# Patient Record
Sex: FEMALE | Race: WHITE | Employment: OTHER | URBAN - METROPOLITAN AREA
[De-identification: names, ages, dates, MRNs, and addresses within clinical notes are randomized per-mention and may not be internally consistent; named-entity substitution may affect disease eponyms.]

---

## 2017-07-12 LAB
AVERAGE GLUCOSE: NORMAL
HBA1C MFR BLD: 6.6 %

## 2017-10-19 ENCOUNTER — OFFICE VISIT (OUTPATIENT)
Dept: FAMILY MEDICINE CLINIC | Age: 67
End: 2017-10-19

## 2017-10-19 VITALS
WEIGHT: 293 LBS | BODY MASS INDEX: 45.99 KG/M2 | DIASTOLIC BLOOD PRESSURE: 76 MMHG | HEIGHT: 67 IN | SYSTOLIC BLOOD PRESSURE: 126 MMHG | OXYGEN SATURATION: 96 % | HEART RATE: 81 BPM | RESPIRATION RATE: 20 BRPM

## 2017-10-19 DIAGNOSIS — E83.42 HYPOMAGNESEMIA: ICD-10-CM

## 2017-10-19 DIAGNOSIS — Z78.0 POST-MENOPAUSAL: ICD-10-CM

## 2017-10-19 DIAGNOSIS — Z12.31 ENCOUNTER FOR SCREENING MAMMOGRAM FOR BREAST CANCER: ICD-10-CM

## 2017-10-19 DIAGNOSIS — R79.89 ELEVATED LFTS: Chronic | ICD-10-CM

## 2017-10-19 DIAGNOSIS — D68.51 HOMOZYGOUS FACTOR V LEIDEN MUTATION (HCC): ICD-10-CM

## 2017-10-19 DIAGNOSIS — I82.5Z1 LOWER LEG DVT (DEEP VENOUS THROMBOEMBOLISM), CHRONIC, RIGHT (HCC): ICD-10-CM

## 2017-10-19 DIAGNOSIS — R60.0 EDEMA OF BOTH LEGS: ICD-10-CM

## 2017-10-19 DIAGNOSIS — I10 ESSENTIAL HYPERTENSION: ICD-10-CM

## 2017-10-19 DIAGNOSIS — E78.00 HYPERCHOLESTEROLEMIA: ICD-10-CM

## 2017-10-19 DIAGNOSIS — Z23 NEED FOR INFLUENZA VACCINATION: ICD-10-CM

## 2017-10-19 DIAGNOSIS — E55.9 VITAMIN D DEFICIENCY: ICD-10-CM

## 2017-10-19 DIAGNOSIS — E11.69 DIABETES MELLITUS TYPE 2 IN OBESE (HCC): Primary | ICD-10-CM

## 2017-10-19 DIAGNOSIS — E66.9 DIABETES MELLITUS TYPE 2 IN OBESE (HCC): Primary | ICD-10-CM

## 2017-10-19 DIAGNOSIS — R74.8 LOW SERUM HDL: Chronic | ICD-10-CM

## 2017-10-19 PROCEDURE — G8417 CALC BMI ABV UP PARAM F/U: HCPCS | Performed by: FAMILY MEDICINE

## 2017-10-19 PROCEDURE — G8400 PT W/DXA NO RESULTS DOC: HCPCS | Performed by: FAMILY MEDICINE

## 2017-10-19 PROCEDURE — 99205 OFFICE O/P NEW HI 60 MIN: CPT | Performed by: FAMILY MEDICINE

## 2017-10-19 PROCEDURE — 1090F PRES/ABSN URINE INCON ASSESS: CPT | Performed by: FAMILY MEDICINE

## 2017-10-19 PROCEDURE — G8484 FLU IMMUNIZE NO ADMIN: HCPCS | Performed by: FAMILY MEDICINE

## 2017-10-19 PROCEDURE — 4040F PNEUMOC VAC/ADMIN/RCVD: CPT | Performed by: FAMILY MEDICINE

## 2017-10-19 PROCEDURE — 3046F HEMOGLOBIN A1C LEVEL >9.0%: CPT | Performed by: FAMILY MEDICINE

## 2017-10-19 PROCEDURE — 3014F SCREEN MAMMO DOC REV: CPT | Performed by: FAMILY MEDICINE

## 2017-10-19 PROCEDURE — G8427 DOCREV CUR MEDS BY ELIG CLIN: HCPCS | Performed by: FAMILY MEDICINE

## 2017-10-19 PROCEDURE — 1036F TOBACCO NON-USER: CPT | Performed by: FAMILY MEDICINE

## 2017-10-19 PROCEDURE — 3017F COLORECTAL CA SCREEN DOC REV: CPT | Performed by: FAMILY MEDICINE

## 2017-10-19 PROCEDURE — 85610 PROTHROMBIN TIME: CPT | Performed by: FAMILY MEDICINE

## 2017-10-19 PROCEDURE — 1123F ACP DISCUSS/DSCN MKR DOCD: CPT | Performed by: FAMILY MEDICINE

## 2017-10-19 RX ORDER — ATORVASTATIN CALCIUM 40 MG/1
1 TABLET, FILM COATED ORAL DAILY
Refills: 5 | COMMUNITY
Start: 2017-10-12 | End: 2018-02-13 | Stop reason: SDUPTHER

## 2017-10-19 RX ORDER — BLOOD SUGAR DIAGNOSTIC
STRIP MISCELLANEOUS
Refills: 0 | COMMUNITY
Start: 2017-08-22 | End: 2017-11-14 | Stop reason: SDUPTHER

## 2017-10-19 RX ORDER — METOPROLOL SUCCINATE 50 MG/1
TABLET, EXTENDED RELEASE ORAL
Qty: 30 TABLET | Refills: 2 | Status: SHIPPED | OUTPATIENT
Start: 2017-10-19 | End: 2017-10-19 | Stop reason: SDUPTHER

## 2017-10-19 RX ORDER — METOPROLOL SUCCINATE 50 MG/1
TABLET, EXTENDED RELEASE ORAL
Refills: 0 | COMMUNITY
Start: 2017-09-07 | End: 2017-10-19 | Stop reason: SDUPTHER

## 2017-10-19 RX ORDER — WARFARIN SODIUM 6 MG/1
6 TABLET ORAL DAILY
Qty: 90 TABLET | Refills: 5 | Status: SHIPPED | OUTPATIENT
Start: 2017-10-19 | End: 2018-11-04 | Stop reason: SDUPTHER

## 2017-10-19 RX ORDER — AMLODIPINE BESYLATE 10 MG/1
TABLET ORAL
Refills: 6 | COMMUNITY
Start: 2017-10-11 | End: 2018-02-13 | Stop reason: SDUPTHER

## 2017-10-19 RX ORDER — INSULIN GLARGINE 100 [IU]/ML
INJECTION, SOLUTION SUBCUTANEOUS
Refills: 6 | COMMUNITY
Start: 2017-10-07 | End: 2018-01-22 | Stop reason: ALTCHOICE

## 2017-10-19 NOTE — PROGRESS NOTES
Subjective:      Patient ID: Jennifer Elmore is a 74 y.o. female. Chief Complaint   Patient presents with    3 Month Follow-Up     HPI    Diabetes mellitus type 2 in obese ([address]  Last A1c was 6.1-or 6.2. On Lantus 72 at bed and Humalog SSI. Checks BS TID. Essential hypertension  -     metoprolol succinate (TOPROL XL) 50 MG extended release tablet; TAKE 1 TAB BY MOUTH DAILY  Has been in good control. Has been in good control. Is eating chips and fast food. Hypercholesterolemia  Has been in good control on Atorvastatin 40 mg.  Diet- watches. Foods that are high = eggs, bread, processed meats, desserts. Post-menopausal  -     DEXA Bone Density Axial Skeleton; Future  Factor V Leiden, prothrombin gene mutation (HCC) homozygous  -     warfarin (COUMADIN) 6 MG tablet; Take 1 tablet by mouth daily Sat 1 1/2 tablets, Sun 1 Tablets, Monday, 1 1/2 tabs, tues  1 tabs,Wed 1 1/2 tabs, , Friday 1 tab  -     POCT INR  Lifetime warfarin per prior providers. Lower leg DVT (deep venous thromboembolism), chronic, right (HCC)  -     warfarin (COUMADIN) 6 MG tablet; Take 1 tablet by mouth daily Sat 1 1/2 tablets, Sun 1 Tablets, Monday, 1 1/2 tabs, tues  1 tabs,Wed 1 1/2 tabs, Thurs 1, Friday 1 tab  Edema of both legs  On Lasix 40 mg and Potassium 10 mEq daily. Swells at times. Wears hose. Encounter for screening mammogram for breast cancer  -     [facility] Digital Screen Bilateral [[id number]];  Future  Need for influenza vaccination  -     INFLUENZA, HIGH DOSE, 65 YRS +, IM, PF, PREFILL SYR, 0.5ML (FLUZONE HD)    Past Medical History:   Diagnosis Date    Diabetes mellitus type 2 in obese ([address]     Edema of both legs     Heterozygous factor V Leiden mutation ([facility] [facility] 1950    Hypercholesterolemia     Hypertension     Lower leg DVT (deep venous thromboembolism), chronic, right [facility])        Past Surgical History:   Procedure Laterality Date    APPENDECTOMY  1976     SECTION      3     CHOLECYSTECTOMY  2002    gangrenous    TUBAL LIGATION Bilateral 02/12/1980       Social History     Social History    Marital status:      Spouse name: N/A    Number of children: 3    Years of education: 12     Occupational History    Retired  7/28/17      Social History Main Topics    Smoking status: Never Smoker    Smokeless tobacco: Never Used    Alcohol use Yes      Comment: OCCASIONALLY 1 or less/1-2 wk    Drug use: No      Comment: No Mj experimentation    Sexual activity: No     Other Topics Concern    Not on file     Social History Narrative    No Exercise. Problems    DEEP VENOUS THROMBOPHLEBITIS (ICD-453.40)    TRANSAMINASES, SERUM, ELEVATED (ICD-790.4)    HYPOMAGNESEMIA (ICD-275.2)    Hx of SEPSIS (ICD-995.91)    DIABETES MELLITUS, TYPE II (ICD-250.00)    HYPERTENSION (ICD-401. 9)    ALOPECIA (ICD-704.00)    ENCOUNTER FOR LONG-TERM USE OF ANTICOAGULANTS (ICD-V58.61)    DERMATITIS (ICD-692. 9)    CELLULITIS (ICD-682. 9)    FACTOR V DEFICIENCY (ICD-286.3)       Family History   Problem Relation Age of Onset    Coronary Art Dis Father     Heart Surgery Father 48    Hypertension Father     Heart Attack Father        Allergies   Allergen Reactions    Nickel Nausea Only and Rash    Ibuprofen      Pt takes Warfarin       Current Outpatient Prescriptions   Medication Sig Dispense Refill    atorvastatin (LIPITOR) 40 MG tablet Take 1 tablet by mouth daily  5    amLODIPine (NORVASC) 10 MG tablet TK 1 T PO D  6    LANTUS 100 UNIT/ML injection vial ADM 72 UNITS SC Q NIGHT  6    metoprolol succinate (TOPROL XL) 50 MG extended release tablet TK 1 T PO D  0    ACCU-CHEK APURVA PLUS strip CHECK 3 TO 4 TIMES DAILY  0    insulin lispro (HUMALOG) 100 UNIT/ML injection vial Inject into the skin 3 times daily (before meals) INJECT UP TO 30 MINUTES BEFORE MEAL.  SLIDING SCALE: IF GLUCOSE IS 0-79 EAT FIRST 0-79, EAT FIRST THEN ;USE 9 UNITS,  USE 10 UNITS, -200 USE 12 UNITS, -250, USE 15 UNITS, GREATER THAN 250 USE 18      lisinopril (PRINIVIL;ZESTRIL) 40 MG tablet Take 40 mg by mouth daily.  furosemide (LASIX) 20 MG tablet Take 20 mg by mouth daily.  potassium chloride SA (K-DUR;KLOR-CON M) 10 MEQ tablet Take 10 mEq by mouth daily.  warfarin (COUMADIN) 6 MG tablet Take 6 mg by mouth daily. Sat 1 1/2 tablets, Sun 1 Tablets, Monday, 1 1/2 tabs, tues ! 1/2 tabs,Wed 1 1/2 tabs. No current facility-administered medications for this visit. Review of Systems Review of systems in history of present illness or scanned in under media tab. Objective:   Physical Exam   Constitutional: She appears well-developed and well-nourished. No distress. HENT:   Right Ear: Tympanic membrane, external ear and ear canal normal. Tympanic membrane is not retracted and not bulging. No middle ear effusion. Left Ear: Tympanic membrane, external ear and ear canal normal. Tympanic membrane is not retracted and not bulging. No middle ear effusion. Nose: Nose normal. No mucosal edema or rhinorrhea. Mouth/Throat: Uvula is midline, oropharynx is clear and moist and mucous membranes are normal. Mucous membranes are not pale, not dry and not cyanotic. She has dentures. No oral lesions. No uvula swelling or dental caries. No oropharyngeal exudate, posterior oropharyngeal edema, posterior oropharyngeal erythema or tonsillar abscesses. Eyes: Conjunctivae and EOM are normal. Pupils are equal, round, and reactive to light. Right eye exhibits no discharge and no exudate. Left eye exhibits no discharge and no exudate. Right conjunctiva is not injected. Left conjunctiva is not injected. No scleral icterus. Neck: Trachea normal and phonation normal. Neck supple. No tracheal tenderness present. No tracheal deviation present. No thyroid mass and no thyromegaly present. Cardiovascular: Normal rate, regular rhythm, S1 normal and S2 normal.   No extrasystoles are present.  Exam reveals no gallop, no S3, no S4, no distant heart sounds and no friction rub. No murmur heard. No systolic murmur is present    No diastolic murmur is present   Pulmonary/Chest: Effort normal and breath sounds normal. No stridor. No respiratory distress. She has no decreased breath sounds. She has no wheezes. She has no rhonchi. She has no rales. Abdominal: Normal appearance. She exhibits no distension, no pulsatile midline mass and no mass. There is no hepatosplenomegaly. There is no tenderness. There is no rigidity, no rebound, no guarding, no CVA tenderness, no tenderness at McBurney's point and negative Yo's sign. Musculoskeletal: She exhibits edema (2 + right and trace left). Lymphadenopathy:        Head (right side): No submandibular and no tonsillar adenopathy present. Head (left side): No submandibular and no tonsillar adenopathy present. She has no cervical adenopathy. Right cervical: No superficial cervical, no deep cervical and no posterior cervical adenopathy present. Left cervical: No superficial cervical, no deep cervical and no posterior cervical adenopathy present. She has no axillary adenopathy. Right axillary: No pectoral and no lateral adenopathy present. Left axillary: No pectoral and no lateral adenopathy present. Right: No supraclavicular adenopathy present. Left: No supraclavicular adenopathy present. Neurological: She is alert. She displays no tremor. She exhibits normal muscle tone. Coordination and gait normal.   Reflex Scores:       Patellar reflexes are 2+ on the right side and 2+ on the left side. Skin: Skin is warm and dry. No lesion noted. She is not diaphoretic. No cyanosis. No pallor. Nails show no clubbing. Psychiatric: She has a normal mood and affect. Her behavior is normal. Judgment normal. Cognition and memory are normal.   Nursing note and vitals reviewed.     Vitals:    10/19/17 1403   BP: 126/76   Site: less than 90 or if less than 100 and the you are light headed when you stand up. Call if the lower number (diastolic blood pressure) is more than 100. A much lower bottom number even in the 40's is not usually urgent. Hypercholesterolemia/mixed hyperlipidemia/low HDL  Fasting labs. Post-menopausal  -     DEXA Bone Density Axial Skeleton; Future  Need a vitamin D level. Factor V Leiden, prothrombin gene mutation (HCC)  -     warfarin (COUMADIN) 6 MG tablet; Take 1 tablet by mouth daily Sat 1 1/2 tablets, Sun 1 Tablets, Monday, 1 1/2 tabs, tues  1 tabs,Wed 1 1/2 tabs, Thurs 1, Friday 1 tab  -     POCT INR    Lower leg DVT (deep venous thromboembolism), chronic, right (HCC)  -     warfarin (COUMADIN) 6 MG tablet; Take 1 tablet by mouth daily Sat 1 1/2 tablets, Sun 1 Tablets, Monday, 1 1/2 tabs, tues  1 tabs,Wed 1 1/2 tabs, Thurs 1, Friday 1 tab    Edema of both legs  Keep leg elevated when sitting. Wear compression stockings as much as possible. If feet are down while sitting press toes and front of foot down then lift them firmly with heel on the ground. When standing gently rock from heels to standing on toe tips. These are calf pumps that move fluid out of the calf and back to the central circulation and prevent worsened swelling in the foot and calf. Encounter for screening mammogram for breast cancer  -     [facility] Digital Screen Bilateral [[id number]]; Future    Need for influenza vaccination  -     INFLUENZA, HIGH DOSE, 65 YRS +, IM, PF, PREFILL SYR, 0.5ML (FLUZONE HD)      The more active you stay the more independent you stay. The latest recommendation from the [facility] Service for exercise is 30 minutes brisk walking or the equivalent 5 times per week or aerobic levels of exercise 25 minutes 3 times a week (breathing slightly hard and sweating at room temperature are indicators of aerobic exercise). OR walking 10,000 steps/day counted on a pedometer or cell phone. Low serum HDL  -     Lipid Panel; Standing    Elevated LFTs  -     Comprehensive Metabolic Panel; Standing    Hypomagnesemia  -     Magnesium; Standing    Vitamin D deficiency  -     Vitamin D 25 Hydroxy;  Future

## 2017-10-20 LAB
INTERNATIONAL NORMALIZATION RATIO, POC: 1.1
PROTHROMBIN TIME, POC: 12.8

## 2017-10-30 ENCOUNTER — ANTI-COAG VISIT (OUTPATIENT)
Dept: FAMILY MEDICINE CLINIC | Age: 67
End: 2017-10-30

## 2017-10-30 DIAGNOSIS — I82.5Z1 LOWER LEG DVT (DEEP VENOUS THROMBOEMBOLISM), CHRONIC, RIGHT (HCC): Primary | ICD-10-CM

## 2017-10-30 DIAGNOSIS — Z79.01 ENCOUNTER FOR CURRENT LONG-TERM USE OF ANTICOAGULANTS: ICD-10-CM

## 2017-10-30 DIAGNOSIS — Z23 NEED FOR INFLUENZA VACCINATION: ICD-10-CM

## 2017-10-30 LAB
INTERNATIONAL NORMALIZATION RATIO, POC: 2
PROTHROMBIN TIME, POC: 23.8

## 2017-10-30 PROCEDURE — 90471 IMMUNIZATION ADMIN: CPT | Performed by: FAMILY MEDICINE

## 2017-10-30 PROCEDURE — 85610 PROTHROMBIN TIME: CPT | Performed by: FAMILY MEDICINE

## 2017-10-30 PROCEDURE — 90662 IIV NO PRSV INCREASED AG IM: CPT | Performed by: FAMILY MEDICINE

## 2017-11-05 PROBLEM — E83.42 HYPOMAGNESEMIA: Status: ACTIVE | Noted: 2017-11-05

## 2017-11-14 ENCOUNTER — TELEPHONE (OUTPATIENT)
Dept: FAMILY MEDICINE CLINIC | Age: 67
End: 2017-11-14

## 2017-11-14 DIAGNOSIS — E66.9 DIABETES MELLITUS TYPE 2 IN OBESE (HCC): ICD-10-CM

## 2017-11-14 DIAGNOSIS — E11.69 DIABETES MELLITUS TYPE 2 IN OBESE (HCC): ICD-10-CM

## 2017-11-14 RX ORDER — BLOOD SUGAR DIAGNOSTIC
STRIP MISCELLANEOUS
Qty: 100 EACH | Refills: 2 | Status: SHIPPED | OUTPATIENT
Start: 2017-11-14 | End: 2018-03-15 | Stop reason: SDUPTHER

## 2017-11-14 NOTE — TELEPHONE ENCOUNTER
Pt calling for refills - never been filled through  before    accuchek Gabby plus  test strips     humalog insulin - [id number] 10 mg      [facility]-[address], [address] [place] [address] [address] - P [phone] - F [phone]    Pt @  [phone]

## 2017-11-15 RX ORDER — BLOOD SUGAR DIAGNOSTIC
STRIP MISCELLANEOUS
Qty: 300 STRIP | Refills: 2 | OUTPATIENT
Start: 2017-11-15

## 2017-11-30 ENCOUNTER — NURSE ONLY (OUTPATIENT)
Dept: FAMILY MEDICINE CLINIC | Age: 67
End: 2017-11-30

## 2017-11-30 DIAGNOSIS — D68.51 HOMOZYGOUS FACTOR V LEIDEN MUTATION (HCC): ICD-10-CM

## 2017-11-30 DIAGNOSIS — I82.5Z1 LOWER LEG DVT (DEEP VENOUS THROMBOEMBOLISM), CHRONIC, RIGHT (HCC): ICD-10-CM

## 2017-11-30 LAB
INTERNATIONAL NORMALIZATION RATIO, POC: 3.6
PROTHROMBIN TIME, POC: 42.8

## 2017-11-30 PROCEDURE — 85610 PROTHROMBIN TIME: CPT | Performed by: FAMILY MEDICINE

## 2017-12-07 ENCOUNTER — ANTI-COAG VISIT (OUTPATIENT)
Dept: FAMILY MEDICINE CLINIC | Age: 67
End: 2017-12-07

## 2017-12-07 DIAGNOSIS — I82.5Z1 LOWER LEG DVT (DEEP VENOUS THROMBOEMBOLISM), CHRONIC, RIGHT (HCC): ICD-10-CM

## 2017-12-07 DIAGNOSIS — D68.51 HOMOZYGOUS FACTOR V LEIDEN MUTATION (HCC): ICD-10-CM

## 2017-12-07 LAB
INTERNATIONAL NORMALIZATION RATIO, POC: 2.5
PROTHROMBIN TIME, POC: 30.2

## 2017-12-07 PROCEDURE — 85610 PROTHROMBIN TIME: CPT | Performed by: FAMILY MEDICINE

## 2017-12-22 ENCOUNTER — ANTI-COAG VISIT (OUTPATIENT)
Dept: FAMILY MEDICINE CLINIC | Age: 67
End: 2017-12-22

## 2017-12-22 DIAGNOSIS — D68.51 HOMOZYGOUS FACTOR V LEIDEN MUTATION (HCC): ICD-10-CM

## 2017-12-22 DIAGNOSIS — I82.5Z1 LOWER LEG DVT (DEEP VENOUS THROMBOEMBOLISM), CHRONIC, RIGHT (HCC): ICD-10-CM

## 2017-12-22 LAB
INTERNATIONAL NORMALIZATION RATIO, POC: 3.7
PROTHROMBIN TIME, POC: 44.8

## 2017-12-22 PROCEDURE — 85610 PROTHROMBIN TIME: CPT | Performed by: FAMILY MEDICINE

## 2017-12-29 ENCOUNTER — ANTI-COAG VISIT (OUTPATIENT)
Dept: FAMILY MEDICINE CLINIC | Age: 67
End: 2017-12-29

## 2017-12-29 DIAGNOSIS — I82.5Z1 LOWER LEG DVT (DEEP VENOUS THROMBOEMBOLISM), CHRONIC, RIGHT (HCC): ICD-10-CM

## 2017-12-29 DIAGNOSIS — D68.51 HOMOZYGOUS FACTOR V LEIDEN MUTATION (HCC): ICD-10-CM

## 2017-12-29 LAB
INTERNATIONAL NORMALIZATION RATIO, POC: 2.9
PROTHROMBIN TIME, POC: 34.5

## 2017-12-29 PROCEDURE — 85610 PROTHROMBIN TIME: CPT | Performed by: FAMILY MEDICINE

## 2018-01-11 DIAGNOSIS — E87.6 DRUG-INDUCED HYPOKALEMIA: ICD-10-CM

## 2018-01-11 DIAGNOSIS — T50.905A DRUG-INDUCED HYPOKALEMIA: ICD-10-CM

## 2018-01-11 DIAGNOSIS — I10 ESSENTIAL HYPERTENSION: Primary | ICD-10-CM

## 2018-01-11 DIAGNOSIS — R60.0 EDEMA OF BOTH LEGS: ICD-10-CM

## 2018-01-11 RX ORDER — FUROSEMIDE 40 MG/1
TABLET ORAL
Qty: 30 TABLET | Refills: 0 | Status: SHIPPED | OUTPATIENT
Start: 2018-01-11 | End: 2018-02-10 | Stop reason: SDUPTHER

## 2018-01-11 RX ORDER — LISINOPRIL 40 MG/1
TABLET ORAL
Qty: 30 TABLET | Refills: 0 | Status: SHIPPED | OUTPATIENT
Start: 2018-01-11 | End: 2018-02-10 | Stop reason: SDUPTHER

## 2018-01-11 RX ORDER — POTASSIUM CHLORIDE 750 MG/1
TABLET, EXTENDED RELEASE ORAL
Qty: 30 TABLET | Refills: 0 | Status: SHIPPED | OUTPATIENT
Start: 2018-01-11 | End: 2018-02-10 | Stop reason: SDUPTHER

## 2018-01-12 ENCOUNTER — ANTI-COAG VISIT (OUTPATIENT)
Dept: FAMILY MEDICINE CLINIC | Age: 68
End: 2018-01-12

## 2018-01-12 DIAGNOSIS — D68.51 HOMOZYGOUS FACTOR V LEIDEN MUTATION (HCC): ICD-10-CM

## 2018-01-12 DIAGNOSIS — I82.5Z1 LOWER LEG DVT (DEEP VENOUS THROMBOEMBOLISM), CHRONIC, RIGHT (HCC): ICD-10-CM

## 2018-01-12 LAB
INTERNATIONAL NORMALIZATION RATIO, POC: 1.9
PROTHROMBIN TIME, POC: 22.4

## 2018-01-12 PROCEDURE — 85610 PROTHROMBIN TIME: CPT | Performed by: FAMILY MEDICINE

## 2018-01-22 ENCOUNTER — OFFICE VISIT (OUTPATIENT)
Dept: FAMILY MEDICINE CLINIC | Age: 68
End: 2018-01-22

## 2018-01-22 VITALS
SYSTOLIC BLOOD PRESSURE: 112 MMHG | RESPIRATION RATE: 16 BRPM | OXYGEN SATURATION: 97 % | HEIGHT: 66 IN | DIASTOLIC BLOOD PRESSURE: 64 MMHG | WEIGHT: 293 LBS | HEART RATE: 56 BPM | BODY MASS INDEX: 47.09 KG/M2

## 2018-01-22 DIAGNOSIS — Z78.0 POST-MENOPAUSAL: ICD-10-CM

## 2018-01-22 DIAGNOSIS — R79.89 ELEVATED LFTS: Chronic | ICD-10-CM

## 2018-01-22 DIAGNOSIS — I10 ESSENTIAL HYPERTENSION: Chronic | ICD-10-CM

## 2018-01-22 DIAGNOSIS — Z23 NEED FOR PROPHYLACTIC VACCINATION AGAINST DIPHTHERIA-TETANUS-PERTUSSIS (DTP): ICD-10-CM

## 2018-01-22 DIAGNOSIS — I82.5Z1 LOWER LEG DVT (DEEP VENOUS THROMBOEMBOLISM), CHRONIC, RIGHT (HCC): Chronic | ICD-10-CM

## 2018-01-22 DIAGNOSIS — R74.8 LOW SERUM HDL: Chronic | ICD-10-CM

## 2018-01-22 DIAGNOSIS — E11.69 DIABETES MELLITUS TYPE 2 IN OBESE (HCC): Primary | Chronic | ICD-10-CM

## 2018-01-22 DIAGNOSIS — E66.9 DIABETES MELLITUS TYPE 2 IN OBESE (HCC): Primary | Chronic | ICD-10-CM

## 2018-01-22 DIAGNOSIS — E78.00 HYPERCHOLESTEROLEMIA: Chronic | ICD-10-CM

## 2018-01-22 DIAGNOSIS — Z12.31 ENCOUNTER FOR SCREENING MAMMOGRAM FOR BREAST CANCER: ICD-10-CM

## 2018-01-22 DIAGNOSIS — D68.51 HOMOZYGOUS FACTOR V LEIDEN MUTATION (HCC): Chronic | ICD-10-CM

## 2018-01-22 DIAGNOSIS — E55.9 VITAMIN D DEFICIENCY: ICD-10-CM

## 2018-01-22 DIAGNOSIS — E83.42 HYPOMAGNESEMIA: Chronic | ICD-10-CM

## 2018-01-22 DIAGNOSIS — R60.0 EDEMA OF BOTH LEGS: Chronic | ICD-10-CM

## 2018-01-22 LAB
A/G RATIO: 1.3 (ref 1.1–2.2)
ALBUMIN SERPL-MCNC: 3.7 G/DL (ref 3.4–5)
ALP BLD-CCNC: 93 U/L (ref 40–129)
ALT SERPL-CCNC: 10 U/L (ref 10–40)
ANION GAP SERPL CALCULATED.3IONS-SCNC: 12 MMOL/L (ref 3–16)
AST SERPL-CCNC: 15 U/L (ref 15–37)
BILIRUB SERPL-MCNC: 1.4 MG/DL (ref 0–1)
BUN BLDV-MCNC: 20 MG/DL (ref 7–20)
CALCIUM SERPL-MCNC: 9.1 MG/DL (ref 8.3–10.6)
CHLORIDE BLD-SCNC: 103 MMOL/L (ref 99–110)
CHOLESTEROL, TOTAL: 164 MG/DL (ref 0–199)
CO2: 28 MMOL/L (ref 21–32)
CREAT SERPL-MCNC: 0.8 MG/DL (ref 0.6–1.2)
CREATININE URINE: 134.1 MG/DL (ref 28–259)
GFR AFRICAN AMERICAN: >60
GFR NON-AFRICAN AMERICAN: >60
GLOBULIN: 2.9 G/DL
GLUCOSE BLD-MCNC: 61 MG/DL (ref 70–99)
HBA1C MFR BLD: 7.2 %
HDLC SERPL-MCNC: 53 MG/DL (ref 40–60)
HOMOCYSTEINE: 19 UMOL/L (ref 0–10)
INTERNATIONAL NORMALIZATION RATIO, POC: 1.9
LDL CHOLESTEROL CALCULATED: 89 MG/DL
MAGNESIUM: 1.9 MG/DL (ref 1.8–2.4)
MICROALBUMIN UR-MCNC: <1.2 MG/DL
MICROALBUMIN/CREAT UR-RTO: NORMAL MG/G (ref 0–30)
POTASSIUM SERPL-SCNC: 4.1 MMOL/L (ref 3.5–5.1)
PROTHROMBIN TIME, POC: 22.9
SODIUM BLD-SCNC: 143 MMOL/L (ref 136–145)
TOTAL PROTEIN: 6.6 G/DL (ref 6.4–8.2)
TRIGL SERPL-MCNC: 110 MG/DL (ref 0–150)
VITAMIN D 25-HYDROXY: 13.2 NG/ML
VLDLC SERPL CALC-MCNC: 22 MG/DL

## 2018-01-22 PROCEDURE — 83036 HEMOGLOBIN GLYCOSYLATED A1C: CPT | Performed by: FAMILY MEDICINE

## 2018-01-22 PROCEDURE — 1036F TOBACCO NON-USER: CPT | Performed by: FAMILY MEDICINE

## 2018-01-22 PROCEDURE — 3045F PR MOST RECENT HEMOGLOBIN A1C LEVEL 7.0-9.0%: CPT | Performed by: FAMILY MEDICINE

## 2018-01-22 PROCEDURE — 85610 PROTHROMBIN TIME: CPT | Performed by: FAMILY MEDICINE

## 2018-01-22 PROCEDURE — 1123F ACP DISCUSS/DSCN MKR DOCD: CPT | Performed by: FAMILY MEDICINE

## 2018-01-22 PROCEDURE — G8427 DOCREV CUR MEDS BY ELIG CLIN: HCPCS | Performed by: FAMILY MEDICINE

## 2018-01-22 PROCEDURE — 3017F COLORECTAL CA SCREEN DOC REV: CPT | Performed by: FAMILY MEDICINE

## 2018-01-22 PROCEDURE — 4040F PNEUMOC VAC/ADMIN/RCVD: CPT | Performed by: FAMILY MEDICINE

## 2018-01-22 PROCEDURE — 1090F PRES/ABSN URINE INCON ASSESS: CPT | Performed by: FAMILY MEDICINE

## 2018-01-22 PROCEDURE — 36415 COLL VENOUS BLD VENIPUNCTURE: CPT | Performed by: FAMILY MEDICINE

## 2018-01-22 PROCEDURE — G8417 CALC BMI ABV UP PARAM F/U: HCPCS | Performed by: FAMILY MEDICINE

## 2018-01-22 PROCEDURE — G8484 FLU IMMUNIZE NO ADMIN: HCPCS | Performed by: FAMILY MEDICINE

## 2018-01-22 PROCEDURE — G8400 PT W/DXA NO RESULTS DOC: HCPCS | Performed by: FAMILY MEDICINE

## 2018-01-22 PROCEDURE — 99215 OFFICE O/P EST HI 40 MIN: CPT | Performed by: FAMILY MEDICINE

## 2018-01-22 PROCEDURE — 3014F SCREEN MAMMO DOC REV: CPT | Performed by: FAMILY MEDICINE

## 2018-01-22 RX ORDER — CALCIUM CARB/VITAMIN D3/VIT K1 500-100-40
TABLET,CHEWABLE ORAL
Refills: 2 | COMMUNITY
Start: 2018-01-07 | End: 2018-08-06 | Stop reason: SDUPTHER

## 2018-01-22 RX ORDER — LANCETS
EACH MISCELLANEOUS
Refills: 10 | COMMUNITY
Start: 2018-01-10 | End: 2019-08-02 | Stop reason: SDUPTHER

## 2018-01-22 ASSESSMENT — PATIENT HEALTH QUESTIONNAIRE - PHQ9
2. FEELING DOWN, DEPRESSED OR HOPELESS: 0
1. LITTLE INTEREST OR PLEASURE IN DOING THINGS: 0
SUM OF ALL RESPONSES TO PHQ QUESTIONS 1-9: 0
SUM OF ALL RESPONSES TO PHQ9 QUESTIONS 1 & 2: 0

## 2018-01-22 NOTE — PROGRESS NOTES
 ACCU-CHEK APURVA PLUS strip CHECK 3 TO 4 TIMES DAILY 100 each 2    insulin lispro (HUMALOG) 100 UNIT/ML injection vial INJECT UP TO 30 MINUTES BEFORE MEAL. SLIDING SCALE: IF GLUCOSE IS 0-79 EAT FIRST THEN: USE 9 UNITS,  USE 10 UNITS, -200 USE 12 UNITS, -250, USE 15 UNITS, GREATER THAN 250 USE 18 1 vial 3    atorvastatin (LIPITOR) 40 MG tablet Take 1 tablet by mouth daily  5    amLODIPine (NORVASC) 10 MG tablet TK 1 T PO D  6    LANTUS 100 UNIT/ML injection vial ADM 72 UNITS SC Q NIGHT  6    warfarin (COUMADIN) 6 MG tablet Take 1 tablet by mouth daily Sat 1 1/2 tablets, Sun 1 Tablets, Monday, 1 1/2 tabs, tues  1 tabs,Wed 1 1/2 tabs, Thurs 1, Friday 1 tab 90 tablet 5    metoprolol succinate (TOPROL XL) 50 MG extended release tablet TAKE 1 TABLET BY MOUTH DAILY 90 tablet 0     No current facility-administered medications on file prior to visit. Review of Systems    Objective:   Physical Exam   Constitutional: She appears well-developed. No distress. Eyes: Conjunctivae are normal. Right eye exhibits no discharge. Left eye exhibits no discharge. No scleral icterus. Neck: Trachea normal and phonation normal. Neck supple. No JVD present. Carotid bruit is not present. No tracheal deviation present. No thyroid mass and no thyromegaly present. Cardiovascular: Normal rate, regular rhythm, S1 normal, S2 normal and normal heart sounds. No extrasystoles are present. Exam reveals no gallop, no S3, no S4, no distant heart sounds and no friction rub. No murmur heard. Pulmonary/Chest: Effort normal and breath sounds normal. No respiratory distress. She has no decreased breath sounds. She has no wheezes. She has no rhonchi. She has no rales. Musculoskeletal: She exhibits edema (2+ right). Lymphadenopathy:        Head (right side): No submandibular and no tonsillar adenopathy present. Head (left side): No submandibular and no tonsillar adenopathy present.      She has no cervical adenopathy. Right cervical: No superficial cervical, no deep cervical and no posterior cervical adenopathy present. Left cervical: No superficial cervical, no deep cervical and no posterior cervical adenopathy present. Neurological: She is alert. Reflex Scores:       Patellar reflexes are 2+ on the right side and 2+ on the left side. Skin: Skin is warm and dry. She is not diaphoretic. No cyanosis. No pallor. Nails show no clubbing. Psychiatric: She has a normal mood and affect. Her speech is normal and behavior is normal. Judgment normal. Cognition and memory are normal.   Nursing note and vitals reviewed. BP Readings from Last 3 Encounters:   01/22/18 112/64   10/19/17 126/76   05/10/14 139/75     Pulse Readings from Last 3 Encounters:   01/22/18 56   10/19/17 81   05/10/14 75     Wt Readings from Last 3 Encounters:   01/22/18 (!) 308 lb 6.4 oz (139.9 kg)   10/19/17 (!) 304 lb 9.6 oz (138.2 kg)   05/10/14 245 lb (111.1 kg)     Body mass index is 50.54 kg/m². Results for POC orders placed in visit on 01/22/18   POCT glycosylated hemoglobin (Hb A1C)   Result Value Ref Range    Hemoglobin A1C 7.2 %   POCT INR   Result Value Ref Range    INR 1.9     Protime 22.9     Impression    POCT/INR PERFORMED SLIGHTLY OUT OF NML RANGE PT ADVISED PER DR. Diogo Landry TO TAKE 9MG TOMORROW NIGHT THEN RESUME NML DOSE RECHECK IN X4 WEEKS. SG     Assessment:      1. Diabetes mellitus type 2 in obese ([facility] [address]    2. Hypercholesterolemia    3. Essential hypertension    4. Hypomagnesemia    5. Low serum HDL    6. Homozygous Factor V Leiden mutation ([facility] [address]    7. Edema of both legs    8. Lower leg DVT (deep venous thromboembolism), chronic, right (HCC)    9. Elevated LFTs    10. Need for prophylactic vaccination against diphtheria-tetanus-pertussis (DTP)    11. Encounter for screening mammogram for breast cancer    12. Post-menopausal    13.  Vitamin D deficiency          Plan:      - Counseling More Than 50% of the 40 min

## 2018-01-22 NOTE — PATIENT INSTRUCTIONS
Jarett Sharif was seen today for diabetes. Diagnoses and all orders for this visit:    Diabetes mellitus type 2 in obese ([facility] [address]  -     Microalbumin / creatinine urine ratio; Future  -     POCT glycosylated hemoglobin (Hb A1C)  -     Insulin Degludec (TRESIBA FLEXTOUCH) 100 UNIT/ML SOPN; Inject 72 Units into the skin Daily with supper - continue same sliding scale. Based on your recent lab values, your Diabetes is unstable. Estimated Average Glucose (eAG) is a new way to understand how well you're managing your diabetes. How does it relate to A1C? If you have diabetes, you may know about the A1c test that tells caban your average blood glucose over the past 2 to 3 months. A1C is reported as a percent (for example, 7%). Now we have a new way to report A1C called estimated average glucose, or eAG. eAG uses the same units (mg/dl) as your glucose meters. Why use eAG? Using eAG may help you get a better idea of how well you are taking care of your diabetes. And that can help you and your health care provider know what changes you may need to make to be as healthy as possible. What's your number? Lab Results   Component Value Date    LABA1C 7.2 01/22/2018   · We will see you at your next scheduled appointment  · Make sure you are taking your medications regularly    -     Blood sugar goal is  before a meal.  Less than 180 1-2 hours after a meal.  100-140 at bedtime. The Hemoglobin A1c goal for good control to avoid diabetic complications like stroke, heart attacks, amputations, kidney dialysis is an A1c of 6.4 or less. Up to 6.9 is considered fair control. Hypercholesterolemia  -     Lipid Panel     Essential hypertension  -     Good control.  -     Continue meds and lifestyle control. Recommend a home cuff. Hypomagnesemia  Check level.     Low serum HDL  -     Lipid Panel    Homozygous Factor V Leiden mutation ([facility] [address] / Lower leg DVT (deep venous thromboembolism), chronic, right ([facility]  Call if sick and medicines are not staying down. Edema of both legs  Keep leg elevated when sitting. Wear compression stockings as much as possible. If feet are down while sitting press toes and front of foot down then lift them firmly with heel on the ground. When standing gently rock from heels to standing on toe tips. These are calf pumps that move fluid out of the calf and back to the central circulation and prevent worsened swelling in the foot and calf. Elevated LFTs  Recheck. Need for prophylactic vaccination against diphtheria-tetanus-pertussis (DTP)  -     Tdap (ADACEL) 5-2-15.5 LF-MCG/0.5 injection; Inject 0.5 mLs into the muscle once for 1 dose    Encounter for screening mammogram for breast cancer  -     [facility] Digital Screen Bilateral [[id number]]; Future    Post-menopausal  -     DEXA Bone Density Axial Skeleton; Future    Vitamin D deficiency  IF you have low level of vitamin D. This is associated with: Increased risk of death from cardiovascular disease, cognitive impairment in older adults, severe asthma in children, cancer especially breast and colon. Vitamin D may also have a role in treatment of diabetes and prediabetes, high blood pressure, psoriasis and multiple sclerosis. Vitamin D deficiency weakens bones leading to rickets in children and osteoporosis in adults, increases risk of infections, and leads to more skin problems. The primary symptoms of deficiency are often muscle aches and weakness as well as fatigue often mistaken for natural aging. Will notify you if you need this. Other orders  -     Cancel:  DIABETES FOOT EXAM - will do at next visit.

## 2018-01-24 DIAGNOSIS — E72.11 HOMOCYSTINEMIA (HCC): Primary | ICD-10-CM

## 2018-01-24 DIAGNOSIS — E55.9 VITAMIN D DEFICIENCY: ICD-10-CM

## 2018-01-24 PROBLEM — R79.83 HOMOCYSTINEMIA: Status: ACTIVE | Noted: 2018-01-24

## 2018-01-24 PROBLEM — R79.83 HOMOCYSTINEMIA: Chronic | Status: ACTIVE | Noted: 2018-01-22

## 2018-01-24 RX ORDER — FOLIC ACID 1 MG/1
1 TABLET ORAL DAILY
Qty: 90 TABLET | Refills: 3 | Status: SHIPPED | OUTPATIENT
Start: 2018-01-24 | End: 2019-02-15 | Stop reason: SDUPTHER

## 2018-01-31 ENCOUNTER — TELEPHONE (OUTPATIENT)
Dept: FAMILY MEDICINE CLINIC | Age: 68
End: 2018-01-31

## 2018-01-31 DIAGNOSIS — E66.9 DIABETES MELLITUS TYPE 2 IN OBESE (HCC): Primary | Chronic | ICD-10-CM

## 2018-01-31 DIAGNOSIS — E11.69 DIABETES MELLITUS TYPE 2 IN OBESE (HCC): Primary | Chronic | ICD-10-CM

## 2018-02-01 NOTE — TELEPHONE ENCOUNTER
She is wondering if she supposed to take her sliding scale Humalog along with a [place]. Explained that she is. The sliding scale Humalog as twice a day. The [place] is just before dinner. By taking it before dinner it will lower the postprandial sugar after dinner and help prevent low sugars overnight or the following morning. She is also asking about the folic acid was at for? 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 She will start 1 mg once daily as instructed. Her previous needles or not for an insulin pen they were for a syringe. She needs pen needles. Called [facility]. They suggested 31-gauge ×5 mm. E scribed number 100 with 3 additional refills. Patient was told that she can come in and be instructed on how to use her a Tresiba pen tomorrow.

## 2018-02-10 DIAGNOSIS — T50.905A DRUG-INDUCED HYPOKALEMIA: ICD-10-CM

## 2018-02-10 DIAGNOSIS — R60.0 EDEMA OF BOTH LEGS: ICD-10-CM

## 2018-02-10 DIAGNOSIS — I10 ESSENTIAL HYPERTENSION: ICD-10-CM

## 2018-02-10 DIAGNOSIS — E87.6 DRUG-INDUCED HYPOKALEMIA: ICD-10-CM

## 2018-02-12 RX ORDER — POTASSIUM CHLORIDE 750 MG/1
TABLET, EXTENDED RELEASE ORAL
Qty: 30 TABLET | Refills: 2 | Status: SHIPPED | OUTPATIENT
Start: 2018-02-12 | End: 2018-06-16 | Stop reason: SDUPTHER

## 2018-02-12 RX ORDER — FUROSEMIDE 40 MG/1
TABLET ORAL
Qty: 30 TABLET | Refills: 2 | Status: SHIPPED | OUTPATIENT
Start: 2018-02-12 | End: 2018-06-16 | Stop reason: SDUPTHER

## 2018-02-12 RX ORDER — LISINOPRIL 40 MG/1
TABLET ORAL
Qty: 30 TABLET | Refills: 2 | Status: SHIPPED | OUTPATIENT
Start: 2018-02-12 | End: 2018-06-16 | Stop reason: SDUPTHER

## 2018-02-13 DIAGNOSIS — R74.8 LOW SERUM HDL: Chronic | ICD-10-CM

## 2018-02-13 DIAGNOSIS — I10 ESSENTIAL HYPERTENSION: ICD-10-CM

## 2018-02-13 DIAGNOSIS — E78.00 HYPERCHOLESTEROLEMIA: ICD-10-CM

## 2018-02-13 RX ORDER — ATORVASTATIN CALCIUM 40 MG/1
40 TABLET, FILM COATED ORAL NIGHTLY
Qty: 90 TABLET | Refills: 3 | Status: SHIPPED | OUTPATIENT
Start: 2018-02-13 | End: 2018-07-31 | Stop reason: SDUPTHER

## 2018-02-13 RX ORDER — AMLODIPINE BESYLATE 10 MG/1
10 TABLET ORAL DAILY
Qty: 90 TABLET | Refills: 1 | Status: SHIPPED | OUTPATIENT
Start: 2018-02-13 | End: 2018-07-31 | Stop reason: SDUPTHER

## 2018-02-22 ENCOUNTER — ANTI-COAG VISIT (OUTPATIENT)
Dept: FAMILY MEDICINE CLINIC | Age: 68
End: 2018-02-22

## 2018-02-22 DIAGNOSIS — Z23 NEED FOR PNEUMOCOCCAL VACCINE: Primary | ICD-10-CM

## 2018-02-22 DIAGNOSIS — D68.51 HOMOZYGOUS FACTOR V LEIDEN MUTATION (HCC): ICD-10-CM

## 2018-02-22 DIAGNOSIS — I82.5Z1 LOWER LEG DVT (DEEP VENOUS THROMBOEMBOLISM), CHRONIC, RIGHT (HCC): ICD-10-CM

## 2018-02-22 LAB
INTERNATIONAL NORMALIZATION RATIO, POC: 1.9
PROTHROMBIN TIME, POC: 22.4

## 2018-02-22 PROCEDURE — G0009 ADMIN PNEUMOCOCCAL VACCINE: HCPCS | Performed by: FAMILY MEDICINE

## 2018-02-22 PROCEDURE — 85610 PROTHROMBIN TIME: CPT | Performed by: FAMILY MEDICINE

## 2018-02-22 PROCEDURE — 90670 PCV13 VACCINE IM: CPT | Performed by: FAMILY MEDICINE

## 2018-03-08 ENCOUNTER — ANTI-COAG VISIT (OUTPATIENT)
Dept: FAMILY MEDICINE CLINIC | Age: 68
End: 2018-03-08

## 2018-03-08 DIAGNOSIS — I82.5Z1 LOWER LEG DVT (DEEP VENOUS THROMBOEMBOLISM), CHRONIC, RIGHT (HCC): ICD-10-CM

## 2018-03-08 DIAGNOSIS — D68.51 HOMOZYGOUS FACTOR V LEIDEN MUTATION (HCC): ICD-10-CM

## 2018-03-08 LAB
INTERNATIONAL NORMALIZATION RATIO, POC: 3.3
PROTHROMBIN TIME, POC: 39.3

## 2018-03-08 PROCEDURE — 85610 PROTHROMBIN TIME: CPT | Performed by: FAMILY MEDICINE

## 2018-03-15 ENCOUNTER — ANTI-COAG VISIT (OUTPATIENT)
Dept: FAMILY MEDICINE CLINIC | Age: 68
End: 2018-03-15

## 2018-03-15 DIAGNOSIS — D68.51 HOMOZYGOUS FACTOR V LEIDEN MUTATION (HCC): ICD-10-CM

## 2018-03-15 DIAGNOSIS — I82.5Z1 LOWER LEG DVT (DEEP VENOUS THROMBOEMBOLISM), CHRONIC, RIGHT (HCC): ICD-10-CM

## 2018-03-15 DIAGNOSIS — E66.9 DIABETES MELLITUS TYPE 2 IN OBESE (HCC): ICD-10-CM

## 2018-03-15 DIAGNOSIS — E11.69 DIABETES MELLITUS TYPE 2 IN OBESE (HCC): ICD-10-CM

## 2018-03-15 LAB
INTERNATIONAL NORMALIZATION RATIO, POC: 2.8
PROTHROMBIN TIME, POC: 33.7

## 2018-03-15 PROCEDURE — 85610 PROTHROMBIN TIME: CPT | Performed by: FAMILY MEDICINE

## 2018-03-15 RX ORDER — BLOOD SUGAR DIAGNOSTIC
STRIP MISCELLANEOUS
Qty: 100 STRIP | Refills: 0 | Status: SHIPPED | OUTPATIENT
Start: 2018-03-15 | End: 2018-03-28 | Stop reason: SDUPTHER

## 2018-03-28 DIAGNOSIS — E11.69 DIABETES MELLITUS TYPE 2 IN OBESE (HCC): ICD-10-CM

## 2018-03-28 DIAGNOSIS — E66.9 DIABETES MELLITUS TYPE 2 IN OBESE (HCC): ICD-10-CM

## 2018-03-28 RX ORDER — BLOOD SUGAR DIAGNOSTIC
STRIP MISCELLANEOUS
Qty: 100 STRIP | Refills: 0 | Status: SHIPPED | OUTPATIENT
Start: 2018-03-28 | End: 2018-04-10 | Stop reason: SDUPTHER

## 2018-03-29 ENCOUNTER — ANTI-COAG VISIT (OUTPATIENT)
Dept: FAMILY MEDICINE CLINIC | Age: 68
End: 2018-03-29

## 2018-03-29 DIAGNOSIS — I82.5Z1 LOWER LEG DVT (DEEP VENOUS THROMBOEMBOLISM), CHRONIC, RIGHT (HCC): ICD-10-CM

## 2018-03-29 DIAGNOSIS — Z79.01 ENCOUNTER FOR CURRENT LONG-TERM USE OF ANTICOAGULANTS: ICD-10-CM

## 2018-03-29 DIAGNOSIS — D68.51 HOMOZYGOUS FACTOR V LEIDEN MUTATION (HCC): ICD-10-CM

## 2018-03-29 LAB
INTERNATIONAL NORMALIZATION RATIO, POC: 2.9
PROTHROMBIN TIME, POC: 34.3

## 2018-03-29 PROCEDURE — 85610 PROTHROMBIN TIME: CPT | Performed by: FAMILY MEDICINE

## 2018-03-29 NOTE — PROGRESS NOTES
POCT PT/INR PERFORMED, WITHIN NORMAL RANGE. PER DR, CONTINUE SAME DOSE OF 9 MG MON, WED, SAT AND 6 MG ALL OTHER DAYS. REPEAT IN 4 WKS. PT INFORMED.  [facility]

## 2018-04-10 DIAGNOSIS — E66.9 DIABETES MELLITUS TYPE 2 IN OBESE (HCC): ICD-10-CM

## 2018-04-10 DIAGNOSIS — E11.69 DIABETES MELLITUS TYPE 2 IN OBESE (HCC): ICD-10-CM

## 2018-04-10 RX ORDER — BLOOD SUGAR DIAGNOSTIC
STRIP MISCELLANEOUS
Qty: 300 STRIP | Refills: 1 | Status: SHIPPED | OUTPATIENT
Start: 2018-04-10 | End: 2019-02-26 | Stop reason: SDUPTHER

## 2018-04-11 ENCOUNTER — OFFICE VISIT (OUTPATIENT)
Dept: FAMILY MEDICINE CLINIC | Age: 68
End: 2018-04-11

## 2018-04-11 VITALS
OXYGEN SATURATION: 98 % | HEART RATE: 72 BPM | TEMPERATURE: 97.7 F | SYSTOLIC BLOOD PRESSURE: 122 MMHG | HEIGHT: 66 IN | RESPIRATION RATE: 18 BRPM | DIASTOLIC BLOOD PRESSURE: 82 MMHG | WEIGHT: 293 LBS | BODY MASS INDEX: 47.09 KG/M2

## 2018-04-11 DIAGNOSIS — Z01.818 PRE-OP EXAMINATION: ICD-10-CM

## 2018-04-11 DIAGNOSIS — H26.9 CATARACT OF BOTH EYES, UNSPECIFIED CATARACT TYPE: Primary | ICD-10-CM

## 2018-04-11 PROCEDURE — 1090F PRES/ABSN URINE INCON ASSESS: CPT | Performed by: NURSE PRACTITIONER

## 2018-04-11 PROCEDURE — G8427 DOCREV CUR MEDS BY ELIG CLIN: HCPCS | Performed by: NURSE PRACTITIONER

## 2018-04-11 PROCEDURE — G8417 CALC BMI ABV UP PARAM F/U: HCPCS | Performed by: NURSE PRACTITIONER

## 2018-04-11 PROCEDURE — 3014F SCREEN MAMMO DOC REV: CPT | Performed by: NURSE PRACTITIONER

## 2018-04-11 PROCEDURE — 3017F COLORECTAL CA SCREEN DOC REV: CPT | Performed by: NURSE PRACTITIONER

## 2018-04-11 PROCEDURE — 99213 OFFICE O/P EST LOW 20 MIN: CPT | Performed by: NURSE PRACTITIONER

## 2018-04-26 ENCOUNTER — ANTI-COAG VISIT (OUTPATIENT)
Dept: FAMILY MEDICINE CLINIC | Age: 68
End: 2018-04-26

## 2018-04-26 DIAGNOSIS — I82.5Z1 LOWER LEG DVT (DEEP VENOUS THROMBOEMBOLISM), CHRONIC, RIGHT (HCC): ICD-10-CM

## 2018-04-26 DIAGNOSIS — D68.51 HOMOZYGOUS FACTOR V LEIDEN MUTATION (HCC): ICD-10-CM

## 2018-04-26 LAB
INTERNATIONAL NORMALIZATION RATIO, POC: 2.3
PROTHROMBIN TIME, POC: 28.1

## 2018-04-26 PROCEDURE — 85610 PROTHROMBIN TIME: CPT | Performed by: FAMILY MEDICINE

## 2018-05-29 ENCOUNTER — ANTI-COAG VISIT (OUTPATIENT)
Dept: FAMILY MEDICINE CLINIC | Age: 68
End: 2018-05-29

## 2018-05-29 DIAGNOSIS — I82.5Z1 LOWER LEG DVT (DEEP VENOUS THROMBOEMBOLISM), CHRONIC, RIGHT (HCC): ICD-10-CM

## 2018-05-29 DIAGNOSIS — D68.51 HOMOZYGOUS FACTOR V LEIDEN MUTATION (HCC): ICD-10-CM

## 2018-05-29 LAB
INTERNATIONAL NORMALIZATION RATIO, POC: 3.3
PROTHROMBIN TIME, POC: 39.5

## 2018-05-29 PROCEDURE — 85610 PROTHROMBIN TIME: CPT | Performed by: FAMILY MEDICINE

## 2018-06-01 DIAGNOSIS — E66.9 DIABETES MELLITUS TYPE 2 IN OBESE (HCC): ICD-10-CM

## 2018-06-01 DIAGNOSIS — E11.69 DIABETES MELLITUS TYPE 2 IN OBESE (HCC): ICD-10-CM

## 2018-06-05 ENCOUNTER — TELEPHONE (OUTPATIENT)
Dept: FAMILY MEDICINE CLINIC | Age: 68
End: 2018-06-05

## 2018-06-05 DIAGNOSIS — E66.9 DIABETES MELLITUS TYPE 2 IN OBESE (HCC): Primary | Chronic | ICD-10-CM

## 2018-06-05 DIAGNOSIS — E11.69 DIABETES MELLITUS TYPE 2 IN OBESE (HCC): Primary | Chronic | ICD-10-CM

## 2018-06-09 DIAGNOSIS — E66.9 DIABETES MELLITUS TYPE 2 IN OBESE (HCC): Chronic | ICD-10-CM

## 2018-06-09 DIAGNOSIS — E11.69 DIABETES MELLITUS TYPE 2 IN OBESE (HCC): Chronic | ICD-10-CM

## 2018-06-11 RX ORDER — INSULIN DEGLUDEC INJECTION 100 U/ML
INJECTION, SOLUTION SUBCUTANEOUS
Qty: 20 PEN | Refills: 0 | Status: SHIPPED | OUTPATIENT
Start: 2018-06-11 | End: 2018-09-06 | Stop reason: SDUPTHER

## 2018-06-16 DIAGNOSIS — E87.6 DRUG-INDUCED HYPOKALEMIA: ICD-10-CM

## 2018-06-16 DIAGNOSIS — R60.0 EDEMA OF BOTH LEGS: ICD-10-CM

## 2018-06-16 DIAGNOSIS — I10 ESSENTIAL HYPERTENSION: ICD-10-CM

## 2018-06-16 DIAGNOSIS — T50.905A DRUG-INDUCED HYPOKALEMIA: ICD-10-CM

## 2018-06-18 RX ORDER — LISINOPRIL 40 MG/1
TABLET ORAL
Qty: 30 TABLET | Refills: 0 | Status: SHIPPED | OUTPATIENT
Start: 2018-06-18 | End: 2018-07-13 | Stop reason: SDUPTHER

## 2018-06-18 RX ORDER — FUROSEMIDE 40 MG/1
TABLET ORAL
Qty: 30 TABLET | Refills: 0 | Status: SHIPPED | OUTPATIENT
Start: 2018-06-18 | End: 2018-07-13 | Stop reason: SDUPTHER

## 2018-06-18 RX ORDER — POTASSIUM CHLORIDE 750 MG/1
TABLET, EXTENDED RELEASE ORAL
Qty: 30 TABLET | Refills: 0 | Status: SHIPPED | OUTPATIENT
Start: 2018-06-18 | End: 2018-07-13 | Stop reason: SDUPTHER

## 2018-06-26 ENCOUNTER — ANTI-COAG VISIT (OUTPATIENT)
Dept: FAMILY MEDICINE CLINIC | Age: 68
End: 2018-06-26

## 2018-06-26 DIAGNOSIS — D68.51 HOMOZYGOUS FACTOR V LEIDEN MUTATION (HCC): ICD-10-CM

## 2018-06-26 DIAGNOSIS — I82.5Z1 LOWER LEG DVT (DEEP VENOUS THROMBOEMBOLISM), CHRONIC, RIGHT (HCC): ICD-10-CM

## 2018-06-26 LAB
INTERNATIONAL NORMALIZATION RATIO, POC: 3.1
PROTHROMBIN TIME, POC: 37.4

## 2018-06-26 PROCEDURE — 85610 PROTHROMBIN TIME: CPT | Performed by: FAMILY MEDICINE

## 2018-07-13 DIAGNOSIS — T50.905A DRUG-INDUCED HYPOKALEMIA: ICD-10-CM

## 2018-07-13 DIAGNOSIS — I10 ESSENTIAL HYPERTENSION: ICD-10-CM

## 2018-07-13 DIAGNOSIS — E87.6 DRUG-INDUCED HYPOKALEMIA: ICD-10-CM

## 2018-07-13 DIAGNOSIS — R60.0 EDEMA OF BOTH LEGS: ICD-10-CM

## 2018-07-13 RX ORDER — POTASSIUM CHLORIDE 750 MG/1
TABLET, EXTENDED RELEASE ORAL
Qty: 30 TABLET | Refills: 0 | Status: SHIPPED | OUTPATIENT
Start: 2018-07-13 | End: 2018-07-31 | Stop reason: SDUPTHER

## 2018-07-13 RX ORDER — LISINOPRIL 40 MG/1
TABLET ORAL
Qty: 30 TABLET | Refills: 0 | Status: SHIPPED | OUTPATIENT
Start: 2018-07-13 | End: 2018-07-31 | Stop reason: SDUPTHER

## 2018-07-13 RX ORDER — FUROSEMIDE 40 MG/1
TABLET ORAL
Qty: 30 TABLET | Refills: 0 | Status: SHIPPED | OUTPATIENT
Start: 2018-07-13 | End: 2018-07-31 | Stop reason: SDUPTHER

## 2018-07-24 ENCOUNTER — ANTI-COAG VISIT (OUTPATIENT)
Dept: FAMILY MEDICINE CLINIC | Age: 68
End: 2018-07-24

## 2018-07-24 DIAGNOSIS — I82.5Z1 LOWER LEG DVT (DEEP VENOUS THROMBOEMBOLISM), CHRONIC, RIGHT (HCC): ICD-10-CM

## 2018-07-24 DIAGNOSIS — D68.51 HOMOZYGOUS FACTOR V LEIDEN MUTATION (HCC): ICD-10-CM

## 2018-07-24 LAB
INTERNATIONAL NORMALIZATION RATIO, POC: 4
PROTHROMBIN TIME, POC: 48.3

## 2018-07-24 PROCEDURE — 85610 PROTHROMBIN TIME: CPT | Performed by: FAMILY MEDICINE

## 2018-07-31 ENCOUNTER — OFFICE VISIT (OUTPATIENT)
Dept: FAMILY MEDICINE CLINIC | Age: 68
End: 2018-07-31

## 2018-07-31 VITALS
WEIGHT: 293 LBS | RESPIRATION RATE: 16 BRPM | DIASTOLIC BLOOD PRESSURE: 84 MMHG | BODY MASS INDEX: 47.09 KG/M2 | HEART RATE: 66 BPM | OXYGEN SATURATION: 98 % | TEMPERATURE: 98.2 F | HEIGHT: 66 IN | SYSTOLIC BLOOD PRESSURE: 128 MMHG

## 2018-07-31 DIAGNOSIS — Z79.01 ENCOUNTER FOR CURRENT LONG-TERM USE OF ANTICOAGULANTS: ICD-10-CM

## 2018-07-31 DIAGNOSIS — E78.00 HYPERCHOLESTEROLEMIA: ICD-10-CM

## 2018-07-31 DIAGNOSIS — E87.6 DRUG-INDUCED HYPOKALEMIA: ICD-10-CM

## 2018-07-31 DIAGNOSIS — R74.8 LOW SERUM HDL: Chronic | ICD-10-CM

## 2018-07-31 DIAGNOSIS — E66.9 DIABETES MELLITUS TYPE 2 IN OBESE (HCC): Primary | Chronic | ICD-10-CM

## 2018-07-31 DIAGNOSIS — Z11.59 ENCOUNTER FOR HEPATITIS C SCREENING TEST FOR LOW RISK PATIENT: ICD-10-CM

## 2018-07-31 DIAGNOSIS — T50.905A DRUG-INDUCED HYPOKALEMIA: ICD-10-CM

## 2018-07-31 DIAGNOSIS — Z12.31 ENCOUNTER FOR SCREENING MAMMOGRAM FOR BREAST CANCER: ICD-10-CM

## 2018-07-31 DIAGNOSIS — R60.0 EDEMA OF BOTH LEGS: ICD-10-CM

## 2018-07-31 DIAGNOSIS — I10 ESSENTIAL HYPERTENSION: ICD-10-CM

## 2018-07-31 DIAGNOSIS — D68.51 HOMOZYGOUS FACTOR V LEIDEN MUTATION (HCC): ICD-10-CM

## 2018-07-31 DIAGNOSIS — E11.69 DIABETES MELLITUS TYPE 2 IN OBESE (HCC): Primary | Chronic | ICD-10-CM

## 2018-07-31 DIAGNOSIS — Z12.11 SCREENING FOR COLON CANCER: ICD-10-CM

## 2018-07-31 DIAGNOSIS — I82.5Z1 LOWER LEG DVT (DEEP VENOUS THROMBOEMBOLISM), CHRONIC, RIGHT (HCC): ICD-10-CM

## 2018-07-31 DIAGNOSIS — Z78.0 POST-MENOPAUSAL: ICD-10-CM

## 2018-07-31 DIAGNOSIS — Z23 NEED FOR PROPHYLACTIC VACCINATION AND INOCULATION AGAINST VARICELLA: ICD-10-CM

## 2018-07-31 LAB
HBA1C MFR BLD: 6.5 %
HEPATITIS C ANTIBODY INTERPRETATION: NORMAL
INTERNATIONAL NORMALIZATION RATIO, POC: 2.1
PROTHROMBIN TIME, POC: 25.7

## 2018-07-31 PROCEDURE — 1036F TOBACCO NON-USER: CPT | Performed by: NURSE PRACTITIONER

## 2018-07-31 PROCEDURE — G8427 DOCREV CUR MEDS BY ELIG CLIN: HCPCS | Performed by: NURSE PRACTITIONER

## 2018-07-31 PROCEDURE — G8417 CALC BMI ABV UP PARAM F/U: HCPCS | Performed by: NURSE PRACTITIONER

## 2018-07-31 PROCEDURE — 1123F ACP DISCUSS/DSCN MKR DOCD: CPT | Performed by: NURSE PRACTITIONER

## 2018-07-31 PROCEDURE — 83036 HEMOGLOBIN GLYCOSYLATED A1C: CPT | Performed by: NURSE PRACTITIONER

## 2018-07-31 PROCEDURE — 3017F COLORECTAL CA SCREEN DOC REV: CPT | Performed by: NURSE PRACTITIONER

## 2018-07-31 PROCEDURE — G8400 PT W/DXA NO RESULTS DOC: HCPCS | Performed by: NURSE PRACTITIONER

## 2018-07-31 PROCEDURE — 99213 OFFICE O/P EST LOW 20 MIN: CPT | Performed by: NURSE PRACTITIONER

## 2018-07-31 PROCEDURE — 2022F DILAT RTA XM EVC RTNOPTHY: CPT | Performed by: NURSE PRACTITIONER

## 2018-07-31 PROCEDURE — 3044F HG A1C LEVEL LT 7.0%: CPT | Performed by: NURSE PRACTITIONER

## 2018-07-31 PROCEDURE — 4040F PNEUMOC VAC/ADMIN/RCVD: CPT | Performed by: NURSE PRACTITIONER

## 2018-07-31 PROCEDURE — 85610 PROTHROMBIN TIME: CPT | Performed by: NURSE PRACTITIONER

## 2018-07-31 PROCEDURE — 1090F PRES/ABSN URINE INCON ASSESS: CPT | Performed by: NURSE PRACTITIONER

## 2018-07-31 PROCEDURE — 1101F PT FALLS ASSESS-DOCD LE1/YR: CPT | Performed by: NURSE PRACTITIONER

## 2018-07-31 RX ORDER — FUROSEMIDE 40 MG/1
TABLET ORAL
Qty: 90 TABLET | Refills: 0 | Status: SHIPPED | OUTPATIENT
Start: 2018-07-31 | End: 2018-11-04 | Stop reason: SDUPTHER

## 2018-07-31 RX ORDER — METOPROLOL SUCCINATE 50 MG/1
TABLET, EXTENDED RELEASE ORAL
Qty: 90 TABLET | Refills: 1 | Status: SHIPPED | OUTPATIENT
Start: 2018-07-31 | End: 2019-02-11 | Stop reason: SDUPTHER

## 2018-07-31 RX ORDER — ATORVASTATIN CALCIUM 40 MG/1
40 TABLET, FILM COATED ORAL NIGHTLY
Qty: 90 TABLET | Refills: 3 | Status: SHIPPED | OUTPATIENT
Start: 2018-07-31 | End: 2019-09-16 | Stop reason: SDUPTHER

## 2018-07-31 RX ORDER — POTASSIUM CHLORIDE 750 MG/1
TABLET, EXTENDED RELEASE ORAL
Qty: 90 TABLET | Refills: 0 | Status: SHIPPED | OUTPATIENT
Start: 2018-07-31 | End: 2018-11-04 | Stop reason: SDUPTHER

## 2018-07-31 RX ORDER — LISINOPRIL 40 MG/1
TABLET ORAL
Qty: 90 TABLET | Refills: 1 | Status: SHIPPED | OUTPATIENT
Start: 2018-07-31 | End: 2019-02-11 | Stop reason: SDUPTHER

## 2018-07-31 RX ORDER — AMLODIPINE BESYLATE 10 MG/1
10 TABLET ORAL DAILY
Qty: 90 TABLET | Refills: 1 | Status: SHIPPED | OUTPATIENT
Start: 2018-07-31 | End: 2019-03-13 | Stop reason: SDUPTHER

## 2018-07-31 ASSESSMENT — ENCOUNTER SYMPTOMS
EYES NEGATIVE: 1
GASTROINTESTINAL NEGATIVE: 1
ALLERGIC/IMMUNOLOGIC NEGATIVE: 1
RESPIRATORY NEGATIVE: 1

## 2018-08-07 RX ORDER — CALCIUM CARB/VITAMIN D3/VIT K1 500-100-40
TABLET,CHEWABLE ORAL
Qty: 300 EACH | Refills: 0 | Status: SHIPPED | OUTPATIENT
Start: 2018-08-07 | End: 2018-12-11 | Stop reason: SDUPTHER

## 2018-08-14 ENCOUNTER — ANTI-COAG VISIT (OUTPATIENT)
Dept: FAMILY MEDICINE CLINIC | Age: 68
End: 2018-08-14

## 2018-08-14 DIAGNOSIS — D68.51 HOMOZYGOUS FACTOR V LEIDEN MUTATION (HCC): ICD-10-CM

## 2018-08-14 DIAGNOSIS — I82.5Z1 LOWER LEG DVT (DEEP VENOUS THROMBOEMBOLISM), CHRONIC, RIGHT (HCC): ICD-10-CM

## 2018-08-14 LAB
INTERNATIONAL NORMALIZATION RATIO, POC: 3.1
PROTHROMBIN TIME, POC: 36.7

## 2018-08-14 PROCEDURE — 85610 PROTHROMBIN TIME: CPT | Performed by: FAMILY MEDICINE

## 2018-08-28 ENCOUNTER — ANTI-COAG VISIT (OUTPATIENT)
Dept: FAMILY MEDICINE CLINIC | Age: 68
End: 2018-08-28

## 2018-08-28 DIAGNOSIS — D68.51 HOMOZYGOUS FACTOR V LEIDEN MUTATION (HCC): ICD-10-CM

## 2018-08-28 DIAGNOSIS — I82.5Z1 LOWER LEG DVT (DEEP VENOUS THROMBOEMBOLISM), CHRONIC, RIGHT (HCC): ICD-10-CM

## 2018-08-28 LAB
INTERNATIONAL NORMALIZATION RATIO, POC: 3.2
PROTHROMBIN TIME, POC: 38.7

## 2018-08-28 PROCEDURE — 85610 PROTHROMBIN TIME: CPT | Performed by: FAMILY MEDICINE

## 2018-09-06 DIAGNOSIS — E11.69 DIABETES MELLITUS TYPE 2 IN OBESE (HCC): Chronic | ICD-10-CM

## 2018-09-06 DIAGNOSIS — E66.9 DIABETES MELLITUS TYPE 2 IN OBESE (HCC): Chronic | ICD-10-CM

## 2018-09-06 RX ORDER — INSULIN DEGLUDEC INJECTION 100 U/ML
INJECTION, SOLUTION SUBCUTANEOUS
Qty: 25 PEN | Refills: 0 | Status: SHIPPED | OUTPATIENT
Start: 2018-09-06 | End: 2018-12-20 | Stop reason: SDUPTHER

## 2018-09-11 ENCOUNTER — ANTI-COAG VISIT (OUTPATIENT)
Dept: FAMILY MEDICINE CLINIC | Age: 68
End: 2018-09-11

## 2018-09-11 DIAGNOSIS — I82.5Z1 LOWER LEG DVT (DEEP VENOUS THROMBOEMBOLISM), CHRONIC, RIGHT (HCC): ICD-10-CM

## 2018-09-11 DIAGNOSIS — D68.51 HOMOZYGOUS FACTOR V LEIDEN MUTATION (HCC): ICD-10-CM

## 2018-09-11 LAB
INTERNATIONAL NORMALIZATION RATIO, POC: 3.5
PROTHROMBIN TIME, POC: 42.3

## 2018-09-11 PROCEDURE — 85610 PROTHROMBIN TIME: CPT | Performed by: FAMILY MEDICINE

## 2018-09-18 ENCOUNTER — ANTI-COAG VISIT (OUTPATIENT)
Dept: FAMILY MEDICINE CLINIC | Age: 68
End: 2018-09-18

## 2018-09-18 DIAGNOSIS — I82.5Z1 LOWER LEG DVT (DEEP VENOUS THROMBOEMBOLISM), CHRONIC, RIGHT (HCC): ICD-10-CM

## 2018-09-18 DIAGNOSIS — D68.51 HOMOZYGOUS FACTOR V LEIDEN MUTATION (HCC): ICD-10-CM

## 2018-09-18 LAB
INTERNATIONAL NORMALIZATION RATIO, POC: 2.6
PROTHROMBIN TIME, POC: 30.9

## 2018-09-18 PROCEDURE — 85610 PROTHROMBIN TIME: CPT | Performed by: FAMILY MEDICINE

## 2018-10-02 ENCOUNTER — ANTI-COAG VISIT (OUTPATIENT)
Dept: FAMILY MEDICINE CLINIC | Age: 68
End: 2018-10-02
Payer: MEDICARE

## 2018-10-02 DIAGNOSIS — D68.51 HOMOZYGOUS FACTOR V LEIDEN MUTATION (HCC): ICD-10-CM

## 2018-10-02 DIAGNOSIS — I82.5Z1 LOWER LEG DVT (DEEP VENOUS THROMBOEMBOLISM), CHRONIC, RIGHT (HCC): ICD-10-CM

## 2018-10-02 LAB
INTERNATIONAL NORMALIZATION RATIO, POC: 3.3
PROTHROMBIN TIME, POC: 39.3

## 2018-10-02 PROCEDURE — 85610 PROTHROMBIN TIME: CPT | Performed by: FAMILY MEDICINE

## 2018-10-09 ENCOUNTER — ANTI-COAG VISIT (OUTPATIENT)
Dept: FAMILY MEDICINE CLINIC | Age: 68
End: 2018-10-09
Payer: MEDICARE

## 2018-10-09 DIAGNOSIS — D68.51 HOMOZYGOUS FACTOR V LEIDEN MUTATION (HCC): ICD-10-CM

## 2018-10-09 DIAGNOSIS — I82.5Z1 LOWER LEG DVT (DEEP VENOUS THROMBOEMBOLISM), CHRONIC, RIGHT (HCC): ICD-10-CM

## 2018-10-09 LAB
INTERNATIONAL NORMALIZATION RATIO, POC: 3.3
PROTHROMBIN TIME, POC: 39.3

## 2018-10-09 PROCEDURE — 85610 PROTHROMBIN TIME: CPT | Performed by: FAMILY MEDICINE

## 2018-10-16 ENCOUNTER — ANTI-COAG VISIT (OUTPATIENT)
Dept: FAMILY MEDICINE CLINIC | Age: 68
End: 2018-10-16
Payer: MEDICARE

## 2018-10-16 DIAGNOSIS — I82.5Z1 LOWER LEG DVT (DEEP VENOUS THROMBOEMBOLISM), CHRONIC, RIGHT (HCC): ICD-10-CM

## 2018-10-16 DIAGNOSIS — D68.51 HOMOZYGOUS FACTOR V LEIDEN MUTATION (HCC): ICD-10-CM

## 2018-10-16 LAB
INR BLD: 2.78 (ref 0.86–1.14)
INTERNATIONAL NORMALIZATION RATIO, POC: 3.3
PROTHROMBIN TIME, POC: 40
PROTHROMBIN TIME: 31.7 SEC (ref 9.8–13)

## 2018-10-16 PROCEDURE — 85610 PROTHROMBIN TIME: CPT | Performed by: FAMILY MEDICINE

## 2018-10-16 PROCEDURE — 36415 COLL VENOUS BLD VENIPUNCTURE: CPT | Performed by: NURSE PRACTITIONER

## 2018-10-18 ENCOUNTER — TELEPHONE (OUTPATIENT)
Dept: FAMILY MEDICINE CLINIC | Age: 68
End: 2018-10-18

## 2018-10-26 ENCOUNTER — ANTI-COAG VISIT (OUTPATIENT)
Dept: FAMILY MEDICINE CLINIC | Age: 68
End: 2018-10-26
Payer: MEDICARE

## 2018-10-26 DIAGNOSIS — I82.5Z1 LOWER LEG DVT (DEEP VENOUS THROMBOEMBOLISM), CHRONIC, RIGHT (HCC): ICD-10-CM

## 2018-10-26 DIAGNOSIS — D68.51 HOMOZYGOUS FACTOR V LEIDEN MUTATION (HCC): ICD-10-CM

## 2018-10-26 LAB
INTERNATIONAL NORMALIZATION RATIO, POC: 3.8
PROTHROMBIN TIME, POC: 45.2

## 2018-10-26 PROCEDURE — 85610 PROTHROMBIN TIME: CPT | Performed by: FAMILY MEDICINE

## 2018-11-02 ENCOUNTER — ANTI-COAG VISIT (OUTPATIENT)
Dept: FAMILY MEDICINE CLINIC | Age: 68
End: 2018-11-02
Payer: MEDICARE

## 2018-11-02 DIAGNOSIS — D68.51 HOMOZYGOUS FACTOR V LEIDEN MUTATION (HCC): ICD-10-CM

## 2018-11-02 DIAGNOSIS — I82.5Z1 LOWER LEG DVT (DEEP VENOUS THROMBOEMBOLISM), CHRONIC, RIGHT (HCC): ICD-10-CM

## 2018-11-02 DIAGNOSIS — Z79.01 ENCOUNTER FOR CURRENT LONG-TERM USE OF ANTICOAGULANTS: ICD-10-CM

## 2018-11-02 LAB
INTERNATIONAL NORMALIZATION RATIO, POC: 2.3
PROTHROMBIN TIME, POC: 27.7

## 2018-11-02 PROCEDURE — 85610 PROTHROMBIN TIME: CPT | Performed by: FAMILY MEDICINE

## 2018-11-04 DIAGNOSIS — T50.905A DRUG-INDUCED HYPOKALEMIA: ICD-10-CM

## 2018-11-04 DIAGNOSIS — I82.5Z1 LOWER LEG DVT (DEEP VENOUS THROMBOEMBOLISM), CHRONIC, RIGHT (HCC): ICD-10-CM

## 2018-11-04 DIAGNOSIS — R60.0 EDEMA OF BOTH LEGS: ICD-10-CM

## 2018-11-04 DIAGNOSIS — D68.51 HOMOZYGOUS FACTOR V LEIDEN MUTATION (HCC): ICD-10-CM

## 2018-11-04 DIAGNOSIS — E87.6 DRUG-INDUCED HYPOKALEMIA: ICD-10-CM

## 2018-11-05 RX ORDER — FUROSEMIDE 40 MG/1
TABLET ORAL
Qty: 90 TABLET | Refills: 0 | Status: SHIPPED | OUTPATIENT
Start: 2018-11-05 | End: 2019-02-11 | Stop reason: SDUPTHER

## 2018-11-05 RX ORDER — WARFARIN SODIUM 6 MG/1
TABLET ORAL
Qty: 90 TABLET | Refills: 2 | Status: SHIPPED | OUTPATIENT
Start: 2018-11-05 | End: 2019-06-17 | Stop reason: SDUPTHER

## 2018-11-05 RX ORDER — POTASSIUM CHLORIDE 750 MG/1
TABLET, EXTENDED RELEASE ORAL
Qty: 90 TABLET | Refills: 0 | Status: SHIPPED | OUTPATIENT
Start: 2018-11-05 | End: 2019-02-11 | Stop reason: SDUPTHER

## 2018-11-14 ENCOUNTER — ANTI-COAG VISIT (OUTPATIENT)
Dept: FAMILY MEDICINE CLINIC | Age: 68
End: 2018-11-14
Payer: MEDICARE

## 2018-11-14 DIAGNOSIS — I82.5Z1 LOWER LEG DVT (DEEP VENOUS THROMBOEMBOLISM), CHRONIC, RIGHT (HCC): Primary | Chronic | ICD-10-CM

## 2018-11-14 LAB
INR BLD: 2.39 (ref 0.86–1.14)
PROTHROMBIN TIME: 27.2 SEC (ref 9.8–13)

## 2018-11-14 PROCEDURE — 36415 COLL VENOUS BLD VENIPUNCTURE: CPT | Performed by: NURSE PRACTITIONER

## 2018-11-20 ENCOUNTER — TELEPHONE (OUTPATIENT)
Dept: FAMILY MEDICINE CLINIC | Age: 68
End: 2018-11-20

## 2018-12-05 ENCOUNTER — TELEPHONE (OUTPATIENT)
Dept: FAMILY MEDICINE CLINIC | Age: 68
End: 2018-12-05

## 2018-12-05 NOTE — TELEPHONE ENCOUNTER
PT C/O NASAL DRAINAGE: yes  WHAT COLOR: no, COUGH: yes, BRINGING UP PHLEGM:  yes IF YES, WHAT COLOR:  Yellow , SORE THROAT:  yes, PND:  no, HEADACHE:  yes, EAR PAIN:  no, LEFT, RIGHT OR BOTH:  , S.O.B.:  no  WITH OR WITHOUT EXERTION:  Only when she coughs a lot, WHEEZING:  no, HEAD CONGESTION:  yes, CHEST CONGESTION:  yes, BODY ACHES:  no, VOMITTING no, DIARRHEA:  no, TEMP:  no  IF SO, HIGHEST TEMP:  , SYMPTOMS FOR HOW MANY DAYS:  Couple days, WHAT MEDS HAS PT TRIED:  no, MED ALLERGIES:  , VERIFY PHARMACY INFORMATION. APPT OFFERED:  No    Pt is diabetic and wants to know what OTC she can take so this will not get worse.       Please give to Thao Ernestine

## 2018-12-11 RX ORDER — CALCIUM CARB/VITAMIN D3/VIT K1 500-100-40
TABLET,CHEWABLE ORAL
Qty: 300 EACH | Refills: 0 | Status: SHIPPED | OUTPATIENT
Start: 2018-12-11 | End: 2019-05-07 | Stop reason: SDUPTHER

## 2018-12-12 ENCOUNTER — ANTI-COAG VISIT (OUTPATIENT)
Dept: FAMILY MEDICINE CLINIC | Age: 68
End: 2018-12-12
Payer: MEDICARE

## 2018-12-12 DIAGNOSIS — I82.5Z1 LOWER LEG DVT (DEEP VENOUS THROMBOEMBOLISM), CHRONIC, RIGHT (HCC): ICD-10-CM

## 2018-12-12 DIAGNOSIS — D68.51 HOMOZYGOUS FACTOR V LEIDEN MUTATION (HCC): ICD-10-CM

## 2018-12-12 LAB
INTERNATIONAL NORMALIZATION RATIO, POC: 2.2
PROTHROMBIN TIME, POC: 26.4

## 2018-12-12 PROCEDURE — 85610 PROTHROMBIN TIME: CPT | Performed by: FAMILY MEDICINE

## 2019-01-09 ENCOUNTER — ANTI-COAG VISIT (OUTPATIENT)
Dept: FAMILY MEDICINE CLINIC | Age: 69
End: 2019-01-09
Payer: MEDICARE

## 2019-01-09 DIAGNOSIS — D68.51 HOMOZYGOUS FACTOR V LEIDEN MUTATION (HCC): ICD-10-CM

## 2019-01-09 DIAGNOSIS — I82.5Z1 LOWER LEG DVT (DEEP VENOUS THROMBOEMBOLISM), CHRONIC, RIGHT (HCC): ICD-10-CM

## 2019-01-09 DIAGNOSIS — Z79.01 ENCOUNTER FOR CURRENT LONG-TERM USE OF ANTICOAGULANTS: ICD-10-CM

## 2019-01-09 LAB
INTERNATIONAL NORMALIZATION RATIO, POC: 2.5
PROTHROMBIN TIME, POC: 30.3

## 2019-01-09 PROCEDURE — 85610 PROTHROMBIN TIME: CPT | Performed by: FAMILY MEDICINE

## 2019-01-17 DIAGNOSIS — E11.69 DIABETES MELLITUS TYPE 2 IN OBESE (HCC): Chronic | ICD-10-CM

## 2019-01-17 DIAGNOSIS — E66.9 DIABETES MELLITUS TYPE 2 IN OBESE (HCC): Chronic | ICD-10-CM

## 2019-02-11 DIAGNOSIS — I10 ESSENTIAL HYPERTENSION: ICD-10-CM

## 2019-02-12 ENCOUNTER — OFFICE VISIT (OUTPATIENT)
Dept: FAMILY MEDICINE CLINIC | Age: 69
End: 2019-02-12
Payer: MEDICARE

## 2019-02-12 VITALS
WEIGHT: 293 LBS | OXYGEN SATURATION: 95 % | SYSTOLIC BLOOD PRESSURE: 112 MMHG | HEART RATE: 55 BPM | DIASTOLIC BLOOD PRESSURE: 60 MMHG | HEIGHT: 66 IN | BODY MASS INDEX: 47.09 KG/M2

## 2019-02-12 DIAGNOSIS — E78.00 HYPERCHOLESTEROLEMIA: Chronic | ICD-10-CM

## 2019-02-12 DIAGNOSIS — E66.9 DIABETES MELLITUS TYPE 2 IN OBESE (HCC): ICD-10-CM

## 2019-02-12 DIAGNOSIS — I10 ESSENTIAL HYPERTENSION: ICD-10-CM

## 2019-02-12 DIAGNOSIS — D68.51 HOMOZYGOUS FACTOR V LEIDEN MUTATION (HCC): ICD-10-CM

## 2019-02-12 DIAGNOSIS — E78.2 MIXED HYPERLIPIDEMIA: ICD-10-CM

## 2019-02-12 DIAGNOSIS — I82.5Z1 LOWER LEG DVT (DEEP VENOUS THROMBOEMBOLISM), CHRONIC, RIGHT (HCC): ICD-10-CM

## 2019-02-12 DIAGNOSIS — E11.69 DIABETES MELLITUS TYPE 2 IN OBESE (HCC): ICD-10-CM

## 2019-02-12 DIAGNOSIS — E66.01 MORBID OBESITY (HCC): ICD-10-CM

## 2019-02-12 DIAGNOSIS — Z12.11 COLON CANCER SCREENING: ICD-10-CM

## 2019-02-12 DIAGNOSIS — R60.0 EDEMA OF BOTH LEGS: ICD-10-CM

## 2019-02-12 DIAGNOSIS — E11.9 TYPE 2 DIABETES MELLITUS WITHOUT COMPLICATION, UNSPECIFIED WHETHER LONG TERM INSULIN USE (HCC): ICD-10-CM

## 2019-02-12 DIAGNOSIS — M81.0 POST-MENOPAUSAL OSTEOPOROSIS: ICD-10-CM

## 2019-02-12 DIAGNOSIS — R79.83 HOMOCYSTEINEMIA: ICD-10-CM

## 2019-02-12 DIAGNOSIS — Z12.39 BREAST CANCER SCREENING: ICD-10-CM

## 2019-02-12 LAB
HBA1C MFR BLD: 7.2 %
INTERNATIONAL NORMALIZATION RATIO, POC: 2.3
PROTHROMBIN TIME, POC: 27.7

## 2019-02-12 PROCEDURE — 4040F PNEUMOC VAC/ADMIN/RCVD: CPT | Performed by: NURSE PRACTITIONER

## 2019-02-12 PROCEDURE — 1123F ACP DISCUSS/DSCN MKR DOCD: CPT | Performed by: NURSE PRACTITIONER

## 2019-02-12 PROCEDURE — G8400 PT W/DXA NO RESULTS DOC: HCPCS | Performed by: NURSE PRACTITIONER

## 2019-02-12 PROCEDURE — G8427 DOCREV CUR MEDS BY ELIG CLIN: HCPCS | Performed by: NURSE PRACTITIONER

## 2019-02-12 PROCEDURE — G8484 FLU IMMUNIZE NO ADMIN: HCPCS | Performed by: NURSE PRACTITIONER

## 2019-02-12 PROCEDURE — 3045F PR MOST RECENT HEMOGLOBIN A1C LEVEL 7.0-9.0%: CPT | Performed by: NURSE PRACTITIONER

## 2019-02-12 PROCEDURE — 2022F DILAT RTA XM EVC RTNOPTHY: CPT | Performed by: NURSE PRACTITIONER

## 2019-02-12 PROCEDURE — 85610 PROTHROMBIN TIME: CPT | Performed by: NURSE PRACTITIONER

## 2019-02-12 PROCEDURE — 1036F TOBACCO NON-USER: CPT | Performed by: NURSE PRACTITIONER

## 2019-02-12 PROCEDURE — 3017F COLORECTAL CA SCREEN DOC REV: CPT | Performed by: NURSE PRACTITIONER

## 2019-02-12 PROCEDURE — 1101F PT FALLS ASSESS-DOCD LE1/YR: CPT | Performed by: NURSE PRACTITIONER

## 2019-02-12 PROCEDURE — 83036 HEMOGLOBIN GLYCOSYLATED A1C: CPT | Performed by: NURSE PRACTITIONER

## 2019-02-12 PROCEDURE — 99214 OFFICE O/P EST MOD 30 MIN: CPT | Performed by: NURSE PRACTITIONER

## 2019-02-12 PROCEDURE — G8417 CALC BMI ABV UP PARAM F/U: HCPCS | Performed by: NURSE PRACTITIONER

## 2019-02-12 PROCEDURE — 82044 UR ALBUMIN SEMIQUANTITATIVE: CPT | Performed by: NURSE PRACTITIONER

## 2019-02-12 PROCEDURE — 1090F PRES/ABSN URINE INCON ASSESS: CPT | Performed by: NURSE PRACTITIONER

## 2019-02-12 ASSESSMENT — PATIENT HEALTH QUESTIONNAIRE - PHQ9
SUM OF ALL RESPONSES TO PHQ9 QUESTIONS 1 & 2: 0
SUM OF ALL RESPONSES TO PHQ QUESTIONS 1-9: 0
2. FEELING DOWN, DEPRESSED OR HOPELESS: 0
1. LITTLE INTEREST OR PLEASURE IN DOING THINGS: 0
SUM OF ALL RESPONSES TO PHQ QUESTIONS 1-9: 0

## 2019-02-13 ENCOUNTER — NURSE ONLY (OUTPATIENT)
Dept: FAMILY MEDICINE CLINIC | Age: 69
End: 2019-02-13
Payer: MEDICARE

## 2019-02-13 DIAGNOSIS — I10 ESSENTIAL HYPERTENSION: ICD-10-CM

## 2019-02-13 DIAGNOSIS — E78.00 HYPERCHOLESTEROLEMIA: Chronic | ICD-10-CM

## 2019-02-13 DIAGNOSIS — E78.2 MIXED HYPERLIPIDEMIA: ICD-10-CM

## 2019-02-13 DIAGNOSIS — E11.9 TYPE 2 DIABETES MELLITUS WITHOUT COMPLICATION, UNSPECIFIED WHETHER LONG TERM INSULIN USE (HCC): ICD-10-CM

## 2019-02-13 DIAGNOSIS — R79.83 HOMOCYSTEINEMIA: ICD-10-CM

## 2019-02-13 DIAGNOSIS — R60.0 EDEMA OF BOTH LEGS: ICD-10-CM

## 2019-02-13 LAB
A/G RATIO: 1.2 (ref 1.1–2.2)
ALBUMIN SERPL-MCNC: 3.9 G/DL (ref 3.4–5)
ALP BLD-CCNC: 117 U/L (ref 40–129)
ALT SERPL-CCNC: 11 U/L (ref 10–40)
ANION GAP SERPL CALCULATED.3IONS-SCNC: 18 MMOL/L (ref 3–16)
AST SERPL-CCNC: 19 U/L (ref 15–37)
BILIRUB SERPL-MCNC: 1.4 MG/DL (ref 0–1)
BUN BLDV-MCNC: 19 MG/DL (ref 7–20)
CALCIUM SERPL-MCNC: 9.6 MG/DL (ref 8.3–10.6)
CHLORIDE BLD-SCNC: 104 MMOL/L (ref 99–110)
CHOLESTEROL, TOTAL: 163 MG/DL (ref 0–199)
CO2: 24 MMOL/L (ref 21–32)
CREAT SERPL-MCNC: 0.9 MG/DL (ref 0.6–1.2)
CREATININE URINE POCT: 200
GFR AFRICAN AMERICAN: >60
GFR NON-AFRICAN AMERICAN: >60
GLOBULIN: 3.3 G/DL
GLUCOSE BLD-MCNC: 59 MG/DL (ref 70–99)
HDLC SERPL-MCNC: 54 MG/DL (ref 40–60)
HOMOCYSTEINE: 16 UMOL/L (ref 0–10)
LDL CHOLESTEROL CALCULATED: 89 MG/DL
MICROALBUMIN/CREAT 24H UR: 10 MG/G{CREAT}
MICROALBUMIN/CREAT UR-RTO: <30
POTASSIUM SERPL-SCNC: 3.7 MMOL/L (ref 3.5–5.1)
SODIUM BLD-SCNC: 146 MMOL/L (ref 136–145)
TOTAL PROTEIN: 7.2 G/DL (ref 6.4–8.2)
TRIGL SERPL-MCNC: 100 MG/DL (ref 0–150)
VLDLC SERPL CALC-MCNC: 20 MG/DL

## 2019-02-13 PROCEDURE — 36415 COLL VENOUS BLD VENIPUNCTURE: CPT | Performed by: NURSE PRACTITIONER

## 2019-02-13 RX ORDER — METOPROLOL SUCCINATE 50 MG/1
TABLET, EXTENDED RELEASE ORAL
Qty: 90 TABLET | Refills: 0 | Status: SHIPPED | OUTPATIENT
Start: 2019-02-13 | End: 2019-05-14 | Stop reason: SDUPTHER

## 2019-02-13 RX ORDER — LISINOPRIL 40 MG/1
TABLET ORAL
Qty: 90 TABLET | Refills: 0 | Status: SHIPPED | OUTPATIENT
Start: 2019-02-13 | End: 2019-05-14 | Stop reason: SDUPTHER

## 2019-02-15 DIAGNOSIS — E72.11 HOMOCYSTINEMIA (HCC): Primary | Chronic | ICD-10-CM

## 2019-02-15 RX ORDER — FOLIC ACID 1 MG/1
1 TABLET ORAL DAILY
Qty: 90 TABLET | Refills: 0 | Status: SHIPPED | OUTPATIENT
Start: 2019-02-15 | End: 2020-04-03

## 2019-02-26 DIAGNOSIS — E66.9 DIABETES MELLITUS TYPE 2 IN OBESE (HCC): Chronic | ICD-10-CM

## 2019-02-26 DIAGNOSIS — E11.69 DIABETES MELLITUS TYPE 2 IN OBESE (HCC): Chronic | ICD-10-CM

## 2019-02-26 RX ORDER — BLOOD SUGAR DIAGNOSTIC
STRIP MISCELLANEOUS
Qty: 300 STRIP | Refills: 5 | Status: SHIPPED | OUTPATIENT
Start: 2019-02-26 | End: 2020-04-03

## 2019-02-26 RX ORDER — PEN NEEDLE, DIABETIC 31 GX5/16"
NEEDLE, DISPOSABLE MISCELLANEOUS
Qty: 100 EACH | Refills: 5 | Status: SHIPPED | OUTPATIENT
Start: 2019-02-26 | End: 2020-04-13

## 2019-03-05 ENCOUNTER — TELEPHONE (OUTPATIENT)
Dept: FAMILY MEDICINE CLINIC | Age: 69
End: 2019-03-05

## 2019-03-12 ENCOUNTER — ANTI-COAG VISIT (OUTPATIENT)
Dept: FAMILY MEDICINE CLINIC | Age: 69
End: 2019-03-12
Payer: MEDICARE

## 2019-03-12 DIAGNOSIS — I82.5Z1 LOWER LEG DVT (DEEP VENOUS THROMBOEMBOLISM), CHRONIC, RIGHT (HCC): ICD-10-CM

## 2019-03-12 DIAGNOSIS — D68.51 HOMOZYGOUS FACTOR V LEIDEN MUTATION (HCC): ICD-10-CM

## 2019-03-12 LAB
INTERNATIONAL NORMALIZATION RATIO, POC: 2.8
PROTHROMBIN TIME, POC: 33.7

## 2019-03-12 PROCEDURE — 85610 PROTHROMBIN TIME: CPT | Performed by: FAMILY MEDICINE

## 2019-03-13 DIAGNOSIS — I10 ESSENTIAL HYPERTENSION: ICD-10-CM

## 2019-03-13 RX ORDER — AMLODIPINE BESYLATE 10 MG/1
10 TABLET ORAL DAILY
Qty: 90 TABLET | Refills: 0 | Status: SHIPPED | OUTPATIENT
Start: 2019-03-13 | End: 2019-06-19 | Stop reason: SDUPTHER

## 2019-04-01 DIAGNOSIS — E11.69 DIABETES MELLITUS TYPE 2 IN OBESE (HCC): Chronic | ICD-10-CM

## 2019-04-01 DIAGNOSIS — E66.9 DIABETES MELLITUS TYPE 2 IN OBESE (HCC): Chronic | ICD-10-CM

## 2019-04-02 RX ORDER — INSULIN DEGLUDEC INJECTION 100 U/ML
INJECTION, SOLUTION SUBCUTANEOUS
Qty: 75 ML | Refills: 2 | Status: SHIPPED | OUTPATIENT
Start: 2019-04-02 | End: 2020-02-17

## 2019-04-11 ENCOUNTER — ANTI-COAG VISIT (OUTPATIENT)
Dept: FAMILY MEDICINE CLINIC | Age: 69
End: 2019-04-11
Payer: MEDICARE

## 2019-04-11 DIAGNOSIS — D68.51 HOMOZYGOUS FACTOR V LEIDEN MUTATION (HCC): ICD-10-CM

## 2019-04-11 DIAGNOSIS — I82.5Z1 LOWER LEG DVT (DEEP VENOUS THROMBOEMBOLISM), CHRONIC, RIGHT (HCC): ICD-10-CM

## 2019-04-11 DIAGNOSIS — Z79.01 ENCOUNTER FOR CURRENT LONG-TERM USE OF ANTICOAGULANTS: ICD-10-CM

## 2019-04-11 LAB
INTERNATIONAL NORMALIZATION RATIO, POC: 2.3
PROTHROMBIN TIME, POC: 27.9

## 2019-04-11 PROCEDURE — 85610 PROTHROMBIN TIME: CPT | Performed by: FAMILY MEDICINE

## 2019-04-25 DIAGNOSIS — E11.69 DIABETES MELLITUS TYPE 2 IN OBESE (HCC): Chronic | ICD-10-CM

## 2019-04-25 DIAGNOSIS — E66.9 DIABETES MELLITUS TYPE 2 IN OBESE (HCC): Chronic | ICD-10-CM

## 2019-05-07 RX ORDER — CALCIUM CARB/VITAMIN D3/VIT K1 500-100-40
TABLET,CHEWABLE ORAL
Qty: 100 EACH | Refills: 5 | Status: SHIPPED | OUTPATIENT
Start: 2019-05-07 | End: 2020-04-13

## 2019-05-09 ENCOUNTER — ANTI-COAG VISIT (OUTPATIENT)
Dept: FAMILY MEDICINE CLINIC | Age: 69
End: 2019-05-09
Payer: MEDICARE

## 2019-05-09 DIAGNOSIS — I82.5Z1 LOWER LEG DVT (DEEP VENOUS THROMBOEMBOLISM), CHRONIC, RIGHT (HCC): ICD-10-CM

## 2019-05-09 DIAGNOSIS — D68.51 HOMOZYGOUS FACTOR V LEIDEN MUTATION (HCC): ICD-10-CM

## 2019-05-09 DIAGNOSIS — Z79.01 ENCOUNTER FOR CURRENT LONG-TERM USE OF ANTICOAGULANTS: ICD-10-CM

## 2019-05-09 LAB
INTERNATIONAL NORMALIZATION RATIO, POC: 2.2
PROTHROMBIN TIME, POC: 26.9

## 2019-05-09 PROCEDURE — 85610 PROTHROMBIN TIME: CPT | Performed by: FAMILY MEDICINE

## 2019-05-14 DIAGNOSIS — R60.0 EDEMA OF BOTH LEGS: ICD-10-CM

## 2019-05-14 DIAGNOSIS — T50.905A DRUG-INDUCED HYPOKALEMIA: ICD-10-CM

## 2019-05-14 DIAGNOSIS — I10 ESSENTIAL HYPERTENSION: ICD-10-CM

## 2019-05-14 DIAGNOSIS — E87.6 DRUG-INDUCED HYPOKALEMIA: ICD-10-CM

## 2019-05-15 RX ORDER — FUROSEMIDE 40 MG/1
TABLET ORAL
Qty: 90 TABLET | Refills: 0 | Status: SHIPPED | OUTPATIENT
Start: 2019-05-15 | End: 2019-07-15 | Stop reason: SDUPTHER

## 2019-05-15 RX ORDER — POTASSIUM CHLORIDE 750 MG/1
TABLET, EXTENDED RELEASE ORAL
Qty: 90 TABLET | Refills: 0 | Status: SHIPPED | OUTPATIENT
Start: 2019-05-15 | End: 2019-08-14 | Stop reason: SDUPTHER

## 2019-05-15 RX ORDER — LISINOPRIL 40 MG/1
TABLET ORAL
Qty: 90 TABLET | Refills: 0 | Status: SHIPPED | OUTPATIENT
Start: 2019-05-15 | End: 2019-06-11 | Stop reason: SDUPTHER

## 2019-05-15 RX ORDER — METOPROLOL SUCCINATE 50 MG/1
TABLET, EXTENDED RELEASE ORAL
Qty: 90 TABLET | Refills: 0 | Status: SHIPPED | OUTPATIENT
Start: 2019-05-15 | End: 2019-08-14 | Stop reason: SDUPTHER

## 2019-05-29 ENCOUNTER — TELEPHONE (OUTPATIENT)
Dept: FAMILY MEDICINE CLINIC | Age: 69
End: 2019-05-29

## 2019-05-29 NOTE — TELEPHONE ENCOUNTER
Sylvia Peralta and her brothers are noticing changes in [facility]. Gaining weight, not getting out as much. Says she cant walk because of the pain in her knees and back. Yesterday she called her son in law at 6:30 in the evening asking why he hadnt dropped off the kidsd. He normally brings them at 6:30 in the morning. Neville Sell that it took him a good 5-10 minutes to convince her that it wasn't morning time it was evening. She always goes to the kids soccer games and missed one recently. She will not let any of them look at her legs, does where her hose.    Ahsan Garrett is on her [de-identified])

## 2019-06-07 ENCOUNTER — ANTI-COAG VISIT (OUTPATIENT)
Dept: FAMILY MEDICINE CLINIC | Age: 69
End: 2019-06-07
Payer: MEDICARE

## 2019-06-07 DIAGNOSIS — Z79.01 ENCOUNTER FOR CURRENT LONG-TERM USE OF ANTICOAGULANTS: ICD-10-CM

## 2019-06-07 DIAGNOSIS — I82.5Z1 LOWER LEG DVT (DEEP VENOUS THROMBOEMBOLISM), CHRONIC, RIGHT (HCC): ICD-10-CM

## 2019-06-07 DIAGNOSIS — D68.51 HOMOZYGOUS FACTOR V LEIDEN MUTATION (HCC): ICD-10-CM

## 2019-06-07 LAB
INTERNATIONAL NORMALIZATION RATIO, POC: 2.4
PROTHROMBIN TIME, POC: 28.6

## 2019-06-07 PROCEDURE — 85610 PROTHROMBIN TIME: CPT | Performed by: FAMILY MEDICINE

## 2019-06-11 DIAGNOSIS — I10 ESSENTIAL HYPERTENSION: ICD-10-CM

## 2019-06-11 DIAGNOSIS — R74.8 LOW SERUM HDL: Chronic | ICD-10-CM

## 2019-06-11 DIAGNOSIS — E78.00 HYPERCHOLESTEROLEMIA: ICD-10-CM

## 2019-06-11 RX ORDER — LISINOPRIL 40 MG/1
40 TABLET ORAL DAILY
Qty: 90 TABLET | Refills: 0 | Status: SHIPPED | OUTPATIENT
Start: 2019-06-11 | End: 2019-09-23 | Stop reason: ALTCHOICE

## 2019-06-11 RX ORDER — ATORVASTATIN CALCIUM 40 MG/1
40 TABLET, FILM COATED ORAL DAILY
Qty: 90 TABLET | Refills: 1 | Status: SHIPPED | OUTPATIENT
Start: 2019-06-11 | End: 2019-09-23 | Stop reason: ALTCHOICE

## 2019-06-17 DIAGNOSIS — I10 ESSENTIAL HYPERTENSION: ICD-10-CM

## 2019-06-17 DIAGNOSIS — R60.0 EDEMA OF BOTH LEGS: ICD-10-CM

## 2019-06-17 DIAGNOSIS — I82.5Z1 LOWER LEG DVT (DEEP VENOUS THROMBOEMBOLISM), CHRONIC, RIGHT (HCC): ICD-10-CM

## 2019-06-17 DIAGNOSIS — D68.51 HOMOZYGOUS FACTOR V LEIDEN MUTATION (HCC): ICD-10-CM

## 2019-06-17 DIAGNOSIS — T50.905A DRUG-INDUCED HYPOKALEMIA: ICD-10-CM

## 2019-06-17 DIAGNOSIS — E87.6 DRUG-INDUCED HYPOKALEMIA: ICD-10-CM

## 2019-06-17 RX ORDER — WARFARIN SODIUM 6 MG/1
TABLET ORAL
Qty: 90 TABLET | Refills: 0 | Status: SHIPPED | OUTPATIENT
Start: 2019-06-17 | End: 2019-09-23 | Stop reason: ALTCHOICE

## 2019-06-17 RX ORDER — METOPROLOL SUCCINATE 50 MG/1
TABLET, EXTENDED RELEASE ORAL
Qty: 90 TABLET | Refills: 0 | OUTPATIENT
Start: 2019-06-17

## 2019-06-17 RX ORDER — POTASSIUM CHLORIDE 750 MG/1
TABLET, EXTENDED RELEASE ORAL
Qty: 90 TABLET | Refills: 0 | OUTPATIENT
Start: 2019-06-17

## 2019-06-17 RX ORDER — FUROSEMIDE 40 MG/1
TABLET ORAL
Qty: 90 TABLET | Refills: 0 | OUTPATIENT
Start: 2019-06-17

## 2019-06-18 DIAGNOSIS — I10 ESSENTIAL HYPERTENSION: ICD-10-CM

## 2019-06-19 DIAGNOSIS — I10 ESSENTIAL HYPERTENSION: ICD-10-CM

## 2019-06-19 RX ORDER — AMLODIPINE BESYLATE 10 MG/1
10 TABLET ORAL DAILY
Qty: 90 TABLET | Refills: 0 | Status: SHIPPED | OUTPATIENT
Start: 2019-06-19 | End: 2019-06-21 | Stop reason: SDUPTHER

## 2019-06-21 RX ORDER — AMLODIPINE BESYLATE 10 MG/1
10 TABLET ORAL DAILY
Qty: 90 TABLET | Refills: 0 | Status: SHIPPED | OUTPATIENT
Start: 2019-06-21 | End: 2019-12-10 | Stop reason: SDUPTHER

## 2019-07-05 ENCOUNTER — ANTI-COAG VISIT (OUTPATIENT)
Dept: FAMILY MEDICINE CLINIC | Age: 69
End: 2019-07-05
Payer: MEDICARE

## 2019-07-05 DIAGNOSIS — Z79.01 ENCOUNTER FOR CURRENT LONG-TERM USE OF ANTICOAGULANTS: ICD-10-CM

## 2019-07-05 DIAGNOSIS — I82.5Z1 LOWER LEG DVT (DEEP VENOUS THROMBOEMBOLISM), CHRONIC, RIGHT (HCC): ICD-10-CM

## 2019-07-05 DIAGNOSIS — D68.51 HOMOZYGOUS FACTOR V LEIDEN MUTATION (HCC): ICD-10-CM

## 2019-07-05 LAB
INTERNATIONAL NORMALIZATION RATIO, POC: 2
PROTHROMBIN TIME, POC: 24

## 2019-07-05 PROCEDURE — 85610 PROTHROMBIN TIME: CPT | Performed by: FAMILY MEDICINE

## 2019-07-15 DIAGNOSIS — I82.5Z1 LOWER LEG DVT (DEEP VENOUS THROMBOEMBOLISM), CHRONIC, RIGHT (HCC): ICD-10-CM

## 2019-07-15 DIAGNOSIS — R60.0 EDEMA OF BOTH LEGS: ICD-10-CM

## 2019-07-15 DIAGNOSIS — D68.51 HOMOZYGOUS FACTOR V LEIDEN MUTATION (HCC): ICD-10-CM

## 2019-07-15 RX ORDER — FUROSEMIDE 40 MG/1
TABLET ORAL
Qty: 90 TABLET | Refills: 0 | Status: SHIPPED | OUTPATIENT
Start: 2019-07-15 | End: 2019-11-11 | Stop reason: SDUPTHER

## 2019-07-16 RX ORDER — WARFARIN SODIUM 6 MG/1
TABLET ORAL
Qty: 90 TABLET | Refills: 0 | Status: SHIPPED | OUTPATIENT
Start: 2019-07-16 | End: 2020-07-09

## 2019-08-02 ENCOUNTER — ANTI-COAG VISIT (OUTPATIENT)
Dept: FAMILY MEDICINE CLINIC | Age: 69
End: 2019-08-02
Payer: MEDICARE

## 2019-08-02 DIAGNOSIS — Z79.01 ENCOUNTER FOR CURRENT LONG-TERM USE OF ANTICOAGULANTS: ICD-10-CM

## 2019-08-02 DIAGNOSIS — I82.5Z1 LOWER LEG DVT (DEEP VENOUS THROMBOEMBOLISM), CHRONIC, RIGHT (HCC): ICD-10-CM

## 2019-08-02 DIAGNOSIS — D68.51 HOMOZYGOUS FACTOR V LEIDEN MUTATION (HCC): ICD-10-CM

## 2019-08-02 LAB
INTERNATIONAL NORMALIZATION RATIO, POC: 2.8
PROTHROMBIN TIME, POC: 33.5

## 2019-08-02 PROCEDURE — 85610 PROTHROMBIN TIME: CPT | Performed by: FAMILY MEDICINE

## 2019-08-02 RX ORDER — LANCETS
EACH MISCELLANEOUS
Qty: 100 EACH | Refills: 1 | Status: SHIPPED | OUTPATIENT
Start: 2019-08-02 | End: 2021-02-24 | Stop reason: SDUPTHER

## 2019-08-02 RX ORDER — LANCETS
1 EACH MISCELLANEOUS 4 TIMES DAILY PRN
Qty: 120 EACH | Refills: 11 | Status: SHIPPED | OUTPATIENT
Start: 2019-08-02 | End: 2019-08-02 | Stop reason: SDUPTHER

## 2019-08-14 DIAGNOSIS — I10 ESSENTIAL HYPERTENSION: ICD-10-CM

## 2019-08-14 DIAGNOSIS — E66.9 DIABETES MELLITUS TYPE 2 IN OBESE (HCC): Chronic | ICD-10-CM

## 2019-08-14 DIAGNOSIS — E11.69 DIABETES MELLITUS TYPE 2 IN OBESE (HCC): Chronic | ICD-10-CM

## 2019-08-14 DIAGNOSIS — T50.905A DRUG-INDUCED HYPOKALEMIA: ICD-10-CM

## 2019-08-14 DIAGNOSIS — E87.6 DRUG-INDUCED HYPOKALEMIA: ICD-10-CM

## 2019-08-14 RX ORDER — METOPROLOL SUCCINATE 50 MG/1
TABLET, EXTENDED RELEASE ORAL
Qty: 90 TABLET | Refills: 0 | Status: SHIPPED | OUTPATIENT
Start: 2019-08-14 | End: 2019-11-12 | Stop reason: SDUPTHER

## 2019-08-14 RX ORDER — POTASSIUM CHLORIDE 750 MG/1
TABLET, EXTENDED RELEASE ORAL
Qty: 90 TABLET | Refills: 0 | Status: SHIPPED | OUTPATIENT
Start: 2019-08-14 | End: 2019-11-11 | Stop reason: SDUPTHER

## 2019-08-30 ENCOUNTER — ANTI-COAG VISIT (OUTPATIENT)
Dept: FAMILY MEDICINE CLINIC | Age: 69
End: 2019-08-30
Payer: MEDICARE

## 2019-08-30 DIAGNOSIS — Z79.01 ENCOUNTER FOR CURRENT LONG-TERM USE OF ANTICOAGULANTS: ICD-10-CM

## 2019-08-30 DIAGNOSIS — D68.51 HOMOZYGOUS FACTOR V LEIDEN MUTATION (HCC): ICD-10-CM

## 2019-08-30 DIAGNOSIS — I82.5Z1 LOWER LEG DVT (DEEP VENOUS THROMBOEMBOLISM), CHRONIC, RIGHT (HCC): ICD-10-CM

## 2019-08-30 LAB
INTERNATIONAL NORMALIZATION RATIO, POC: 2.7
PROTHROMBIN TIME, POC: 31.8

## 2019-08-30 PROCEDURE — 85610 PROTHROMBIN TIME: CPT | Performed by: FAMILY MEDICINE

## 2019-09-13 DIAGNOSIS — I10 ESSENTIAL HYPERTENSION: ICD-10-CM

## 2019-09-13 RX ORDER — LISINOPRIL 40 MG/1
40 TABLET ORAL DAILY
Qty: 90 TABLET | Refills: 0 | OUTPATIENT
Start: 2019-09-13 | End: 2019-12-12

## 2019-09-16 DIAGNOSIS — I10 ESSENTIAL HYPERTENSION: ICD-10-CM

## 2019-09-16 DIAGNOSIS — E78.00 HYPERCHOLESTEROLEMIA: ICD-10-CM

## 2019-09-16 DIAGNOSIS — R74.8 LOW SERUM HDL: Chronic | ICD-10-CM

## 2019-09-17 RX ORDER — ATORVASTATIN CALCIUM 40 MG/1
40 TABLET, FILM COATED ORAL NIGHTLY
Qty: 90 TABLET | Refills: 0 | Status: SHIPPED | OUTPATIENT
Start: 2019-09-17 | End: 2019-12-30 | Stop reason: SDUPTHER

## 2019-09-17 RX ORDER — LISINOPRIL 40 MG/1
40 TABLET ORAL DAILY
Qty: 90 TABLET | Refills: 0 | OUTPATIENT
Start: 2019-09-17 | End: 2019-12-16

## 2019-09-17 RX ORDER — LISINOPRIL 40 MG/1
TABLET ORAL
Qty: 90 TABLET | Refills: 0 | Status: SHIPPED | OUTPATIENT
Start: 2019-09-17 | End: 2019-11-12 | Stop reason: SDUPTHER

## 2019-09-23 ENCOUNTER — OFFICE VISIT (OUTPATIENT)
Dept: FAMILY MEDICINE CLINIC | Age: 69
End: 2019-09-23
Payer: MEDICARE

## 2019-09-23 VITALS
HEART RATE: 64 BPM | BODY MASS INDEX: 47.09 KG/M2 | SYSTOLIC BLOOD PRESSURE: 116 MMHG | DIASTOLIC BLOOD PRESSURE: 60 MMHG | OXYGEN SATURATION: 96 % | RESPIRATION RATE: 24 BRPM | HEIGHT: 66 IN | WEIGHT: 293 LBS

## 2019-09-23 DIAGNOSIS — Z79.01 ENCOUNTER FOR CURRENT LONG-TERM USE OF ANTICOAGULANTS: ICD-10-CM

## 2019-09-23 DIAGNOSIS — Z23 NEED FOR 23-POLYVALENT PNEUMOCOCCAL POLYSACCHARIDE VACCINE: ICD-10-CM

## 2019-09-23 DIAGNOSIS — Z23 NEED FOR INFLUENZA VACCINATION: ICD-10-CM

## 2019-09-23 DIAGNOSIS — I10 ESSENTIAL HYPERTENSION: ICD-10-CM

## 2019-09-23 DIAGNOSIS — D68.51 HOMOZYGOUS FACTOR V LEIDEN MUTATION (HCC): ICD-10-CM

## 2019-09-23 DIAGNOSIS — I82.5Z1 LOWER LEG DVT (DEEP VENOUS THROMBOEMBOLISM), CHRONIC, RIGHT (HCC): ICD-10-CM

## 2019-09-23 DIAGNOSIS — E66.9 DIABETES MELLITUS TYPE 2 IN OBESE (HCC): ICD-10-CM

## 2019-09-23 DIAGNOSIS — E11.69 DIABETES MELLITUS TYPE 2 IN OBESE (HCC): ICD-10-CM

## 2019-09-23 DIAGNOSIS — M81.0 POSTMENOPAUSAL OSTEOPOROSIS: ICD-10-CM

## 2019-09-23 DIAGNOSIS — E78.00 HYPERCHOLESTEROLEMIA: ICD-10-CM

## 2019-09-23 DIAGNOSIS — Z12.11 COLON CANCER SCREENING: ICD-10-CM

## 2019-09-23 DIAGNOSIS — E72.11 HOMOCYSTINEMIA (HCC): ICD-10-CM

## 2019-09-23 LAB
HBA1C MFR BLD: 6.5 %
INTERNATIONAL NORMALIZATION RATIO, POC: 2.5
PROTHROMBIN TIME, POC: 30

## 2019-09-23 PROCEDURE — 99214 OFFICE O/P EST MOD 30 MIN: CPT | Performed by: NURSE PRACTITIONER

## 2019-09-23 PROCEDURE — G8400 PT W/DXA NO RESULTS DOC: HCPCS | Performed by: NURSE PRACTITIONER

## 2019-09-23 PROCEDURE — 90732 PPSV23 VACC 2 YRS+ SUBQ/IM: CPT | Performed by: NURSE PRACTITIONER

## 2019-09-23 PROCEDURE — G8417 CALC BMI ABV UP PARAM F/U: HCPCS | Performed by: NURSE PRACTITIONER

## 2019-09-23 PROCEDURE — 3044F HG A1C LEVEL LT 7.0%: CPT | Performed by: NURSE PRACTITIONER

## 2019-09-23 PROCEDURE — 4040F PNEUMOC VAC/ADMIN/RCVD: CPT | Performed by: NURSE PRACTITIONER

## 2019-09-23 PROCEDURE — 3017F COLORECTAL CA SCREEN DOC REV: CPT | Performed by: NURSE PRACTITIONER

## 2019-09-23 PROCEDURE — G8427 DOCREV CUR MEDS BY ELIG CLIN: HCPCS | Performed by: NURSE PRACTITIONER

## 2019-09-23 PROCEDURE — 1090F PRES/ABSN URINE INCON ASSESS: CPT | Performed by: NURSE PRACTITIONER

## 2019-09-23 PROCEDURE — G0008 ADMIN INFLUENZA VIRUS VAC: HCPCS | Performed by: NURSE PRACTITIONER

## 2019-09-23 PROCEDURE — G0009 ADMIN PNEUMOCOCCAL VACCINE: HCPCS | Performed by: NURSE PRACTITIONER

## 2019-09-23 PROCEDURE — 83036 HEMOGLOBIN GLYCOSYLATED A1C: CPT | Performed by: NURSE PRACTITIONER

## 2019-09-23 PROCEDURE — 1123F ACP DISCUSS/DSCN MKR DOCD: CPT | Performed by: NURSE PRACTITIONER

## 2019-09-23 PROCEDURE — 1036F TOBACCO NON-USER: CPT | Performed by: NURSE PRACTITIONER

## 2019-09-23 PROCEDURE — 90653 IIV ADJUVANT VACCINE IM: CPT | Performed by: NURSE PRACTITIONER

## 2019-09-23 PROCEDURE — 2022F DILAT RTA XM EVC RTNOPTHY: CPT | Performed by: NURSE PRACTITIONER

## 2019-09-23 PROCEDURE — 85610 PROTHROMBIN TIME: CPT | Performed by: NURSE PRACTITIONER

## 2019-09-23 RX ORDER — LANCETS
EACH MISCELLANEOUS
Qty: 100 EACH | Refills: 5 | Status: SHIPPED | OUTPATIENT
Start: 2019-09-23 | End: 2020-06-29 | Stop reason: SDUPTHER

## 2019-09-23 NOTE — PROGRESS NOTES
ptSUBJECTIVE:    Patient ID: Rodrick Johnson is a 81 y.o. y.o. female. HPI  Chief Complaint   Patient presents with    Diabetes     ROUTINE FOLLOW UP FOR DM- A1C DONE TODAY, PT HAD EYE DONE DR. Nayan Ross [phone]    Hypertension     ROUTINE FOLLOW UP FOR HTN      SHE DOES CHECK HER BLOOD SUGARS AT HOME TYPICALLY RUNNING IN 'S- AS HIGH  WHEN OUT TO EAT- NO C/O INCREASED THIRST - FREQUENT URINATION OR VISION CHANGES NOTED- SHE HAS HAD HER EYE EXAM- SHE DOES NOT EXERCISE    Hypertension: Patient here for follow-up of elevated blood pressure. She is not exercising and is adherent to low salt diet- She does not check her blood pressure  well controlled at home. Cardiac symptoms none. Patient denies none. Cardiovascular risk factors: advanced age (older than 54 for men, 72 for women), diabetes mellitus, dyslipidemia, obesity (BMI >= 30 kg/m2) and sedentary lifestyle. Use of agents associated with hypertension: none. History of target organ damage: none. Review of Systems     OBJECTIVE:  Vitals:    09/23/19 1105   BP: 116/60   Pulse: 64   Resp: 24   SpO2: 96%       ASSESSMENT UNREMARKABLE- UNABLE TO DOCUMENT USING PHYSICAL EXAM TAB    A+O X 3  OBESE  VSS  EYES-PERRLA   NO CAROTID BRUIT  LUNGS CTA  CARDIO SYSTOLIC MURMUR  MSK- NO DIFFICULTY AMBULATION  Delfino Perez was seen today for diabetes and hypertension. Diagnoses and all orders for this visit:    Essential hypertension  Continue lisinopril and Toprol as prescribed  She is encouraged to monitor B/P at least 1 x daily- goal of 150/90 or less  She is encouraged to increase physical activity to at least 30 min 4-5 x weekly- cardio - chair exercises reviewed with pt as well      Diabetes mellitus type 2 in obese (HCC)  POCT glycosylated hemoglobin (Hb A1C) 6.5 GOOD CONTROL  COMPREHENSIVE METABOLIC PANEL;  Future  ACCU-CHEK SOFTCLIX LANCETS MISC; CHECK 3 X DAILY  CONTINUE TRESIBA 72 UNITS NIGHTLY  NOVOLOG SLIDING SCALE  0-79 EAT THEN 9 UNITS---

## 2019-09-23 NOTE — PATIENT INSTRUCTIONS
serving of the food. First, look at the serving size on the food label. Is that the amount you eat in a serving? All of the nutrition information on a food label is based on that serving size. So if you eat more or less than that, you'll need to adjust the other numbers. Total carbohydrate is the next thing you need to look for on the label. If you count carbohydrate servings, one serving of carbohydrate is 15 grams. For foods that don't come with labels, such as fresh fruits and vegetables, you'll need a guide that lists carbohydrate in these foods. Ask your doctor, dietitian, or diabetes educator about books or other nutrition guides you can use. If you take insulin, you need to know how many grams of carbohydrate are in a meal. This lets you know how much rapid-acting insulin to take before you eat. If you use an insulin pump, you get a constant rate of insulin during the day. So the pump must be programmed at meals to give you extra insulin to cover the rise in blood sugar after meals. When you know how much carbohydrate you will eat, you can take the right amount of insulin. Or, if you always use the same amount of insulin, you need to make sure that you eat the same amount of carbohydrate at meals. If you need more help to understand carbohydrate counting and food labels, ask your doctor, dietitian, or diabetes educator. How do you get started with meal planning? Here are some tips to get started:  · Plan your meals a week at a time. Don't forget to include snacks too. · Use cookbooks or online recipes to plan several main meals. Plan some quick meals for busy nights. You also can double some recipes that freeze well. Then you can save half for other busy nights when you don't have time to cook. · Make sure you have the ingredients you need for your recipes. If you're running low on basic items, put these items on your shopping list too.   · List foods that you use to make breakfasts, lunches, and serving sizes (equal to 1 ounce of meat) are 1/4 cup of cottage cheese, 1 egg, 1 tablespoon of peanut butter, and ½ cup of tofu. How can you eat out and still eat healthy? · Learn to estimate the serving sizes of foods that have carbohydrate. If you measure food at home, it will be easier to estimate the amount in a serving of restaurant food. · If the meal you order has too much carbohydrate (such as potatoes, corn, or baked beans), ask to have a low-carbohydrate food instead. Ask for a salad or green vegetables. · If you use insulin, check your blood sugar before and after eating out to help you plan how much to eat in the future. · If you eat more carbohydrate at a meal than you had planned, take a walk or do other exercise. This will help lower your blood sugar. What else should you know? · Limit saturated fat, such as the fat from meat and dairy products. This is a healthy choice because people who have diabetes are at higher risk of heart disease. So choose lean cuts of meat and nonfat or low-fat dairy products. Use olive or canola oil instead of butter or shortening when cooking. · Don't skip meals. Your blood sugar may drop too low if you skip meals and take insulin or certain medicines for diabetes. · Check with your doctor before you drink alcohol. Alcohol can cause your blood sugar to drop too low. Alcohol can also cause a bad reaction if you take certain diabetes medicines. Follow-up care is a key part of your treatment and safety. Be sure to make and go to all appointments, and call your doctor if you are having problems. It's also a good idea to know your test results and keep a list of the medicines you take. Where can you learn more? Go to [email or web address] and sign in to your [employer] account. Enter N061 in the [employer] box to learn more about \"Learning About Diabetes Food Guidelines. \"     If you do not have an account, please click on the \"Sign Up each day. A serving is 1 cup of lettuce or raw leafy vegetables, ½ cup of chopped or cooked vegetables, or 4 ounces (½ cup) of vegetable juice. Choose vegetables more often than vegetable juice. · Get 2 to 3 servings of low-fat and fat-free dairy each day. A serving is 8 ounces of milk, 1 cup of yogurt, or 1 ½ ounces of cheese. · Eat 6 to 8 servings of grains each day. A serving is 1 slice of bread, 1 ounce of dry cereal, or ½ cup of cooked rice, pasta, or cooked cereal. Try to choose whole-grain products as much as possible. · Limit lean meat, poultry, and fish to 2 servings each day. A serving is 3 ounces, about the size of a deck of cards. · Eat 4 to 5 servings of nuts, seeds, and legumes (cooked dried beans, lentils, and split peas) each week. A serving is 1/3 cup of nuts, 2 tablespoons of seeds, or ½ cup of cooked beans or peas. · Limit fats and oils to 2 to 3 servings each day. A serving is 1 teaspoon of vegetable oil or 2 tablespoons of salad dressing. · Limit sweets and added sugars to 5 servings or less a week. A serving is 1 tablespoon jelly or jam, ½ cup sorbet, or 1 cup of lemonade. · Eat less than 2,300 milligrams (mg) of sodium a day. If you limit your sodium to 1,500 mg a day, you can lower your blood pressure even more. Tips for success  · Start small. Do not try to make dramatic changes to your diet all at once. You might feel that you are missing out on your favorite foods and then be more likely to not follow the plan. Make small changes, and stick with them. Once those changes become habit, add a few more changes. · Try some of the following:  ? Make it a goal to eat a fruit or vegetable at every meal and at snacks. This will make it easy to get the recommended amount of fruits and vegetables each day. ? Try yogurt topped with fruit and nuts for a snack or healthy dessert. ? Add lettuce, tomato, cucumber, and onion to sandwiches.   ? Combine a ready-made pizza crust with low-fat goal for your blood pressure. Your goal will be based on your health and your age. Lifestyle changes, such as eating healthy and being active, are always important to help lower blood pressure. You might also take medicine to reach your blood pressure goal.  Follow-up care is a key part of your treatment and safety. Be sure to make and go to all appointments, and call your doctor if you are having problems. It's also a good idea to know your test results and keep a list of the medicines you take. How can you care for yourself at home? Medical treatment  · If you stop taking your medicine, your blood pressure will go back up. You may take one or more types of medicine to lower your blood pressure. Be safe with medicines. Take your medicine exactly as prescribed. Call your doctor if you think you are having a problem with your medicine. · Talk to your doctor before you start taking aspirin every day. Aspirin can help certain people lower their risk of a heart attack or stroke. But taking aspirin isn't right for everyone, because it can cause serious bleeding. · See your doctor regularly. You may need to see the doctor more often at first or until your blood pressure comes down. · If you are taking blood pressure medicine, talk to your doctor before you take decongestants or anti-inflammatory medicine, such as ibuprofen. Some of these medicines can raise blood pressure. · Learn how to check your blood pressure at home. Lifestyle changes  · Stay at a healthy weight. This is especially important if you put on weight around the waist. Losing even 10 pounds can help you lower your blood pressure. · If your doctor recommends it, get more exercise. Walking is a good choice. Bit by bit, increase the amount you walk every day. Try for at least 30 minutes on most days of the week. You also may want to swim, bike, or do other activities. · Avoid or limit alcohol.  Talk to your doctor about whether you can drink any alcohol. · Try to limit how much sodium you eat to less than 2,300 milligrams (mg) a day. Your doctor may ask you to try to eat less than 1,500 mg a day. · Eat plenty of fruits (such as bananas and oranges), vegetables, legumes, whole grains, and low-fat dairy products. · Lower the amount of saturated fat in your diet. Saturated fat is found in animal products such as milk, cheese, and meat. Limiting these foods may help you lose weight and also lower your risk for heart disease. · Do not smoke. Smoking increases your risk for heart attack and stroke. If you need help quitting, talk to your doctor about stop-smoking programs and medicines. These can increase your chances of quitting for good. When should you call for help? Call 911 anytime you think you may need emergency care. This may mean having symptoms that suggest that your blood pressure is causing a serious heart or blood vessel problem. Your blood pressure may be over 180/120.   For example, call 911 if:    · You have symptoms of a heart attack. These may include:  ? Chest pain or pressure, or a strange feeling in the chest.  ? Sweating. ? Shortness of breath. ? Nausea or vomiting. ? Pain, pressure, or a strange feeling in the back, neck, jaw, or upper belly or in one or both shoulders or arms. ? Lightheadedness or sudden weakness. ? A fast or irregular heartbeat.     · You have symptoms of a stroke. These may include:  ? Sudden numbness, tingling, weakness, or loss of movement in your face, arm, or leg, especially on only one side of your body. ? Sudden vision changes. ? Sudden trouble speaking. ? Sudden confusion or trouble understanding simple statements. ? Sudden problems with walking or balance. ? A sudden, severe headache that is different from past headaches.     · You have severe back or belly pain.    Do not wait until your blood pressure comes down on its own.  Get help right away.   Call your doctor now or seek immediate care

## 2019-10-31 ENCOUNTER — ANTI-COAG VISIT (OUTPATIENT)
Dept: FAMILY MEDICINE CLINIC | Age: 69
End: 2019-10-31
Payer: MEDICARE

## 2019-10-31 DIAGNOSIS — I82.5Z1 LOWER LEG DVT (DEEP VENOUS THROMBOEMBOLISM), CHRONIC, RIGHT (HCC): ICD-10-CM

## 2019-10-31 DIAGNOSIS — Z79.01 ENCOUNTER FOR CURRENT LONG-TERM USE OF ANTICOAGULANTS: ICD-10-CM

## 2019-10-31 DIAGNOSIS — D68.51 HOMOZYGOUS FACTOR V LEIDEN MUTATION (HCC): ICD-10-CM

## 2019-10-31 LAB
INTERNATIONAL NORMALIZATION RATIO, POC: 2.5
PROTHROMBIN TIME, POC: 29.6

## 2019-10-31 PROCEDURE — 85610 PROTHROMBIN TIME: CPT | Performed by: FAMILY MEDICINE

## 2019-11-11 DIAGNOSIS — R60.0 EDEMA OF BOTH LEGS: ICD-10-CM

## 2019-11-11 DIAGNOSIS — T50.905A DRUG-INDUCED HYPOKALEMIA: ICD-10-CM

## 2019-11-11 DIAGNOSIS — E87.6 DRUG-INDUCED HYPOKALEMIA: ICD-10-CM

## 2019-11-11 RX ORDER — POTASSIUM CHLORIDE 750 MG/1
TABLET, EXTENDED RELEASE ORAL
Qty: 90 TABLET | Refills: 0 | Status: SHIPPED | OUTPATIENT
Start: 2019-11-11 | End: 2020-02-10

## 2019-11-11 RX ORDER — FUROSEMIDE 40 MG/1
TABLET ORAL
Qty: 90 TABLET | Refills: 0 | Status: SHIPPED | OUTPATIENT
Start: 2019-11-11 | End: 2019-12-10 | Stop reason: SDUPTHER

## 2019-11-12 DIAGNOSIS — I10 ESSENTIAL HYPERTENSION: ICD-10-CM

## 2019-11-12 RX ORDER — LISINOPRIL 40 MG/1
TABLET ORAL
Qty: 90 TABLET | Refills: 0 | Status: SHIPPED | OUTPATIENT
Start: 2019-11-12 | End: 2020-04-30

## 2019-11-12 RX ORDER — METOPROLOL SUCCINATE 50 MG/1
TABLET, EXTENDED RELEASE ORAL
Qty: 90 TABLET | Refills: 0 | Status: SHIPPED | OUTPATIENT
Start: 2019-11-12 | End: 2020-02-10

## 2019-11-14 ENCOUNTER — HOSPITAL ENCOUNTER (OUTPATIENT)
Dept: MAMMOGRAPHY | Age: 69
Discharge: HOME | End: 2019-11-19
Payer: MEDICARE

## 2019-11-14 DIAGNOSIS — Z12.31 VISIT FOR SCREENING MAMMOGRAM: ICD-10-CM

## 2019-11-14 PROCEDURE — 77063 BREAST TOMOSYNTHESIS BI: CPT

## 2019-11-19 DIAGNOSIS — R92.1 BREAST CALCIFICATIONS ON MAMMOGRAM: Primary | ICD-10-CM

## 2019-11-25 ENCOUNTER — HOSPITAL ENCOUNTER (OUTPATIENT)
Dept: WOMENS IMAGING | Age: 69
Discharge: HOME | End: 2019-11-25
Payer: MEDICARE

## 2019-11-25 DIAGNOSIS — R92.1 BREAST CALCIFICATIONS ON MAMMOGRAM: ICD-10-CM

## 2019-11-25 PROCEDURE — 77065 DX MAMMO INCL CAD UNI: CPT

## 2019-11-26 ENCOUNTER — ANTI-COAG VISIT (OUTPATIENT)
Dept: FAMILY MEDICINE CLINIC | Age: 69
End: 2019-11-26
Payer: MEDICARE

## 2019-11-26 DIAGNOSIS — D68.51 HOMOZYGOUS FACTOR V LEIDEN MUTATION (HCC): ICD-10-CM

## 2019-11-26 DIAGNOSIS — I82.5Z1 LOWER LEG DVT (DEEP VENOUS THROMBOEMBOLISM), CHRONIC, RIGHT (HCC): ICD-10-CM

## 2019-11-26 DIAGNOSIS — Z79.01 ENCOUNTER FOR CURRENT LONG-TERM USE OF ANTICOAGULANTS: ICD-10-CM

## 2019-11-26 LAB
INTERNATIONAL NORMALIZATION RATIO, POC: 2.5
PROTHROMBIN TIME, POC: 29.7

## 2019-11-26 PROCEDURE — 85610 PROTHROMBIN TIME: CPT | Performed by: FAMILY MEDICINE

## 2019-11-27 DIAGNOSIS — E66.9 DIABETES MELLITUS TYPE 2 IN OBESE (HCC): Chronic | ICD-10-CM

## 2019-11-27 DIAGNOSIS — E11.69 DIABETES MELLITUS TYPE 2 IN OBESE (HCC): Chronic | ICD-10-CM

## 2019-12-10 DIAGNOSIS — D68.51 HOMOZYGOUS FACTOR V LEIDEN MUTATION (HCC): ICD-10-CM

## 2019-12-10 DIAGNOSIS — I10 ESSENTIAL HYPERTENSION: ICD-10-CM

## 2019-12-10 DIAGNOSIS — R60.0 EDEMA OF BOTH LEGS: ICD-10-CM

## 2019-12-10 DIAGNOSIS — I82.5Z1 LOWER LEG DVT (DEEP VENOUS THROMBOEMBOLISM), CHRONIC, RIGHT (HCC): ICD-10-CM

## 2019-12-10 RX ORDER — WARFARIN SODIUM 6 MG/1
TABLET ORAL
Qty: 90 TABLET | Refills: 0 | Status: SHIPPED | OUTPATIENT
Start: 2019-12-10 | End: 2020-04-20

## 2019-12-10 RX ORDER — FUROSEMIDE 40 MG/1
TABLET ORAL
Qty: 90 TABLET | Refills: 0 | Status: SHIPPED | OUTPATIENT
Start: 2019-12-10 | End: 2020-03-09

## 2019-12-10 RX ORDER — AMLODIPINE BESYLATE 10 MG/1
TABLET ORAL
Qty: 90 TABLET | Refills: 0 | Status: SHIPPED | OUTPATIENT
Start: 2019-12-10 | End: 2020-03-09

## 2019-12-26 ENCOUNTER — ANTI-COAG VISIT (OUTPATIENT)
Dept: FAMILY MEDICINE CLINIC | Age: 69
End: 2019-12-26
Payer: MEDICARE

## 2019-12-26 DIAGNOSIS — D68.51 HOMOZYGOUS FACTOR V LEIDEN MUTATION (HCC): ICD-10-CM

## 2019-12-26 DIAGNOSIS — I82.5Z1 LOWER LEG DVT (DEEP VENOUS THROMBOEMBOLISM), CHRONIC, RIGHT (HCC): ICD-10-CM

## 2019-12-26 DIAGNOSIS — E72.11 HOMOCYSTINEMIA (HCC): ICD-10-CM

## 2019-12-26 DIAGNOSIS — E11.69 DIABETES MELLITUS TYPE 2 IN OBESE (HCC): ICD-10-CM

## 2019-12-26 DIAGNOSIS — E66.9 DIABETES MELLITUS TYPE 2 IN OBESE (HCC): ICD-10-CM

## 2019-12-26 DIAGNOSIS — Z79.01 ENCOUNTER FOR CURRENT LONG-TERM USE OF ANTICOAGULANTS: ICD-10-CM

## 2019-12-26 DIAGNOSIS — E78.00 HYPERCHOLESTEROLEMIA: ICD-10-CM

## 2019-12-26 LAB
A/G RATIO: 1 (ref 1.1–2.2)
ALBUMIN SERPL-MCNC: 3.3 G/DL (ref 3.4–5)
ALP BLD-CCNC: 112 U/L (ref 40–129)
ALT SERPL-CCNC: 10 U/L (ref 10–40)
ANION GAP SERPL CALCULATED.3IONS-SCNC: 16 MMOL/L (ref 3–16)
AST SERPL-CCNC: 19 U/L (ref 15–37)
BILIRUB SERPL-MCNC: 1.2 MG/DL (ref 0–1)
BUN BLDV-MCNC: 16 MG/DL (ref 7–20)
CALCIUM SERPL-MCNC: 9.2 MG/DL (ref 8.3–10.6)
CHLORIDE BLD-SCNC: 102 MMOL/L (ref 99–110)
CHOLESTEROL, TOTAL: 132 MG/DL (ref 0–199)
CO2: 26 MMOL/L (ref 21–32)
CREAT SERPL-MCNC: 0.9 MG/DL (ref 0.6–1.2)
GFR AFRICAN AMERICAN: >60
GFR NON-AFRICAN AMERICAN: >60
GLOBULIN: 3.2 G/DL
GLUCOSE BLD-MCNC: 48 MG/DL (ref 70–99)
HDLC SERPL-MCNC: 51 MG/DL (ref 40–60)
HOMOCYSTEINE: 14 UMOL/L (ref 0–10)
INTERNATIONAL NORMALIZATION RATIO, POC: 2.3
LDL CHOLESTEROL CALCULATED: 67 MG/DL
POTASSIUM SERPL-SCNC: 4.1 MMOL/L (ref 3.5–5.1)
PROTHROMBIN TIME, POC: 28.1
SODIUM BLD-SCNC: 144 MMOL/L (ref 136–145)
TOTAL PROTEIN: 6.5 G/DL (ref 6.4–8.2)
TRIGL SERPL-MCNC: 71 MG/DL (ref 0–150)
VLDLC SERPL CALC-MCNC: 14 MG/DL

## 2019-12-26 PROCEDURE — 36415 COLL VENOUS BLD VENIPUNCTURE: CPT | Performed by: NURSE PRACTITIONER

## 2019-12-26 PROCEDURE — 85610 PROTHROMBIN TIME: CPT | Performed by: NURSE PRACTITIONER

## 2019-12-30 DIAGNOSIS — E78.00 HYPERCHOLESTEROLEMIA: ICD-10-CM

## 2019-12-30 DIAGNOSIS — R74.8 LOW SERUM HDL: Chronic | ICD-10-CM

## 2019-12-30 RX ORDER — ATORVASTATIN CALCIUM 40 MG/1
40 TABLET, FILM COATED ORAL NIGHTLY
Qty: 90 TABLET | Refills: 3 | Status: SHIPPED | OUTPATIENT
Start: 2019-12-30 | End: 2021-01-11

## 2020-01-28 ENCOUNTER — ANTI-COAG VISIT (OUTPATIENT)
Dept: FAMILY MEDICINE CLINIC | Age: 70
End: 2020-01-28
Payer: COMMERCIAL

## 2020-01-28 LAB
INTERNATIONAL NORMALIZATION RATIO, POC: 1.8
PROTHROMBIN TIME, POC: 22

## 2020-01-28 PROCEDURE — 85610 PROTHROMBIN TIME: CPT | Performed by: FAMILY MEDICINE

## 2020-01-28 NOTE — PROGRESS NOTES
POCT PT/INR Performed today, slightly low, Per CC, increase today's dose to 1 1/2 tabs then resume 1 tab qd, repeat in 2 weeks.   Pt informed./mv

## 2020-01-31 ENCOUNTER — TELEPHONE (OUTPATIENT)
Dept: FAMILY MEDICINE CLINIC | Age: 70
End: 2020-01-31

## 2020-01-31 NOTE — TELEPHONE ENCOUNTER
PT CALLING SHE TWISTED HER ANKLE SHE HAS BEEN PUTTING ICE ON IT AND TOOK ONE IBPROFEN  - PT WANTING ADVICE ON WHAT ELSE SHE CAN DO?    PT @  [phone]

## 2020-01-31 NOTE — TELEPHONE ENCOUNTER
With coumadin, the only safe med that does not interfere OTC is tylenol - 2 Tylenol Extra strength up to 3 times daily. Use ice, as tylenol does not help with inflammation, and ice does. 15 minutes 3-4 times daily. Elevated, use an ace wrap. Rest the ankle. Xray ordered if not improving. If pain not controlled with OTC, will need to be seen for pain medication.

## 2020-01-31 NOTE — TELEPHONE ENCOUNTER
LEFT ANKLE, SHE TWISTED IT TURNING AROUND IS NOT SWOLLEN YET, IS ABLE TO WALK ON IT ADVISED TO KEEP USING ICE, SHE IS ALSO ELEVATING AND WALKING AS MUCH AS SHE CAN TOLERATE, SHE DID TAKE IBUPROFEN BUT SHE IS ON WARFARIN WHAT CAN SHE TAKE FOR PAIN ALSO CAN WE ORDER AN XRAY JUST IN CASE IT GETS WORSE OVER THE WEEKEND. Clara Kwan

## 2020-02-10 RX ORDER — METOPROLOL SUCCINATE 50 MG/1
TABLET, EXTENDED RELEASE ORAL
Qty: 90 TABLET | Refills: 0 | Status: SHIPPED | OUTPATIENT
Start: 2020-02-10 | End: 2020-05-11

## 2020-02-10 RX ORDER — POTASSIUM CHLORIDE 750 MG/1
TABLET, EXTENDED RELEASE ORAL
Qty: 90 TABLET | Refills: 0 | Status: SHIPPED | OUTPATIENT
Start: 2020-02-10 | End: 2020-05-11

## 2020-02-11 ENCOUNTER — ANTI-COAG VISIT (OUTPATIENT)
Dept: FAMILY MEDICINE CLINIC | Age: 70
End: 2020-02-11
Payer: COMMERCIAL

## 2020-02-11 LAB
INTERNATIONAL NORMALIZATION RATIO, POC: 2.1
PROTHROMBIN TIME, POC: 25.4

## 2020-02-11 PROCEDURE — 85610 PROTHROMBIN TIME: CPT | Performed by: NURSE PRACTITIONER

## 2020-02-11 NOTE — PROGRESS NOTES
POCT PT/INR Performed today, within nml range, Per CC, Continue same dose of 1 tab daily, repeat in 4 weeks.   Pt informed./mv

## 2020-02-17 RX ORDER — INSULIN DEGLUDEC INJECTION 100 U/ML
INJECTION, SOLUTION SUBCUTANEOUS
Qty: 75 ML | Refills: 2 | Status: ON HOLD | OUTPATIENT
Start: 2020-02-17 | End: 2020-12-03 | Stop reason: SDUPTHER

## 2020-03-09 RX ORDER — FUROSEMIDE 40 MG/1
TABLET ORAL
Qty: 90 TABLET | Refills: 0 | Status: SHIPPED | OUTPATIENT
Start: 2020-03-09 | End: 2020-07-22

## 2020-03-09 RX ORDER — AMLODIPINE BESYLATE 10 MG/1
TABLET ORAL
Qty: 90 TABLET | Refills: 0 | Status: SHIPPED | OUTPATIENT
Start: 2020-03-09 | End: 2020-06-02

## 2020-03-10 ENCOUNTER — ANTI-COAG VISIT (OUTPATIENT)
Dept: FAMILY MEDICINE CLINIC | Age: 70
End: 2020-03-10
Payer: COMMERCIAL

## 2020-03-10 LAB
INTERNATIONAL NORMALIZATION RATIO, POC: 2.1
PROTHROMBIN TIME, POC: 25.7

## 2020-03-10 PROCEDURE — 85610 PROTHROMBIN TIME: CPT | Performed by: FAMILY MEDICINE

## 2020-04-03 ENCOUNTER — ANTI-COAG VISIT (OUTPATIENT)
Dept: FAMILY MEDICINE CLINIC | Age: 70
End: 2020-04-03
Payer: COMMERCIAL

## 2020-04-03 LAB
INTERNATIONAL NORMALIZATION RATIO, POC: 2.2
PROTHROMBIN TIME, POC: 26.9

## 2020-04-03 PROCEDURE — 85610 PROTHROMBIN TIME: CPT | Performed by: FAMILY MEDICINE

## 2020-04-03 RX ORDER — FOLIC ACID 1 MG/1
1 TABLET ORAL DAILY
Qty: 90 TABLET | Refills: 0 | Status: SHIPPED | OUTPATIENT
Start: 2020-04-03 | End: 2020-06-24

## 2020-04-03 RX ORDER — BLOOD SUGAR DIAGNOSTIC
STRIP MISCELLANEOUS
Qty: 300 STRIP | Refills: 5 | Status: SHIPPED | OUTPATIENT
Start: 2020-04-03 | End: 2021-04-16

## 2020-04-13 RX ORDER — PEN NEEDLE, DIABETIC 31 GX5/16"
NEEDLE, DISPOSABLE MISCELLANEOUS
Qty: 100 EACH | Refills: 5 | Status: SHIPPED | OUTPATIENT
Start: 2020-04-13

## 2020-04-13 RX ORDER — CALCIUM CARB/VITAMIN D3/VIT K1 500-100-40
TABLET,CHEWABLE ORAL
Qty: 100 EACH | Refills: 5 | Status: SHIPPED | OUTPATIENT
Start: 2020-04-13 | End: 2020-06-29

## 2020-04-20 RX ORDER — WARFARIN SODIUM 6 MG/1
TABLET ORAL
Qty: 90 TABLET | Refills: 0 | Status: SHIPPED | OUTPATIENT
Start: 2020-04-20 | End: 2020-04-24 | Stop reason: SDUPTHER

## 2020-04-20 RX ORDER — WARFARIN SODIUM 6 MG/1
TABLET ORAL
Qty: 109 TABLET | OUTPATIENT
Start: 2020-04-20

## 2020-04-24 ENCOUNTER — VIRTUAL VISIT (OUTPATIENT)
Dept: FAMILY MEDICINE CLINIC | Age: 70
End: 2020-04-24
Payer: COMMERCIAL

## 2020-04-24 VITALS — WEIGHT: 293 LBS | BODY MASS INDEX: 47.09 KG/M2 | HEIGHT: 66 IN

## 2020-04-24 PROCEDURE — 99213 OFFICE O/P EST LOW 20 MIN: CPT | Performed by: NURSE PRACTITIONER

## 2020-04-24 ASSESSMENT — PATIENT HEALTH QUESTIONNAIRE - PHQ9
2. FEELING DOWN, DEPRESSED OR HOPELESS: 1
SUM OF ALL RESPONSES TO PHQ QUESTIONS 1-9: 1
SUM OF ALL RESPONSES TO PHQ QUESTIONS 1-9: 1
SUM OF ALL RESPONSES TO PHQ9 QUESTIONS 1 & 2: 1
1. LITTLE INTEREST OR PLEASURE IN DOING THINGS: 0

## 2020-04-24 NOTE — PROGRESS NOTES
2020     Pebbles Moreau (:  1950) is a 78 y.o. female, here for evaluation of the following medical concerns:  Pebbles Moreau is a 78 y.o. female being evaluated by a Virtual Visit (video visit) encounter to address concerns as mentioned above. A caregiver was present when appropriate. Due to this being a TeleHealth encounter ([facility]), evaluation of the following organ systems was limited: Vitals/Constitutional/EENT/Resp/CV/GI//MS/Neuro/Skin/Heme-Lymph-Imm. Pursuant to the emergency declaration under the [address] [place] [address], [address] [place] [address] authority and the [employer] Act, this Virtual Visit was conducted with patient's (and/or legal guardian's) consent, to reduce the patient's risk of exposure to COVID-19 and provide necessary medical care. The patient (and/or legal guardian) has also been advised to contact this office for worsening conditions or problems, and seek emergency medical treatment and/or call 911 if deemed necessary. Patient identification was verified at the start of the visit: Yes    Total time spent for this encounter: 15    Services were provided through a video synchronous discussion virtually to substitute for in-person clinic visit. Patient and provider were located at their individual homes. --JUAN MANUEL Sanderson CNP on 2020 at 1:16 PM    An electronic signature was used to authenticate this note.   HPI   Chief Complaint   Patient presents with    Hypertension     HTN ROUTINE FOLLOW UP    Diabetes Mellitus     DM ROUTINE FOLLOW UP     Treatment Adherence:   Medication compliance:  compliant all of the time Tresiba 72 units nightly- sliding scale for Novolog  Diet compliance:  compliant most of the time  Weight trend: n/a  Current exercise: no regular exercise  Barriers: none    Diabetes Mellitus Type 2: Current symptoms/problems JUAN MANUEL Moore CNP   NOVOLOG 100 UNIT/ML injection vial ADMINISTER 18 UNITS UNDER THE SKIN THREE TIMES DAILY BEFORE MEALS Yes JUAN MANUEL Moore CNP   amLODIPine (NORVASC) 10 MG tablet TAKE 1 TABLET BY MOUTH DAILY FOR HIGH BLOOD PRESSURE Yes JUAN MANUEL Moore CNP   furosemide (LASIX) 40 MG tablet TAKE 1 TABLET BY MOUTH DAILY Yes JUAN MANUEL Moore CNP   TRESIBA FLEXTOUCH 100 UNIT/ML SOPN INJECT 72 UNITS UNDER THE SKIN DAILY WITH SUPPER Yes JUAN MANUEL Moore CNP   KLOR-CON M10 10 MEQ extended release tablet TAKE 1 TABLET BY MOUTH DAILY Yes JUAN MANUEL Moore CNP   metoprolol succinate (TOPROL XL) 50 MG extended release tablet TAKE 1 TABLET BY MOUTH DAILY Yes JUAN MANUEL Moore CNP   atorvastatin (LIPITOR) 40 MG tablet Take 1 tablet by mouth nightly Yes JUAN MANUEL Cheney CNP   lisinopril (PRINIVIL;ZESTRIL) 40 MG tablet TAKE 1 TABLET BY MOUTH DAILY Yes Everton Sandoval, DO   ACCU-CHEK SOFTCLIX LANCETS MISC CHECK 3 X DAILY Yes JUAN MANUEL Cheney CNP   ACCU-CHEK MULTICLIX LANCETS MISC TEST FOUR TIMES DAILY AS NEEDED FOR LOW OR HIGH BLOOD SUGARS Yes JUAN MANUEL Moore CNP   warfarin (COUMADIN) 6 MG tablet TAKE 1 TABLET BY MOUTH DAILY OR AS DIRECTED PER INR Yes JUAN MANUEL Moore CNP        Social History     Tobacco Use    Smoking status: Never Smoker    Smokeless tobacco: Never Used   Substance Use Topics    Alcohol use: Yes     Comment: OCCASIONALLY 1 or less/1-2 wk        Vitals:    04/24/20 1125   Weight: (!) 310 lb (140.6 kg)  Comment: PER PT AT HOME.  [facility]   Height: 5' 5.5\" (1.664 m)     Estimated body mass index is 50.8 kg/m² as calculated from the following:    Height as of this encounter: 5' 5.5\" (1.664 m). Weight as of this encounter: 310 lb (140.6 kg). Physical Exam  Constitutional:       General: She is awake. She is not in acute distress. Appearance: Normal appearance.  She is well-developed. She is morbidly obese. She is not ill-appearing, toxic-appearing or diaphoretic. Neurological:      Mental Status: She is alert. GCS: GCS eye subscore is 4. GCS verbal subscore is 5. GCS motor subscore is 6. Psychiatric:         Attention and Perception: Attention and perception normal.         Mood and Affect: Mood and affect normal.         Speech: Speech normal.         Behavior: Behavior normal. Behavior is cooperative. Thought Content: Thought content normal.         Cognition and Memory: Cognition and memory normal.         Judgment: Judgment normal.         Tripp Hamilton was seen today for hypertension and diabetes mellitus.     Diagnoses and all orders for this visit:    Diabetes mellitus type 2 in obese ([address]  CONTINUE TRESIBA- NOVOLOG AS PRESCRIBED  CONTINUE MONITORING BLOOD GLUCOSE LEVELS- FASTING GOAL 110 -130  ENCOURAGED PT TO EXERCISE AT LEAST 30 MI 4-5 X WEEKLY    Essential hypertension  She is encouraged to monitor b/p at least once weekly'- goal of 130/90 or less- as well as increasing physical activity to at least 30 min 4-5 x weekly- preferably cardio  Follow up in six months    Left breast mass  -     [facility] CAD LEFT- FOLLOW UP 8/25

## 2020-05-11 RX ORDER — POTASSIUM CHLORIDE 750 MG/1
TABLET, EXTENDED RELEASE ORAL
Qty: 90 TABLET | Refills: 1 | Status: SHIPPED | OUTPATIENT
Start: 2020-05-11 | End: 2020-10-20

## 2020-05-11 RX ORDER — METOPROLOL SUCCINATE 50 MG/1
TABLET, EXTENDED RELEASE ORAL
Qty: 90 TABLET | Refills: 1 | Status: SHIPPED | OUTPATIENT
Start: 2020-05-11 | End: 2020-10-20

## 2020-06-02 RX ORDER — AMLODIPINE BESYLATE 10 MG/1
TABLET ORAL
Qty: 90 TABLET | Refills: 0 | Status: SHIPPED | OUTPATIENT
Start: 2020-06-02 | End: 2020-08-24

## 2020-06-05 ENCOUNTER — TELEPHONE (OUTPATIENT)
Dept: FAMILY MEDICINE CLINIC | Age: 70
End: 2020-06-05

## 2020-06-05 NOTE — TELEPHONE ENCOUNTER
Called pt about overdue INR, pt going to [facility] for INR and repeat labs from Dec 2019.   Labs ordered as lab collect./mv

## 2020-06-16 ENCOUNTER — NURSE ONLY (OUTPATIENT)
Dept: FAMILY MEDICINE CLINIC | Age: 70
End: 2020-06-16
Payer: COMMERCIAL

## 2020-06-16 VITALS — TEMPERATURE: 97.9 F

## 2020-06-16 LAB
A/G RATIO: 1.3 (ref 1.1–2.2)
ALBUMIN SERPL-MCNC: 3.8 G/DL (ref 3.4–5)
ALP BLD-CCNC: 120 U/L (ref 40–129)
ALT SERPL-CCNC: 9 U/L (ref 10–40)
ANION GAP SERPL CALCULATED.3IONS-SCNC: 9 MMOL/L (ref 3–16)
AST SERPL-CCNC: 18 U/L (ref 15–37)
BILIRUB SERPL-MCNC: 1.2 MG/DL (ref 0–1)
BUN BLDV-MCNC: 15 MG/DL (ref 7–20)
CALCIUM SERPL-MCNC: 8.6 MG/DL (ref 8.3–10.6)
CHLORIDE BLD-SCNC: 104 MMOL/L (ref 99–110)
CO2: 30 MMOL/L (ref 21–32)
CREAT SERPL-MCNC: 0.8 MG/DL (ref 0.6–1.2)
GFR AFRICAN AMERICAN: >60
GFR NON-AFRICAN AMERICAN: >60
GLOBULIN: 2.9 G/DL
GLUCOSE BLD-MCNC: 72 MG/DL (ref 70–99)
HOMOCYSTEINE: 18 UMOL/L (ref 0–10)
INTERNATIONAL NORMALIZATION RATIO, POC: 2.7
POTASSIUM SERPL-SCNC: 4 MMOL/L (ref 3.5–5.1)
PROTHROMBIN TIME, POC: 32.8
SODIUM BLD-SCNC: 143 MMOL/L (ref 136–145)
TOTAL PROTEIN: 6.7 G/DL (ref 6.4–8.2)

## 2020-06-16 PROCEDURE — 36415 COLL VENOUS BLD VENIPUNCTURE: CPT | Performed by: NURSE PRACTITIONER

## 2020-06-16 PROCEDURE — 85610 PROTHROMBIN TIME: CPT | Performed by: FAMILY MEDICINE

## 2020-06-24 RX ORDER — FOLIC ACID 1 MG/1
1 TABLET ORAL DAILY
Qty: 90 TABLET | Refills: 1 | Status: SHIPPED | OUTPATIENT
Start: 2020-06-24 | End: 2020-06-26 | Stop reason: SDUPTHER

## 2020-06-26 ENCOUNTER — TELEPHONE (OUTPATIENT)
Dept: FAMILY MEDICINE CLINIC | Age: 70
End: 2020-06-26

## 2020-06-26 RX ORDER — FOLIC ACID 1 MG/1
2 TABLET ORAL DAILY
Qty: 180 TABLET | Refills: 1 | Status: SHIPPED | OUTPATIENT
Start: 2020-06-26 | End: 2021-01-08

## 2020-06-26 NOTE — TELEPHONE ENCOUNTER
PT HAS AN APPT ON Monday - HER DAUGHTER IS CALLING TO SEE IF THERE IS ANYTHING SHE CAN TAKE OTC --  TO HELP - SO THEY DON'T HAVE TO TAKE HER TO THE HOSPITAL OVER THE WEEKEND  SHE IS TAKING COUGH DROPS, VICKS VAPOR RUB = CHLORICIDIN      OXYGEN LEVEL 93 & 97    THEY THINK IT IS BRONCHITIS    GINO @  [phone]

## 2020-06-29 ENCOUNTER — TELEMEDICINE (OUTPATIENT)
Dept: FAMILY MEDICINE CLINIC | Age: 70
End: 2020-06-29
Payer: COMMERCIAL

## 2020-06-29 PROCEDURE — 99442 PR PHYS/QHP TELEPHONE EVALUATION 11-20 MIN: CPT | Performed by: NURSE PRACTITIONER

## 2020-06-29 RX ORDER — AMOXICILLIN 500 MG/1
500 CAPSULE ORAL 2 TIMES DAILY
Qty: 14 CAPSULE | Refills: 0 | Status: SHIPPED | OUTPATIENT
Start: 2020-06-29 | End: 2020-07-06

## 2020-06-29 ASSESSMENT — ENCOUNTER SYMPTOMS
CHEST TIGHTNESS: 0
WHEEZING: 0
SHORTNESS OF BREATH: 0

## 2020-06-29 NOTE — PROGRESS NOTES
SUBJECTIVE:    Patient ID: Richa Varela is a 78 y.o. y.o. female. Richa Varela is a 78 y.o. female evaluated via telephone on 6/29/2020. Consent:  She and/or health care decision maker is aware that that she may receive a bill for this telephone service, depending on her insurance coverage, and has provided verbal consent to proceed: Yes      Documentation:  I communicated with the patient and/or health care decision maker about viral illnesses. Symptomatic treatment  Details of this discussion including any medical advice provided:       I affirm this is a Patient Initiated Episode with a Patient who has not had a related appointment within my department in the past 7 days or scheduled within the next 24 hours. Patient identification was verified at the start of the visit: Yes    Total Time: minutes: 11-20 minutes    Note: not billable if this call serves to triage the patient into an appointment for the relevant concern      Dimitri Conroy   HPI  Chief Complaint   Patient presents with    Cough     PT CO COUGH WITH LIGHT MUCOUS X 3-4 DAYS. WAS A LITTLE BETTER YESTERDAY. COUGH IS NOT AS OFTEN BUT HAS BEEN USING MUCINEX. NO FWEVER. NO BODY ACHES. Pt c/o productive cough clear to yellow stuff - slight wheezing x 1 day but better now- no SOB or fevers - slight sore throat in the beginning gargling with salt water seemed to help- her daughter listened to her lungs and they were  Has tried Mucinex- vaporizer - vicks vapor rub     Review of Systems   Constitutional: Negative for fever. Respiratory: Negative for chest tightness, shortness of breath and wheezing. All other systems reviewed and are negative. OBJECTIVE:        Physical Exam  Psychiatric:         Attention and Perception: Attention and perception normal.         Mood and Affect: Mood and affect normal.         Speech: Speech normal.         Behavior: Behavior normal.         Thought Content:  Thought content normal.         Cognition and Memory: Cognition and memory normal.         Judgment: Judgment normal.         Earnest Mathias was seen today for cough. Diagnoses and all orders for this visit:    Viral URI  -     amoxicillin (AMOXIL) 500 MG capsule; Take 1 capsule by mouth 2 times daily for 7 days   HYDROcodone-homatropine (HYCODAN) 5-1.5 MG/5ML syrup; Take 5 mLs by mouth 4 times daily as needed (cough) for up to 3 days. Sedation precautions dw pt  Encouraged to continue symptomatic treatment - hold off on abx until 7/2  As most likely viral  Instructions for Respiratory Infections (SAVE THIS SHEET)   For the first 7-14 days of symptoms follow instructions below, even before being seen in the office or even during treatment with antibiotics, until symptom free. 1. Water: Drink 1 ounce of water for every 2 pounds of body weight for adults need 64 Ounces of water per day. This will loosen mucus in the head and chest & improve the weak feeling of dehydration, allow the body to get germ fighting resources to the infection. Half can be juice or sugar free Crystal Light. Don't count drinks with caffeine or carbonation. Infants can have Pedialyte liquid or freezer pops. Avoid salt if you have high Blood Pressure, swelling in the feet or ankles or have heart problems. 2. Humidity: Humidify the air to 35-50% ( or until the windows fog over slightly). Can use a humidifier, vaporizer, boil water on the stove or put a coffee can full of water on the heater vents. This will loosen mucus from infections and allergies. 3. Sleep: Get 8-10 hours a night and rest during the evening after work or school. If you have trouble sleeping, adults can take Melatonin 3mg up to 3 tabs at bedtime ( not for children or pregnant women). If Mono is suspected then sleep during 9PM to 9AM time span (if possible.)   4. Cough: Take cough medicines with Guaifenesin ( to loosen chest or head congestion) and Dextromethorphan ( to decrease excess cough). appear call to be seen in the office as soon as possible and don't gargle on that day. Newborns, infants, or anyone with earaches or influenza may need to be seen quickly. Adults with fevers over 103 degrees or shortness of breath should call the office immediately. Ilya Teresa

## 2020-07-09 RX ORDER — WARFARIN SODIUM 6 MG/1
TABLET ORAL
Qty: 90 TABLET | Refills: 1 | Status: SHIPPED | OUTPATIENT
Start: 2020-07-09 | End: 2020-12-09 | Stop reason: ALTCHOICE

## 2020-07-14 ENCOUNTER — ANTI-COAG VISIT (OUTPATIENT)
Dept: FAMILY MEDICINE CLINIC | Age: 70
End: 2020-07-14
Payer: COMMERCIAL

## 2020-07-14 LAB
INTERNATIONAL NORMALIZATION RATIO, POC: 2.9
PROTHROMBIN TIME, POC: 34.7

## 2020-07-14 PROCEDURE — 85610 PROTHROMBIN TIME: CPT | Performed by: FAMILY MEDICINE

## 2020-07-14 NOTE — PROGRESS NOTES
POCT PT/INR Performed today, within nml range, Per TR, Continue same dose of 1 tab daily, repeat in 4 weeks.   Pt informed./mv

## 2020-07-22 RX ORDER — FUROSEMIDE 40 MG/1
TABLET ORAL
Qty: 90 TABLET | Refills: 0 | Status: SHIPPED | OUTPATIENT
Start: 2020-07-22 | End: 2020-10-20

## 2020-08-11 ENCOUNTER — ANTI-COAG VISIT (OUTPATIENT)
Dept: FAMILY MEDICINE CLINIC | Age: 70
End: 2020-08-11
Payer: COMMERCIAL

## 2020-08-11 LAB
INTERNATIONAL NORMALIZATION RATIO, POC: 2.7
PROTHROMBIN TIME, POC: 32.4

## 2020-08-11 PROCEDURE — 85610 PROTHROMBIN TIME: CPT | Performed by: FAMILY MEDICINE

## 2020-08-24 RX ORDER — AMLODIPINE BESYLATE 10 MG/1
TABLET ORAL
Qty: 90 TABLET | Refills: 0 | Status: SHIPPED | OUTPATIENT
Start: 2020-08-24 | End: 2020-11-17

## 2020-09-08 ENCOUNTER — NURSE ONLY (OUTPATIENT)
Dept: FAMILY MEDICINE CLINIC | Age: 70
End: 2020-09-08
Payer: COMMERCIAL

## 2020-09-08 LAB
INTERNATIONAL NORMALIZATION RATIO, POC: 3.4
PROTHROMBIN TIME, POC: 41.3

## 2020-09-08 PROCEDURE — 85610 PROTHROMBIN TIME: CPT | Performed by: FAMILY MEDICINE

## 2020-09-08 NOTE — PROGRESS NOTES
POCT PT/INR Performed today, SLIGHTLY HIGH, Per JDK, HOLD TODAYS dose, THEN RESUME 1 TAB DAILY, INCREASE GREENS, repeat in 10 DAYS.   Pt informed./mv

## 2020-09-18 ENCOUNTER — NURSE ONLY (OUTPATIENT)
Dept: FAMILY MEDICINE CLINIC | Age: 70
End: 2020-09-18
Payer: COMMERCIAL

## 2020-09-18 LAB
INTERNATIONAL NORMALIZATION RATIO, POC: 3.6
PROTHROMBIN TIME, POC: 43.2

## 2020-09-18 PROCEDURE — 90653 IIV ADJUVANT VACCINE IM: CPT | Performed by: NURSE PRACTITIONER

## 2020-09-18 PROCEDURE — G0008 ADMIN INFLUENZA VIRUS VAC: HCPCS | Performed by: NURSE PRACTITIONER

## 2020-09-18 PROCEDURE — 85610 PROTHROMBIN TIME: CPT | Performed by: FAMILY MEDICINE

## 2020-09-18 NOTE — PROGRESS NOTES
Vaccine Information Sheet, \"Influenza - Inactivated\"  given to Sahra Bueno, or parent/legal guardian of  Sahra Bueno and verbalized understanding. Patient responses:    Have you ever had a reaction to a flu vaccine? No  Do you have any current illness? No  Have you ever had Guillian Wayne Syndrome? No  Do you have a serious allergy to any of the follow: Neomycin, Polymyxin, Thimerosal, eggs or egg products? No    Flu vaccine given per order. Please see immunization tab. Risks and benefits explained. Current VIS given. Immunizations Administered     Name Date Dose Route    Influenza, Triv, inactivated, subunit, adjuvanted, IM (Fluad 65 yrs and older) 9/18/2020 0.5 mL Intramuscular    Site: Deltoid- Left    Lot:  [id number]    NDC: 48031-565-53        POCT PT/INR PERFORMED, SLIGHTLY ELEVATED AGAIN. PER TR, HOLD TODAY THEN TAKE 1/2 TAB TOMORROW/SATURDAY THEN TAKE 1 TAB DAILY AND REPEAT IN ONE WEEK. PT INFORMED.   [facility]

## 2020-09-25 ENCOUNTER — NURSE ONLY (OUTPATIENT)
Dept: FAMILY MEDICINE CLINIC | Age: 70
End: 2020-09-25
Payer: COMMERCIAL

## 2020-09-25 LAB
HBA1C MFR BLD: 6.2 %
INTERNATIONAL NORMALIZATION RATIO, POC: 2.3
PROTHROMBIN TIME, POC: 28.1

## 2020-09-25 PROCEDURE — 83036 HEMOGLOBIN GLYCOSYLATED A1C: CPT | Performed by: NURSE PRACTITIONER

## 2020-09-25 PROCEDURE — 85610 PROTHROMBIN TIME: CPT | Performed by: FAMILY MEDICINE

## 2020-10-08 ENCOUNTER — NURSE ONLY (OUTPATIENT)
Dept: FAMILY MEDICINE CLINIC | Age: 70
End: 2020-10-08
Payer: COMMERCIAL

## 2020-10-08 LAB
INTERNATIONAL NORMALIZATION RATIO, POC: 3.2
PROTHROMBIN TIME, POC: 38.5

## 2020-10-08 PROCEDURE — 85610 PROTHROMBIN TIME: CPT | Performed by: FAMILY MEDICINE

## 2020-10-12 ENCOUNTER — TELEPHONE (OUTPATIENT)
Dept: FAMILY MEDICINE CLINIC | Age: 70
End: 2020-10-12

## 2020-10-20 RX ORDER — POTASSIUM CHLORIDE 750 MG/1
TABLET, EXTENDED RELEASE ORAL
Qty: 90 TABLET | Refills: 0 | Status: SHIPPED | OUTPATIENT
Start: 2020-10-20 | End: 2021-01-19

## 2020-10-20 RX ORDER — METOPROLOL SUCCINATE 50 MG/1
TABLET, EXTENDED RELEASE ORAL
Qty: 90 TABLET | Refills: 0 | Status: SHIPPED | OUTPATIENT
Start: 2020-10-20 | End: 2021-01-19

## 2020-10-20 RX ORDER — LISINOPRIL 40 MG/1
TABLET ORAL
Qty: 90 TABLET | Refills: 0 | Status: ON HOLD | OUTPATIENT
Start: 2020-10-20 | End: 2020-12-03 | Stop reason: HOSPADM

## 2020-10-20 RX ORDER — FUROSEMIDE 40 MG/1
TABLET ORAL
Qty: 90 TABLET | Refills: 0 | Status: ON HOLD | OUTPATIENT
Start: 2020-10-20 | End: 2020-12-03 | Stop reason: SDUPTHER

## 2020-10-20 NOTE — TELEPHONE ENCOUNTER
LAST VISIT 04/24/2020. NEXT VISIT NONE.     Pt due for inperson visit for dm, htn     Sent pt a [employer].  hs

## 2020-10-20 NOTE — TELEPHONE ENCOUNTER
LAST VISIT 04/24/2020. NEXT VISIT NONE. Pt due for inperson visit for dm, htn    Sent pt a [employer].  hs

## 2020-10-26 ENCOUNTER — NURSE ONLY (OUTPATIENT)
Dept: FAMILY MEDICINE CLINIC | Age: 70
End: 2020-10-26
Payer: COMMERCIAL

## 2020-10-26 LAB
INTERNATIONAL NORMALIZATION RATIO, POC: 3.3
PROTHROMBIN TIME, POC: 39.7

## 2020-10-26 PROCEDURE — 85610 PROTHROMBIN TIME: CPT | Performed by: FAMILY MEDICINE

## 2020-11-09 ENCOUNTER — NURSE ONLY (OUTPATIENT)
Dept: FAMILY MEDICINE CLINIC | Age: 70
End: 2020-11-09
Payer: COMMERCIAL

## 2020-11-09 LAB
INTERNATIONAL NORMALIZATION RATIO, POC: 3.3
PROTHROMBIN TIME, POC: 39.7

## 2020-11-09 PROCEDURE — 85610 PROTHROMBIN TIME: CPT | Performed by: FAMILY MEDICINE

## 2020-11-09 NOTE — PROGRESS NOTES
POCT PT/INR Performed today, high, Per CC, Hold todays dose, then resume 1/2 tab on Tues, and Sat and 1 tab on all other days, repeat in 10 days.   Pt informed./mv

## 2020-11-17 RX ORDER — AMLODIPINE BESYLATE 10 MG/1
TABLET ORAL
Qty: 90 TABLET | Refills: 0 | Status: ON HOLD | OUTPATIENT
Start: 2020-11-17 | End: 2020-12-03 | Stop reason: HOSPADM

## 2020-11-17 NOTE — TELEPHONE ENCOUNTER
TRIED TO CALL PT BUT VM NOT SET UP. SENT PT A MY CHART MESSAGE INSTRUCTING THEM TO CALL AND SCHEDULE AN APPT. IT CAN BE VIRTUAL OR IN OFFICE.    [address]

## 2020-11-19 ENCOUNTER — NURSE ONLY (OUTPATIENT)
Dept: FAMILY MEDICINE CLINIC | Age: 70
End: 2020-11-19
Payer: COMMERCIAL

## 2020-11-19 LAB
INTERNATIONAL NORMALIZATION RATIO, POC: 3.3
PROTHROMBIN TIME, POC: 39.2

## 2020-11-19 PROCEDURE — 85610 PROTHROMBIN TIME: CPT | Performed by: NURSE PRACTITIONER

## 2020-11-19 NOTE — PROGRESS NOTES
POCT PT/INR Performed today, within nml range, Per CC, hold today and tomorrow and then resume dose of 1/2 tab on Tues and Saturday and 1 tab on all other days, repeat in 2 weeks.   Pt informed./mv

## 2020-11-22 ENCOUNTER — APPOINTMENT (OUTPATIENT)
Dept: GENERAL RADIOLOGY | Age: 70
DRG: 292 | End: 2020-11-22
Payer: MEDICARE

## 2020-11-22 ENCOUNTER — TELEPHONE (OUTPATIENT)
Dept: FAMILY MEDICINE CLINIC | Age: 70
End: 2020-11-22

## 2020-11-22 ENCOUNTER — HOSPITAL ENCOUNTER (INPATIENT)
Age: 70
LOS: 11 days | Discharge: HOME HEALTH | DRG: 292 | End: 2020-12-03
Attending: INTERNAL MEDICINE | Admitting: INTERNAL MEDICINE
Payer: MEDICARE

## 2020-11-22 PROBLEM — R53.1 GENERALIZED WEAKNESS: Status: ACTIVE | Noted: 2020-11-22

## 2020-11-22 PROBLEM — I87.2 CHRONIC VENOUS STASIS DERMATITIS OF BOTH LOWER EXTREMITIES: Status: ACTIVE | Noted: 2020-11-22

## 2020-11-22 PROBLEM — I50.9 CHF WITH UNKNOWN LVEF (HCC): Status: ACTIVE | Noted: 2020-11-22

## 2020-11-22 PROBLEM — E66.01 MORBID OBESITY DUE TO EXCESS CALORIES (HCC): Status: ACTIVE | Noted: 2020-11-22

## 2020-11-22 PROBLEM — R06.02 SHORTNESS OF BREATH: Status: ACTIVE | Noted: 2020-11-22

## 2020-11-22 PROBLEM — I48.19 PERSISTENT ATRIAL FIBRILLATION (HCC): Status: ACTIVE | Noted: 2020-11-22

## 2020-11-22 PROBLEM — R09.89 PULMONARY VASCULAR CONGESTION: Status: ACTIVE | Noted: 2020-11-22

## 2020-11-22 LAB
ANION GAP SERPL CALCULATED.3IONS-SCNC: 11 MMOL/L (ref 3–16)
APTT: 37.2 SEC (ref 24.2–36.2)
BASOPHILS ABSOLUTE: 0 K/UL (ref 0–0.2)
BASOPHILS RELATIVE PERCENT: 0.6 %
BUN BLDV-MCNC: 25 MG/DL (ref 7–20)
CALCIUM SERPL-MCNC: 9.1 MG/DL (ref 8.3–10.6)
CHLORIDE BLD-SCNC: 100 MMOL/L (ref 99–110)
CO2: 24 MMOL/L (ref 21–32)
CREAT SERPL-MCNC: 1.1 MG/DL (ref 0.6–1.2)
EOSINOPHILS ABSOLUTE: 0.1 K/UL (ref 0–0.6)
EOSINOPHILS RELATIVE PERCENT: 1.1 %
GFR AFRICAN AMERICAN: 60
GFR NON-AFRICAN AMERICAN: 49
GLUCOSE BLD-MCNC: 124 MG/DL (ref 70–99)
GLUCOSE BLD-MCNC: 147 MG/DL (ref 70–99)
GLUCOSE BLD-MCNC: 149 MG/DL (ref 70–99)
HCG QUALITATIVE: NEGATIVE
HCT VFR BLD CALC: 33.5 % (ref 36–48)
HEMOGLOBIN: 10.4 G/DL (ref 12–16)
INR BLD: 1.82 (ref 0.86–1.14)
LACTIC ACID, SEPSIS: 1.8 MMOL/L (ref 0.4–1.9)
LYMPHOCYTES ABSOLUTE: 1.1 K/UL (ref 1–5.1)
LYMPHOCYTES RELATIVE PERCENT: 23.2 %
MCH RBC QN AUTO: 25.3 PG (ref 26–34)
MCHC RBC AUTO-ENTMCNC: 31 G/DL (ref 31–36)
MCV RBC AUTO: 81.6 FL (ref 80–100)
MONOCYTES ABSOLUTE: 0.6 K/UL (ref 0–1.3)
MONOCYTES RELATIVE PERCENT: 13.3 %
NEUTROPHILS ABSOLUTE: 2.9 K/UL (ref 1.7–7.7)
NEUTROPHILS RELATIVE PERCENT: 61.8 %
PDW BLD-RTO: 20.1 % (ref 12.4–15.4)
PERFORMED ON: ABNORMAL
PERFORMED ON: ABNORMAL
PLATELET # BLD: 203 K/UL (ref 135–450)
PMV BLD AUTO: 8 FL (ref 5–10.5)
POTASSIUM REFLEX MAGNESIUM: 4.4 MMOL/L (ref 3.5–5.1)
PRO-BNP: 1346 PG/ML (ref 0–124)
PROTHROMBIN TIME: 21.2 SEC (ref 10–13.2)
RBC # BLD: 4.11 M/UL (ref 4–5.2)
SODIUM BLD-SCNC: 135 MMOL/L (ref 136–145)
TROPONIN: <0.01 NG/ML
WBC # BLD: 4.7 K/UL (ref 4–11)

## 2020-11-22 PROCEDURE — 6370000000 HC RX 637 (ALT 250 FOR IP): Performed by: INTERNAL MEDICINE

## 2020-11-22 PROCEDURE — 2700000000 HC OXYGEN THERAPY PER DAY

## 2020-11-22 PROCEDURE — 36415 COLL VENOUS BLD VENIPUNCTURE: CPT

## 2020-11-22 PROCEDURE — 87040 BLOOD CULTURE FOR BACTERIA: CPT

## 2020-11-22 PROCEDURE — 1200000000 HC SEMI PRIVATE

## 2020-11-22 PROCEDURE — 83605 ASSAY OF LACTIC ACID: CPT

## 2020-11-22 PROCEDURE — 94761 N-INVAS EAR/PLS OXIMETRY MLT: CPT

## 2020-11-22 PROCEDURE — 85730 THROMBOPLASTIN TIME PARTIAL: CPT

## 2020-11-22 PROCEDURE — 99283 EMERGENCY DEPT VISIT LOW MDM: CPT

## 2020-11-22 PROCEDURE — 93005 ELECTROCARDIOGRAM TRACING: CPT | Performed by: STUDENT IN AN ORGANIZED HEALTH CARE EDUCATION/TRAINING PROGRAM

## 2020-11-22 PROCEDURE — 85025 COMPLETE CBC W/AUTO DIFF WBC: CPT

## 2020-11-22 PROCEDURE — 85610 PROTHROMBIN TIME: CPT

## 2020-11-22 PROCEDURE — 6360000002 HC RX W HCPCS: Performed by: INTERNAL MEDICINE

## 2020-11-22 PROCEDURE — 84703 CHORIONIC GONADOTROPIN ASSAY: CPT

## 2020-11-22 PROCEDURE — 2500000003 HC RX 250 WO HCPCS: Performed by: INTERNAL MEDICINE

## 2020-11-22 PROCEDURE — 71046 X-RAY EXAM CHEST 2 VIEWS: CPT

## 2020-11-22 PROCEDURE — 83880 ASSAY OF NATRIURETIC PEPTIDE: CPT

## 2020-11-22 PROCEDURE — 84484 ASSAY OF TROPONIN QUANT: CPT

## 2020-11-22 PROCEDURE — 6360000002 HC RX W HCPCS: Performed by: PHYSICIAN ASSISTANT

## 2020-11-22 PROCEDURE — 80048 BASIC METABOLIC PNL TOTAL CA: CPT

## 2020-11-22 RX ORDER — NICOTINE POLACRILEX 4 MG
15 LOZENGE BUCCAL PRN
Status: DISCONTINUED | OUTPATIENT
Start: 2020-11-22 | End: 2020-12-03 | Stop reason: HOSPADM

## 2020-11-22 RX ORDER — SODIUM CHLORIDE 0.9 % (FLUSH) 0.9 %
10 SYRINGE (ML) INJECTION EVERY 12 HOURS SCHEDULED
Status: DISCONTINUED | OUTPATIENT
Start: 2020-11-22 | End: 2020-12-03 | Stop reason: HOSPADM

## 2020-11-22 RX ORDER — SODIUM CHLORIDE 0.9 % (FLUSH) 0.9 %
10 SYRINGE (ML) INJECTION PRN
Status: DISCONTINUED | OUTPATIENT
Start: 2020-11-22 | End: 2020-12-03 | Stop reason: HOSPADM

## 2020-11-22 RX ORDER — ACETAMINOPHEN 650 MG/1
650 SUPPOSITORY RECTAL EVERY 4 HOURS PRN
Status: DISCONTINUED | OUTPATIENT
Start: 2020-11-22 | End: 2020-12-03 | Stop reason: HOSPADM

## 2020-11-22 RX ORDER — ATORVASTATIN CALCIUM 40 MG/1
40 TABLET, FILM COATED ORAL NIGHTLY
Status: DISCONTINUED | OUTPATIENT
Start: 2020-11-22 | End: 2020-12-03 | Stop reason: HOSPADM

## 2020-11-22 RX ORDER — SODIUM CHLORIDE 0.9 % (FLUSH) 0.9 %
10 SYRINGE (ML) INJECTION PRN
Status: DISCONTINUED | OUTPATIENT
Start: 2020-11-22 | End: 2020-11-22 | Stop reason: HOSPADM

## 2020-11-22 RX ORDER — METOPROLOL SUCCINATE 50 MG/1
50 TABLET, EXTENDED RELEASE ORAL NIGHTLY
Status: DISCONTINUED | OUTPATIENT
Start: 2020-11-22 | End: 2020-11-26

## 2020-11-22 RX ORDER — INSULIN GLARGINE 100 [IU]/ML
72 INJECTION, SOLUTION SUBCUTANEOUS NIGHTLY
Status: DISCONTINUED | OUTPATIENT
Start: 2020-11-22 | End: 2020-11-23

## 2020-11-22 RX ORDER — HEPARIN SODIUM 1000 [USP'U]/ML
8600 INJECTION, SOLUTION INTRAVENOUS; SUBCUTANEOUS ONCE
Status: COMPLETED | OUTPATIENT
Start: 2020-11-22 | End: 2020-11-22

## 2020-11-22 RX ORDER — FUROSEMIDE 10 MG/ML
40 INJECTION INTRAMUSCULAR; INTRAVENOUS 2 TIMES DAILY
Status: DISCONTINUED | OUTPATIENT
Start: 2020-11-22 | End: 2020-11-24

## 2020-11-22 RX ORDER — DEXTROSE MONOHYDRATE 25 G/50ML
12.5 INJECTION, SOLUTION INTRAVENOUS PRN
Status: DISCONTINUED | OUTPATIENT
Start: 2020-11-22 | End: 2020-12-03 | Stop reason: HOSPADM

## 2020-11-22 RX ORDER — ONDANSETRON 2 MG/ML
4 INJECTION INTRAMUSCULAR; INTRAVENOUS EVERY 4 HOURS PRN
Status: DISCONTINUED | OUTPATIENT
Start: 2020-11-22 | End: 2020-12-03 | Stop reason: HOSPADM

## 2020-11-22 RX ORDER — WARFARIN SODIUM 3 MG/1
6 TABLET ORAL
Status: COMPLETED | OUTPATIENT
Start: 2020-11-22 | End: 2020-11-22

## 2020-11-22 RX ORDER — FUROSEMIDE 10 MG/ML
40 INJECTION INTRAMUSCULAR; INTRAVENOUS ONCE
Status: COMPLETED | OUTPATIENT
Start: 2020-11-22 | End: 2020-11-22

## 2020-11-22 RX ORDER — ACETAMINOPHEN 325 MG/1
650 TABLET ORAL EVERY 4 HOURS PRN
Status: DISCONTINUED | OUTPATIENT
Start: 2020-11-22 | End: 2020-12-03 | Stop reason: HOSPADM

## 2020-11-22 RX ORDER — DEXTROSE MONOHYDRATE 50 MG/ML
100 INJECTION, SOLUTION INTRAVENOUS PRN
Status: DISCONTINUED | OUTPATIENT
Start: 2020-11-22 | End: 2020-12-03 | Stop reason: HOSPADM

## 2020-11-22 RX ORDER — AMLODIPINE BESYLATE 10 MG/1
10 TABLET ORAL NIGHTLY
Status: DISCONTINUED | OUTPATIENT
Start: 2020-11-22 | End: 2020-11-24

## 2020-11-22 RX ORDER — LISINOPRIL 40 MG/1
40 TABLET ORAL NIGHTLY
Status: DISCONTINUED | OUTPATIENT
Start: 2020-11-22 | End: 2020-11-24

## 2020-11-22 RX ORDER — POLYETHYLENE GLYCOL 3350 17 G/17G
17 POWDER, FOR SOLUTION ORAL DAILY PRN
Status: DISCONTINUED | OUTPATIENT
Start: 2020-11-22 | End: 2020-12-03 | Stop reason: HOSPADM

## 2020-11-22 RX ORDER — HEPARIN SODIUM 10000 [USP'U]/100ML
12.9 INJECTION, SOLUTION INTRAVENOUS CONTINUOUS
Status: DISCONTINUED | OUTPATIENT
Start: 2020-11-22 | End: 2020-11-23

## 2020-11-22 RX ORDER — SODIUM CHLORIDE 0.9 % (FLUSH) 0.9 %
10 SYRINGE (ML) INJECTION EVERY 12 HOURS SCHEDULED
Status: DISCONTINUED | OUTPATIENT
Start: 2020-11-22 | End: 2020-11-22 | Stop reason: HOSPADM

## 2020-11-22 RX ADMIN — INSULIN LISPRO 6 UNITS: 100 INJECTION, SOLUTION INTRAVENOUS; SUBCUTANEOUS at 17:33

## 2020-11-22 RX ADMIN — HEPARIN SODIUM 19.3 ML/HR: 10000 INJECTION, SOLUTION INTRAVENOUS at 19:53

## 2020-11-22 RX ADMIN — INSULIN GLARGINE 72 UNITS: 100 INJECTION, SOLUTION SUBCUTANEOUS at 21:43

## 2020-11-22 RX ADMIN — AMLODIPINE BESYLATE 10 MG: 10 TABLET ORAL at 21:38

## 2020-11-22 RX ADMIN — METOPROLOL SUCCINATE 50 MG: 50 TABLET, EXTENDED RELEASE ORAL at 21:38

## 2020-11-22 RX ADMIN — FUROSEMIDE 40 MG: 10 INJECTION, SOLUTION INTRAMUSCULAR; INTRAVENOUS at 14:45

## 2020-11-22 RX ADMIN — LISINOPRIL 40 MG: 40 TABLET ORAL at 21:38

## 2020-11-22 RX ADMIN — ATORVASTATIN CALCIUM 40 MG: 40 TABLET, FILM COATED ORAL at 21:38

## 2020-11-22 RX ADMIN — WARFARIN SODIUM 6 MG: 3 TABLET ORAL at 17:33

## 2020-11-22 RX ADMIN — FUROSEMIDE 40 MG: 10 INJECTION, SOLUTION INTRAMUSCULAR; INTRAVENOUS at 21:19

## 2020-11-22 RX ADMIN — HEPARIN SODIUM 8600 UNITS: 1000 INJECTION INTRAVENOUS; SUBCUTANEOUS at 19:47

## 2020-11-22 ASSESSMENT — PAIN SCALES - GENERAL
PAINLEVEL_OUTOF10: 0
PAINLEVEL_OUTOF10: 4
PAINLEVEL_OUTOF10: 0

## 2020-11-22 ASSESSMENT — ENCOUNTER SYMPTOMS
COUGH: 0
EYE PAIN: 0
VOMITING: 0
NAUSEA: 0
CONSTIPATION: 0
SORE THROAT: 0
BACK PAIN: 0
DIARRHEA: 0
ABDOMINAL PAIN: 0
RHINORRHEA: 0
SHORTNESS OF BREATH: 1

## 2020-11-22 ASSESSMENT — PAIN DESCRIPTION - ORIENTATION: ORIENTATION: RIGHT;POSTERIOR;LOWER

## 2020-11-22 ASSESSMENT — PAIN DESCRIPTION - LOCATION: LOCATION: LEG

## 2020-11-22 NOTE — PROGRESS NOTES
Patient admitted to room 3259 from ER. Transferred with gait belt and stand pivot. Denies pain. Oriented to room and call light. Verbalizes and demonstrates understanding. All safety precautions in place.

## 2020-11-22 NOTE — H&P
Hospital Medicine  History and Physical    PCP: JUAN MANUEL Jennings CNP  Patient Name: Xiomara Garcia    Date of Service: Pt seen/examined on 2020 and admitted to Inpatient with expected LOS greater than two midnights due to medical therapy    CHIEF COMPLAINT:  Pt c/o this of breath, increased bilateral lower extremity edema  HISTORY OF PRESENT ILLNESS: Pt is an 78y.o. year-old female with a history of hypertension, hyperlipidemia, diabetes mellitus, chronic venous stasis dermatitis of bilateral lower extremities, atrial fibrillation, factor V Leiden deficiency and a chronic left DVT for which she takes Coumadin. Presents to the emergency room for evaluation following a 1 month history of shortness of breath and a 2-day history of increased lower extremity weakness and generalized fatigue. She states that her shortness of breath is moderately severe, worse with exertion and improved with rest.  In the emergency room she was found to have an area vascular congestion and bilateral lower extremity edema. She is being admitted for further evaluation and treatment. Associated signs and symptoms do not include chest pain, diaphoresis, orthopnea, paroxysmal nocturnal dyspnea, fever or chills.       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    19    Homozygous Factor V Leiden mutation ([facility] 1952    Hypercholesterolemia     Hypertension     Hypomagnesemia     Low serum HDL     Lower leg DVT (deep venous thromboembolism), chronic, right (HCC)     Osteoarthritis of cervical spine 2010    Facet arthropathy, spondylosis    Renal failure, acute (HCC)     Due to sepsis    Vitamin D deficiency 2018    13.2       Past Surgical History:        Procedure Laterality Date    APPENDECTOMY  1976     SECTION      3     CHOLECYSTECTOMY  2002    gangrenous    COLONOSCOPY  01/01/2014    no polyp    TUBAL LIGATION Bilateral 02/12/1980       Allergies:  Nickel and Ibuprofen    Medications Prior to Admission:    Prior to Admission medications    Medication Sig Start Date End Date Taking?  Authorizing Provider   NOVOLOG 100 UNIT/ML injection vial ADMINISTER 18 UNITS UNDER THE SKIN THREE TIMES DAILY BEFORE MEALS 11/20/20  Yes JUAN MANUEL Warner CNP   amLODIPine ([address]) 10 MG tablet TAKE 1 TABLET BY MOUTH DAILY FOR HIGH BLOOD PRESSURE  Patient taking differently: Take 10 mg by mouth nightly  11/17/20  Yes JUAN MANUEL Dawson CNP   lisinopril (PRINIVIL;ZESTRIL) 40 MG tablet TAKE 1 TABLET BY MOUTH DAILY  Patient taking differently: Take 40 mg by mouth nightly  10/20/20  Yes Sabine Villalpando DO   metoprolol succinate (TOPROL XL) 50 MG extended release tablet TAKE 1 TABLET BY MOUTH DAILY  Patient taking differently: Take 50 mg by mouth nightly TAKE 1 TABLET BY MOUTH DAILY 10/20/20  Yes JUAN MANUEL Heller CNP   furosemide (LASIX) 40 MG tablet TAKE 1 TABLET BY MOUTH DAILY  Patient taking differently: Take 40 mg by mouth nightly  10/20/20  Yes JUAN MANUEL Dawson CNP   potassium chloride (KLOR-CON M) 10 MEQ extended release tablet TAKE 1 TABLET BY MOUTH DAILY  Patient taking differently: Take 10 mEq by mouth nightly  10/20/20  Yes JUAN MANUEL Dawson CNP   folic acid (FOLVITE) 1 MG tablet Take 2 tablets by mouth daily  Patient taking differently: Take 2 mg by mouth nightly  6/26/20 11/22/20 Yes JUAN MANUEL Warner CNP   TRESIBA FLEXTOUCH 100 UNIT/ML SOPN INJECT 72 UNITS UNDER THE SKIN DAILY WITH SUPPER  Patient taking differently: Inject 72 Units into the skin nightly  2/17/20  Yes JUAN MANUEL Heller CNP   atorvastatin (LIPITOR) 40 MG tablet Take 1 tablet by mouth nightly 12/30/19  Yes JUAN MANUEL Dawson CNP   warfarin (COUMADIN) 6 MG tablet TAKE 1 TABLET BY MOUTH The cardiac silhouette is globally prominent. Pulmonary vessels are congested. Interstitial septal lines are increased. There is blunting of at least 1 costophrenic angle. No consolidation is seen. No acute osseous abnormality. Vascular congestion. Suspected small pleural effusion. Increased interstitial opacities, suggestive of pulmonary edema. Enlargement of the cardiac silhouette, cardiomegaly versus pericardial effusion. Assessment:   Principal Problem:    CHF with unknown LVEF ([facility]  Active Problems:    DMII (diabetes mellitus, type 2) ([facility]    Essential hypertension    Hypercholesterolemia    Lower leg DVT (deep venous thromboembolism), chronic, right (HCC)    Homozygous Factor V Leiden mutation (HCC)    Generalized weakness    Pulmonary vascular congestion    Chronic venous stasis dermatitis of both lower extremities    Morbid obesity due to excess calories (HCC)    Shortness of breath    Persistent atrial fibrillation ([facility]  Resolved Problems:    * No resolved hospital problems. *      Plan:       CHF with unknown LVEF with pulmonary vascular congestion (New onset CHF? Pt takes Lasix at home, but has not been told she has CHF) - A 2D Echocardiogram has been ordered to assess cardiac function. Diurese with IV Lasix. Monitor strict I&Os and daily weights. A low sodium fluid restricted diet has been ordered    Shortness of breath - likely secondary to pulmonary vascular congestion - no O2 need at this time. Expect to improve with diuresis    Persistent atrial fibrillation (HCC) - continue Metoprolol and Coumadin. Bridge with Heparin as Pt's INR is subtherapeutic    Chronic, right lower leg DVT (deep venous thromboembolism) and Pt is homozygous Factor V Leiden mutation - Continue Coumadin.  Bridge with Heparin as Pt's INR is subtherapeutic      Generalized weakness/fatigue - Will ask PT/OT to evaluate and treat patient, and if necessary to provide recommendations for post hospital therapy    Diabetes mellitus II - Lantus, SSI and carb control diet    Essential (primary) hypertension - continue home meds and monitor blood pressure    Hyperlipidemia - No current evidence of Rhabdomyolysis or other adverse effects. Continue statin therapy while in the hospital    Chronic venous stasis dermatitis of both lower extremities - Will provide supportive care. Morbid obesity due to excess calories (Body mass index is 62.63 kg/m². ) - Complicating assessment and treatment. Placing patient at risk for multiple co-morbidities as well as early death and contributing to the patient's presentation.  on weight loss when appropriate. DVT Prophylaxis: Lovenox  Heparin SCDs Coumadin continued Xarelto Not indicated, as patient is ambulatory  Diet: DIET LOW SODIUM 2 GM; Carb Control: 4 carb choices (60 gms)/meal; 1500 ml  Code Status: Full Code  (Advanced care planning has been discussed with patient and/or responsible family member and is reflected in the code status.  Further orders associated with this have been entered if appropriate)    Disposition: Anticipate that patient will remain in the hospital for 2 to 3 days depending on further evaluation and clinical course    Please note that over 50 minutes was spent in evaluating the patient, review of records and results, discussion with staff/family, etc.    Velasquez Paz MD

## 2020-11-22 NOTE — ED NOTES
ED SBAR report provider to Namita Irwin RN. Patient to be transported to Room 3272 via stretcher by transport tech. Patient transported with bedside cardiac monitor and with IV medications infusing. IV site clean, dry, and intact. MEWS score and pain assessed as 0/10 and documented. Updated patient and family on plan of care.      Toni Morris RN  11/22/20 [id number]

## 2020-11-22 NOTE — TELEPHONE ENCOUNTER
Received call from the patient's daughter. She states that the patient is currently staying with them at their home. Upon coming to their house they noted a few days ago that her right lower extremity was swollen. They state that over time it has gotten significantly worse. It is now red and and extremely painful. They state that the patient can hardly walk and is very concerned about this. They deny any open sores or drainage at  this time. She does have a known history of right lower extremity diabetic ulcers, DVT, and has been hospitalized for sepsis due to wounds and cellulitis previously. I have recommended that they go to the ER for further evaluation to help differentiate.   They are in agreement with this plan

## 2020-11-22 NOTE — ED PROVIDER NOTES
[address] [facility]        Pt Name: Lien Villalobos  MRN: [id number]  [place] 1950  Date of evaluation: 11/22/2020  Provider: Ashia Dalal PA-C  PCP: JUAN MANUEL Mandel CNP    EUGENE. I have evaluated this patient. My supervising physician was available for consultation. CHIEF COMPLAINT       Chief Complaint   Patient presents with    Shortness of Breath    Skin Ulcer     right calf       HISTORY OF PRESENT ILLNESS   (Location/Symptom, Timing/Onset, Context/Setting, Quality, Duration, Modifying Factors, Severity)  Note limiting factors. Lien Villalobos is a 78 y.o. female who presents here to the emergency department, the patient states that she recently stayed at her daughter-in-law's house, and 2 days ago woke up and felt profoundly weak in her legs, and her legs seem to be more swollen. She states that she has had shortness of breath for about 1 month, she does have a history of peripheral edema, and DVT, A. fib, it is currently on Coumadin, but states that something feels very different. She has increased shortness of breath and weakness. She rates her pain level to her legs as 4/10. Nursing Notes were all reviewed and agreed with or any disagreements were addressed  in the HPI. REVIEW OF SYSTEMS    (2-9 systems for level 4, 10 or more for level 5)     Review of Systems   Constitutional: Negative for chills, diaphoresis and fever. HENT: Negative for congestion, ear pain, rhinorrhea and sore throat. Eyes: Negative for pain and visual disturbance. Respiratory: Positive for shortness of breath. Negative for cough. Cardiovascular: Positive for leg swelling. Negative for chest pain and palpitations. Gastrointestinal: Negative for abdominal pain, constipation, diarrhea, nausea and vomiting. Genitourinary: Negative for decreased urine volume, dysuria, frequency and urgency. Musculoskeletal: Negative for back pain and neck pain. Skin: Negative for rash and wound. Neurological: Negative for dizziness and light-headedness.        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    19    Homozygous Factor V Leiden mutation ([facility] 1952    Hypercholesterolemia     Hypertension     Hypomagnesemia     Low serum HDL     Lower leg DVT (deep venous thromboembolism), chronic, right (HCC)     Osteoarthritis of cervical spine 2010    Facet arthropathy, spondylosis    Renal failure, acute (HCC)     Due to sepsis    Vitamin D deficiency 2018    13.2       SURGICAL HISTORY     Past Surgical History:   Procedure Laterality Date    APPENDECTOMY  1976     SECTION      3     CHOLECYSTECTOMY  2002    gangrenous    COLONOSCOPY  2014    no polyp    TUBAL LIGATION Bilateral 1980       CURRENTMEDICATIONS       Previous Medications    ACCU-CHEK APURVA PLUS STRIP    CHECK SUGAR 3 TO 4 TIMES A DAY    ACCU-CHEK MULTICLIX LANCETS MISC    TEST FOUR TIMES DAILY AS NEEDED FOR LOW OR HIGH BLOOD SUGARS    AMLODIPINE (NORVASC) 10 MG TABLET    TAKE 1 TABLET BY MOUTH DAILY FOR HIGH BLOOD PRESSURE    ATORVASTATIN (LIPITOR) 40 MG TABLET    Take 1 tablet by mouth nightly    B-D UF III MINI PEN NEEDLES 31G X 5 MM MISC    USE DAILY AS DIRECTED    FOLIC ACID (FOLVITE) 1 MG TABLET    Take 2 tablets by mouth daily    FUROSEMIDE (LASIX) 40 MG TABLET    TAKE 1 TABLET BY MOUTH DAILY    LISINOPRIL (PRINIVIL;ZESTRIL) 40 MG TABLET    TAKE 1 TABLET BY MOUTH DAILY    METOPROLOL SUCCINATE (TOPROL XL) 50 MG EXTENDED RELEASE TABLET    TAKE 1 TABLET BY MOUTH DAILY    NOVOLOG 100 UNIT/ML INJECTION VIAL    ADMINISTER 18 UNITS UNDER THE SKIN THREE TIMES DAILY BEFORE MEALS    POTASSIUM CHLORIDE (KLOR-CON M) 10 MEQ EXTENDED RELEASE TABLET    TAKE 1 TABLET BY MOUTH DAILY    TRESIBA FLEXTOUCH 100 UNIT/ML SOPN    INJECT 72 UNITS UNDER THE SKIN DAILY WITH SUPPER    WARFARIN (COUMADIN) 6 MG TABLET    TAKE 1 TABLET BY MOUTH DAILY OR AS DIRECTED       ALLERGIES     Nickel and Ibuprofen    FAMILYHISTORY       Family History   Problem Relation Age of Onset    Kidney Disease Mother         kidney stones - larger    Coronary Art Dis Father     Heart Surgery Father 48    Hypertension Father     Heart Attack Father     COPD Sister         smoker    Diabetes Sister     Diabetes Brother     Clotting Disorder Son         Factor V Leiden heterozygote    Other Son         GERD, gout, MRSA, homocystinemia    Hypertension Son     High Cholesterol Son     Other Sister         carotid stenosis        SOCIAL HISTORY       Social History     Socioeconomic History    Marital status:       Spouse name: Not on file    Number of children: 3    Years of education: 15    Highest education level: Not on file   Occupational History    Occupation: Retired  7/28/17   Social Needs    Financial resource strain: Not on file    Food insecurity     Worry: Not on file     Inability: Not on file   Agrivi needs     Medical: Not on file     Non-medical: Not on file   Tobacco Use    Smoking status: Never Smoker    Smokeless tobacco: Never Used   Substance and Sexual Activity    Alcohol use: Yes     Comment: OCCASIONALLY 1 or less/1-2 wk    Drug use: No     Comment: No Mj experimentation    Sexual activity: Never   Lifestyle    Physical activity     Days per week: Not on file     Minutes per session: Not on file    Stress: Not on file   Relationships    Social connections     Talks on phone: Not on file     Gets together: Not on file     Attends Sabianism service: Not on file     Active member of club or organization: Not on file     Attends meetings of clubs or organizations: Not on file     Relationship status: Not on file  Intimate partner violence     Fear of current or ex partner: Not on file     Emotionally abused: Not on file     Physically abused: Not on file     Forced sexual activity: Not on file   Other Topics Concern    Not on file   Social History Narrative    No Exercise. 19    Information from [facility] EMR ending 2010    Problems    DEEP VENOUS THROMBOPHLEBITIS (ICD-453.40)    TRANSAMINASES, SERUM, ELEVATED (ICD-790.4)    HYPOMAGNESEMIA (ICD-275.2)    Hx of SEPSIS (ICD-995.91)    DIABETES MELLITUS, TYPE II (ICD-250.00)    HYPERTENSION (ICD-401. 9)    ALOPECIA (ICD-704.00)    ENCOUNTER FOR LONG-TERM USE OF ANTICOAGULANTS (ICD-V58.61)    DERMATITIS (ICD-692. 9)    CELLULITIS (ICD-682. 9)    FACTOR V DEFICIENCY (ICD-286.3)        Past Medical History:  Coronary Artery Disease, Hypertension, mixed hyperlipidemia/ low HDL/ metabolic synd. , Diabetes Type II, 2009 episode renal failure 2nd to sepsis, 2009 CELLULITIS/OPEN WOUND RIGHT ANKLE, BLOOD CLOTS (bilat. PASCALE hose)/ factor V leiden homozygote. Surgical History:  Appendectomy:, Cholecystectomy: , GYN Surgery: 3 , Tubal Ligation: , 2004 \"[facility]\"      Family History: . Father - HTN, CAD/ MI    Son - 1 heterozygous factor V leiden. 2  HTN    Sister - Breast Ca, HTN, DVT        Exercise: doing hand weights 15-20 3x/wk. 18       SCREENINGS             PHYSICAL EXAM    (up to 7 for level 4, 8 or more for level 5)     ED Triage Vitals [20 1125]   BP Temp Temp Source Pulse Resp SpO2 Height Weight   (!) 146/72 97.4 °F (36.3 °C) Oral 62 22 91 % 5' 6.5\" (1.689 m) (!) 393 lb 15.4 oz (178.7 kg)       Physical Exam  Vitals signs and nursing note reviewed. Constitutional:       Appearance: Normal appearance. She is well-developed. She is not diaphoretic. Comments: Body mass index is 62.63 kg/m². HENT:      Head: Normocephalic and atraumatic.       Right Ear: External ear normal.      Left Ear: External ear normal.      Nose: Nose normal.   Eyes:      General:         Right eye: No discharge. Left eye: No discharge. Neck:      Musculoskeletal: Normal range of motion and neck supple. Cardiovascular:      Rate and Rhythm: Normal rate and regular rhythm. Heart sounds: Normal heart sounds. No murmur. No friction rub. No gallop. Pulmonary:      Effort: Pulmonary effort is normal. No respiratory distress. Breath sounds: No stridor. Examination of the right-lower field reveals rhonchi and rales. Examination of the left-lower field reveals rhonchi and rales. Rhonchi and rales present. No wheezing. Chest:      Chest wall: No tenderness. Abdominal:      General: Bowel sounds are normal. There is no distension or abdominal bruit. Palpations: Abdomen is soft. Abdomen is not rigid. There is no mass or pulsatile mass. Tenderness: There is no abdominal tenderness. There is no guarding or rebound. Negative signs include Yo's sign and McBurney's sign. Musculoskeletal: Normal range of motion. Right lower leg: She exhibits tenderness. Edema present. Left lower leg: She exhibits tenderness. Edema present. Comments: Chronic skin changes from venous stasis   Skin:     General: Skin is warm and dry. Coloration: Skin is not pale. Neurological:      Mental Status: She is alert and oriented to person, place, and time.    Psychiatric:         Behavior: Behavior normal.         DIAGNOSTIC RESULTS   LABS:    Labs Reviewed   CBC WITH AUTO DIFFERENTIAL - Abnormal; Notable for the following components:       Result Value    Hemoglobin 10.4 (*)     Hematocrit 33.5 (*)     MCH 25.3 (*)     RDW 20.1 (*)     All other components within normal limits    Narrative:     Performed at:  [address]   Phone [phone]   BASIC METABOLIC PANEL W/ REFLEX TO MG FOR LOW K - Abnormal; Notable for the following components:    Sodium 135 (*) at the time of this note:    XR CHEST (2 VW)   Final Result   Vascular congestion. Suspected small pleural effusion. Increased interstitial opacities, suggestive of pulmonary edema. Enlargement of the cardiac silhouette, cardiomegaly versus pericardial   effusion. Xr Chest (2 Vw)    Result Date: 11/22/2020  EXAMINATION: TWO XRAY VIEWS OF THE CHEST 11/22/2020 12:08 pm COMPARISON: None. HISTORY: ORDERING SYSTEM PROVIDED HISTORY: Shortness of breath TECHNOLOGIST PROVIDED HISTORY: Reason for exam:->Shortness of breath Reason for Exam: sob, water retention in legs, diabetic with diabetic ulcer Acuity: Acute Type of Exam: Initial FINDINGS: The cardiac silhouette is globally prominent. Pulmonary vessels are congested. Interstitial septal lines are increased. There is blunting of at least 1 costophrenic angle. No consolidation is seen. No acute osseous abnormality. Vascular congestion. Suspected small pleural effusion. Increased interstitial opacities, suggestive of pulmonary edema. Enlargement of the cardiac silhouette, cardiomegaly versus pericardial effusion. PROCEDURES   Unless otherwise noted below, none     Procedures    CRITICAL CARE TIME   N/A    CONSULTS:  None      EMERGENCY DEPARTMENT COURSE and DIFFERENTIAL DIAGNOSIS/MDM:   Vitals:    Vitals:    11/22/20 1125   BP: (!) 146/72   Pulse: 62   Resp: 22   Temp: 97.4 °F (36.3 °C)   TempSrc: Oral   SpO2: 91%   Weight: (!) 393 lb 15.4 oz (178.7 kg)   Height: 5' 6.5\" (1.689 m)       Patient was given thefollowing medications:  Medications   sodium chloride flush 0.9 % injection 10 mL (has no administration in time range)   sodium chloride flush 0.9 % injection 10 mL (has no administration in time range)   furosemide (LASIX) injection 40 mg (has no administration in time range)           This patient has new onset of pulmonary edema, most likely secondary to congestive heart failure from her longstanding A. fib.   She also has profound weakness, she will need to be evaluated with PT and OT, and potentially go to rehab for management of her chronic deconditioning. FINAL IMPRESSION      1. Acute on chronic congestive heart failure, unspecified heart failure type ([facility]    2. Longstanding persistent atrial fibrillation ([facility]    3. General weakness          DISPOSITION/PLAN   DISPOSITION Decision To Admit 11/22/2020 02:28:22 PM      PATIENT REFERREDTO:  No follow-up provider specified.     DISCHARGE MEDICATIONS:  New Prescriptions    No medications on file       DISCONTINUED MEDICATIONS:  Discontinued Medications    No medications on file              (Please note that portions ofthis note were completed with a voice recognition program.  Efforts were made to edit the dictations but occasionally words are mis-transcribed.)    Jennie Stone PA-C (electronically signed)             Jennie Stone PA-C  11/22/20 1600

## 2020-11-22 NOTE — PROGRESS NOTES
Clinical Pharmacy Note  Heparin Dosing Consult    Ria Alexander is a 78 y.o. female ordered heparin per high dose nomogram by Dr. Porfirio Dominique. No results found for: APTT  Lab Results   Component Value Date    HGB 10.4 11/22/2020    HCT 33.5 11/22/2020     11/22/2020    INR 1.82 11/22/2020       Ht Readings from Last 1 Encounters:   11/22/20 5' 6.5\" (1.689 m)        Wt Readings from Last 1 Encounters:   11/22/20 (!) 390 lb 7 oz (177.1 kg)     Dosing weight: 107 kg    Assessment/Plan:  Initial bolus: 8600 units  Initial infusion rate: 19.3 mL/hr  Next aPTT: 0100 11/23/20     Pharmacy will continue to monitor adjust heparin based on aPTT results using nomogram below:     HIGH DOSE HEPARIN PROTOCOL (DVT/PE)     Initial Bolus: 80 units/kg Max Bolus: 10,000 units       Initial Rate: 18 units/kg/hr Max Initial Rate: 2,100 units/hr     aPTT < 36   Heparin 80 units/kg bolus Increase infusion by 4 units/kg/hr       (maximum 10,000 units)   aPTT 37-48   Heparin 40 units/kg bolus Increase infusion by 2 units/kg/hr       (maximum 5,000 units)   aPTT 49-76   No bolus   No change   aPTT 77-85   No bolus   Decrease infusion by 2 units/kg/hr   aPTT 86-94   Hold heparin for 1 hour Decrease infusion by 3 units/kg/hr   aPTT      Hold heparin for 1 hour Decrease infusion by 4 units/kg/hr   aPTT > 103   Hold heparin for 1 hour Decrease infusion by 6 units/kg/hr    Obtain aPTT 6 hours after initial bolus and 6 hours after any dose change until two consecutive therapeutic aPTTs are achieved - then daily.

## 2020-11-23 LAB
ANION GAP SERPL CALCULATED.3IONS-SCNC: 7 MMOL/L (ref 3–16)
APTT: 157.5 SEC (ref 24.2–36.2)
APTT: 46.9 SEC (ref 24.2–36.2)
APTT: 51.2 SEC (ref 24.2–36.2)
APTT: >248 SEC (ref 24.2–36.2)
BASOPHILS ABSOLUTE: 0 K/UL (ref 0–0.2)
BASOPHILS RELATIVE PERCENT: 1.1 %
BUN BLDV-MCNC: 26 MG/DL (ref 7–20)
CALCIUM SERPL-MCNC: 8.9 MG/DL (ref 8.3–10.6)
CHLORIDE BLD-SCNC: 106 MMOL/L (ref 99–110)
CO2: 29 MMOL/L (ref 21–32)
CREAT SERPL-MCNC: 1 MG/DL (ref 0.6–1.2)
EKG DIAGNOSIS: NORMAL
EKG Q-T INTERVAL: 426 MS
EKG QRS DURATION: 88 MS
EKG QTC CALCULATION (BAZETT): 439 MS
EKG R AXIS: 213 DEGREES
EKG T AXIS: 86 DEGREES
EKG VENTRICULAR RATE: 64 BPM
EOSINOPHILS ABSOLUTE: 0.1 K/UL (ref 0–0.6)
EOSINOPHILS RELATIVE PERCENT: 2.9 %
GFR AFRICAN AMERICAN: >60
GFR NON-AFRICAN AMERICAN: 55
GLUCOSE BLD-MCNC: 119 MG/DL (ref 70–99)
GLUCOSE BLD-MCNC: 60 MG/DL (ref 70–99)
GLUCOSE BLD-MCNC: 62 MG/DL (ref 70–99)
GLUCOSE BLD-MCNC: 67 MG/DL (ref 70–99)
GLUCOSE BLD-MCNC: 68 MG/DL (ref 70–99)
GLUCOSE BLD-MCNC: 73 MG/DL (ref 70–99)
GLUCOSE BLD-MCNC: 87 MG/DL (ref 70–99)
GLUCOSE BLD-MCNC: 96 MG/DL (ref 70–99)
HCT VFR BLD CALC: 29.6 % (ref 36–48)
HEMOGLOBIN: 9.5 G/DL (ref 12–16)
INR BLD: 2.04 (ref 0.86–1.14)
LV EF: 58 %
LVEF MODALITY: NORMAL
LYMPHOCYTES ABSOLUTE: 1 K/UL (ref 1–5.1)
LYMPHOCYTES RELATIVE PERCENT: 31.5 %
MCH RBC QN AUTO: 25.7 PG (ref 26–34)
MCHC RBC AUTO-ENTMCNC: 32 G/DL (ref 31–36)
MCV RBC AUTO: 80.5 FL (ref 80–100)
MONOCYTES ABSOLUTE: 0.4 K/UL (ref 0–1.3)
MONOCYTES RELATIVE PERCENT: 14.6 %
NEUTROPHILS ABSOLUTE: 1.5 K/UL (ref 1.7–7.7)
NEUTROPHILS RELATIVE PERCENT: 49.9 %
PDW BLD-RTO: 19.6 % (ref 12.4–15.4)
PERFORMED ON: ABNORMAL
PERFORMED ON: NORMAL
PLATELET # BLD: 180 K/UL (ref 135–450)
PMV BLD AUTO: 8.1 FL (ref 5–10.5)
POTASSIUM REFLEX MAGNESIUM: 4.1 MMOL/L (ref 3.5–5.1)
PROTHROMBIN TIME: 23.8 SEC (ref 10–13.2)
RBC # BLD: 3.68 M/UL (ref 4–5.2)
SODIUM BLD-SCNC: 142 MMOL/L (ref 136–145)
WBC # BLD: 3.1 K/UL (ref 4–11)

## 2020-11-23 PROCEDURE — 2500000003 HC RX 250 WO HCPCS: Performed by: HOSPITALIST

## 2020-11-23 PROCEDURE — 93010 ELECTROCARDIOGRAM REPORT: CPT | Performed by: INTERNAL MEDICINE

## 2020-11-23 PROCEDURE — 6370000000 HC RX 637 (ALT 250 FOR IP): Performed by: INTERNAL MEDICINE

## 2020-11-23 PROCEDURE — 97166 OT EVAL MOD COMPLEX 45 MIN: CPT

## 2020-11-23 PROCEDURE — 6370000000 HC RX 637 (ALT 250 FOR IP): Performed by: HOSPITALIST

## 2020-11-23 PROCEDURE — 93306 TTE W/DOPPLER COMPLETE: CPT

## 2020-11-23 PROCEDURE — 2580000003 HC RX 258: Performed by: INTERNAL MEDICINE

## 2020-11-23 PROCEDURE — 36415 COLL VENOUS BLD VENIPUNCTURE: CPT

## 2020-11-23 PROCEDURE — 94760 N-INVAS EAR/PLS OXIMETRY 1: CPT

## 2020-11-23 PROCEDURE — 97535 SELF CARE MNGMENT TRAINING: CPT

## 2020-11-23 PROCEDURE — 97116 GAIT TRAINING THERAPY: CPT | Performed by: PHYSICAL THERAPIST

## 2020-11-23 PROCEDURE — 85610 PROTHROMBIN TIME: CPT

## 2020-11-23 PROCEDURE — 80048 BASIC METABOLIC PNL TOTAL CA: CPT

## 2020-11-23 PROCEDURE — 6360000002 HC RX W HCPCS: Performed by: INTERNAL MEDICINE

## 2020-11-23 PROCEDURE — 97162 PT EVAL MOD COMPLEX 30 MIN: CPT | Performed by: PHYSICAL THERAPIST

## 2020-11-23 PROCEDURE — 97530 THERAPEUTIC ACTIVITIES: CPT | Performed by: PHYSICAL THERAPIST

## 2020-11-23 PROCEDURE — 85730 THROMBOPLASTIN TIME PARTIAL: CPT

## 2020-11-23 PROCEDURE — 85025 COMPLETE CBC W/AUTO DIFF WBC: CPT

## 2020-11-23 PROCEDURE — 97530 THERAPEUTIC ACTIVITIES: CPT

## 2020-11-23 PROCEDURE — 1200000000 HC SEMI PRIVATE

## 2020-11-23 PROCEDURE — 2700000000 HC OXYGEN THERAPY PER DAY

## 2020-11-23 RX ORDER — HEPARIN SODIUM 10000 [USP'U]/100ML
6.4 INJECTION, SOLUTION INTRAVENOUS CONTINUOUS
Status: DISCONTINUED | OUTPATIENT
Start: 2020-11-23 | End: 2020-11-23

## 2020-11-23 RX ORDER — HEPARIN SODIUM 10000 [USP'U]/100ML
8.6 INJECTION, SOLUTION INTRAVENOUS CONTINUOUS
Status: DISCONTINUED | OUTPATIENT
Start: 2020-11-23 | End: 2020-11-24

## 2020-11-23 RX ORDER — HEPARIN SODIUM 1000 [USP'U]/ML
4200 INJECTION, SOLUTION INTRAVENOUS; SUBCUTANEOUS ONCE
Status: COMPLETED | OUTPATIENT
Start: 2020-11-24 | End: 2020-11-24

## 2020-11-23 RX ORDER — WARFARIN SODIUM 3 MG/1
6 TABLET ORAL
Status: COMPLETED | OUTPATIENT
Start: 2020-11-23 | End: 2020-11-23

## 2020-11-23 RX ORDER — INSULIN GLARGINE 100 [IU]/ML
44 INJECTION, SOLUTION SUBCUTANEOUS NIGHTLY
Status: DISCONTINUED | OUTPATIENT
Start: 2020-11-23 | End: 2020-11-24

## 2020-11-23 RX ADMIN — INSULIN GLARGINE 44 UNITS: 100 INJECTION, SOLUTION SUBCUTANEOUS at 22:14

## 2020-11-23 RX ADMIN — SODIUM CHLORIDE, PRESERVATIVE FREE 10 ML: 5 INJECTION INTRAVENOUS at 07:37

## 2020-11-23 RX ADMIN — ATORVASTATIN CALCIUM 40 MG: 40 TABLET, FILM COATED ORAL at 22:14

## 2020-11-23 RX ADMIN — FUROSEMIDE 40 MG: 10 INJECTION, SOLUTION INTRAMUSCULAR; INTRAVENOUS at 07:32

## 2020-11-23 RX ADMIN — FUROSEMIDE 40 MG: 10 INJECTION, SOLUTION INTRAMUSCULAR; INTRAVENOUS at 17:07

## 2020-11-23 RX ADMIN — HEPARIN SODIUM 6.5 ML/HR: 10000 INJECTION, SOLUTION INTRAVENOUS at 13:01

## 2020-11-23 RX ADMIN — ACETAMINOPHEN 650 MG: 325 TABLET ORAL at 23:23

## 2020-11-23 RX ADMIN — WARFARIN SODIUM 6 MG: 3 TABLET ORAL at 17:07

## 2020-11-23 ASSESSMENT — PAIN DESCRIPTION - LOCATION: LOCATION: LEG

## 2020-11-23 ASSESSMENT — PAIN SCALES - GENERAL
PAINLEVEL_OUTOF10: 0
PAINLEVEL_OUTOF10: 0
PAINLEVEL_OUTOF10: 3

## 2020-11-23 ASSESSMENT — PAIN DESCRIPTION - ONSET: ONSET: ON-GOING

## 2020-11-23 ASSESSMENT — PAIN DESCRIPTION - ORIENTATION: ORIENTATION: RIGHT

## 2020-11-23 ASSESSMENT — PAIN DESCRIPTION - PAIN TYPE: TYPE: ACUTE PAIN

## 2020-11-23 ASSESSMENT — PAIN DESCRIPTION - FREQUENCY: FREQUENCY: CONTINUOUS

## 2020-11-23 ASSESSMENT — PAIN DESCRIPTION - DESCRIPTORS: DESCRIPTORS: ACHING

## 2020-11-23 ASSESSMENT — PAIN DESCRIPTION - PROGRESSION: CLINICAL_PROGRESSION: NOT CHANGED

## 2020-11-23 NOTE — PROGRESS NOTES
Patient currently sitting up in chair. Patient is currently receiving Heparin gtt at 6.5 in RFA, infusing without issues. Agility here to perform maintenance on bed. MD also here to see patient and ordered wound consult on patient d/t area on back of right leg that is red and painful. Skin on BLE very dry, excoriation to abd pannus, behind bilat knees, and groin, Interdry applied. Diabetic educator seen patient r/t hypoglycemic episodes this am and afternoon. Medication changes were made, see new orders.

## 2020-11-23 NOTE — PROGRESS NOTES
Occupational Therapy   Occupational Therapy Initial Assessment  Date: 2020   Patient Name: Albina Rutherford  MRN: [id number]     : 1950    Date of Service: 2020    Discharge Recommendations:  Continue to assess pending progress, Home with Home health OT, Home with assist PRN  OT Equipment Recommendations  Other: TBD - possibly bariatric RW (pt reports having RW walker at home that belonged to daughter in [address] father)    Albina Rutherford scored a 19/24 on the AM-PAC ADL Inpatient form. Current research shows that an AM-PAC score of 18 or greater is typically associated with a discharge to the patient's home setting. Based on the patient's AM-PAC score, and their current ADL deficits, it is recommended that the patient have 2-3 sessions per week of Occupational Therapy at d/c to increase the patient's independence. At this time, this patient demonstrates the endurance and safety to discharge home with home OT and a follow up treatment frequency of 2-3x/wk. Please see assessment section for further patient specific details. If patient discharges prior to next session this note will serve as a discharge summary. Please see below for the latest assessment towards goals. Assessment   Performance deficits / Impairments: Decreased functional mobility ; Decreased endurance;Decreased ADL status; Decreased balance  Assessment: Pt is a 77 y/o female admitted to the hospital for CHF. Pt lives alone in an entry-level apartment and was indep with ADL/IADLs and functional mobility with no device. Pt is currently functioning below baseline d/t the above deficits and required CGA/SBA for fx mobility and transfers using RW. Pt required up to CGA for grooming/toileting this morning and anticipate pt will require up to Min A with ADLs d/t decreased endurance, balance, and occassional pain in R LE.  Will continue to work with pt on acute care to address the above deficits but recommend pt recieve home OT and assist prn to continue improving functional mobility and ADL participation. Pt is motiviated to participate in therapies to get stronger for safe return home and was receptive to home OT recommendation. Prognosis: Good  Decision Making: Medium Complexity  History: see above  Exam: fx mobility, transfers, ADLs, bed mobility, coordination, UE ROM/strength  OT Education: OT Role;Plan of Care;Transfer Training  REQUIRES OT FOLLOW UP: Yes  Activity Tolerance  Activity Tolerance: Patient Tolerated treatment well  Safety Devices  Safety Devices in place: Yes  Type of devices: Left in chair;Call light within reach; Chair alarm in place;Gait belt           Patient Diagnosis(es): The primary encounter diagnosis was Acute on chronic congestive heart failure, unspecified heart failure type ([facility] Diagnoses of Longstanding persistent atrial fibrillation (HCC) and General weakness were also pertinent to this visit. has a past medical history of Diabetes mellitus type 2 in obese ([address] Edema of both legs, Elevated LFTs, 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[address] Homozygous Factor V Leiden mutation ([address] Hypercholesterolemia, Hypertension, Hypomagnesemia, Low serum HDL, Lower leg DVT (deep venous thromboembolism), chronic, right ([address] Osteoarthritis of cervical spine, Renal failure, acute ([address] and Vitamin D deficiency. has a past surgical history that includes Cholecystectomy ();  section; Appendectomy (1976); Tubal ligation (Bilateral, 1980); and Colonoscopy (2014).            Restrictions  Restrictions/Precautions  Restrictions/Precautions: Fall Risk  Position Activity Restriction  Other position/activity restrictions: 2 L 02, telemetry, IV in R arm    Subjective   General  Chart Reviewed: Yes, Orders, History and Physical  Additional Pertinent Hx: MIRTA Austin  \"Sheila Felton is a 78 y.o. female who presents here to the emergency department, the patient states that she recently stayed at her daughter-in-law's house, and 2 days ago woke up and felt profoundly weak in her legs, and her legs seem to be more swollen. She states that she has had shortness of breath for about 1 month, she does have a history of peripheral edema, and DVT, A. fib, it is currently on Coumadin, but states that something feels very different. She has increased shortness of breath and weakness. She rates her pain level to her legs as 4/10. \"  Family / Caregiver Present: No  Referring Practitioner: Roni  Diagnosis: CHF with unknown LVEF  Subjective  Subjective: Pt met bedside, agreeable to OT, no c/o of pain. General Comment  Comments: Per RNavni for therapy    Social/Functional History  Social/Functional History  Lives With: Alone  Type of Home: Apartment  Home Layout: One level  Home Access: Level entry  Bathroom Shower/Tub: Tub/Shower unit  Bathroom Toilet: Handicap height  Bathroom Accessibility: Accessible  Home Equipment: Reacher, Rolling walker  ADL Assistance: Independent  Homemaking Assistance: Independent  Ambulation Assistance: Independent  Transfer Assistance: Independent  Active : Yes  Mode of Transportation: [place]  Occupation: Retired  Type of occupation: [employer]  Leisure & Hobbies: plays cards with her granddaughter who visits frequently (granddaughter has CP)  Additional Comments: Pt reports difficulty with getting in/out of bed, so sleeps in a recliner.  Pt also reports that she would like to get grab bars/shower chair for bathroom       Objective   Vision: Impaired  Vision Exceptions: Wears glasses for reading;Wears glasses for distance  Hearing: Within functional limits    Orientation  Overall Orientation Status: Within Functional Limits     Balance  Sitting Balance: Stand by assistance  Standing Balance: Contact guard assistance(Initially CGA, but progressed to SBA)  Standing Balance  Time: 4-5 min  Activity: grooming and washing eye glasses at sink  Comment: Pt stood with SBA at sink with no LOB or c/o dizziness. Pt leans forward to stabilize arms on sink. Functional Mobility  Functional - Mobility Device: Rolling Walker(bariatric RW)  Activity: To/from bathroom  Assist Level: Contact guard assistance(CGA/SBA)  Functional Mobility Comments: Pt ambulated from EOB > to bathroom with RW and CGA. Pt then ambulated from bathroom > recliner with RW and SBA. Assist to manage IV and 02 during fx mobility. Toilet Transfers  Toilet - Technique: Ambulating  Equipment Used: Grab bars  Toilet Transfer: Contact guard assistance  Toilet Transfers Comments: pt ambulated with RW to toilet with CGA, but able to complete sit > stand from toilet with SBA. Assist to manage IV and 02. Wheelchair Bed Transfers  Wheelchair/Bed - Technique: Ambulating  Equipment Used: Bed;Other(recliner)  Level of Asssistance: Stand by assistance;Contact guard assistance  Wheelchair Transfers Comments: Pt initially unable to complete sit > stand from EOB (height slightly elevated), but after 3 attempts was able to complete with CGA and RW. Pt able to complete stand > sit to recliner (with pillow and cushion for increased height) with SBA and RW. Assist to manage 02 and IV. ADL  Grooming: Stand by assistance(Pt stood with SBA at sink for oral care and to wash face)  Toileting: Contact guard assistance;Stand by assistance(Pure wick removed. Pt completed hygiene after urinating while seated. Pt able to manage hospital gown for toileting while standing with CGA/SBA, but was not wearing briefs)  Additional Comments: Anticipate pt will be indep for feeding, CGA/Min A for bathing, Set up for UB dressing, and CGA/Min A for LB dressing  Tone RUE  RUE Tone: Normotonic  Tone LUE  LUE Tone: Normotonic  Coordination  Movements Are Fluid And Coordinated: Yes     Bed mobility  Supine to Sit: Moderate assistance(Min A to manage L LE and Mod A for trunk support.  Pt reports not sleeping in a bed at home b/c too difficult)  Comment: hosital bed with rail and HOB elevated  Transfers  Sit to stand: Contact guard assistance  Stand to sit: Contact guard assistance  Transfer Comments: Pt attempted sit > stand ~3x before successful, but able to achieve with CGA x1. No trouble with additional sit > stand from toilet.      Cognition  Overall Cognitive Status: WFL        Sensation  Overall Sensation Status: WFL        LUE AROM (degrees)  LUE AROM : WFL  Left Hand AROM (degrees)  Left Hand AROM: WFL  RUE AROM (degrees)  RUE AROM : WFL  Right Hand AROM (degrees)  Right Hand AROM: WFL  LUE Strength  Gross LUE Strength: WFL  RUE Strength  Gross RUE Strength: WFL                   Plan   Plan  Times per week: 3-5  Plan weeks: by discharge  Current Treatment Recommendations: Strengthening, Patient/Caregiver Education & Training, Equipment Evaluation, Education, & procurement, Balance Training, Functional Mobility Training, Endurance Training, Safety Education & Training, Self-Care / ADL                                                      AM-PAC Score        AM-[facility] Inpatient Daily Activity Raw Score: 19 (11/23/20 0921)  AM-PAC Inpatient ADL T-Scale Score : 40.22 (11/23/20 0921)  ADL Inpatient CMS 0-100% Score: 42.8 (11/23/20 0921)  ADL Inpatient CMS G-Code Modifier : CK (11/23/20 0921)    Goals  Short term goals  Time Frame for Short term goals: by discharge  Short term goal 1: Pt will complete self care indep with AD  Short term goal 2: Pt will complete fx mobility indep with AD  Short term goal 3: Pt will complete transfers indep with AD  Short term goal 4: Pt will increase activity tolerance to stand for 6 min for ADL task  Short term goal 5: Pt will be indep in HEP for UE strengthening for improved activity tolerance for mobility/ADL participation  Long term goals  Time Frame for Long term goals : LTGs=STGs  Patient Goals   Patient goals : \"to get stronger\"       Therapy Time   Individual Concurrent Group Co-treatment   Time In 0745         Time Out 0900         Minutes 75 Timed Code Treatment Minutes: 60 Minutes(+15 min eval)     Therapist was present, directed the patient's care, made skilled judgement, and was responsible for assessment and treatment of the patient.         WILFRID Bush, OTR/ESTHER #[id number] 11/23/2020 12:07 PM

## 2020-11-23 NOTE — CARE COORDINATION
[facility]  Diabetes Education   Progress Note       NAME:  Portia Cruz  MEDICAL RECORD NUMBER:  [id number]  AGE: 78 y.o. GENDER: female  : 1950  TODAY'S DATE:  2020    Subjective   Reason for Diabetic Education Evaluation and Assessment: hypoglycemia    Chucho Ching agrees to meet with me for diabetes education. She denies any symptoms of hypoglycemia currently.       Visit Type: evaluation      Portia Cruz is a 78 y.o. female referred by:     [] Physician  [] Nursing  [x] Chart Review   [] Other:     PAST MEDICAL HISTORY        Diagnosis Date    Diabetes mellitus type 2 in obese ([facility] [address] 2010    Edema of both legs     Elevated LFTs     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[facility] 2018    19    Homozygous Factor V Leiden mutation ([facility] 1950    Hypercholesterolemia     Hypertension     Hypomagnesemia     Low serum HDL     Lower leg DVT (deep venous thromboembolism), chronic, right (HCC)     Osteoarthritis of cervical spine 2010    Facet arthropathy, spondylosis    Renal failure, acute (HCC)     Due to sepsis    Vitamin D deficiency 2018    13.2       PAST SURGICAL HISTORY    Past Surgical History:   Procedure Laterality Date    APPENDECTOMY  1976     SECTION      3     CHOLECYSTECTOMY  2002    gangrenous    COLONOSCOPY  2014    no polyp    TUBAL LIGATION Bilateral 1980       FAMILY HISTORY    Family History   Problem Relation Age of Onset    Kidney Disease Mother         kidney stones - larger    Coronary Art Dis Father     Heart Surgery Father 48    Hypertension Father     Heart Attack Father     COPD Sister         smoker    Diabetes Sister     Diabetes Brother     Clotting Disorder Son         Factor V Leiden heterozygote    Other Son         GERD, gout, MRSA, homocystinemia    Hypertension Son     High Cholesterol Son     Other Sister         carotid stenosis       SOCIAL HISTORY    Social History Tobacco Use    Smoking status: Never Smoker    Smokeless tobacco: Never Used   Substance Use Topics    Alcohol use: Yes     Comment: OCCASIONALLY 1 or less/1-2 wk    Drug use: No     Comment: No Mj experimentation       ALLERGIES    Allergies   Allergen Reactions    Nickel Nausea Only and Rash    Ibuprofen      Pt takes Warfarin       MEDICATIONS     warfarin  6 mg Oral Once    insulin glargine  44 Units Subcutaneous Nightly    [START ON 11/24/2020] insulin lispro  15 Units Subcutaneous TID WC    metoprolol succinate  50 mg Oral Nightly    lisinopril  40 mg Oral Nightly    atorvastatin  40 mg Oral Nightly    amLODIPine  10 mg Oral Nightly    furosemide  40 mg Intravenous BID    sodium chloride flush  10 mL Intravenous 2 times per day    insulin lispro  0-12 Units Subcutaneous TID WC    insulin lispro  0-6 Units Subcutaneous Nightly    warfarin (COUMADIN) daily dosing (placeholder)   Other RX Placeholder       Objective        Patient Active Problem List   Diagnosis Code    DMII (diabetes mellitus, type 2) ([facility]) E11.9    Essential hypertension I10    Hypercholesterolemia E78.00    Lower leg DVT (deep venous thromboembolism), chronic, right ([facility]) I82.5Z1    Edema of both legs R60.0    Encounter for current long-term use of anticoagulants Z79.01    [facility] D68.51    Low serum HDL R74.8    Elevated LFTs R79.89    Hypomagnesemia E83.42    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[facility]) E72.11    Vitamin D deficiency E55.9    CHF with unknown LVEF ([facility]) I50.9    Generalized weakness R53.1    Pulmonary vascular congestion R09.89    Chronic venous stasis dermatitis of both lower extremities I87.2    Morbid obesity due to excess calories ([facility]) E66.01    Shortness of breath R06.02    Persistent atrial fibrillation ([facility]) I48.19        BP (!) 94/59   Pulse 65   Temp 97.8 °F (36.6 °C) (Oral)   Resp 16   Ht 5' 6.5\" (1.689 m)   Wt (!) 390 lb 7 oz (177.1 kg)   SpO2 96%   BMI 62.07 kg/m²     HgBA1c:    Lab Results   Component Value Date    LABA1C 6.2 09/25/2020       Recent Labs     11/23/20  0705 11/23/20  0724 11/23/20  0740 11/23/20  1141   POCGLU 60* 68* 87 67*       BUN/Creatinine:    Lab Results   Component Value Date    BUN 26 11/23/2020    CREATININE 1.0 11/23/2020       Assessment        Diabetes Management and Education    Does the patient have a Primary Care Physician? Yes, JUAN MANUEL Mccarty - CNP       Does the patient require new medication instruction? TBD - may require insulin adjustments. Harshil Stein omits prandial insulin doses when skipping meals or her blood sugars are \"running low\"     Does the patient/caregiver monitor Blood Glucoses? Yes  Reviewed glycemic control targets, testing frequency and when to call PCP. Level of patient/caregiver understanding able to:        [] Verbalized Understanding   [] Demonstrated Understanding       [] Teach Back       [x] Needs Reinforcement     []  Other:        Does the patient/caregiver follow a Meal Plan? No: She know she is supposed to  carbs\" and salt. Recommend making water, unsweetened tea or coffee primary drinks. Discussed current fluid restriction. Reviewed importance of eating three meals per day. Identified common foods with carbohydrates. Level of patient/caregiver understanding able to:       [] Verbalized Understanding   [] Demonstrated Understanding       [] Teach Back       [x] Needs Reinforcement     []  Other:      Does the patient/caregiver understand S/S of Hypoglycemia? No:   Reviewed symptoms, prevention and treatment. Level of patient/caregiver understanding able to:       [x] Verbalized Understanding   [] Demonstrated Understanding       [] Teach Back       [] Needs Reinforcement     []  Other:  Agrees to notify staff if she has symptoms. Plan        Ongoing diabetes education and blood glucose monitoring.       Education session suspended due to difficulties getting comfortable in her bed. Notified Sangita Cruz MD of hypoglycemia. New orders entered.      Dietary Consult Made:  Yes    The following educational and support materials were provided:  · My contact information     · The Diabetes Education Program:  Planning Healthy Meals   · Academy of Nutrition and Dietetics handout - Carbohydrate Counting for People with Diabetes                                         Discharge Plan:  Discharge needs: no prescription needs identified       Teaching Time Diabetes Education:  20 minutes     Electronically signed by Mallory Bernal on 11/23/2020 at 11:57 AM

## 2020-11-23 NOTE — PLAN OF CARE
Problem: Skin Integrity:  Goal: Will show no infection signs and symptoms  Description: Will show no infection signs and symptoms  Outcome: Ongoing     Problem: Falls - Risk of:  Goal: Will remain free from falls  Description: Will remain free from falls  Outcome: Ongoing     Problem: DAILY CARE  Goal: Daily care needs are met  Outcome: Ongoing     Problem: FLUID AND ELECTROLYTE IMBALANCE  Goal: Fluid and electrolyte balance are achieved/maintained  Outcome: Ongoing

## 2020-11-23 NOTE — PROGRESS NOTES
Clinical Pharmacy Note  Warfarin Consult    Arturo De La Cruz is a 78 y.o. female receiving warfarin managed by pharmacy. Patient being bridged with weight based heparin drip. Warfarin Indication: Afib, Factor V Leiden deficiency, chronic left DVT  Target INR range: 2-3   Dose prior to admission: 3 mg Tuesdays and Saturdays, 6 mg all other days    Current warfarin drug-drug interactions:     Recent Labs     11/22/20  1221 11/22/20  1222 11/23/20  0458   HGB  --  10.4* 9.5*   HCT  --  33.5* 29.6*   INR 1.82*  --  2.04*       Assessment/Plan:    Patient's dosing schedule has been erratic due to variable INRs. Warfarin was on hold 11/19 and 11/20 due to INR=3.3, then resumed at prior home dose of 3 mg Tuesdays and Saturdays, 6 mg all other days. Given patient's complex medical history and the fact that she has been historically kept closer to and INR of 3 by her PCP, recommend considering continued overlap with weight based heparin for one more day. Will discuss with Dr. Yaquelin Hicks and with the patient. Will repeat warfarin 6 mg tonight. Daily PT/INR until stable within therapeutic range. Thank you for the consult. Will continue to follow.     Kari Avina, PharmD, BCPS  11/23/2020  11:12 AM

## 2020-11-23 NOTE — PROGRESS NOTES
Physical Therapy    Facility/Department: [address] IP REHAB  Initial Assessment    NAME: Zoey Moore  : 1950  MRN: [id number]    Date of Service: 2020    Discharge Recommendations:  Continue to assess pending progress, Patient would benefit from continued therapy after discharge, 2-3 sessions per week, Home with Home health PT, 24 hour supervision or assist, S Level 3   PT Equipment Recommendations  Equipment Needed: Yes  Mobility Devices: Maria Guadalupe Se: Rolling  Other: Pt reports having a RW she can borrow from family if needed, as long as bariatric. If not a bariatric RW, she will need one. HOME HEALTH CARE: LEVEL 3 SAFETY   -Initial home health evaluation to occur within 24-48 hours, in patient home    -Home health agency to establish plan of care for patient over 60 day period    -Medication Reconciliation    -PT/OT/Speech evaluations in home within 24-48 hours of discharge; including  -DME and home safety    -Frontload therapy 5 days, then 3x a week    -OT to evaluate if patient has [address] needs for personal care    - evaluation within 24-48 hours, includes evaluation of resources and insurance to determine [place], [place], LTC, and Medicaid options    -PCP Visit scheduled within three to seven days of discharge     Zoey Moore scored a 15/24 on the AM-PAC short mobility form. Current research shows that an AM-PAC score of 18 or greater is typically associated with a discharge to the patient's home setting. Based on the patient's AM-PAC score and their current functional mobility deficits, it is recommended that the patient have 2-3 sessions per week of Physical Therapy at d/c to increase the patient's independence. At this time, this patient demonstrates the endurance and safety to discharge home with home therapy and a follow up treatment frequency of 2-3x/wk. Please see assessment section for further patient specific details.     If patient discharges prior to next session this note will serve as a discharge summary. Please see below for the latest assessment towards goals. Assessment   Body structures, Functions, Activity limitations: Decreased functional mobility ; Increased pain;Decreased balance;Decreased strength;Decreased safe awareness;Decreased endurance  Assessment: Pt is an 78y.o. year-old female that presents to acute care with SOB, increased lower extremity weakness, and generalized fatigue. She currently lives alone in an apartment with one level and no STEFFANIE. PTA pt was independent with all functional mobility. Today (11/23) pt is able to transfer sit<>stand with CGA from chair, toilet, and bed. Able to amb with RW and CGA approx 25' with several turns around obstacles. Pt attempted to amb without AD as she reported not using one at home, but was only able to amb approx 5' while holding onto objects during and reached for RW to use again. Toilet transfer, CGA throughout and used bilat grab bars. Bed mobility with mod A x2 to get bilat LEs onto hosp bed. Throughout session, pt experienced a significant amount of anxiety and thought she was falling or going to fall multiple times, yariel when getting into bed, even though she was safely seated. Pt also requested to have space as she was transferring and would get agitated if too close due to her stating she gets claustrophobic. Pt limited by decreased strength, balance, endurance, and anxiety. She is motivated to do exercises though to get stronger. Would benefit from continued skilled therapy during acute stay and after D/C to further maximize functional mobility and independence experienced PTA. Treatment Diagnosis: decreased functional mobility  Prognosis: Good  Decision Making: Medium Complexity  History: as above  Exam: as above  Clinical Presentation: evolving  PT Education: Goals;Gait Training;General Safety;PT Role;Plan of Care; Functional Mobility Training;Transfer Training  Barriers to Learning: anxiety  REQUIRES PT FOLLOW UP: Yes  Activity Tolerance  Activity Tolerance: Patient limited by fatigue;Patient limited by pain; Other(anxiety)  Activity Tolerance: Pt experienced a lot of anxiety throughout the session and had a fear of falling, yariel when getting into bed. Patient Diagnosis(es): The primary encounter diagnosis was Acute on chronic congestive heart failure, unspecified heart failure type ([facility] Diagnoses of Longstanding persistent atrial fibrillation (HCC) and General weakness were also pertinent to this visit. has a past medical history of Diabetes mellitus type 2 in obese ([address] Edema of both legs, Elevated LFTs, 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[facility] Homozygous Factor V Leiden mutation ([facility] Hypercholesterolemia, Hypertension, Hypomagnesemia, Low serum HDL, Lower leg DVT (deep venous thromboembolism), chronic, right ([address] Osteoarthritis of cervical spine, Renal failure, acute ([facility] and Vitamin D deficiency. has a past surgical history that includes Cholecystectomy ();  section; Appendectomy (1976); Tubal ligation (Bilateral, 1980); and Colonoscopy (2014). Restrictions  Restrictions/Precautions  Restrictions/Precautions: Fall Risk  Position Activity Restriction  Other position/activity restrictions: 1L O2, telemetry, IV in R arm    Vision/Hearing  Vision: Impaired  Vision Exceptions: Wears glasses for reading;Wears glasses for distance  Hearing: Within functional limits       Subjective  General  Chart Reviewed: Yes  Patient assessed for rehabilitation services?: Yes  Additional Pertinent Hx: Per Carmelo Sotelo MD, \"Pt is an 78y.o. year-old female with a history of hypertension, hyperlipidemia, diabetes mellitus, chronic venous stasis dermatitis of bilateral lower extremities, atrial fibrillation, factor V Leiden deficiency and a chronic left DVT for which she takes Coumadin.   Presents to the emergency room for evaluation following a 1 month history of shortness of breath and a 2-day history of increased lower extremity weakness and generalized fatigue. She states that her shortness of breath is moderately severe, worse with exertion and improved with rest.  In the emergency room she was found to have an area vascular congestion and bilateral lower extremity edema. She is being admitted for further evaluation and treatment. Associated signs and symptoms do not include chest pain, diaphoresis, orthopnea, paroxysmal nocturnal dyspnea, fever or chills. \"  Response To Previous Treatment: Not applicable  Family / Caregiver Present: No  Referring Practitioner: Vida Ortiz MD  Referral Date : 11/22/20  Diagnosis: CHF  Follows Commands: Within Functional Limits  Subjective  Subjective: Pt pleasant and awake in chair and agreeable to PT eval this PM. Reports pain in RLE from knee down and rates 8/10. Pain Screening  Patient Currently in Pain: Denies    Orientation  Orientation  Overall Orientation Status: Within Functional Limits    Social/Functional History  Social/Functional History  Lives With: Alone  Type of Home: Apartment  Home Layout: One level  Home Access: Level entry  Bathroom Shower/Tub: Tub/Shower unit  Bathroom Toilet: Handicap height  Bathroom Accessibility: Wheelchair accessible  Home Equipment: Reacher, Rolling walker  Receives Help From: Family(children help with groceries, granddaughter comes a couple times a week to help around house)  ADL Assistance: Independent  Homemaking Assistance: Independent  Homemaking Responsibilities: Yes  Ambulation Assistance: Independent  Transfer Assistance: Independent  Active : Yes  Mode of Transportation: Instant Information  Occupation: Retired  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  Pt also reports that she would like to get grab bars/shower chair for bathroom    Cognition   Cognition  Overall Cognitive Status: WFL    Objective  AROM RLE (degrees)  RLE AROM: Exceptions  RLE General AROM: decreased DF, PF, hip flex, knee flex  AROM LLE (degrees)  LLE AROM : Exceptions  LLE General AROM: decreased DF, PF, hip flex, knee flex  AROM RUE (degrees)  RUE AROM : WFL  AROM LUE (degrees)  LUE AROM : WFL  Strength RLE  Strength RLE: Exception  Comment: decreased knee extn, hip extn/flex  Strength LLE  Strength LLE: Exception  Comment: decreased knee extn, hip extn/flex  Strength RUE  Strength RUE: WFL  Strength LUE  Strength LUE: WFL        Bed mobility  Bridging: Modified independent (uses bilat railing)  Sit to Supine: Moderate assistance;2 Person assistance(lifting bilat UEs to hosp bed)  Scooting: Modified independent(uses bilat railing)  Comment: Performed on slightly elevated [facility] bed and 2 pillows. Pt requested that the [facility] be elevated and she did not think she could complete sit to supine with the HOB flat. When sitting EOB and supine, pt experienced a lot of anxiety and thought she was falling off the bed even though she was not. Transfers  Sit to Stand: Contact guard assistance  Stand to sit: Contact guard assistance  Bed to Chair: Contact guard assistance  Comment: Pt required verbal cues for proper hand placement when going sit<>stand, tended to grab onto walker. Able to self correct and execute properly after verbal cues. Towards end of session, pt was allowed to grab onto walker to assist with sit to stand as anxiety and fatigue increased and pt was not able to perform sit to stand without holding onto the walker due to fear of falling. Holding onto the walker makes the pt feel more comfortable. Sit<>stand transfers completed from chair, toilet, and bed all with CGA and uses a momentum strategy to boost herself into standing from sitting.   Ambulation  Ambulation?: Yes  More Ambulation?: No  Ambulation 1  Surface: level tile  Device: [address]: Contact guard assistance  Quality of Gait: flexed posture, step through pattern, ER of bilat LEs, guarded movements, no LOB noted, notable lateral trunk flexion bilat directions, decreased DF bilat, flat foot initial contact  Gait Deviations: Slow Livia; Increased PIERO; Decreased step length;Decreased step height  Distance: approx 25' with several turns around obstacles  Comments: Pt reported not using an AD at home, attempted to amb without AD and was able to for approx 5' before pt grabbed onto RW again that was kept close by in case. When questioned why she reached for it, she stated it was because it was there instead of truly needing it. Continued to use RW for the rest of amb. Without AD, pt would grab onto objects for steadying. Stairs/Curb  Stairs?: No     Balance  Posture: Fair  Sitting - Static: Good  Sitting - Dynamic: Fair  Standing - Static: Fair  Standing - Dynamic: Fair;-  Comments: Able to amb without AD for a very short distance without significant unsteadiness or LOB, but more stable with AD. Can stand statically without AD. Plan   Plan  Times per week: 3-5  Plan weeks: during acute stay  Current Treatment Recommendations: Strengthening, Home Exercise Program, Safety Education & Training, Balance Training, Endurance Training, Functional Mobility Training, Transfer Training, Gait Training  Safety Devices  Type of devices:  All fall risk precautions in place, Gait belt, Patient at risk for falls, Left in bed, Call light within reach, Bed alarm in place  Restraints  Initially in place: No    AM-PAC Score  AM-PAC Inpatient Mobility Raw Score : 15 (11/23/20 1442)  AM-PAC Inpatient T-Scale Score : 39.45 (11/23/20 1442)  Mobility Inpatient CMS 0-100% Score: 57.7 (11/23/20 1442)  Mobility Inpatient CMS G-Code Modifier : CK (11/23/20 1442)        Goals  Short term goals  Time Frame for Short term goals: during acute stay  Short term goal 1: bed mobility with CGA  Short term

## 2020-11-23 NOTE — PROGRESS NOTES
Hospitalist Progress Note  11/23/2020 9:30 AM    PCP: Aline Jenkins, JUAN MANUEL - CNP    [id number]     Date of Admission: 11/22/2020                                                                                                                     HOSPITAL COURSE    Patient demographics:  The patient  Elva Gonzalez is a 78 y.o. female     Significant past medical history:   Patient Active Problem List   Diagnosis    DMII (diabetes mellitus, type 2) ([facility]    Essential hypertension    Hypercholesterolemia    Lower leg DVT (deep venous thromboembolism), chronic, right (HCC)    Edema of both legs    Encounter for current long-term use of anticoagulants    Homozygous Factor V Leiden mutation ([facility]    Low serum HDL    Elevated LFTs    Hypomagnesemia    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[facility]    Vitamin D deficiency    CHF with unknown LVEF (HCC)    Generalized weakness    Pulmonary vascular congestion    Chronic venous stasis dermatitis of both lower extremities    Morbid obesity due to excess calories [facility]    Shortness of breath    Persistent atrial fibrillation (HCC)         Presenting symptoms:  SOB, increased bilateral lower extremity edema    Diagnostic workup:      CONSULTS DURING ADMISSION :   PHARMACY TO DOSE WARFARIN  IP CONSULT TO SOCIAL WORK      Patient was diagnosed with:        Treatment while inpatient:                                                                                         ----------------------------------------------------------      SUBJECTIVE COMPLAINTS- follow up for lower extremity edema    Diet: DIET LOW SODIUM 2 GM; Carb Control: 4 carb choices (60 gms)/meal; 1500 ml      OBJECTIVE:   Patient Active Problem List   Diagnosis    DMII (diabetes mellitus, type 2) ([facility]    Essential hypertension    Hypercholesterolemia    Lower leg DVT (deep venous thromboembolism), chronic, right (HCC)    Edema of both legs    Encounter for current long-term use of anticoagulants  Homozygous Factor V Leiden mutation ([facility]    Low serum HDL    Elevated LFTs    Hypomagnesemia    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HCC)    Vitamin D deficiency    CHF with unknown LVEF (HCC)    Generalized weakness    Pulmonary vascular congestion    Chronic venous stasis dermatitis of both lower extremities    Morbid obesity due to excess calories (HCC)    Shortness of breath    Persistent atrial fibrillation (HCC)       Allergies  Nickel and Ibuprofen    Medications    Scheduled Meds:   metoprolol succinate  50 mg Oral Nightly    lisinopril  40 mg Oral Nightly    atorvastatin  40 mg Oral Nightly    amLODIPine  10 mg Oral Nightly    furosemide  40 mg Intravenous BID    sodium chloride flush  10 mL Intravenous 2 times per day    insulin lispro  0-12 Units Subcutaneous TID WC    insulin lispro  0-6 Units Subcutaneous Nightly    warfarin (COUMADIN) daily dosing (placeholder)   Other RX Placeholder    insulin glargine  72 Units Subcutaneous Nightly     Continuous Infusions:   dextrose      heparin (PORCINE) Infusion 12.9 mL/hr (11/23/20 0321)     PRN Meds:  sodium chloride flush, acetaminophen **OR** acetaminophen, polyethylene glycol, ondansetron, glucose, dextrose, glucagon (rDNA), dextrose    Vitals   Vitals /wt   Patient Vitals for the past 8 hrs:   BP Temp Temp src Pulse Resp SpO2   11/23/20 0919 -- -- -- -- 16 96 %   11/23/20 0535 (!) 94/59 97.8 °F (36.6 °C) Oral 65 18 95 %        72HR INTAKE/OUTPUT:  No intake or output data in the 24 hours ending 11/23/20 0930    Exam:    Gen:   Alert and oriented ×3    Eyes: PERRL. No sclera icterus. No conjunctival injection. ENT: No discharge. Pharynx clear. External appearance of ears and nose normal.  Neck: Trachea midline. No obvious mass. Resp: No accessory muscle use. No crackles. No wheezes. No rhonchi. CV: Regular rate. Regular rhythm. No murmur or rub. No edema. GI: Non-tender. Non-distended. No hernia. Skin: Warm, dry, normal texture and turgor. Lymph: No cervical LAD. No supraclavicular LAD. M/S: / Ext. No cyanosis. No clubbing. No joint deformity. Neuro: CN 2-12 are intact,  no neurologic deficits noted. PT/INR:   Recent Labs     11/22/20  1221 11/23/20  0458   PROTIME 21.2* 23.8*   INR 1.82* 2.04*     APTT:   Recent Labs     11/22/20  1733 11/23/20  0120   APTT 37.2* >248.0*       CBC:   Recent Labs     11/22/20  1222 11/23/20  0458   WBC 4.7 3.1*   HGB 10.4* 9.5*   HCT 33.5* 29.6*   MCV 81.6 80.5    180       BMP:   Recent Labs     11/22/20  1222 11/23/20  0458   * 142   K 4.4 4.1    106   CO2 24 29   BUN 25* 26*   CREATININE 1.1 1.0       LIVER PROFILE: No results for input(s): ALKPHOS, AST, ALT, ALB, BILIDIR, BILITOT, ALKPHOS in the last 72 hours. No results for input(s): AMYLASE in the last 72 hours. No results for input(s): LIPASE in the last 72 hours. UA:No results for input(s): NITRITE, LABCAST, WBCUA, RBCUA, MUCUS in the last 72 hours. TROPONIN:   Recent Labs     11/22/20  1222   TROPONINI <0.01       No results found for: URRFLXCULT    No results for input(s): TSHREFLEX in the last 72 hours. No components found for: AMH2440  POC GLUCOSE:    Recent Labs     11/22/20  1624 11/22/20  2123 11/23/20  0705 11/23/20  0724 11/23/20  0740   POCGLU 147* 124* 60* 68* 87     No results for input(s): LABA1C in the last 72 hours. Lab Results   Component Value Date    LABA1C 6.2 09/25/2020     Ejection fraction is visually estimated to be 55-60%.  Grade I diastolic dysfunction with normal LV filling pressures. ASSESSMENT AND PLAN    Acute on chronic diastolic CHF  Echocardiogram shows a normal ejection fraction   Diurese with IV Lasix. Monitor strict I&Os and daily weights. A low sodium fluid restricted diet   4 pound improvement of weight     Shortness of breath   cxr pulmonary vascular congestion   Expect to improve with diuresis     Persistent atrial fibrillation (HCC) -   continue Metoprolol and Coumadin.

## 2020-11-23 NOTE — PROGRESS NOTES
Clinical Pharmacy Note  Heparin Dosing       Lab Results   Component Value Date    APTT >248.0 11/23/2020     Lab Results   Component Value Date    HGB 10.4 11/22/2020    HCT 33.5 11/22/2020     11/22/2020    INR 1.82 11/22/2020       Current Infusion Rate: 19.3 mL/hr    Plan:  Hold heparin for 1 hour  Rate: decrease to 12.9 mL/hr  Next aPTT: 0900 11/23/20    Pharmacy will continue to monitor and adjust based on aPTT results.   Steph Ortega, PharmD

## 2020-11-23 NOTE — PROGRESS NOTES
Clinical Pharmacy Note  Heparin Dosing       Lab Results   Component Value Date    APTT 157.5 11/23/2020     Lab Results   Component Value Date    HGB 9.5 11/23/2020    HCT 29.6 11/23/2020     11/23/2020    INR 2.04 11/23/2020       Current Infusion Rate: 19.3 mL/hr    Plan:  Hold heparin for 1 hour  Rate: decrease to 6.5 mL/hr  Next aPTT: 1730 11/23/20    Pharmacy will continue to monitor and adjust based on aPTT results.   Geo Murphy, [address] [facility]

## 2020-11-23 NOTE — PROGRESS NOTES
Pt.resting in the recliner. No resp. distress noted. Heparin 8,600 units given IV. Heparin Drip started at 19.3ml/hr per Right AC. On telemetry . O2 sats 88% on RA. Resp.called. Pt.on O2 now at 2l/nc. Sats 92%. Due meds given. Denies pain . Call light in reach. Will continue to monitor pt.

## 2020-11-23 NOTE — CARE COORDINATION
Wound care consulted for R lower extremity wound. Pt skin assessed by wound care. Pt BLE red, edematous, dry/flaky, with open wound to the posterior R calf. Pt complains of intense pain in this area. The family was questioning about possible podiatry consult for toe nail trimming, and follow-up at outpatient wound care clinic. Notified Dr. Bird Mustafa, recommending podiatry consult.    Electronically signed by Zacarias Jean RN on 11/23/2020 at 3:08 PM

## 2020-11-24 LAB
ANION GAP SERPL CALCULATED.3IONS-SCNC: 8 MMOL/L (ref 3–16)
APTT: 66.4 SEC (ref 24.2–36.2)
APTT: 69.8 SEC (ref 24.2–36.2)
BASOPHILS ABSOLUTE: 0 K/UL (ref 0–0.2)
BASOPHILS RELATIVE PERCENT: 1 %
BUN BLDV-MCNC: 26 MG/DL (ref 7–20)
CALCIUM SERPL-MCNC: 8.7 MG/DL (ref 8.3–10.6)
CHLORIDE BLD-SCNC: 102 MMOL/L (ref 99–110)
CO2: 30 MMOL/L (ref 21–32)
CREAT SERPL-MCNC: 1.1 MG/DL (ref 0.6–1.2)
EOSINOPHILS ABSOLUTE: 0.1 K/UL (ref 0–0.6)
EOSINOPHILS RELATIVE PERCENT: 1.6 %
GFR AFRICAN AMERICAN: 60
GFR NON-AFRICAN AMERICAN: 49
GLUCOSE BLD-MCNC: 57 MG/DL (ref 70–99)
GLUCOSE BLD-MCNC: 66 MG/DL (ref 70–99)
GLUCOSE BLD-MCNC: 75 MG/DL (ref 70–99)
GLUCOSE BLD-MCNC: 89 MG/DL (ref 70–99)
GLUCOSE BLD-MCNC: 90 MG/DL (ref 70–99)
GLUCOSE BLD-MCNC: 95 MG/DL (ref 70–99)
GLUCOSE BLD-MCNC: 95 MG/DL (ref 70–99)
HCT VFR BLD CALC: 29.1 % (ref 36–48)
HEMOGLOBIN: 9.3 G/DL (ref 12–16)
INR BLD: 1.91 (ref 0.86–1.14)
LYMPHOCYTES ABSOLUTE: 1.2 K/UL (ref 1–5.1)
LYMPHOCYTES RELATIVE PERCENT: 34.4 %
MCH RBC QN AUTO: 25.8 PG (ref 26–34)
MCHC RBC AUTO-ENTMCNC: 32 G/DL (ref 31–36)
MCV RBC AUTO: 80.8 FL (ref 80–100)
MONOCYTES ABSOLUTE: 0.4 K/UL (ref 0–1.3)
MONOCYTES RELATIVE PERCENT: 12.7 %
NEUTROPHILS ABSOLUTE: 1.7 K/UL (ref 1.7–7.7)
NEUTROPHILS RELATIVE PERCENT: 50.3 %
PDW BLD-RTO: 20.1 % (ref 12.4–15.4)
PERFORMED ON: ABNORMAL
PERFORMED ON: ABNORMAL
PERFORMED ON: NORMAL
PLATELET # BLD: 170 K/UL (ref 135–450)
PMV BLD AUTO: 8.2 FL (ref 5–10.5)
POTASSIUM REFLEX MAGNESIUM: 4.2 MMOL/L (ref 3.5–5.1)
PROTHROMBIN TIME: 22.3 SEC (ref 10–13.2)
RBC # BLD: 3.6 M/UL (ref 4–5.2)
SODIUM BLD-SCNC: 140 MMOL/L (ref 136–145)
WBC # BLD: 3.4 K/UL (ref 4–11)

## 2020-11-24 PROCEDURE — 2580000003 HC RX 258: Performed by: INTERNAL MEDICINE

## 2020-11-24 PROCEDURE — 85025 COMPLETE CBC W/AUTO DIFF WBC: CPT

## 2020-11-24 PROCEDURE — 99222 1ST HOSP IP/OBS MODERATE 55: CPT | Performed by: INTERNAL MEDICINE

## 2020-11-24 PROCEDURE — 97116 GAIT TRAINING THERAPY: CPT | Performed by: PHYSICAL THERAPIST

## 2020-11-24 PROCEDURE — 6370000000 HC RX 637 (ALT 250 FOR IP): Performed by: INTERNAL MEDICINE

## 2020-11-24 PROCEDURE — 85730 THROMBOPLASTIN TIME PARTIAL: CPT

## 2020-11-24 PROCEDURE — 6360000002 HC RX W HCPCS: Performed by: INTERNAL MEDICINE

## 2020-11-24 PROCEDURE — 6370000000 HC RX 637 (ALT 250 FOR IP): Performed by: HOSPITALIST

## 2020-11-24 PROCEDURE — 36415 COLL VENOUS BLD VENIPUNCTURE: CPT

## 2020-11-24 PROCEDURE — 1200000000 HC SEMI PRIVATE

## 2020-11-24 PROCEDURE — 97530 THERAPEUTIC ACTIVITIES: CPT | Performed by: PHYSICAL THERAPIST

## 2020-11-24 PROCEDURE — 85610 PROTHROMBIN TIME: CPT

## 2020-11-24 PROCEDURE — 97535 SELF CARE MNGMENT TRAINING: CPT

## 2020-11-24 PROCEDURE — 97530 THERAPEUTIC ACTIVITIES: CPT

## 2020-11-24 PROCEDURE — 80048 BASIC METABOLIC PNL TOTAL CA: CPT

## 2020-11-24 PROCEDURE — 94761 N-INVAS EAR/PLS OXIMETRY MLT: CPT

## 2020-11-24 PROCEDURE — 2700000000 HC OXYGEN THERAPY PER DAY

## 2020-11-24 PROCEDURE — 2500000003 HC RX 250 WO HCPCS: Performed by: HOSPITALIST

## 2020-11-24 PROCEDURE — 97110 THERAPEUTIC EXERCISES: CPT | Performed by: PHYSICAL THERAPIST

## 2020-11-24 RX ORDER — LISINOPRIL 20 MG/1
20 TABLET ORAL NIGHTLY
Status: DISCONTINUED | OUTPATIENT
Start: 2020-11-24 | End: 2020-11-26

## 2020-11-24 RX ORDER — WARFARIN SODIUM 7.5 MG/1
7.5 TABLET ORAL
Status: COMPLETED | OUTPATIENT
Start: 2020-11-24 | End: 2020-11-24

## 2020-11-24 RX ORDER — FUROSEMIDE 10 MG/ML
40 INJECTION INTRAMUSCULAR; INTRAVENOUS DAILY
Status: DISCONTINUED | OUTPATIENT
Start: 2020-11-25 | End: 2020-11-24

## 2020-11-24 RX ORDER — MIDODRINE HYDROCHLORIDE 5 MG/1
5 TABLET ORAL
Status: DISCONTINUED | OUTPATIENT
Start: 2020-11-24 | End: 2020-11-24

## 2020-11-24 RX ORDER — INSULIN GLARGINE 100 [IU]/ML
30 INJECTION, SOLUTION SUBCUTANEOUS NIGHTLY
Status: DISCONTINUED | OUTPATIENT
Start: 2020-11-24 | End: 2020-12-03 | Stop reason: HOSPADM

## 2020-11-24 RX ORDER — AMMONIUM LACTATE 12 G/100G
LOTION TOPICAL PRN
Status: DISCONTINUED | OUTPATIENT
Start: 2020-11-24 | End: 2020-11-25 | Stop reason: SDUPTHER

## 2020-11-24 RX ADMIN — METOPROLOL SUCCINATE 50 MG: 50 TABLET, EXTENDED RELEASE ORAL at 21:52

## 2020-11-24 RX ADMIN — LISINOPRIL 20 MG: 20 TABLET ORAL at 21:52

## 2020-11-24 RX ADMIN — WARFARIN SODIUM 7.5 MG: 7.5 TABLET ORAL at 17:50

## 2020-11-24 RX ADMIN — ATORVASTATIN CALCIUM 40 MG: 40 TABLET, FILM COATED ORAL at 21:52

## 2020-11-24 RX ADMIN — MICONAZOLE NITRATE: 2 POWDER TOPICAL at 21:53

## 2020-11-24 RX ADMIN — HEPARIN SODIUM 8.6 ML/HR: 10000 INJECTION, SOLUTION INTRAVENOUS at 17:50

## 2020-11-24 RX ADMIN — FUROSEMIDE 40 MG: 10 INJECTION, SOLUTION INTRAMUSCULAR; INTRAVENOUS at 09:13

## 2020-11-24 RX ADMIN — SODIUM CHLORIDE, PRESERVATIVE FREE 10 ML: 5 INJECTION INTRAVENOUS at 09:13

## 2020-11-24 RX ADMIN — FUROSEMIDE 10 MG/HR: 10 INJECTION, SOLUTION INTRAMUSCULAR; INTRAVENOUS at 21:52

## 2020-11-24 RX ADMIN — INSULIN GLARGINE 30 UNITS: 100 INJECTION, SOLUTION SUBCUTANEOUS at 21:54

## 2020-11-24 RX ADMIN — HEPARIN SODIUM 4200 UNITS: 1000 INJECTION INTRAVENOUS; SUBCUTANEOUS at 00:13

## 2020-11-24 RX ADMIN — MIDODRINE HYDROCHLORIDE 5 MG: 5 TABLET ORAL at 17:50

## 2020-11-24 RX ADMIN — SODIUM CHLORIDE, PRESERVATIVE FREE 10 ML: 5 INJECTION INTRAVENOUS at 21:52

## 2020-11-24 ASSESSMENT — PAIN SCALES - GENERAL
PAINLEVEL_OUTOF10: 0
PAINLEVEL_OUTOF10: 0

## 2020-11-24 NOTE — PROGRESS NOTES
Pt awake and AAO lying in bed. Denies pain. Pt admitted with RLE cellulitis and CHF. Pt also has h/o R DVT. On Heparin gtt @ 8.6ml/hr infusing into R FA PIV. Lungs decreased. SOB with activity. Nonproductive cough. On O2 @ 2L/NC. Belly round and soft with active BS> Pt continent of B and B. 3+ edema noted to RLE. RLE red and cyn and scaly. 3+ edema to LLE. On 1500 ml fluid restriction. Pt is on Lasix. Call light in reach. Bed alarm on.

## 2020-11-24 NOTE — PLAN OF CARE
Problem: Skin Integrity:  Goal: Absence of new skin breakdown  Description: Absence of new skin breakdown  Outcome: Ongoing  Note: Able to change positions in bed without assist, no evidence of skin breakdown noted. Waffle cushion in place to chair. Problem: Falls - Risk of:  Goal: Will remain free from falls  Description: Will remain free from falls  Outcome: Ongoing  Note: Pt educated on falls precautions and safety. Call light and personal belongings within reach at all times. Non skid socks in use when up.  Hourly rounding

## 2020-11-24 NOTE — PROGRESS NOTES
Clinical Pharmacy Note  Warfarin Consult    Garrison Hanson is a 78 y.o. female receiving warfarin managed by pharmacy. Patient being bridged with a weight based heparin drip. Warfarin Indication: Afib, Factor V Leiden deficiency, chronic left DVT  Target INR range: 2-3   Dose prior to admission: 3 mg Tuesdays and Saturdays, 6 mg all other days    Current warfarin drug-drug interactions:     Recent Labs     11/22/20  1221 11/22/20  1222 11/23/20  0458 11/24/20  0800   HGB  --  10.4* 9.5* 9.3*   HCT  --  33.5* 29.6* 29.1*   INR 1.82*  --  2.04* 1.91*       Assessment/Plan:    Patient's dosing schedule has been erratic due to variable INRs. Warfarin was on hold 11/19 and 11/20 due to INR=3.3, then resumed at prior home dose of 3 mg Tuesdays and Saturdays, 6 mg all other days. Given patient's complex medical history and the fact that she has been historically kept closer to and INR of 3 by her PCP, recommend considering continued overlap with weight based heparin. Discussed with the patient. Will give warfarin 7.5 mg tonight. Daily PT/INR until stable within therapeutic range. Thank you for the consult. Will continue to follow.     Tutu Daniels PharmD, BCPS  11/24/2020  12:54 PM

## 2020-11-24 NOTE — PROGRESS NOTES
Clinical Pharmacy Note  Heparin Dosing       Lab Results   Component Value Date    APTT 66.4 11/24/2020     Lab Results   Component Value Date    HGB 9.3 11/24/2020    HCT 29.1 11/24/2020     11/24/2020    INR 1.91 11/24/2020       Current Infusion Rate: 8.6 mL/hr    Plan:  Rate:  8.6 mL/hr  Next aPTT: 0600 11/25/20    Pharmacy will continue to monitor and adjust based on aPTT results.   Sherald Spurling [place], [facility] 11/24/2020 2:45 PM

## 2020-11-24 NOTE — CARE COORDINATION
LSW reviewed chart. Therapy Team is recommending home with home care services and DME for Bariatric [address] walker and Bariatric shower chair. LSW called pt on the phone to introduce  role and to complete assessment. INITIAL CASE MANAGEMENT ASSESSMENT      Living Situation: Patient lives alone, apartment with level entry. She reports her daughter lives nearby and visits daily to bring in groceries. She has no in home services at this time but is agreeable to Home Care to continue her progress. She is active with Dr Byers Form for pcp needs. ADLs:   Totally independent. DME:  \"borrowed a wh walker but is not the right size. \"  She is interested in a shower chair. LSW informed her insurance will not cover a shower chair but she could obtain one on [address] [facility] [address] or any [address] place. LSW will ask MD to order a bariatric wh walker. PT/OT Recs:   THerapy team recommends fast track therapy at home. LSW pulled an in network list of agencies as well as CMS star rated list of agencies and review with her over the phone. She wanted the highest rated agency which is [facility] and [address] [place] [address]. She prefers [facility] as first choice and [facility] as second. The Plan for Transition of Care is related to the following treatment goals: To continue her progress in personal care and ambulation at home. The Patient  was provided with a choice of provider and agrees   with the discharge plan. [x] Yes [] No    Freedom of choice list was provided with basic dialogue that supports the patient's individualized plan of care/goals, treatment preferences and shares the quality data associated with the providers. [x] Yes [] No       Active Services:   NONE     Transportation:   Family will transport her home. Medications:     PCP:   Dr Byers Form  [phone]      HD/PD:   NONE    PLAN/COMMENTS:   Referral initiated to [facility] for possible home care needs. Referral made to [address] [place] [address] for Bariatric Wh walker.   Berhane Linton [place], [place]     Case Management   [phone]    11/24/2020  11:00 AM        SW/CM provided contact information for patient or family to call with any questions. SW/CM will follow and assist as needed.     [place], [place]     Case Management   [phone]    11/24/2020  10:56 AM

## 2020-11-24 NOTE — PROGRESS NOTES
Hospitalist Progress Note  11/24/2020 8:54 AM    PCP: Elda Al, JUAN MANUEL Ho CNP    [id number]     Date of Admission: 11/22/2020                                                                                                                     HOSPITAL COURSE    Patient demographics:  The patient  Claude Palmer is a 78 y.o. female     Significant past medical history:   Patient Active Problem List   Diagnosis    DMII (diabetes mellitus, type 2) ([facility]    Essential hypertension    Hypercholesterolemia    Lower leg DVT (deep venous thromboembolism), chronic, right (HCC)    Edema of both legs    Encounter for current long-term use of anticoagulants    Homozygous Factor V Leiden mutation ([facility]    Low serum HDL    Elevated LFTs    Hypomagnesemia    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[facility]    Vitamin D deficiency    CHF with unknown LVEF ([facility])    Generalized weakness    Pulmonary vascular congestion    Chronic venous stasis dermatitis of both lower extremities    Morbid obesity due to excess calories ([facility])    Shortness of breath    Persistent atrial fibrillation ([facility])         Presenting symptoms:  SOB, increased bilateral lower extremity edema    Diagnostic workup:      CONSULTS DURING ADMISSION :   PHARMACY TO DOSE WARFARIN  IP CONSULT TO SOCIAL WORK  IP CONSULT TO PODIATRY  IP CONSULT TO HEART FAILURE NURSE/COORDINATOR      Patient was diagnosed with:        Treatment while inpatient:                                                                                         ----------------------------------------------------------      SUBJECTIVE COMPLAINTS- follow up for lower extremity edema    Diet: DIET LOW SODIUM 2 GM; Carb Control: 4 carb choices (60 gms)/meal; 1500 ml      OBJECTIVE:   Patient Active Problem List   Diagnosis    DMII (diabetes mellitus, type 2) ([facility]    Essential hypertension    Hypercholesterolemia    Lower leg DVT (deep venous thromboembolism), chronic, right (HCC)    Edema of both legs    Encounter for current long-term use of anticoagulants    Homozygous Factor V Leiden mutation ([address]    Low serum HDL    Elevated LFTs    Hypomagnesemia    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HCC)    Vitamin D deficiency    CHF with unknown LVEF (HCC)    Generalized weakness    Pulmonary vascular congestion    Chronic venous stasis dermatitis of both lower extremities    Morbid obesity due to excess calories (HCC)    Shortness of breath    Persistent atrial fibrillation (HCC)       Allergies  Nickel and Ibuprofen    Medications    Scheduled Meds:   insulin glargine  44 Units Subcutaneous Nightly    insulin lispro  15 Units Subcutaneous TID WC    metoprolol succinate  50 mg Oral Nightly    lisinopril  40 mg Oral Nightly    atorvastatin  40 mg Oral Nightly    amLODIPine  10 mg Oral Nightly    furosemide  40 mg Intravenous BID    sodium chloride flush  10 mL Intravenous 2 times per day    insulin lispro  0-12 Units Subcutaneous TID WC    insulin lispro  0-6 Units Subcutaneous Nightly    warfarin (COUMADIN) daily dosing (placeholder)   Other RX Placeholder     Continuous Infusions:   heparin (PORCINE) Infusion 8.6 mL/hr (11/24/20 0013)    dextrose       PRN Meds:  sodium chloride flush, acetaminophen **OR** acetaminophen, polyethylene glycol, ondansetron, glucose, dextrose, glucagon (rDNA), dextrose    Vitals   Vitals /wt   Patient Vitals for the past 8 hrs:   BP Temp Temp src Pulse Resp SpO2 Weight   11/24/20 0516 96/67 97.8 °F (36.6 °C) Oral 73 16 95 % (!) 382 lb 8 oz (173.5 kg)        72HR INTAKE/OUTPUT:      Intake/Output Summary (Last 24 hours) at 11/24/2020 0854  Last data filed at 11/24/2020 0655  Gross per 24 hour   Intake 1280.66 ml   Output 900 ml   Net 380.66 ml       Exam:    Gen:   Alert and oriented ×3    Eyes: PERRL. No sclera icterus. No conjunctival injection. ENT: No discharge. Pharynx clear. External appearance of ears and nose normal.  Neck: Trachea midline. No obvious mass. Resp: No accessory muscle use. No crackles. No wheezes. No rhonchi. CV: Regular rate. Regular rhythm. No murmur or rub. No edema. GI: Non-tender. Non-distended. No hernia. Skin: Warm, dry, normal texture and turgor. Lymph: No cervical LAD. No supraclavicular LAD. M/S: / Ext. No cyanosis. No clubbing. No joint deformity. Neuro: CN 2-12 are intact,  no neurologic deficits noted. PT/INR:   Recent Labs     11/22/20  1221 11/23/20  0458 11/24/20  0800   PROTIME 21.2* 23.8* 22.3*   INR 1.82* 2.04* 1.91*     APTT:   Recent Labs     11/23/20  0858 11/23/20  1706 11/23/20  2305 11/24/20  0800   APTT 157.5* 51.2* 46.9* 69.8*       CBC:   Recent Labs     11/22/20  1222 11/23/20  0458 11/24/20  0800   WBC 4.7 3.1* 3.4*   HGB 10.4* 9.5* 9.3*   HCT 33.5* 29.6* 29.1*   MCV 81.6 80.5 80.8    180 170       BMP:   Recent Labs     11/22/20  1222 11/23/20  0458   * 142   K 4.4 4.1    106   CO2 24 29   BUN 25* 26*   CREATININE 1.1 1.0       LIVER PROFILE: No results for input(s): ALKPHOS, AST, ALT, ALB, BILIDIR, BILITOT, ALKPHOS in the last 72 hours. No results for input(s): AMYLASE in the last 72 hours. No results for input(s): LIPASE in the last 72 hours. UA:No results for input(s): NITRITE, LABCAST, WBCUA, RBCUA, MUCUS in the last 72 hours. TROPONIN:   Recent Labs     11/22/20  1222   TROPONINI <0.01       No results found for: URRFLXCULT    No results for input(s): TSHREFLEX in the last 72 hours. No components found for: [id number]  POC GLUCOSE:    Recent Labs     11/23/20  1634 11/23/20 2032 11/24/20  0651 11/24/20  0710 11/24/20  0726   POCGLU 96 119* 57* 66* 95     No results for input(s): LABA1C in the last 72 hours. Lab Results   Component Value Date    LABA1C 6.2 09/25/2020     Ejection fraction is visually estimated to be 55-60%.  Grade I diastolic dysfunction with normal LV filling pressures.      ASSESSMENT AND PLAN    Acute on chronic diastolic CHF  Echocardiogram shows a normal ejection fraction   Diurese with IV Lasix. Monitor strict I&Os and daily weights. A low sodium fluid restricted diet   Consult to cardiology    Hypotension  Decrease lasix to once daily  Start midodrin  Monitor cr    Shortness of breath   cxr pulmonary vascular congestion   Expect to improve with diuresis     Persistent atrial fibrillation ([address] -   continue Metoprolol and Coumadin. Bridge with Heparin as Pt's INR is subtherapeutic     Chronic, right lower leg DVT (   Pt is homozygous Factor V Leiden mutation - Continue Coumadin. Bridge with Heparin as Pt's INR is subtherapeutic      Generalized weakness/fatigue -  PTOT     Diabetes mellitus II -   Low bs  Decrease insulin dose     Essential (primary) hypertension -   continue home meds and monitor blood pressure     Hyperlipidemia -   No current evidence of Rhabdomyolysis or other adverse effects. Continue statin therapy while in the hospital     Chronic venous stasis dermatitis of both lower extremities -   Will provide supportive care.      Morbid obesity due to excess calories (Body mass index is 62.63 kg/m². ) -   Complicating assessment and treatment. Placing patient at risk for multiple co-morbidities as well as early death and contributing to the patient's presentation.  on weight loss when appropriate. Code Status: Full Code        Dispo -cc        The patient and / or the family were informed of the results of any tests, a time was given to answer questions, a plan was proposed and they agreed with plan. Buck Vernon MD    This note was transcribed using [id number] [employer]. Please disregard any translational errors.

## 2020-11-24 NOTE — PROGRESS NOTES
Physical Therapy  Facility/Department: [address] IP REHAB  Daily Treatment Note  NAME: Sunny Mckeon  : 1950  MRN: [id number]    Date of Service: 2020    Discharge Recommendations:  Continue to assess pending progress, Patient would benefit from continued therapy after discharge, 2-3 sessions per week, Home with Home health PT, 24 hour supervision or assist, S Level 3   PT Equipment Recommendations  Equipment Needed: Yes  Mobility Devices: Prashantwilder Baeins: Rolling  Other: Pt reports having a RW she can borrow from family if needed, as long as bariatric. If not a bariatric RW, she will need one. HOME HEALTH CARE: LEVEL 3 SAFETY      -Initial home health evaluation to occur within 24-48 hours, in patient home    -Home health agency to establish plan of care for patient over 60 day period    -Medication Reconciliation    -PT/OT/Speech evaluations in home within 24-48 hours of discharge; including    -DME and home safety    -Frontload therapy 5 days, then 3x a week    -OT to evaluate if patient has [address] needs for personal care    - evaluation within 24-48 hours, includes evaluation of resources and insurance to determine [place], [place], LTC, and Medicaid options    -PCP Visit scheduled within three to seven days of discharge     Sunny Mckeon scored a 16/24 on the AM-PAC short mobility form. Current research shows that an AM-PAC score of 18 or greater is typically associated with a discharge to the patient's home setting. Based on the patient's AM-PAC score and their current functional mobility deficits, it is recommended that the patient have 2-3 sessions per week of Physical Therapy at d/c to increase the patient's independence. At this time, this patient demonstrates the endurance and safety to discharge home with home therapy and a follow up treatment frequency of 2-3x/wk. Please see assessment section for further patient specific details.     If patient discharges prior to next session this note will serve as a discharge summary. Please see below for the latest assessment towards goals. Assessment   Body structures, Functions, Activity limitations: Decreased functional mobility ; Increased pain;Decreased balance;Decreased strength;Decreased safe awareness;Decreased endurance  Assessment: Pt is an 78y.o. year-old female that presents to acute care with SOB, increased lower extremity weakness, and generalized fatigue. She currently lives alone in an apartment with one level and no STEFFANIE. PTA pt was independent with all functional mobility. Today (11/24) pt able to transfer sit<>stand with SBA from chair and toilet. Toilet transfer SBA throughout, used bilat grab bars. Cues for proper hand placement when going from sit to stand, but able to perform correctly when going stand to sit. Able to amb approx 20' in room with several turns around obstacles. Cues to keep RW close but pt stated that if she did that, she would fall backwards. Bed mobility with mod A x2 to get bilat LEs onto hosp bed when going from sit to supine. SBA-CGA for rolling side to side and cues for hand placement to assist with roll. Pt experienced increased anxiety throughout sesssion, yariel with bed mobility. Thought she was falling even though she was safely on the bed. Pt continues to be limited by decreased strength, balance, and anxiety. She is motivated to do exercises though to get stronger. Would benefit from contiuned skilled therapy during acute stay and after D/C to further maximize functional mobility and independence experienced PTA.   Treatment Diagnosis: decreased functional mobility  Specific instructions for Next Treatment: pt would like to work on LE exercises  Prognosis: Good  PT Education: General Safety;Gait Training;Functional Mobility Training;Transfer Training  Barriers to Learning: anxiety  REQUIRES PT FOLLOW UP: Yes  Activity Tolerance  Activity Tolerance: Patient limited by fatigue;Patient limited by pain; Other(anxiety)  Activity Tolerance: Pt experienced anxiety throughout the session, but has decreased since yesterday's session. Patient Diagnosis(es): The primary encounter diagnosis was Acute on chronic congestive heart failure, unspecified heart failure type ([facility] Diagnoses of Longstanding persistent atrial fibrillation (HCC) and General weakness were also pertinent to this visit. has a past medical history of Diabetes mellitus type 2 in obese ([address] Edema of both legs, Elevated LFTs, 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[address] Homozygous Factor V Leiden mutation ([facility] Hypercholesterolemia, Hypertension, Hypomagnesemia, Low serum HDL, Lower leg DVT (deep venous thromboembolism), chronic, right ([address] Osteoarthritis of cervical spine, Renal failure, acute ([address] and Vitamin D deficiency. has a past surgical history that includes Cholecystectomy ();  section; Appendectomy (1976); Tubal ligation (Bilateral, 1980); and Colonoscopy (2014). Restrictions  Restrictions/Precautions  Restrictions/Precautions: Fall Risk  Position Activity Restriction  Other position/activity restrictions: 2L O2, telemetry, IV in R arm    Subjective   General  Chart Reviewed: Yes  Additional Pertinent Hx: Per Stefany Farrell MD, \"Pt is an 78y.o. year-old female with a history of hypertension, hyperlipidemia, diabetes mellitus, chronic venous stasis dermatitis of bilateral lower extremities, atrial fibrillation, factor V Leiden deficiency and a chronic left DVT for which she takes Coumadin. Presents to the emergency room for evaluation following a 1 month history of shortness of breath and a 2-day history of increased lower extremity weakness and generalized fatigue. She states that her shortness of breath is moderately severe, worse with exertion and improved with rest.  In the emergency room she was found to have an area vascular congestion and bilateral lower extremity edema.   She is being admitted for further evaluation and treatment. Associated signs and symptoms do not include chest pain, diaphoresis, orthopnea, paroxysmal nocturnal dyspnea, fever or chills. \"  Response To Previous Treatment: Patient with no complaints from previous session. Family / Caregiver Present: No  Referring Practitioner: Ja Damon MD  Subjective  Subjective: Pt awake in chair and agreeable to therapy this AM. Reports having a rough night last night and day so far due to not being able to sleep well. Rates pain 8/10 in sitting and increases to 9/10 during amb. General Comment  Comments: Baseline SpO2 91% in sitting at baseline with 1L O2 via NC. RN increased to 2L O2 and SpO2 increased to 93%. Remained on 2L for rest of session. Orientation  Orientation  Overall Orientation Status: Within Functional Limits       Objective   Bed mobility  Rolling to Left: Contact guard assistance  Rolling to Right: Stand by assistance  Sit to Supine: Moderate assistance;2 Person assistance(to get bilat UEs onto bed)  Scooting: Minimal assistance(to prevent bilat feet from sliding on bed)  Comment: Performed on slightly elevated hosp bed with 2 pillow. Able to go sit to supine with flat hosp bed. Increased anxiety when going from sit to supine as pt thought she was falling even though she was not. Transfers  Sit to Stand: Stand by assistance  Stand to sit: Stand by assistance  Bed to Chair: Contact guard assistance  Comment: Pt required verbal cues for proper hand placement when going sit to stand, but was able to remember proper hand placement and sequencing when going stand to sit. Able to self correct and execute properly after verbal cues. Sit<>stand from chair and toilet. Stand to sit on bed. Increased anxiety when transferring onto bed and pt requests space when performing all transfers.   Ambulation  Ambulation?: Yes  More Ambulation?: No  Ambulation 1  Surface: level tile  Device: Rolling Walker  Assistance: Contact guard assistance;Stand by assistance  Quality of Gait: flexed posture, step through pattern, ER of bilat LEs, guarded movements, no LOB noted, notable lateral trunk flexion bilat directions, decreased DF bilat, flat foot initial contact  Gait Deviations: Slow Livia; Increased PIERO; Decreased step length;Decreased step height  Distance: approx 20' around room, approx 10' from toilet to bed  Comments: Pt took multiple standing rest breaks during the longer bout of amb due to increased fatigue this AM. Reported feeling slightly dizzy when amb from toilet but cleared. Cues to keep walker close, but pt reported that if she did, she would fall. Stairs/Curb  Stairs?: No        Performed exercises at pt's request this PM.  Exercises  Straight Leg Raise: x10  Hamstring Sets: x15  Quad Sets: x15  Gluteal Sets: x15  Hip Abduction: x15 abductor/adductor AROM in hooklying to prevent calfs rubbing on bed  Ankle Pumps: x20  Comments: All performed in supine with periodic rest breaks. Increased RLE pain from 8/10 to 9/10, but motivated to continue. Pt provided a paper copy of HEP with picture and word descriptions.                        AM-PAC Score  AM-PAC Inpatient Mobility Raw Score : 16 (11/24/20 1020)  AM-PAC Inpatient T-Scale Score : 40.78 (11/24/20 1020)  Mobility Inpatient CMS 0-100% Score: 54.16 (11/24/20 1020)  Mobility Inpatient CMS G-Code Modifier : CK (11/24/20 1020)          Goals  Short term goals  Time Frame for Short term goals: during acute stay  Short term goal 1: bed mobility with CGA  Short term goal 2: transfers with supervision  Short term goal 3: ambulate 150' with or without AD and SBA  Long term goals  Time Frame for Long term goals : STG = LTG  Patient Goals   Patient goals : get stronger legs, work on exercises, go home    Plan    Plan  Times per week: 3-5  Plan weeks: during acute stay  Specific instructions for Next Treatment: pt would like to work on LE exercises  Current Treatment Recommendations:

## 2020-11-24 NOTE — PROGRESS NOTES
Clinical Pharmacy Note  Heparin Dosing       Lab Results   Component Value Date    APTT 51.2 11/23/2020     Lab Results   Component Value Date    HGB 9.5 11/23/2020    HCT 29.6 11/23/2020     11/23/2020    INR 2.04 11/23/2020       Current Infusion Rate: 6.5 mL/hr    Plan:  Bolus: 00 units  Rate: 6.5 mL/hr  Next aPTT: 2300    Pharmacy will continue to monitor and adjust based on aPTT results.

## 2020-11-24 NOTE — PROGRESS NOTES
Occupational Therapy  Facility/Department: [address] [facility]  Daily Treatment Note  NAME: Arturo De La Cruz  : 1950  MRN: [id number]    Date of Service: 2020    Discharge Recommendations:  Continue to assess pending progress, Home with Home health OT, Home with assist PRN  OT Equipment Recommendations  Other: TBD - possibly bariatric RW (pt reports having RW walker at home that belonged to daughter in [address] father)     Arturo De La Cruz scored a 19/24 on the AM-PAC ADL Inpatient form. Current research shows that an AM-PAC score of 18 or greater is typically associated with a discharge to the patient's home setting. Based on the patient's AM-PAC score, and their current ADL deficits, it is recommended that the patient have 2-3 sessions per week of Occupational Therapy at d/c to increase the patient's independence. At this time, this patient demonstrates the endurance and safety to discharge home with home OT and a follow up treatment frequency of 2-3x/wk. Please see assessment section for further patient specific details. If patient discharges prior to next session this note will serve as a discharge summary. Please see below for the latest assessment towards goals. Assessment   Performance deficits / Impairments: Decreased functional mobility ; Decreased endurance;Decreased ADL status; Decreased balance  Assessment: Pt tolerated treatment well, but reported she feels increased weakness this date. Pt also reported increased pain in R calf and rated pain as 9/10 (painful to touch and with movement). Pt completed fx mobility and transfers with up to [id number] with RW. Pt also able to complete ADLs with CGA/SBA this date. Pt is motivated to participate in therapies to get stronger, but requires cues to conserve energy and not to push self too hard if feeling increased weakness or pain.  Will continue to work with pt on acute to improve functional mobility and ADL participation, but recommend pt her legs, and her legs seem to be more swollen. She states that she has had shortness of breath for about 1 month, she does have a history of peripheral edema, and DVT, A. fib, it is currently on Coumadin, but states that something feels very different. She has increased shortness of breath and weakness. She rates her pain level to her legs as 4/10. \"  Family / Caregiver Present: No  Referring Practitioner: Roni  Diagnosis: CHF with unknown LVEF  Subjective  Subjective: Pt met bedside, agreeable to OT, rates pain in R calf as 9/10. General Comment  Comments: . Objective    ADL  Grooming: Stand by assistance(Pt washed hands at sink after toileting while standing with SBA)  Toileting: Contact guard assistance(Pt managed gown and completed hygiene after urinating)        Balance  Sitting Balance: Stand by assistance  Standing Balance: Contact guard assistance(CGA/SBA)  Standing Balance  Time: 1 min  Activity: washing hands at sink after toileting  Comment: Pt stood with CGA/SBA at sink with no LOB or c/o dizziness. Pt leans forward to stabilize arms on sink  Functional Mobility  Functional - Mobility Device: Rolling Walker(bariatric RW)  Activity: To/from bathroom  Assist Level: Contact guard assistance(CGA/SBA)  Functional Mobility Comments: Pt ambulated from EOB > bathroom > recliner with RW and CGA. Assist to manage IV and 02 during fx mobility. Toilet Transfers  Toilet - Technique: Ambulating  Equipment Used: Grab bars(comfort height commode)  Toilet Transfer: Contact guard assistance  Toilet Transfers Comments: Pt transferred to/from toilet with CGA and used bilateral hand rails to complete transfer. Assist to manage IV and O2. Wheelchair Bed Transfers  Wheelchair/Bed - Technique: Ambulating  Equipment Used: Bed;Other(recliner)  Level of Asssistance: Contact guard assistance  Wheelchair Transfers Comments: Pt completed sit > stand from EOB with CGA and RW.  Pt completed stand > sit to recliner with CGA and RW. Assist to manage IV and 02. Bed mobility  Supine to Sit: Moderate assistance(Assist with R LE initially, but pt able to bring legs over EOB. Pt required assist for trunk to sit up EOB.)  Comment: Hospital bed with rail and HOB elevated. Cued pt to use rail to assist with rolling to complete supine > sit. Pt reported increased pain/weakness and required rest break on side of bed after completing bed mobility. Transfers  Sit to stand: Contact guard assistance  Stand to sit: Contact guard assistance  Transfer Comments: Pt very particular with placement of therapist during sit > stand transfers, as pt reports being clostrophobic and gets overwhelmed if people are too close/hovering. Pt able to complete several sit <> stands with CGA x1 throughout session.  Pt reported feeling light headed after first sit > stand, but was able to self recover after ~30 sec                                                                 Plan   Plan  Times per week: 3-5  Plan weeks: by discharge  Current Treatment Recommendations: Strengthening, Patient/Caregiver Education & Training, Equipment Evaluation, Education, & procurement, Balance Training, Functional Mobility Training, Endurance Training, Safety Education & Training, Self-Care / ADL                                                  AM-PAC Score        AM-[facility] Inpatient Daily Activity Raw Score: 19 (11/24/20 1641)  -PAC Inpatient ADL T-Scale Score : 40.22 (11/24/20 1641)  ADL Inpatient CMS 0-100% Score: 42.8 (11/24/20 1641)  ADL Inpatient CMS G-Code Modifier : CK (11/24/20 1641)    Goals  Short term goals  Time Frame for Short term goals: by discharge  Short term goal 1: Pt will complete self care indep with AD  Short term goal 2: Pt will complete fx mobility indep with AD  Short term goal 3: Pt will complete transfers indep with AD  Short term goal 4: Pt will increase activity tolerance to stand for 6 min for ADL task  Short term goal 5: Pt will be indep in HEP

## 2020-11-24 NOTE — CARE COORDINATION
Suzanne/Katherine received referral from  for [address] Will need DME Orders. Per , d/c date unknown at this time. Will verify patient's insurance and follow up with patient to deliver the ordered item(s) prior to discharge.     Thank you for the referral.  Electronically signed by Alejandro Yu on 11/24/2020 at 11:10 AM  Cell ph# [phone]

## 2020-11-24 NOTE — PROGRESS NOTES
Clinical Pharmacy Note  Heparin Dosing       Lab Results   Component Value Date    APTT 69.8 11/24/2020     Lab Results   Component Value Date    HGB 9.3 11/24/2020    HCT 29.1 11/24/2020     11/24/2020    INR 1.91 11/24/2020       Current Infusion Rate: 8.6 mL/hr    Plan:  Rate:  8.6 mL/hr  Next aPTT: 1400 11/24/20    Pharmacy will continue to monitor and adjust based on aPTT results.   [employer] 11/24/2020 9:09 AM

## 2020-11-24 NOTE — PROGRESS NOTES
Dr. Bernie March on unit to see the patient. New orders given for Lachydrin to RLE and Nystatin powder to RLE after lotion and in skin folds. Cardiology to be consulted for diastolic CHF.

## 2020-11-24 NOTE — PROGRESS NOTES
Resting quietly in bed. Pt admitted with CHF exacerbation, being treated with lasix. Also with RLE DVT, currently on a heparin gtt at 8.6ml/hr. Infusing into RAC PIV without issue. Lungs diminished but clear, on 2L O2. HR regular, on telemetry showing NSR. +1-2 BLE edema, ulcer to posterior right calf seen, podiatry to assess today. C/o generalized pain and was medicated with PRN tylenol, pt able to fall asleep after administration. Needs max assist to reposition in bed, pt with anxiety and decreased mobility. Excoriation and redness to breast and abdominal folds, interdry applied. All needs assessed with Q2H rounding. Alarms active. Will continue to monitor.  Britney Umana RN

## 2020-11-25 LAB
ANION GAP SERPL CALCULATED.3IONS-SCNC: 9 MMOL/L (ref 3–16)
BASOPHILS ABSOLUTE: 0 K/UL (ref 0–0.2)
BASOPHILS RELATIVE PERCENT: 0.7 %
BUN BLDV-MCNC: 26 MG/DL (ref 7–20)
CALCIUM SERPL-MCNC: 9 MG/DL (ref 8.3–10.6)
CHLORIDE BLD-SCNC: 101 MMOL/L (ref 99–110)
CO2: 30 MMOL/L (ref 21–32)
CREAT SERPL-MCNC: 1.2 MG/DL (ref 0.6–1.2)
EOSINOPHILS ABSOLUTE: 0.1 K/UL (ref 0–0.6)
EOSINOPHILS RELATIVE PERCENT: 1.9 %
GFR AFRICAN AMERICAN: 54
GFR NON-AFRICAN AMERICAN: 45
GLUCOSE BLD-MCNC: 110 MG/DL (ref 70–99)
GLUCOSE BLD-MCNC: 132 MG/DL (ref 70–99)
GLUCOSE BLD-MCNC: 157 MG/DL (ref 70–99)
GLUCOSE BLD-MCNC: 83 MG/DL (ref 70–99)
GLUCOSE BLD-MCNC: 84 MG/DL (ref 70–99)
GLUCOSE BLD-MCNC: 95 MG/DL (ref 70–99)
HCT VFR BLD CALC: 29.7 % (ref 36–48)
HEMOGLOBIN: 9.5 G/DL (ref 12–16)
INR BLD: 1.93 (ref 0.86–1.14)
LYMPHOCYTES ABSOLUTE: 0.9 K/UL (ref 1–5.1)
LYMPHOCYTES RELATIVE PERCENT: 27.3 %
MCH RBC QN AUTO: 25.8 PG (ref 26–34)
MCHC RBC AUTO-ENTMCNC: 32.1 G/DL (ref 31–36)
MCV RBC AUTO: 80.4 FL (ref 80–100)
MONOCYTES ABSOLUTE: 0.4 K/UL (ref 0–1.3)
MONOCYTES RELATIVE PERCENT: 14 %
NEUTROPHILS ABSOLUTE: 1.8 K/UL (ref 1.7–7.7)
NEUTROPHILS RELATIVE PERCENT: 56.1 %
PDW BLD-RTO: 20.3 % (ref 12.4–15.4)
PERFORMED ON: ABNORMAL
PERFORMED ON: NORMAL
PERFORMED ON: NORMAL
PLATELET # BLD: 179 K/UL (ref 135–450)
PMV BLD AUTO: 8 FL (ref 5–10.5)
POTASSIUM REFLEX MAGNESIUM: 4.1 MMOL/L (ref 3.5–5.1)
PROTHROMBIN TIME: 22.5 SEC (ref 10–13.2)
RBC # BLD: 3.69 M/UL (ref 4–5.2)
SODIUM BLD-SCNC: 140 MMOL/L (ref 136–145)
WBC # BLD: 3.2 K/UL (ref 4–11)

## 2020-11-25 PROCEDURE — 6370000000 HC RX 637 (ALT 250 FOR IP): Performed by: INTERNAL MEDICINE

## 2020-11-25 PROCEDURE — 2580000003 HC RX 258: Performed by: INTERNAL MEDICINE

## 2020-11-25 PROCEDURE — 99232 SBSQ HOSP IP/OBS MODERATE 35: CPT | Performed by: NURSE PRACTITIONER

## 2020-11-25 PROCEDURE — 97530 THERAPEUTIC ACTIVITIES: CPT | Performed by: PHYSICAL THERAPIST

## 2020-11-25 PROCEDURE — 36415 COLL VENOUS BLD VENIPUNCTURE: CPT

## 2020-11-25 PROCEDURE — 85025 COMPLETE CBC W/AUTO DIFF WBC: CPT

## 2020-11-25 PROCEDURE — 6360000002 HC RX W HCPCS: Performed by: INTERNAL MEDICINE

## 2020-11-25 PROCEDURE — 85610 PROTHROMBIN TIME: CPT

## 2020-11-25 PROCEDURE — 6370000000 HC RX 637 (ALT 250 FOR IP): Performed by: HOSPITALIST

## 2020-11-25 PROCEDURE — 97110 THERAPEUTIC EXERCISES: CPT

## 2020-11-25 PROCEDURE — 97116 GAIT TRAINING THERAPY: CPT | Performed by: PHYSICAL THERAPIST

## 2020-11-25 PROCEDURE — 80048 BASIC METABOLIC PNL TOTAL CA: CPT

## 2020-11-25 PROCEDURE — 1200000000 HC SEMI PRIVATE

## 2020-11-25 PROCEDURE — 6370000000 HC RX 637 (ALT 250 FOR IP): Performed by: NURSE PRACTITIONER

## 2020-11-25 RX ORDER — WARFARIN SODIUM 7.5 MG/1
7.5 TABLET ORAL
Status: COMPLETED | OUTPATIENT
Start: 2020-11-25 | End: 2020-11-25

## 2020-11-25 RX ORDER — METOLAZONE 5 MG/1
5 TABLET ORAL ONCE
Status: COMPLETED | OUTPATIENT
Start: 2020-11-25 | End: 2020-11-25

## 2020-11-25 RX ORDER — AMMONIUM LACTATE 12 G/100G
LOTION TOPICAL 2 TIMES DAILY PRN
Status: DISCONTINUED | OUTPATIENT
Start: 2020-11-25 | End: 2020-12-03 | Stop reason: HOSPADM

## 2020-11-25 RX ADMIN — LISINOPRIL 20 MG: 20 TABLET ORAL at 22:39

## 2020-11-25 RX ADMIN — MICONAZOLE NITRATE: 2 POWDER TOPICAL at 22:41

## 2020-11-25 RX ADMIN — FUROSEMIDE 10 MG/HR: 10 INJECTION, SOLUTION INTRAMUSCULAR; INTRAVENOUS at 09:23

## 2020-11-25 RX ADMIN — ATORVASTATIN CALCIUM 40 MG: 40 TABLET, FILM COATED ORAL at 22:38

## 2020-11-25 RX ADMIN — METOPROLOL SUCCINATE 50 MG: 50 TABLET, EXTENDED RELEASE ORAL at 22:38

## 2020-11-25 RX ADMIN — INSULIN GLARGINE 30 UNITS: 100 INJECTION, SOLUTION SUBCUTANEOUS at 22:38

## 2020-11-25 RX ADMIN — MICONAZOLE NITRATE: 2 POWDER TOPICAL at 10:26

## 2020-11-25 RX ADMIN — ACETAMINOPHEN 650 MG: 325 TABLET ORAL at 19:23

## 2020-11-25 RX ADMIN — WARFARIN SODIUM 7.5 MG: 7.5 TABLET ORAL at 17:06

## 2020-11-25 RX ADMIN — FUROSEMIDE 10 MG/HR: 10 INJECTION, SOLUTION INTRAMUSCULAR; INTRAVENOUS at 19:10

## 2020-11-25 RX ADMIN — INSULIN LISPRO 1 UNITS: 100 INJECTION, SOLUTION INTRAVENOUS; SUBCUTANEOUS at 22:38

## 2020-11-25 RX ADMIN — METOLAZONE 5 MG: 5 TABLET ORAL at 12:59

## 2020-11-25 ASSESSMENT — PAIN SCALES - GENERAL
PAINLEVEL_OUTOF10: 3
PAINLEVEL_OUTOF10: 1

## 2020-11-25 ASSESSMENT — PAIN DESCRIPTION - LOCATION: LOCATION: LEG

## 2020-11-25 ASSESSMENT — PAIN DESCRIPTION - FREQUENCY: FREQUENCY: CONTINUOUS

## 2020-11-25 ASSESSMENT — PAIN DESCRIPTION - PROGRESSION: CLINICAL_PROGRESSION: NOT CHANGED

## 2020-11-25 ASSESSMENT — PAIN DESCRIPTION - PAIN TYPE: TYPE: ACUTE PAIN

## 2020-11-25 ASSESSMENT — PAIN DESCRIPTION - DESCRIPTORS: DESCRIPTORS: SORE

## 2020-11-25 ASSESSMENT — PAIN DESCRIPTION - ONSET: ONSET: ON-GOING

## 2020-11-25 ASSESSMENT — PAIN DESCRIPTION - ORIENTATION: ORIENTATION: RIGHT

## 2020-11-25 NOTE — PLAN OF CARE
Problem: Skin Integrity:  Goal: Absence of new skin breakdown  Description: Absence of new skin breakdown  11/25/2020 0057 by Pam Fernandes RN  Note: Able to change positions in bed without assist, no evidence of skin breakdown noted. Waffle cushion in place to chair. Bariatric bed in place      Problem: Falls - Risk of:  Goal: Will remain free from falls  Description: Will remain free from falls  11/25/2020 1103 by Sharron Becerra RN  Outcome: Ongoing  Note: Risk assessment complete; interventions in place and effective at present. Compliant with call light use and staff assist.  Call light and needed items within reach. 11/25/2020 0057 by Pam Fernandes RN  Note: Pt educated on falls precautions and safety. Call light and personal belongings within reach at all times. Non skid socks in use when up. Hourly rounding and alarms active. Problem: PAIN  Goal: Patient's pain/discomfort is manageable  11/25/2020 0057 by Pam Fernandes RN  Note: Able to rate pain using a 1-10 scale, medicated per prn orders, see MAR. Able to verbalize a reduction in pain and/or able to fall asleep and remain asleep without any s/s of pain       Problem: KNOWLEDGE DEFICIT  Goal: Patient/S.O. demonstrates understanding of disease process, treatment plan, medications, and discharge instructions.   Outcome: Ongoing  Note: CHF education information given to patient

## 2020-11-25 NOTE — CONSULTS
glycol, ondansetron, glucose, dextrose, glucagon (rDNA), dextrose    Allergies:  Nickel and Ibuprofen    Social History:   Social History     Socioeconomic History    Marital status:      Spouse name: None    Number of children: 3    Years of education: 15    Highest education level: None   Occupational History    Occupation: Retired  7/28/17   Social Needs    Financial resource strain: None    Food insecurity     Worry: None     Inability: None    Transportation needs     Medical: None     Non-medical: None   Tobacco Use    Smoking status: Never Smoker    Smokeless tobacco: Never Used   Substance and Sexual Activity    Alcohol use: Yes     Comment: OCCASIONALLY 1 or less/1-2 wk    Drug use: No     Comment: No Mj experimentation    Sexual activity: Never   Lifestyle    Physical activity     Days per week: None     Minutes per session: None    Stress: None   Relationships    Social connections     Talks on phone: None     Gets together: None     Attends Quaker service: None     Active member of club or organization: None     Attends meetings of clubs or organizations: None     Relationship status: None    Intimate partner violence     Fear of current or ex partner: None     Emotionally abused: None     Physically abused: None     Forced sexual activity: None   Other Topics Concern    None   Social History Narrative    No Exercise. 1/19/17    Information from [employer] EMR ending 1/31/2010    Problems    DEEP VENOUS THROMBOPHLEBITIS (ICD-453.40)    TRANSAMINASES, SERUM, ELEVATED (ICD-790.4)    HYPOMAGNESEMIA (ICD-275.2)    Hx of SEPSIS (ICD-995.91)    DIABETES MELLITUS, TYPE II (ICD-250.00)    HYPERTENSION (ICD-401. 9)    ALOPECIA (ICD-704.00)    ENCOUNTER FOR LONG-TERM USE OF ANTICOAGULANTS (ICD-V58.61)    DERMATITIS (ICD-692. 9)    CELLULITIS (ICD-682. 9)    FACTOR V DEFICIENCY (ICD-286.3)        Past Medical History:  Coronary Artery Disease, Hypertension, mixed hyperlipidemia/ low HDL/ metabolic synd. , Diabetes Type II, 2009 episode renal failure 2nd to sepsis, 2009 CELLULITIS/OPEN WOUND RIGHT ANKLE, BLOOD CLOTS (bilat. PASCALE hose)/ factor V leiden homozygote. Surgical History:  Appendectomy:, Cholecystectomy: , GYN Surgery: 3 , Tubal Ligation: , 2004 \"LHC\"      Family History: . Father - HTN, CAD/ MI    Son - 1 heterozygous factor V leiden. 2  HTN    Sister - Breast Ca, HTN, DVT        Exercise: doing hand weights 15-20 3x/wk.  20     Family History:   Family History   Problem Relation Age of Onset    Kidney Disease Mother         kidney stones - larger    Coronary Art Dis Father     Heart Surgery Father 48    Hypertension Father     Heart Attack Father     COPD Sister         smoker    Diabetes Sister     Diabetes Brother     Clotting Disorder Son         Factor V Leiden heterozygote    Other Son         GERD, gout, MRSA, homocystinemia    Hypertension Son     High Cholesterol Son     Other Sister         carotid stenosis     REVIEW OF SYSTEMS:  Negative other than mentioned in the HPI           Objective     /62   Pulse 70   Temp 97.3 °F (36.3 °C) (Oral)   Resp 20   Ht 5' 6.5\" (1.689 m)   Wt (!) 380 lb 15.3 oz (172.8 kg)   SpO2 96%   BMI 60.57 kg/m²      I/O:    Intake/Output Summary (Last 24 hours) at 2020 1142  Last data filed at 2020 0947  Gross per 24 hour   Intake 1126.36 ml   Output 2575 ml   Net -1448.64 ml              Wt Readings from Last 3 Encounters:   20 (!) 380 lb 15.3 oz (172.8 kg)   20 (!) 310 lb (140.6 kg)   19 (!) 318 lb (144.2 kg)       LABS:    Recent Labs     20  0800 20  0616   WBC 3.4* 3.2*   HGB 9.3* 9.5*   HCT 29.1* 29.7*    179        Recent Labs     20  0616      K 4.1      CO2 30   BUN 26*   CREATININE 1.2        Recent Labs     20  0800 20  1414 20  0616   INR 1.91*  --  1.93*   APTT 69.8* 66.4*  -- Recent Labs     11/22/20  1222   TROPONINI <0.01               Exam:     DERMATOLOGIC:   Nails 1-5 bilaterally are thickened with dystrophic changes. Positive subungual debris  Positive onycholysis  Positive erythema lower extremities consistent with chronic edema  Positive incurvation  Negative open lesions    VASCULAR:  non-pitting edema, bilaterally  Pedal pulses non palpable due to edema    NEUROLOGIC:  joint position sense or light touch intact    ORTHOPEDIC:  No gross deformity noted. ASSESSMENT/PLAN:   Pt. is a 78 y.o. female with elongated painful hard to cut toenails  Lymphedema  cellulitis    Nails  - All nails debrided without incident.  - Debris cleaned from between toes. Right leg pain  Lachydrin 12%  Compression if tolerated. WIll follow. Thank you for this consult.      Sami Drilling 11/25/2020 11:42 AM

## 2020-11-25 NOTE — PROGRESS NOTES
Nutrition Education    RD informed by nutrition pt ambassador that pt requesting education on heart healthy diet guidelines and carb control diet guidelines. Pt follows general diet at home but interested in changing nutrition habits. Pt states wishing to learn more about portion controls and about foods that are and are not allowed. This was discussed during education today. Also discussed how to read a food label and which products to choose when shopping. All questions were answered at this time and education handouts were provided in discharge papers. RD will be available if pt has further questions. · Verbally reviewed information with Patient  · Educated on heart healthy diet guidelines and carb control diet guidelines  · Written educational materials provided. · Contact name and number provided. · Refer to Patient Education activity for more details.     Electronically signed by Bob Jacobo RD, LD on 11/25/20 at 12:14 PM EST    Contact: [phone]

## 2020-11-25 NOTE — PROGRESS NOTES
Physician Progress Note      PATIENT:               Abby Bonds  CSN #:                  [id number]  :                       1950  ADMIT DATE:       2020 11:35 AM  DISCH DATE:  RESPONDING  PROVIDER #:        Lucas Capone MD          QUERY TEXT:    Dear Dr Sybil Gonzalez,    Patient admitted with CHF. Noted documentation of acute on chronic diastolic   chf by attending on 20 at 8:53am and acute on chronic RV systolic   failure. by cardiology on 20 at 8:21am.  If possible, please document in progress notes and discharge summary if you   are evaluating and /or treating any of the following: The medical record reflects the following:  Risk Factors: Current admission for CHF with pulmonary HTN and massive Obesity  Clinical Indicators:  bnp  1346. .. ECHO- EF 55-60%. ... BMI 61 ? Claude Longoria CXR-Vascular   congestion. Suspected small pleural effusion. Increased interstitial   opacities, suggestive of pulmonary edema. Enlargement of the cardiac   silhouette, cardiomegaly versus pericardial effusion. Treatment: Lasix IV, Echo, Cardiology consult, Lasix gtt    Thank Mayur Walsh RN BSN CDS CRCR  [email or web address] ([email or web address])  Options provided:  -- acute on chronic diastolic chf  confirmed and acute on chronic RV systolic   failure ruled out  -- acute on chronic RV systolic failure confirmed and acute on chronic   diastolic chf  ruled out  -- acute on chronic diastolic chf  and acute on chronic RV systolic failure   confirmed  -- acute on chronic diastolic chf  and acute on chronic RV systolic failure   ruled out  -- Other - I will add my own diagnosis  -- Disagree - Not applicable / Not valid  -- Disagree - Clinically unable to determine / Unknown  -- Refer to Clinical Documentation Reviewer    PROVIDER RESPONSE TEXT:    After study, acute on chronic diastolic chf confirmed and acute on chronic RV   systolic failure ruled out.     Query created by: Marina Voss on 2020 8:47 AM      Electronically signed by:  Cam Holden MD 11/25/2020 3:27 PM

## 2020-11-25 NOTE — PROGRESS NOTES
[facility]   Daily Progress Note      Admit Date:  11/22/2020    Reason for follow up visit: R heart failure    CC: \"I'm feeling a little better, a long way to go. \"    79 y/o female with PMH significant for morbid obesity, atrial fib (chronic), DVT/PE, factor V, diabetes mellitus and marked mobility issues who was admitted with bilateral LE edema and increasing KENNEDY. She does not follow up regularly with physicians and presented with worsening symptoms. She was placed on IV lasix infusion on 11/24/2020. Interval History:  Pt. seen and examined; records reviewed  BP reviewed. Remains on O2 @ 2L  Wt 380# today.    + SOB and LE edema  Denies chest pain  Remains on Lasix gtt    Subjective:  Pt with no acute overnight cardiac events. Denies chest pain, cough, palpitations or dizziness    Review of Systems:   · Constitutional: no unanticipated weight loss. There's been no change in energy level, sleep pattern, or activity level. No fevers, chills. + chronic fatigue  · Eyes: No visual changes or diplopia. No scleral icterus. · ENT: No Headaches, hearing loss or vertigo. No mouth sores or sore throat. · Cardiovascular: as reviewed in HPI  · Respiratory: No cough or wheezing, no sputum production. No hematemesis. · Gastrointestinal: No abdominal pain, appetite loss, blood in stools. No change in bowel or bladder habits. · Genitourinary: No dysuria, trouble voiding, or hematuria. · Musculoskeletal:  + limited mobility/ambulation  · Integumentary: No rash or pruritis. + chronic stasis changes lower legs  · Neurological: No headache, diplopia, change in muscle strength, numbness or tingling. · Psychiatric: No anxiety or depression. · Endocrine: No temperature intolerance. No excessive thirst, fluid intake, or urination. No tremor. · Hematologic/Lymphatic: No abnormal bruising or bleeding, blood clots or swollen lymph nodes. · Allergic/Immunologic: No nasal congestion or hives.     Objective: BP (!) 110/59   Pulse 63   Temp 97.4 °F (36.3 °C) (Oral)   Resp 18   Ht 5' 6.5\" (1.689 m)   Wt (!) 380 lb 15.3 oz (172.8 kg)   SpO2 96%   BMI 60.57 kg/m²       Intake/Output Summary (Last 24 hours) at 11/25/2020 0915  Last data filed at 11/25/2020 1560  Gross per 24 hour   Intake 1006.36 ml   Output 1575 ml   Net -568.64 ml     Wt Readings from Last 3 Encounters:   11/25/20 (!) 380 lb 15.3 oz (172.8 kg)   04/24/20 (!) 310 lb (140.6 kg)   09/23/19 (!) 318 lb (144.2 kg)       Physical Exam:  General: In no acute distress. Awake, alert, and oriented X4. Up in chair. Skin:  Warm and dry. No new appearing rashes or lesions. Neck:  Supple and thick. JVD hard to ascertain d/t body habitus  Chest: Lungs clear with decreased breath sound bilaterally. No wheezes/rhonchi/rales  Cardiovascular:  Irreg; normal S1 and S2; II/VI FLEX   Abdomen: Morbidly obese, soft, nontender, +bowel sounds. Extremities:   3-4+ edema to lower extremities; diffuse erythema to lower legs bilaterally. Appearance of few healed venous ulcers. + chronic stasis changes with thickening of skin on lower legs.      Medications:    insulin glargine  30 Units Subcutaneous Nightly    miconazole   Topical BID    lisinopril  20 mg Oral Nightly    metoprolol succinate  50 mg Oral Nightly    atorvastatin  40 mg Oral Nightly    sodium chloride flush  10 mL Intravenous 2 times per day    insulin lispro  0-12 Units Subcutaneous TID     insulin lispro  0-6 Units Subcutaneous Nightly    warfarin (COUMADIN) daily dosing (placeholder)   Other RX Placeholder      furosemide (LASIX) 1mg/ml infusion 10 mg/hr (11/24/20 2152)    dextrose       ammonium lactate, sodium chloride flush, acetaminophen **OR** acetaminophen, polyethylene glycol, ondansetron, glucose, dextrose, glucagon (rDNA), dextrose    Lab Data:  CBC:   Recent Labs     11/23/20  0458 11/24/20  0800 11/25/20  0616   WBC 3.1* 3.4* 3.2*   HGB 9.5* 9.3* 9.5*    170 179     BMP: Recent Labs     11/23/20  0458 11/24/20  0800 11/25/20  0616    140 140   K 4.1 4.2 4.1   CO2 29 30 30   BUN 26* 26* 26*   CREATININE 1.0 1.1 1.2     LIVR: No results for input(s): AST, ALT in the last 72 hours. INR:    Recent Labs     11/23/20  0458 11/24/20  0800 11/25/20  0616   INR 2.04* 1.91* 1.93*     APTT:   Recent Labs     11/23/20  2305 11/24/20  0800 11/24/20  1414   APTT 46.9* 69.8* 66.4*     Results for Chelsea Donato (MRN [id number]) as of 11/25/2020 09:18   Ref. Range 11/22/2020 12:22   Pro-BNP Latest Ref Range: 0 - 124 pg/mL 1,346 (H)   Troponin Latest Ref Range: <0.01 ng/mL <0.01     Echo 11/23/2020:  Ejection fraction is visually estimated to be 55-60%. Grade I diastolic dysfunction with normal LV filling pressures. Severly dilated right ventricle. Right ventricular systolic function is mildly reduced. The right atrium is severely dilated. Estimated pulmonary artery systolic pressure is normal at 37 mmHg assuming a   right atrial pressure of 15 mmHg. ECG: Atrial fib with controlled VR      Telemetry: Atrial fib with controlled VR    Assessment/Plan:    1. Acute on chronic RV systolic heart failure  -likely secondary to pulmonary HTN d/t morbid obesity and hypoventilation syndrome  -continue lasix gtt (needs significant diuresis)  -continue BB and lisinopril  -plan is for outpatient sleep eval and PFT's  -eventual RHC (as outpatient)    2. Atrial fib  -chronic and with controlled VR  -has been on Warfarin (discussed NOAC-she is thinking about it)  -continue Toprol    3. H/O DVT/PE  -on warfarin (considering change to NOAC)  -maintaining therapeutic levels have been an issue    4. Foot ulcer  -suspect underlying PAD (eventual ISABEL once edema improves)  -chronic stasis dermatitis to lwoer extremities    5. Morbid obesity  -BMI 61  -consider outpatient bariatric referral    6. Anemia  -normocytic  -further eval per Primary team    7.  Essential HTN  -goal BP <

## 2020-11-25 NOTE — PROGRESS NOTES
Resting quietly in the chair. Pt admitted with CHF exacerbation. On 2l O2, not home dependent. Lungs clear. Does have SOB with activity. HR regular. Abdomen non tender with active bowel sounds. Heparin gtt stopped last evening, NO for lasix gtt. Pt updated on plan on care. PIV to RAC intact, flushes easily. Old and new drainage on PIV dressing, dressing changed. +3 RLE and +2 LLE edema, legs elevated in the chair. Denies pain. All needs assessed with Q2H rounding. Alarms active. Will monitor.  Jarad Euceda RN

## 2020-11-25 NOTE — PROGRESS NOTES
Physical Therapy  Facility/Department: [address] [facility] REH[facility]  Daily Treatment Note  NAME: Gala Mejia  : 1950  MRN: [id number]    Date of Service: 2020    Discharge Recommendations:  Continue to assess pending progress, Patient would benefit from continued therapy after discharge, 2-3 sessions per week, Home with Home health PT, 24 hour supervision or assist, S Level 3   PT Equipment Recommendations  Equipment Needed: Yes  Mobility Devices: [place]: Rolling  Other: Pt reports having a RW she can borrow from family if needed, as long as bariatric. If not a bariatric RW, she will need one. HOME HEALTH CARE: LEVEL 3 SAFETY      -Initial home health evaluation to occur within 24-48 hours, in patient home    -Home health agency to establish plan of care for patient over 60 day period    -Medication Reconciliation    -PT/OT/Speech evaluations in home within 24-48 hours of discharge; including   -DME and home safety    -Frontload therapy 5 days, then 3x a week    -OT to evaluate if patient has [address] needs for personal care    - evaluation within 24-48 hours, includes evaluation of resources  and insurance to determine [place], [place], LTC, and Medicaid options    -PCP Visit scheduled within three to seven days of discharge     Gala Mejia scored a 17/24 on the AM-PAC short mobility form. Current research shows that an AM-PAC score of 18 or greater is typically associated with a discharge to the patient's home setting. Based on the patient's AM-PAC score and their current functional mobility deficits, it is recommended that the patient have 2-3 sessions per week of Physical Therapy at d/c to increase the patient's independence. At this time, this patient demonstrates the endurance and safety to discharge home with home therapy and a follow up treatment frequency of 2-3x/wk. Please see assessment section for further patient specific details.     If patient discharges prior to next session this note will serve as a discharge summary. Please see below for the latest assessment towards goals. Assessment   Body structures, Functions, Activity limitations: Decreased functional mobility ; Increased pain;Decreased balance;Decreased strength;Decreased safe awareness;Decreased endurance  Assessment: Pt is an 78y.o. year-old female that presents to acute care with SOB, increased lower extremity weakness, and generalized fatigue. She currently lives alone in an apartment with one level and no STEFFANIE. PTA pt was independent with all functional mobility. Today (11/25) pt reported less pain (2/10) and demonstrated less anxiety compared to previous sessions. Able to transfer sit<>stand from toilet with SBA, sit to stand from bed with SBA, stand to sit on chair with SBA. Pt able to recall proper hand placement and sequencing. Performed toilet transfer with SBA throughout. Pt able to go supine to sit with CGA and railing. Amb approx 20' in one bout with RW and CGA due to occasional unsteadiness. She thought she could go further but was experiencing an increase in pain and thought it would be safer to stay in the room at this time. Cues to keep walker close and pt not able to self correct. Pt continues to be limited by anxiety, decreased strength, endurance, balance, but is motivated to participate and improve. Would benefit from continued skilled therapy during acute stay and after D/C to further maximize functional mobility and indpenedence pt experienced PTA.   Treatment Diagnosis: decreased functional mobility  Specific instructions for Next Treatment: pt would like to work on LE exercises  Prognosis: Good  PT Education: General Safety;Gait Training;Functional Mobility Training;Transfer Training  Barriers to Learning: anxiety  REQUIRES PT FOLLOW UP: Yes  Activity Tolerance  Activity Tolerance: Patient limited by fatigue;Patient limited by pain     Patient Diagnosis(es): The primary encounter diagnosis was Acute on chronic congestive heart failure, unspecified heart failure type ([address] Diagnoses of Longstanding persistent atrial fibrillation (HCC) and General weakness were also pertinent to this visit. has a past medical history of Diabetes mellitus type 2 in obese ([address] Edema of both legs, Elevated LFTs, 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[address] Homozygous Factor V Leiden mutation ([address] Hypercholesterolemia, Hypertension, Hypomagnesemia, Low serum HDL, Lower leg DVT (deep venous thromboembolism), chronic, right ([address] Osteoarthritis of cervical spine, Renal failure, acute ([address] and Vitamin D deficiency. has a past surgical history that includes Cholecystectomy ();  section; Appendectomy (1976); Tubal ligation (Bilateral, 1980); and Colonoscopy (2014). Restrictions  Restrictions/Precautions  Restrictions/Precautions: Fall Risk  Position Activity Restriction  Other position/activity restrictions: 2L O2, telemetry, IV in R arm    Subjective   General  Chart Reviewed: Yes  Additional Pertinent Hx: Baron Moncada MD, \"Pt is an 78y.o. year-old female with a history of hypertension, hyperlipidemia, diabetes mellitus, chronic venous stasis dermatitis of bilateral lower extremities, atrial fibrillation, factor V Leiden deficiency and a chronic left DVT for which she takes Coumadin. Presents to the emergency room for evaluation following a 1 month history of shortness of breath and a 2-day history of increased lower extremity weakness and generalized fatigue. She states that her shortness of breath is moderately severe, worse with exertion and improved with rest.  In the emergency room she was found to have an area vascular congestion and bilateral lower extremity edema. She is being admitted for further evaluation and treatment. Associated signs and symptoms do not include chest pain, diaphoresis, orthopnea, paroxysmal nocturnal dyspnea, fever or chills. \"  Response To Previous Treatment: Patient with no complaints from previous session. Family / Caregiver Present: No  Referring Practitioner: Ja Damon MD  Subjective  Subjective: Pt awake in bed and agreeable to therapy this PM. Reports being in a better mood compared to yesterday and this morning. Rates pain as 2/10 in RLE while in supine. General Comment  Comments: Baseline SpO2 93% in supine with 2L O2 via NC. Orientation  Orientation  Overall Orientation Status: Within Functional Limits     Objective   Bed mobility  Supine to Sit: Contact guard assistance(used pt R rail)  Comment: Hosp bed with rail and HOB elevated. Transfers  Sit to Stand: Stand by assistance  Stand to sit: Stand by assistance  Bed to Chair: Contact guard assistance  Comment: Pt was able to recall proper hand placement and sequencing for transfers throughout, did not need cues. Sit<>stand from toilet, sit to stand from bed, stand to sit on chair. No anxiety while performing transfers this session. Ambulation  Ambulation?: Yes  More Ambulation?: No  Ambulation 1  Surface: level tile  Device: Rolling Walker  Assistance: Contact guard assistance;Stand by assistance  Quality of Gait: flexed posture, step through pattern, ER of bilat LEs, guarded movements, no LOB noted, notable lateral trunk flexion bilat directions, decreased DF bilat, flat foot initial contact  Gait Deviations: Slow Livia; Increased PIERO; Decreased step length;Decreased step height  Distance: approx 10' from bed to toilet, approx 20' from toilet to chair  Comments: Pt did not need to take standing rest breaks during either bouts of amb along with no reports of dizziness this session. Cues to keep walker close and pt was not able to self correct. Stairs/Curb  Stairs?: No     Other Activities: Other (see comment)  Comment: Pt used toilet and voided, able to perform own shashi care and handwashing. When washing hands, pt rested bilat UEs on sink for support.        AM-PAC Score  AM-PAC Inpatient Mobility Raw Score : 17 (11/25/20 1449)  AM-PAC Inpatient T-Scale Score : 42.13 (11/25/20 1449)  Mobility Inpatient CMS 0-100% Score: 50.57 (11/25/20 1449)  Mobility Inpatient CMS G-Code Modifier : CK (11/25/20 1449)        Goals  Short term goals  Time Frame for Short term goals: during acute stay  Short term goal 1: bed mobility with CGA  Short term goal 2: transfers with supervision  Short term goal 3: ambulate 150' with or without AD and SBA  Long term goals  Time Frame for Long term goals : STG = LTG  Patient Goals   Patient goals : get stronger legs, work on exercises, go home    Plan    Plan  Times per week: 3-5  Plan weeks: during acute stay  Specific instructions for Next Treatment: pt would like to work on LE exercises  Current Treatment Recommendations: Strengthening, Home Exercise Program, Safety Education & Training, Balance Training, Endurance Training, Functional Mobility Training, Transfer Training, Gait Training  Safety Devices  Type of devices: All fall risk precautions in place, Gait belt, Patient at risk for falls, Call light within reach, Chair alarm in place, Left in chair  Restraints  Initially in place: No     Therapy Time   Individual Concurrent Group Co-treatment   Time In 1405         Time Out 1455         Minutes SHAYLEE Perla  Therapist was present, directed the patient's care, made skilled judgement, and was responsible for assessment and treatment of the patient.          Electronically signed by Max Cih PT on 11/25/20 at 3:29 PM EST

## 2020-11-25 NOTE — CONSULTS
[facility]  HEART FAILURE PROGRAM      NAME:  Mookie Mcgovern  MEDICAL RECORD NUMBER:  [id number]  AGE: 78 y.o.    GENDER: female  : 1950  TODAY'S DATE:  2020    Subjective:     VISIT TYPE: evaluation     ADMITTING PHYSICIAN:  Nestor Starks MD    PAST MEDICAL HISTORY        Diagnosis Date    Diabetes mellitus type 2 in obese ([facility] 2010    Edema of both legs     Elevated LFTs     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[facility] 2018    19    Homozygous Factor V Leiden mutation ([facility] 1950    Hypercholesterolemia     Hypertension     Hypomagnesemia     Low serum HDL     Lower leg DVT (deep venous thromboembolism), chronic, right (HCC)     Osteoarthritis of cervical spine 2010    Facet arthropathy, spondylosis    Renal failure, acute (HCC)     Due to sepsis    Vitamin D deficiency 2018    13.2       SOCIAL HISTORY    Social History     Tobacco Use    Smoking status: Never Smoker    Smokeless tobacco: Never Used   Substance Use Topics    Alcohol use: Yes     Comment: OCCASIONALLY 1 or less/1-2 wk    Drug use: No     Comment: No Mj experimentation       ALLERGIES    Allergies   Allergen Reactions    Nickel Nausea Only and Rash    Ibuprofen      Pt takes Warfarin       MEDICATIONS  Scheduled Meds:   warfarin  7.5 mg Oral Once    insulin glargine  30 Units Subcutaneous Nightly    miconazole   Topical BID    lisinopril  20 mg Oral Nightly    metoprolol succinate  50 mg Oral Nightly    atorvastatin  40 mg Oral Nightly    sodium chloride flush  10 mL Intravenous 2 times per day    insulin lispro  0-12 Units Subcutaneous TID WC    insulin lispro  0-6 Units Subcutaneous Nightly    warfarin (COUMADIN) daily dosing (placeholder)   Other RX Placeholder       ADMIT DATE: 2020      Objective:     ADMISSION DIAGNOSIS:   CHF with unknown LVEF (HCC) [I50.9]     /66   Pulse 68   Temp 97.6 °F (36.4 °C) (Oral)   Resp 15   Ht 5' 6.5\" (1.689 m)   Wt (!) 380 lb 15.3 oz (172.8 kg)   SpO2 93%   BMI 60.57 kg/m²     ADMIT:  Weight: (!) 393 lb 15.4 oz (178.7 kg)    TODAY: Weight: (!) 380 lb 15.3 oz (172.8 kg)    Wt Readings from Last 25 Encounters:   11/25/20 (!) 380 lb 15.3 oz (172.8 kg)   04/24/20 (!) 310 lb (140.6 kg)   09/23/19 (!) 318 lb (144.2 kg)   02/12/19 (!) 322 lb 3.2 oz (146.1 kg)   07/31/18 (!) 318 lb 9.6 oz (144.5 kg)   04/11/18 (!) 309 lb 6.4 oz (140.3 kg)   01/22/18 (!) 308 lb 6.4 oz (139.9 kg)   10/19/17 (!) 304 lb 9.6 oz (138.2 kg)          Intake/Output Summary (Last 24 hours) at 11/25/2020 1538  Last data filed at 11/25/2020 1253  Gross per 24 hour   Intake 1126.36 ml   Output 2575 ml   Net -1448.64 ml       LABS  BMP:   Lab Results   Component Value Date     11/25/2020    K 4.1 11/25/2020     11/25/2020    CO2 30 11/25/2020    BUN 26 11/25/2020    LABALBU 3.8 06/16/2020    CREATININE 1.2 11/25/2020    CALCIUM 9.0 11/25/2020    GFRAA 54 11/25/2020    GFRAA >60 03/12/2010    LABGLOM 45 11/25/2020    GLUCOSE 83 11/25/2020     CBC:   Recent Labs     11/23/20  0458 11/24/20  0800 11/25/20  0616   WBC 3.1* 3.4* 3.2*   HGB 9.5* 9.3* 9.5*   HCT 29.6* 29.1* 29.7*   MCV 80.5 80.8 80.4    170 179     BNP: No results found for: BNP    ECHOCARDIOGRAM: 11/23/2020  Summary   Ejection fraction is visually estimated to be 55-60%. Grade I diastolic dysfunction with normal LV filling pressures. Severly dilated right ventricle. Right ventricular systolic function is mildly reduced. The right atrium is severely dilated. Estimated pulmonary artery systolic pressure is normal at 37 mmHg assuming a   right atrial pressure of 15 mmHg.     Assessment:     CONSULTS:   PHARMACY TO DOSE WARFARIN  IP CONSULT TO SOCIAL WORK  IP CONSULT TO PODIATRY  IP CONSULT TO HEART FAILURE NURSE/COORDINATOR  IP CONSULT TO CARDIOLOGY    Patient has a CARDIOLOGY CONSULT: Yes      Patient taking an ACEI/ARB:  Yes       Patient taking a BETA BLOCKER:  Yes    SCALE AVAILABLE:  Yes -- states family can purchase new one    EDUCATION STATUS: Patient -- no one else present at this time  [x]  Provided both written and verbal education on Heart Failure signs/symptoms. [x]  Provided instructions on daily medications. [x]  Provided instructions to monitor and record weight daily. [x]  Provided instructions to call if weight increases 3 lbs in one day or 5 lbs in one week. [x]  Received verbal acknowledgment/understanding of Heart Failure related causes. [x]  Provided instructions on how to maintain a low sodium diet. [x]  Provided recommendations for smoking cessation programs  [x]  Provided recommendations on activity and exercise    [x]  Other:  HF RN consult received from Dr Mini Cortes. Pt is also being coded HF per CDS HF list as of 11/24/2020. Chart reviewed. Pt presented to ED with c/o leg weakness and edema. She reports being SOB x 1 month. She has history of chronic leg edema but claims this feels different. She has a wound on her right calf. Pt has no prior history of HF and no established cardiologist. She has history of AF and chronic LLE DVT on coumadin which is managed by her PCP. She is morbidly obese. ProBnP on admission was 1346 with Cr 1.1. CXR showed vascular congestion. Weight 393# (ED) and 390# (floor). Pt has been treated with IV bolus lasix which was transitioned to Lasix gtt on 11/24 by cardiology. Per Dr Robina Nuñez, pt has acute on chronic RV systolic failure and pHTN secondary to massive obesity / hypoventilation syndrome. He recommended RHC as OP as well as sleep study / workup. I/O appear inaccurate -- currently negative ~ 1 liter but weight is down ~ 10# since admission. She remains on Lasix gtt at this time. I met with patient in her room. She is on 2L NC and appears comfortable at rest and with light conversation. I introduced myself and my role in her care. She is agreeable to meet with me for HF education.   She confirms the above summary of events leading to admission. She is aware of heart failure diagnosis and need to make some lifestyle changes. She shares she has received dietary education from dietitian and staff and is motivated to make necessary changes for improved health. Praise given. Pt states she has a scale at home but \"needs a new one\". She states her family will be able to get one for her. I provided weight log and corresponding AHA HF zones sheet. We reviewed the 3/5 rule and s/sx of worsening HF. She shares she has chronic LE edema and is SOB at baseline. She is very sedentary which inevitably is impacting her respiratory status. We reviewed the rationale for sodium and fluid restriction and the recommendation of each. Pt is not very forthcoming about her food choices but repeats need to learn about \"heart healthy\" food. She is aware of need to control carbs to impact DM. I share how blood sugar control directly impacts HF management. She states she \"knows (she) can do better\" and is motivated to adopt \"healthy plate\" choices. Praise given for willingness to make changes and for her recognition of lifestyle habits needing addressed. Pt feels her fluid intake \"is fine\". She is unable to quantify how much she drinks per day. She states she \"always has something to drink\" but conditions she \"never drinks all of it\". I urged her to accurately track her intake for a few days after discharge to attempt to get a true accounting of how much she drinks. We reviewed all sources of fluid intake (ice chips, jello, etc). We discussed after learning this, she will be more able to make a plan for any necessary changes if indicated. We discussed tips on how to offset thirst. She is receptive to information but I am not convinced of her ability to make changes. Pt claims compliance with medications.  She is fixated on her leg wound but I shared how HF management will help in wound healing -- that the two are inseparable. She voices understanding but continues to indicate she \"just needs wound care\". I discussed post discharge support program available in [facility]. Pt declines referral - states she is \"busy with all the other appts\". I assured her appts could be completed virtually and would lend ongoing support but she continues to decline. Pt continues on Lasix gtt and per cardiology notes is still very overloaded. She will likely be hospitalized for several more days. We discussed required 7 day follow up and how an appt had been arranged because of upcoming long holiday weekend. I assured her I would amend appt if indicated. HF RNs will continue to follow and assist with transition of care at discharge. CURRENT DIET: DIET LOW SODIUM 2 GM; Carb Control: 4 carb choices (60 gms)/meal; 1500 ml  EDUCATIONAL PACKETS PROVIDED- PRINTED FROM [employer]. Titles and material given:  Yes   [x]  What is Heart Failure? [x]  Heart Failure: Warning Signs of a Flare-Up  [x]  Heart Failure: Making Changes to Your Diet  [x]  Heart Failure: Medications to Help Your Heart   [x]  Other: weight log, AHA HF zones sheet, [employer] [place] [employer], [facility] brochure     PATIENT/CAREGIVER TEACHING:    Level of patient/caregiver understanding able to:   [x] Verbalize understanding   [] Demonstrate understanding       [x] Teach back        [x] Needs reinforcement     []  Other:      TEACHING TIME:  60 minutes       Plan:       DISCHARGE PLAN:  Placement for patient upon discharge: home with support   Hospice Care:  no  Code Status: Full Code  Discharge appointment scheduled: Yes     RECOMMENDATIONS:   [x]  Encourage to call Heart Failure Resource Line with any questions or concerns. [x]  Educate further Ms. Esparza's on fluid restriction 48 oz- 64 oz during inpatient stay so she can understand how to measure intake at home. [x]  Continue to educate on S/S of Heart Failure.   [x]  Emphasize daily weights, diet, and if changes, to call Heart Failure Resource Line  [x]  Other:  Cardiac Rehab referral : pt declines as it is not a covered service with insurance as EF preserved  [address] referral: pt declines but will consider in the future. She is aware she could receive infusion services if indicated regardless of \"enrollment\".          Electronically signed by Amy Cordova RN, BSN CHFN  on 11/25/2020 at 3:38 PM

## 2020-11-25 NOTE — PROGRESS NOTES
Hospitalist Progress Note  11/25/2020 9:01 AM    PCP: Remberto Bates, JUAN MANUEL - TANIKA    [id number]     Date of Admission: 11/22/2020                                                                                                                     HOSPITAL COURSE    Patient demographics:  The patient  Karyle Pyo is a 78 y.o. female     Significant past medical history:   Patient Active Problem List   Diagnosis    DMII (diabetes mellitus, type 2) ([email or web address]    Essential hypertension    Hypercholesterolemia    Lower leg DVT (deep venous thromboembolism), chronic, right (HCC)    Edema of both legs    Encounter for current long-term use of anticoagulants    Homozygous Factor V Leiden mutation ([email or web address]    Low serum HDL    Elevated LFTs    Hypomagnesemia    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[email or web address]    Vitamin D deficiency    CHF with unknown LVEF ([facility])    Generalized weakness    Pulmonary vascular congestion    Chronic venous stasis dermatitis of both lower extremities    Morbid obesity due to excess calories ([facility])    Shortness of breath    Persistent atrial fibrillation (HCC)         Presenting symptoms:  SOB, increased bilateral lower extremity edema    Diagnostic workup:      CONSULTS DURING ADMISSION :   PHARMACY TO DOSE WARFARIN  IP CONSULT TO SOCIAL WORK  IP CONSULT TO PODIATRY  IP CONSULT TO HEART FAILURE NURSE/COORDINATOR  IP CONSULT TO CARDIOLOGY      Patient was diagnosed with:        Treatment while inpatient:                                                                                         ----------------------------------------------------------      SUBJECTIVE COMPLAINTS- follow up for lower extremity edema    Diet: DIET LOW SODIUM 2 GM; Carb Control: 4 carb choices (60 gms)/meal; 1500 ml      OBJECTIVE:   Patient Active Problem List   Diagnosis    DMII (diabetes mellitus, type 2) ([email or web address]    Essential hypertension    Hypercholesterolemia    Lower leg DVT (deep venous thromboembolism), chronic, right (HCC)    Edema of both legs    Encounter for current long-term use of anticoagulants    Homozygous Factor V Leiden mutation ([facility]    Low serum HDL    Elevated LFTs    Hypomagnesemia    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HCC)    Vitamin D deficiency    CHF with unknown LVEF (HCC)    Generalized weakness    Pulmonary vascular congestion    Chronic venous stasis dermatitis of both lower extremities    Morbid obesity due to excess calories (HCC)    Shortness of breath    Persistent atrial fibrillation (HCC)       Allergies  Nickel and Ibuprofen    Medications    Scheduled Meds:   insulin glargine  30 Units Subcutaneous Nightly    miconazole   Topical BID    lisinopril  20 mg Oral Nightly    metoprolol succinate  50 mg Oral Nightly    atorvastatin  40 mg Oral Nightly    sodium chloride flush  10 mL Intravenous 2 times per day    insulin lispro  0-12 Units Subcutaneous TID WC    insulin lispro  0-6 Units Subcutaneous Nightly    warfarin (COUMADIN) daily dosing (placeholder)   Other RX Placeholder     Continuous Infusions:   furosemide (LASIX) 1mg/ml infusion 10 mg/hr (11/24/20 2152)    dextrose       PRN Meds:  ammonium lactate, sodium chloride flush, acetaminophen **OR** acetaminophen, polyethylene glycol, ondansetron, glucose, dextrose, glucagon (rDNA), dextrose    Vitals   Vitals /wt   Patient Vitals for the past 8 hrs:   BP Temp Temp src Pulse Resp SpO2 Weight   11/25/20 0427 (!) 110/59 97.4 °F (36.3 °C) Oral 63 18 96 % (!) 380 lb 15.3 oz (172.8 kg)        72HR INTAKE/OUTPUT:      Intake/Output Summary (Last 24 hours) at 11/25/2020 0901  Last data filed at 11/25/2020 0620  Gross per 24 hour   Intake 1006.36 ml   Output 1575 ml   Net -568.64 ml       Exam:    Gen:   Alert and oriented ×3    Eyes: PERRL. No sclera icterus. No conjunctival injection. ENT: No discharge. Pharynx clear. External appearance of ears and nose normal.  Neck: Trachea midline. No obvious mass.     Resp: No accessory muscle use. No crackles. No wheezes. No rhonchi. CV: Regular rate. Regular rhythm. No murmur or rub. No edema. GI: Non-tender. Non-distended. No hernia. Skin: Warm, dry, normal texture and turgor. Lymph: No cervical LAD. No supraclavicular LAD. M/S: / Ext. No cyanosis. No clubbing. No joint deformity. Neuro: CN 2-12 are intact,  no neurologic deficits noted. PT/INR:   Recent Labs     11/22/20  1221 11/23/20  0458 11/24/20  0800 11/25/20  0616   PROTIME 21.2* 23.8* 22.3* 22.5*   INR 1.82* 2.04* 1.91* 1.93*     APTT:   Recent Labs     11/23/20  1706 11/23/20  2305 11/24/20  0800 11/24/20  1414   APTT 51.2* 46.9* 69.8* 66.4*       CBC:   Recent Labs     11/22/20  1222 11/23/20  0458 11/24/20  0800 11/25/20  0616   WBC 4.7 3.1* 3.4* 3.2*   HGB 10.4* 9.5* 9.3* 9.5*   HCT 33.5* 29.6* 29.1* 29.7*   MCV 81.6 80.5 80.8 80.4    180 170 179       BMP:   Recent Labs     11/22/20  1222 11/23/20  0458 11/24/20  0800 11/25/20  0616   * 142 140 140   K 4.4 4.1 4.2 4.1    106 102 101   CO2 24 29 30 30   BUN 25* 26* 26* 26*   CREATININE 1.1 1.0 1.1 1.2       LIVER PROFILE: No results for input(s): ALKPHOS, AST, ALT, ALB, BILIDIR, BILITOT, ALKPHOS in the last 72 hours. No results for input(s): AMYLASE in the last 72 hours. No results for input(s): LIPASE in the last 72 hours. UA:No results for input(s): NITRITE, LABCAST, WBCUA, RBCUA, MUCUS in the last 72 hours. TROPONIN:   Recent Labs     11/22/20  1222   TROPONINI <0.01       No results found for: URRFLXCULT    No results for input(s): TSHREFLEX in the last 72 hours. No components found for: [id number]  POC GLUCOSE:    Recent Labs     11/24/20  1131 11/24/20  1644 11/24/20  2015 11/25/20  0625 11/25/20  0713   POCGLU 75 89 95 95 84     No results for input(s): LABA1C in the last 72 hours. Lab Results   Component Value Date    LABA1C 6.2 09/25/2020     Ejection fraction is visually estimated to be 55-60%.     Grade I diastolic dysfunction with normal LV filling pressures. ASSESSMENT AND PLAN    Acute on chronic right ventricular systolic heart failure  likely due to pulmonary hypertension and obesity hypoventilation syndrome  Echocardiogram shows a normal ejection fraction  Started patient on Lasix infusion-weight is 393 to 380  Continue lisinopril and beta-blocker  A low sodium fluid restricted diet   Cardiology consult is appreciated    Hypotension  Lasix changed to IV infusion  Consider midodrine if blood pressure remains low  Monitor cr    Shortness of breath   cxr pulmonary vascular congestion   Expect to improve with diuresis     Persistent atrial fibrillation (HCC)  History of DVT -   continue Metoprolol and Coumadin. Bridge with Heparin as Pt's INR is subtherapeutic           Diabetes mellitus II -   Low bs  Decrease insulin dose     Essential (primary) hypertension -   continue home meds and monitor blood pressure     Hyperlipidemia -   No current evidence of Rhabdomyolysis or other adverse effects. Continue statin therapy while in the hospital     Chronic venous stasis dermatitis of both lower extremities -   Will provide supportive care.      Morbid obesity due to excess calories (Body mass index is 62.63 kg/m². ) -   Complicating assessment and treatment. Placing patient at risk for multiple co-morbidities as well as early death and contributing to the patient's presentation.  on weight loss when appropriate. Code Status: Full Code        Dispo -cc        The patient and / or the family were informed of the results of any tests, a time was given to answer questions, a plan was proposed and they agreed with plan. Kamryn Chung MD    This note was transcribed using [id number] [employer]. Please disregard any translational errors.

## 2020-11-25 NOTE — PLAN OF CARE
Problem: Skin Integrity:  Goal: Absence of new skin breakdown  Description: Absence of new skin breakdown  Note: Able to change positions in bed without assist, no evidence of skin breakdown noted. Waffle cushion in place to chair. Bariatric bed in place      Problem: Falls - Risk of:  Goal: Will remain free from falls  Description: Will remain free from falls  Note: Pt educated on falls precautions and safety. Call light and personal belongings within reach at all times. Non skid socks in use when up. Hourly rounding and alarms active. Problem: PAIN  Goal: Patient's pain/discomfort is manageable  Note: Able to rate pain using a 1-10 scale, medicated per prn orders, see MAR.  Able to verbalize a reduction in pain and/or able to fall asleep and remain asleep without any s/s of pain

## 2020-11-25 NOTE — DISCHARGE INSTR - DIET
sodium. o Leave the salt out of recipes for pasta, casseroles, and soups.  Be a smart . o Look for food packages that say salt-free or sodium-free.  These items contain less than 5 milligrams of sodium per serving. o Very-low-sodium products contain less than 35 milligrams of sodium per serving. o Low-sodium products contain less than 140 milligrams of sodium per serving. o Unsalted or no added salt products may still be high in sodium. Check the nutrition label.  Add flavors to your food without adding sodium. o Try lemon juice, lime juice, fruit juice, or vinegar. o Dry or fresh herbs add flavor. Try basil, bay leaf, dill, rosemary, parsley, umair, dry mustard, nutmeg, thyme, and paprika.  o Pepper, red pepper flakes, and cayenne pepper can add spice to your meals without adding sodium. Hot sauce contains sodium, but if you use just a drop or two, it will not add up to much.  o Buy a sodium-free seasoning blend or make your own at home.  Use caution when you eat outside your home.   o Restaurant foods can be very high in sodium. o Ask for nutrition information. Many restaurants provide nutrition facts on their menus or websites. o Let your  know that you want your food to be cooked without salt. Ask for your salad dressing and sauces to come on the side.     Fluid Restriction  Your doctor may ask you to follow a fluid restriction in addition to taking diuretics (water pills). Ask your doctor how much fluid you can have. Foods that are liquid at room temperature are considered a fluid, such as popsicles, soup, ice cream, and Jell-O.      Foods Not Recommended   Breads or crackers topped with salt   Prepackaged bread crumbs    Self-rising flours    Canned vegetables (unless they are salt free or low sodium)   Sauerkraut and pickled vegetables    Canned or dried soups (unless they are salt free or low sodium)   Western [employer] fries, onion rings, and other fried foods  Processed cheeses    Cottage cheese    Cured meats: che, ham, sausage, pepperoni, and hot dogs    Canned meats   Smoked fish and meats    Frozen meals (that have more than 600 mg sodium)   Salter butter or margarine    Any type of salt seasoning (sea salt, kosher salt, onion salt, garlic salt, seasoning blends made with salt)   Ketchup, BBQ sauce, soy sauce, Worcestershire sauce   Salsa, pickles, olives, relish   Salad dressings: ranch blue cheese, [place], [facility] Dietitian Office: [phone]           Foods with carbohydrates make your blood glucose level go up. Learning how to count carbohydrates can help you control your blood glucose levels. First, identify the foods you eat that contain carbohydrates. Then, using the Foods with Carbohydrates chart, determine about how much carbohydrates are in your meals and snacks. Make sure you are eating foods with fiber, protein, and healthy fat along with your carbohydrate foods. Foods with Carbohydrates  The following table shows carbohydrate foods that have about 15 grams of carbohydrate each. Using measuring cups, spoons, or a food scale when you first begin learning about carbohydrate counting can help you learn about the portion sizes you typically eat.   The following foods have 15 grams carbohydrate each:   Grains  1 slice bread (1 ounce)   1 small tortilla (6-inch size)   ¼ large bagel (1 ounce)   1/3 cup pasta or rice (cooked)   ½ hamburger or hot dog bun (¾ ounce)   ½ cup cooked cereal   ½ to ¾ cup ready-to-eat cereal   2 taco shells (5-inch size) Fruit  1 small fresh fruit (¾ to 1 cup)   ½ medium banana   17 small grapes (3 ounces)   1 cup melon or berries   ½ cup canned or frozen fruit   2 tablespoons dried fruit (blueberries, cherries, cranberries, raisins)   ½ cup unsweetened fruit juice   Starchy Vegetables  ½ cup cooked beans, peas, corn, potatoes/sweet potatoes   ¼ large baked potato (3 ounces)   1 cup acorn or butternut squash  Snack Foods  3 to 6 crackers   8 potato chips or 13 tortilla chips (¾ ounce to 1 ounce)   3 cups popped popcorn   Dairy  3/4 cup (6 ounces) nonfat plain yogurt, or yogurt with sugar-free sweetener   1 cup milk   1 cup plain rice, soy, coconut or flavored almond milk Sweets and Desserts  ½ cup ice cream or frozen yogurt   1 tablespoon jam, jelly, pancake syrup, table sugar, or honey   2 tablespoons light pancake syrup   1 inch square of frosted cake or 2 inch square of unfrosted cake   2 small cookies (2/3 ounce each) or ¼ large cookie   Sometimes youll have to estimate carbohydrate amounts if you dont know the exact recipe. One cup of mixed foods like soups can have 1 to 2 carbohydrate servings, while some casseroles might have 2 or more servings of carbohydrate. Foods that have less than 20 calories in each serving can be counted as free foods. Count 1 cup raw vegetables, or ½ cup cooked non-starchy vegetables as free foods. If you eat 3 or more servings at one meal, then count them as 1 carbohydrate serving. Foods without Carbohydrates   Not all foods contain carbohydrates. Meat, some dairy, fats, non-starchy vegetables, and many beverages dont contain carbohydrate. So when you count carbohydrates, you can generally exclude chicken, pork, beef, fish, seafood, eggs, tofu, cheese, butter, sour cream, avocado, nuts, seeds, olives, mayonnaise, water, black coffee, unsweetened tea, and zero-calorie drinks. Vegetables with no or low carbohydrate include green beans, cauliflower, tomatoes, and onions. How much carbohydrate should I eat at each meal?   Carbohydrate counting can help you plan your meals and manage your weight. Following are some starting points for carbohydrate intake at each meal. Work with your registered dietitian nutritionist to find the best range that works for your blood glucose and weight.     To Lose Weight To Maintain Weight   Women 2 - 3 carb servings 3 - 4 carb servings   Men 3 - 4 carb servings 4 - 5 carb servings   Checking your blood glucose after meals will help you know if you need to adjust the timing, type, or number of carbohydrate servings in your meal plan. Achieve and keep a healthy body weight by balancing your food intake and physical activity. Tips  How should I plan my meals? Plan for half the food on your plate to include non-starchy vegetables, like salad greens, broccoli, or carrots. Try to eat 3 to 5 servings of non-starchy vegetables every day. Have a protein food at each meal. Protein foods include chicken, fish, meat, eggs, or beans (note that beans contain carbohydrate). These two food groups (non-starchy vegetables and proteins) are low in carbohydrate. If you fill up your plate with these foods, you will eat less carbohydrate but still fill up your stomach. Try to limit your carbohydrate portion to ¼ of the plate. What fats are healthiest to eat? Diabetes increases risk for heart disease. To help protect your heart, eat more healthy fats, such as olive oil, nuts, and avocado. Eat less saturated fats like butter, cream, and high-fat meats, like che and sausage. Avoid trans fats, which are in all foods that list partially hydrogenated oil as an ingredient. What should I drink? Choose drinks that are not sweetened with sugar. The healthiest choices are water, carbonated or seltzer mc, and tea and coffee without added sugars. Sweet drinks will make your blood glucose go up very quickly. One serving of soda or energy drink is ½ cup. It is best to drink these beverages only if your blood glucose is low. Artificially sweetened, or diet drinks, typically do not increase your blood glucose if they have zero calories in them. Read labels of beverages, as some diet drinks do have carbohydrate and will raise your blood glucose. Label Reading Tips  Read Nutrition Facts labels to find out how many grams of carbohydrate are in a food you want to eat. Dont forget: sometimes serving sizes on the label arent the same as how much food you are going to eat, so you may need to calculate how much carbohydrate is in the food you are serving yourself.        [email or web address]

## 2020-11-25 NOTE — CONSULTS
Cardiology Consultation   Referring Physician: Dr. Reynaldo Corral   Reason for Consultation: heart failure   Chief Complaint:   Chief Complaint   Patient presents with    Shortness of Breath    Skin Ulcer     right calf         Subjective:   History of Present Illness:     Karyle Pyo is a 78 y.o. female with significant history of morbid obesity, afib, DVT/PE, factor V, DM who  presents with edema. She is mostly immobile due to massive obesity. Takes a few steps during the day. Over the past several months has increasing bilateral edema. Additionally increasing dyspnea with exertion. No regular follow up with physicians. She denies chest pain, PND, orthopnea, dyspnea at rest, palpitations, or syncope. Prior cardiac testing:  None     Past Medical History:   has a past medical history of Diabetes mellitus type 2 in obese ([address] Edema of both legs, Elevated LFTs, 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[address] Homozygous Factor V Leiden mutation ([address] Hypercholesterolemia, Hypertension, Hypomagnesemia, Low serum HDL, Lower leg DVT (deep venous thromboembolism), chronic, right ([address] Osteoarthritis of cervical spine, Renal failure, acute ([address] and Vitamin D deficiency. Surgical History:   has a past surgical history that includes Cholecystectomy ();  section; Appendectomy (1976); Tubal ligation (Bilateral, 1980); and Colonoscopy (2014). Social History:   reports that she has never smoked. She has never used smokeless tobacco. She reports current alcohol use. She reports that she does not use drugs. Family History:  family history includes COPD in her sister; Clotting Disorder in her son; Coronary Art Dis in her father; Diabetes in her brother and sister; Heart Attack in her father; Heart Surgery (age of onset: 48) in her father; High Cholesterol in her son; Hypertension in her father and son; Kidney Disease in her mother; Other in her sister and son.     Home Medications:  Were reviewed and are listed in nursing record and/or below  Prior to Admission medications    Medication Sig Start Date End Date Taking?  Authorizing Provider   NOVOLOG 100 UNIT/ML injection vial ADMINISTER 18 UNITS UNDER THE SKIN THREE TIMES DAILY BEFORE MEALS  Patient taking differently: 0-18 Units  11/20/20  Yes JUAN MANUEL Almanza CNP   amLODIPine ([address]) 10 MG tablet TAKE 1 TABLET BY MOUTH DAILY FOR HIGH BLOOD PRESSURE  Patient taking differently: Take 10 mg by mouth nightly  11/17/20  Yes JUAN MANUEL Serrano CNP   lisinopril (PRINIVIL;ZESTRIL) 40 MG tablet TAKE 1 TABLET BY MOUTH DAILY  Patient taking differently: Take 40 mg by mouth nightly  10/20/20  Yes Kel Escamilla,    metoprolol succinate (TOPROL XL) 50 MG extended release tablet TAKE 1 TABLET BY MOUTH DAILY  Patient taking differently: Take 50 mg by mouth nightly TAKE 1 TABLET BY MOUTH DAILY 10/20/20  Yes JUAN MANUEL Jennings CNP   furosemide (LASIX) 40 MG tablet TAKE 1 TABLET BY MOUTH DAILY  Patient taking differently: Take 40 mg by mouth nightly  10/20/20  Yes JUAN MANUEL Serrano CNP   potassium chloride (KLOR-CON M) 10 MEQ extended release tablet TAKE 1 TABLET BY MOUTH DAILY  Patient taking differently: Take 10 mEq by mouth nightly  10/20/20  Yes JUAN MANUEL Serrano CNP   folic acid (FOLVITE) 1 MG tablet Take 2 tablets by mouth daily  Patient taking differently: Take 2 mg by mouth nightly  6/26/20 11/22/20 Yes JAUN MANUEL Almanza CNP   TRESIBA FLEXTOUCH 100 UNIT/ML SOPN INJECT 72 UNITS UNDER THE SKIN DAILY WITH SUPPER  Patient taking differently: Inject 72 Units into the skin nightly  2/17/20  Yes JUAN MANUEL Jennings CNP   atorvastatin (LIPITOR) 40 MG tablet Take 1 tablet by mouth nightly 12/30/19  Yes JUAN MANUEL Serrano CNP   warfarin (COUMADIN) 6 MG tablet TAKE 1 TABLET BY MOUTH DAILY OR AS DIRECTED  Patient taking differently: See Admin Instructions 6 mg daily except 3 mg on Tuesday and normal. No drainage or sinus tenderness. Neck: Supple, symmetrical, trachea midline. No adenopathy. No tenderness/mass/nodules. + JVD   Lungs:   Clear to auscultation bilaterally, respirations unlabored. No wheeze, rales, or rhonchi. Chest Wall:  No tenderness or deformity. Heart:  irregular rate. S1/S2 normal. FLEX 3/6 RUSB,no rub, or gallop. Abdomen:   Soft, non-tender, bowel sounds active. Musculoskeletal: No muscle wasting or digital clubbing. Extremities: Extremities normal, atraumatic. 4+edema. Pulses: 2+ radial and carotid pulses, symmetric. Skin: Diffuse erythema with distal ulcers    Pysch: Normal mood and affect. Alert and oriented x 4.    Neurologic: Normal gross motor and sensory exam.       Labs     CBC:   Lab Results   Component Value Date    WBC 3.4 11/24/2020    RBC 3.60 11/24/2020    HGB 9.3 11/24/2020    HCT 29.1 11/24/2020    MCV 80.8 11/24/2020    RDW 20.1 11/24/2020     11/24/2020     CMP:  Lab Results   Component Value Date     11/24/2020    K 4.2 11/24/2020     11/24/2020    CO2 30 11/24/2020    BUN 26 11/24/2020    CREATININE 1.1 11/24/2020    GFRAA 60 11/24/2020    GFRAA >60 03/12/2010    AGRATIO 1.3 06/16/2020    LABGLOM 49 11/24/2020    GLUCOSE 90 11/24/2020    PROT 6.7 06/16/2020    CALCIUM 8.7 11/24/2020    BILITOT 1.2 06/16/2020    ALKPHOS 120 06/16/2020    AST 18 06/16/2020    ALT 9 06/16/2020     PT/INR:  No results found for: PTINR  HgBA1c:  Lab Results   Component Value Date    LABA1C 6.2 09/25/2020     @RESUFAST(CKTOTAL,CKMB,CKMBINDEX,TROPONINI      CURRENT Medications:  Current Facility-Administered Medications: insulin glargine (LANTUS) injection vial 30 Units, 30 Units, Subcutaneous, Nightly  miconazole (MICOTIN) 2 % powder, , Topical, BID  ammonium lactate (LAC-HYDRIN) 12 % lotion, , Topical, PRN  [START ON 11/25/2020] furosemide (LASIX) injection 40 mg, 40 mg, Intravenous, Daily  midodrine (PROAMATINE) tablet 5 mg, 5 mg, Oral, TID WC  heparin 25,000 unit in sodium chloride 0.45% 250 mL infusion, 8.6 mL/hr, Intravenous, Continuous  metoprolol succinate (TOPROL XL) extended release tablet 50 mg, 50 mg, Oral, Nightly  lisinopril (PRINIVIL;ZESTRIL) tablet 40 mg, 40 mg, Oral, Nightly  atorvastatin (LIPITOR) tablet 40 mg, 40 mg, Oral, Nightly  amLODIPine (NORVASC) tablet 10 mg, 10 mg, Oral, Nightly  sodium chloride flush 0.9 % injection 10 mL, 10 mL, Intravenous, 2 times per day  sodium chloride flush 0.9 % injection 10 mL, 10 mL, Intravenous, PRN  acetaminophen (TYLENOL) tablet 650 mg, 650 mg, Oral, Q4H PRN **OR** acetaminophen (TYLENOL) suppository 650 mg, 650 mg, Rectal, Q4H PRN  polyethylene glycol (GLYCOLAX) packet 17 g, 17 g, Oral, Daily PRN  ondansetron (ZOFRAN) injection 4 mg, 4 mg, Intravenous, Q4H PRN  insulin lispro (HUMALOG) injection vial 0-12 Units, 0-12 Units, Subcutaneous, TID WC  insulin lispro (HUMALOG) injection vial 0-6 Units, 0-6 Units, Subcutaneous, Nightly  glucose (GLUTOSE) 40 % oral gel 15 g, 15 g, Oral, PRN  dextrose 50 % IV solution, 12.5 g, Intravenous, PRN  glucagon (rDNA) injection 1 mg, 1 mg, Intramuscular, PRN  dextrose 5 % solution, 100 mL/hr, Intravenous, PRN  warfarin (COUMADIN) daily dosing (placeholder), , Other, RX Placeholder     Cardiac testing     EKG: afib, low voltage     Echo:   Summary   Ejection fraction is visually estimated to be 55-60%. Grade I diastolic dysfunction with normal LV filling pressures. Severly dilated right ventricle. Right ventricular systolic function is mildly reduced. The right atrium is severely dilated. Estimated pulmonary artery systolic pressure is normal at 37 mmHg assuming a   right atrial pressure of 15 mmHg. Stress Test:     Cath:      All above diagnostic testing was independently visualized and reviewed by me (not simply review of report)     Patient Active Problem List   Diagnosis    DMII (diabetes mellitus, type 2) [facility]    Essential hypertension    Hypercholesterolemia    Lower leg DVT (deep venous thromboembolism), chronic, right (HCC)    Edema of both legs    Encounter for current long-term use of anticoagulants    Hom[facility] Leiden mutation [facility]    Low serum HDL    Elevated LFTs    Hypomagnesemia    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HCC)    Vitamin D deficiency    CHF with unknown LVEF (HCC)    Generalized weakness    Pulmonary vascular congestion    Chronic venous stasis dermatitis of both lower extremities    Morbid obesity due to excess calories (HCC)    Shortness of breath    Persistent atrial fibrillation (HCC)         Assessment and Plan   1) acute on chronic RV systolic failure   - etiology likely pulmonary HTN due to massive obesity (BMI 61)  - start IV lasix gtt @ 10   - will eventually need RHC to define, will plan in the future as outpatient   - needs outpatient pft/sleep study  - d/c norvasc until after RHC     2) Morbid obesity   - outpatient bariatric / sleep referral     3) afib   - warfarin ( would recommend change to NOAC on discharge unless financial constraint)   - toprol     4) dvt/PE  - does not require heparin bridge  - c/w warfarin ; would recommend change to NOAC unless financial constraint     5) foot ucler  - likely PAD  - once volume status improves will get ISABEL    Thank you for allowing us to participate in the care of Jake Ahuja.     Ewelina Valencia MD [id number] [facility],  Interventional Cardiology, and Peripheral Vascular Disease   [address]   (C): [phone]  [place] Agent): [phone]

## 2020-11-25 NOTE — DISCHARGE INSTR - COC
Continuity of Care Form    Patient Name: Dayna Muir   :  1950  MRN:  [id number]    Admit date:  2020  Discharge date:  12/3    Code Status Order: Full Code   Advance Directives:   [facility] Documentation       Date/Time Healthcare Directive Type of Healthcare Directive Copy in [facility] [address] Agent's Name Healthcare Agent's Phone Number    20 1715  No, patient does not have an advance directive for healthcare treatment -- -- -- -- --            Admitting Physician:  Jarad Andersen MD  PCP: Beni Thompson, APRN - [address] Unit/Room#: Z7Z-6018/6040-51  Discharging Unit Phone Number:[phone]    Emergency Contact:   Extended Emergency Contact Information  Primary Emergency Contact: Vero Valderrama  Address: [facility] [address] Phone: [phone]  Relation: Child    Past Surgical History:  Past Surgical History:   Procedure Laterality Date    APPENDECTOMY  1976     SECTION      3     CHOLECYSTECTOMY  2002    gangrenous    COLONOSCOPY  2014    no polyp    TUBAL LIGATION Bilateral 1980       Immunization History:   Immunization History   Administered Date(s) Administered    Influenza Vaccine, unspecified formulation 2009    Influenza, High Dose (Fluzone 65 yrs and older) 10/30/2017, 10/02/2018    Influenza, Triv, inactivated, subunit, adjuvanted, IM (Fluad 65 yrs and older) 2019, 2020    Pneumococcal Conjugate 13-valent (Azpldxd11) 2018    Pneumococcal Polysaccharide (Bqodqwgym07) 2007, 2019    Tdap (Boostrix, Adacel) 2018       Active Problems:  Patient Active Problem List   Diagnosis Code    DMII (diabetes mellitus, type 2) ([facility] E11.9    Essential hypertension I10    Hypercholesterolemia E78.00    Lower leg DVT (deep venous thromboembolism), chronic, right ([facility]) I82.5Z1    Edema of both legs R60.0    Encounter for current long-term use of anticoagulants Z79.01    Homozygous Factor V Leiden mutation ([address] D68.51    Low serum HDL R74.8    Elevated LFTs R79.89    Hypomagnesemia E83.42    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[facility]) E72.11    Vitamin D deficiency E55.9    CHF with unknown LVEF ([facility]) I50.9    Generalized weakness R53.1    Pulmonary vascular congestion R09.89    Chronic venous stasis dermatitis of both lower extremities I87.2    Morbid obesity due to excess calories ([facility]) E66.01    Shortness of breath R06.02    Persistent atrial fibrillation ([facility]) I48.19    RVF (right ventricular failure) ([facility]) I50.810    Anemia D64.9       Isolation/Infection:   Isolation            No Isolation          Patient Infection Status       None to display            Nurse Assessment:  Last Vital Signs: BP (!) 110/59   Pulse 63   Temp 97.4 °F (36.3 °C) (Oral)   Resp 18   Ht 5' 6.5\" (1.689 m)   Wt (!) 380 lb 15.3 oz (172.8 kg)   SpO2 96%   BMI 60.57 kg/m²     Last documented pain score (0-10 scale): Pain Level: 0  Last Weight:   Wt Readings from Last 1 Encounters:   11/25/20 (!) 380 lb 15.3 oz (172.8 kg)     Mental Status:  oriented and alert    IV Access:  - None    Nursing Mobility/ADLs:  Walking   Independent  Transfer  Independent  Bathing  Independent  Dressing  Independent  Toileting  Independent  Feeding  Independent  Med Admin  Independent  Med Delivery   none    Wound Care Documentation and Therapy:  Wound 11/23/20 Other (Comment) Right;Posterior Calf (Active)   Wound Etiology Venous 11/24/20 2029   Wound Length (cm) 4 cm 11/23/20 1505   Wound Width (cm) 3 cm 11/23/20 1505   Wound Surface Area (cm^2) 12 cm^2 11/23/20 1505   Wound Assessment Pink/red 11/24/20 2029   Carolyn-wound Assessment Edematous; Warm 11/24/20 2029   Number of days: 1        Elimination:  Continence:   · Bowel:  Yes  · Bladder: Yes  Urinary Catheter: None   Colostomy/Ileostomy/Ileal Heart [place] [phone] and/or [facility] Heart Failure Resource Line @ [phone] with any questions or concerns.  Please continue heart failure education to patient and family/support system.  See After Visit Summary for hospital follow up appointment details.  Consider spiritual care referral for support and/or completion of advance directives [phone].  Consider: having the facility MD complete required 7 day follow up, [place] [address] telehealth program if patient agreeable and able to participate, palliative care consult for ongoing goals of care, end of life, and/or chronic disease management discussions and referral to [facility] [phone]) once SNF/HHC complete. Dr Alexa Cai is pt's primary cardiologist.       Rehab Therapies: {THERAPEUTIC INTERVENTION:[id number]}  Weight Bearing Status/Restrictions: No weight bearing restirctions  Other Medical Equipment (for information only, NOT a DME order):  walker  Other Treatments:   Diabetes education and support with focus on medication management, glucose monitoring, using personal health data, meal planning and hypo/hyperglcyemia prevention.   (Electronically signed by Nicki Merlin, RN on 12/3/2020 at 12:11 PM)        Patient's personal belongings (please select all that are sent with patient):  None    RN SIGNATURE:  Electronically signed by Toñito Zarate RN on 12/3/20 at 11:36 AM EST    CASE MANAGEMENT/SOCIAL WORK SECTION    Inpatient Status Date: ***    Readmission Risk Assessment Score:  Readmission Risk              Risk of Unplanned Readmission:        16           Discharging to Facility/ Agency   · Name:   · Address:  · Phone:  · Fax:    Dialysis Facility (if applicable)   · Name:  · Address:  · Dialysis Schedule:  · Phone:  · Fax:    / signature: {Esignature:[id number]}    PHYSICIAN SECTION    Prognosis: Fair    Condition at Discharge: Stable    Rehab Potential (if transferring to

## 2020-11-25 NOTE — PROGRESS NOTES
Clinical Pharmacy Note  Warfarin Consult    Zoey Moore is a 78 y.o. female receiving warfarin managed by pharmacy. Warfarin Indication: Afib, Factor V Leiden  Target INR range: 2-3   Dose prior to admission: 3 mg Tuesdays and Saturdays, 6 mg all other days    Current warfarin drug-drug interactions:     Recent Labs     11/23/20  0458 11/24/20  0800 11/25/20  0616   HGB 9.5* 9.3* 9.5*   HCT 29.6* 29.1* 29.7*   INR 2.04* 1.91* 1.93*       Assessment/Plan:    Patient's dosing schedule has been erratic due to variable INRs.  Warfarin was on hold 11/19 and 11/20 due to INR=3.3, then resumed at prior home dose of 3 mg Tuesdays and Saturdays, 6 mg all other days.    Will give warfarin 7.5 mg tonight. Daily PT/INR until stable within therapeutic range.      Warfarin 7.5 mg tonight. Daily PT/INR until stable within therapeutic range. Thank you for the consult. Will continue to follow.

## 2020-11-25 NOTE — PROGRESS NOTES
Occupational Therapy  Facility/Department: [address] IP REHAB  Daily Treatment Note  NAME: Madhuri Sherman  : 1950  MRN: [id number]    Date of Service: 2020    Discharge Recommendations:  Continue to assess pending progress, Home with Home health OT, Home with assist PRN     Madhuri Sherman scored a 19/24 on the AM-PAC ADL Inpatient form. Current research shows that an AM-PAC score of 18 or greater is typically associated with a discharge to the patient's home setting. Based on the patient's AM-PAC score, and their current ADL deficits, it is recommended that the patient have 2-3 sessions per week of Occupational Therapy at d/c to increase the patient's independence. At this time, this patient demonstrates the endurance and safety to discharge home with home OT and a follow up treatment frequency of 2-3x/wk. Please see assessment section for further patient specific details. If patient discharges prior to next session this note will serve as a discharge summary. Please see below for the latest assessment towards goals. Assessment   Performance deficits / Impairments: Decreased functional mobility ; Decreased endurance;Decreased ADL status; Decreased balance  Assessment: Pt reported feeling tired after PT session and requested to complete activities sitting up in recliner. Pt reports she is limited by pain in R calf which affects her ability to ambulate and move. Pt completed UE strengthening exercises with red theraband to increase activity tolerance for ADL participation and mobility. Pt educated on need for deep breathing/pursed lip breathing to conserve energy when completing exercises. Pt declined ADLs and fx mobility during today's session as she reports she had recently been up to ambulate with PT and completed ADLs with PCA. Will continue to work with pt on acute to address the above deficits, but recommend pt recieve home OT and assist prn for a safe return home.   Prognosis: Good  OT Education: Energy Conservation;Home Exercise Program;OT Role  REQUIRES OT FOLLOW UP: Yes  Activity Tolerance  Activity Tolerance: Patient limited by fatigue  Activity Tolerance: Pt tolerated entire session but requested to complete tasks seated upright in recliner. Safety Devices  Safety Devices in place: Yes  Type of devices: Left in chair;Call light within reach; Chair alarm in place;Gait belt         Patient Diagnosis(es): The primary encounter diagnosis was Acute on chronic congestive heart failure, unspecified heart failure type ([facility] [address] Diagnoses of Longstanding persistent atrial fibrillation (HCC) and General weakness were also pertinent to this visit. has a past medical history of Diabetes mellitus type 2 in obese ([facility] Edema of both legs, Elevated LFTs, 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[facility] [address]ozygous [facility] Le[facility] Hypercholesterolemia, Hypertension, Hypomagnesemia, Low serum HDL, Lower leg DVT (deep venous thromboembolism), chronic, right ([facility] Osteoarthritis of cervical spine, Renal failure, acute ([facility] and Vitamin D deficiency. has a past surgical history that includes Cholecystectomy ();  section; Appendectomy (1976); Tubal ligation (Bilateral, 1980); and Colonoscopy (2014). Restrictions  Restrictions/Precautions  Restrictions/Precautions: Fall Risk  Position Activity Restriction  Other position/activity restrictions: 2L O2, telemetry, IV in R arm  Subjective   General  Chart Reviewed: Yes, Orders, Progress Notes  Patient assessed for rehabilitation services?: Yes  Additional Pertinent Hx: Per MIRTA Ledesma  Jaime Correia is a 78 y.o. female who presents here to the emergency department, the patient states that she recently stayed at her daughter-in-law's house, and 2 days ago woke up and felt profoundly weak in her legs, and her legs seem to be more swollen.   She states that she has had shortness of breath for about 1 month, she does have a history of peripheral edema, and DVT, A. fib, it is currently on Coumadin, but states that something feels very different. She has increased shortness of breath and weakness. She rates her pain level to her legs as 4/10. \"  Family / Caregiver Present: No  Referring Practitioner: Roni  Diagnosis: CHF with unknown LVEF  Subjective  Subjective: Pt met sitting up in recliner, agreeable to OT, rates pain in R calf as 8/10. Pt reports feeling tired after PT session. General Comment  Comments: . Objective             Functional Mobility  Functional Mobility Comments: Pt declined fx mobility as she has just been up ambulating with PT prior to OT arrival.                                                  Type of ROM/Therapeutic Exercise  Type of ROM/Therapeutic Exercise: Resistive Bands  Comment: Provided pt with HEP for UE strengthening with red theraband. Pt completed 15 reps of straight up, straight forward, straight down, to the side, both sides out, and elbow bend with red theraband to increase activity tolerance for mobility and ADL participation. Pt required cues to utilize pursed lip breathing when completing exercises.       Pt min SOB during exercises, requires rest breaks between exercises              Plan   Plan  Times per week: 3-5  Plan weeks: by discharge  Current Treatment Recommendations: Strengthening, Patient/Caregiver Education & Training, Equipment Evaluation, Education, & procurement, Balance Training, Functional Mobility Training, Endurance Training, Safety Education & Training, Self-Care / ADL                                                  AM-PAC Score        AM-PAC Inpatient Daily Activity Raw Score: 19 (11/25/20 1526)  AM-PAC Inpatient ADL T-Scale Score : 40.22 (11/25/20 1526)  ADL Inpatient CMS 0-100% Score: 42.8 (11/25/20 1526)  ADL Inpatient CMS G-Code Modifier : CK (11/25/20 1526)    Goals  Short term goals  Time Frame for Short term goals: by discharge  Short term goal 1: Pt will complete self care indep with AD  Short term goal 2: Pt will complete fx mobility indep with AD  Short term goal 3: Pt will complete transfers indep with AD  Short term goal 4: Pt will increase activity tolerance to stand for 6 min for ADL task  Short term goal 5: Pt will be indep in HEP for UE strengthening for improved activity tolerance for mobility/ADL participation  Long term goals  Time Frame for Long term goals : LTGs=STGs  Patient Goals   Patient goals : \"to get stronger\"       Therapy Time   Individual Concurrent Group Co-treatment   Time In 1500         Time Out 1530         Minutes 30         Timed Code Treatment Minutes: 30 Minutes     Therapist was present, directed the patient's care, made skilled judgement, and was responsible for assessment and treatment of the patient.         WILFRID Piedra, OTR/L  #[id number] 11/25/20 3:30 PM

## 2020-11-26 LAB
ANION GAP SERPL CALCULATED.3IONS-SCNC: 10 MMOL/L (ref 3–16)
BASOPHILS ABSOLUTE: 0 K/UL (ref 0–0.2)
BASOPHILS RELATIVE PERCENT: 0.7 %
BLOOD CULTURE, ROUTINE: NORMAL
BUN BLDV-MCNC: 28 MG/DL (ref 7–20)
CALCIUM SERPL-MCNC: 8.9 MG/DL (ref 8.3–10.6)
CHLORIDE BLD-SCNC: 98 MMOL/L (ref 99–110)
CO2: 30 MMOL/L (ref 21–32)
CREAT SERPL-MCNC: 1.1 MG/DL (ref 0.6–1.2)
CULTURE, BLOOD 2: NORMAL
EOSINOPHILS ABSOLUTE: 0.1 K/UL (ref 0–0.6)
EOSINOPHILS RELATIVE PERCENT: 2.4 %
GFR AFRICAN AMERICAN: 60
GFR NON-AFRICAN AMERICAN: 49
GLUCOSE BLD-MCNC: 116 MG/DL (ref 70–99)
GLUCOSE BLD-MCNC: 120 MG/DL (ref 70–99)
GLUCOSE BLD-MCNC: 123 MG/DL (ref 70–99)
GLUCOSE BLD-MCNC: 154 MG/DL (ref 70–99)
GLUCOSE BLD-MCNC: 163 MG/DL (ref 70–99)
HCT VFR BLD CALC: 30.7 % (ref 36–48)
HEMOGLOBIN: 9.7 G/DL (ref 12–16)
INR BLD: 2.16 (ref 0.86–1.14)
LYMPHOCYTES ABSOLUTE: 1 K/UL (ref 1–5.1)
LYMPHOCYTES RELATIVE PERCENT: 29.2 %
MCH RBC QN AUTO: 25.6 PG (ref 26–34)
MCHC RBC AUTO-ENTMCNC: 31.8 G/DL (ref 31–36)
MCV RBC AUTO: 80.6 FL (ref 80–100)
MONOCYTES ABSOLUTE: 0.5 K/UL (ref 0–1.3)
MONOCYTES RELATIVE PERCENT: 13.4 %
NEUTROPHILS ABSOLUTE: 1.9 K/UL (ref 1.7–7.7)
NEUTROPHILS RELATIVE PERCENT: 54.3 %
PDW BLD-RTO: 19.8 % (ref 12.4–15.4)
PERFORMED ON: ABNORMAL
PLATELET # BLD: 175 K/UL (ref 135–450)
PMV BLD AUTO: 7.8 FL (ref 5–10.5)
POTASSIUM REFLEX MAGNESIUM: 4 MMOL/L (ref 3.5–5.1)
PROTHROMBIN TIME: 25.3 SEC (ref 10–13.2)
RBC # BLD: 3.81 M/UL (ref 4–5.2)
SODIUM BLD-SCNC: 138 MMOL/L (ref 136–145)
WBC # BLD: 3.5 K/UL (ref 4–11)

## 2020-11-26 PROCEDURE — 80048 BASIC METABOLIC PNL TOTAL CA: CPT

## 2020-11-26 PROCEDURE — 36415 COLL VENOUS BLD VENIPUNCTURE: CPT

## 2020-11-26 PROCEDURE — 2580000003 HC RX 258: Performed by: INTERNAL MEDICINE

## 2020-11-26 PROCEDURE — 85025 COMPLETE CBC W/AUTO DIFF WBC: CPT

## 2020-11-26 PROCEDURE — 6370000000 HC RX 637 (ALT 250 FOR IP): Performed by: HOSPITALIST

## 2020-11-26 PROCEDURE — 6370000000 HC RX 637 (ALT 250 FOR IP): Performed by: PODIATRIST

## 2020-11-26 PROCEDURE — 99233 SBSQ HOSP IP/OBS HIGH 50: CPT | Performed by: INTERNAL MEDICINE

## 2020-11-26 PROCEDURE — 6370000000 HC RX 637 (ALT 250 FOR IP): Performed by: INTERNAL MEDICINE

## 2020-11-26 PROCEDURE — 1200000000 HC SEMI PRIVATE

## 2020-11-26 PROCEDURE — 6360000002 HC RX W HCPCS: Performed by: INTERNAL MEDICINE

## 2020-11-26 PROCEDURE — 85610 PROTHROMBIN TIME: CPT

## 2020-11-26 RX ORDER — METOPROLOL SUCCINATE 25 MG/1
25 TABLET, EXTENDED RELEASE ORAL NIGHTLY
Status: DISCONTINUED | OUTPATIENT
Start: 2020-11-26 | End: 2020-12-03 | Stop reason: HOSPADM

## 2020-11-26 RX ORDER — WARFARIN SODIUM 3 MG/1
6 TABLET ORAL
Status: COMPLETED | OUTPATIENT
Start: 2020-11-26 | End: 2020-11-26

## 2020-11-26 RX ORDER — MIDODRINE HYDROCHLORIDE 5 MG/1
5 TABLET ORAL
Status: DISCONTINUED | OUTPATIENT
Start: 2020-11-26 | End: 2020-11-26

## 2020-11-26 RX ADMIN — ATORVASTATIN CALCIUM 40 MG: 40 TABLET, FILM COATED ORAL at 20:54

## 2020-11-26 RX ADMIN — SODIUM CHLORIDE, PRESERVATIVE FREE 10 ML: 5 INJECTION INTRAVENOUS at 20:55

## 2020-11-26 RX ADMIN — MIDODRINE HYDROCHLORIDE 5 MG: 5 TABLET ORAL at 14:41

## 2020-11-26 RX ADMIN — METOPROLOL SUCCINATE 25 MG: 25 TABLET, EXTENDED RELEASE ORAL at 23:22

## 2020-11-26 RX ADMIN — FUROSEMIDE 10 MG/HR: 10 INJECTION, SOLUTION INTRAMUSCULAR; INTRAVENOUS at 05:10

## 2020-11-26 RX ADMIN — WARFARIN SODIUM 6 MG: 3 TABLET ORAL at 17:01

## 2020-11-26 RX ADMIN — ACETAMINOPHEN 650 MG: 325 TABLET ORAL at 23:26

## 2020-11-26 RX ADMIN — ACETAMINOPHEN 650 MG: 325 TABLET ORAL at 10:36

## 2020-11-26 RX ADMIN — Medication: at 20:55

## 2020-11-26 RX ADMIN — INSULIN LISPRO 2 UNITS: 100 INJECTION, SOLUTION INTRAVENOUS; SUBCUTANEOUS at 11:51

## 2020-11-26 RX ADMIN — MICONAZOLE NITRATE: 2 POWDER TOPICAL at 09:16

## 2020-11-26 RX ADMIN — INSULIN GLARGINE 30 UNITS: 100 INJECTION, SOLUTION SUBCUTANEOUS at 20:56

## 2020-11-26 RX ADMIN — MICONAZOLE NITRATE: 2 POWDER TOPICAL at 20:55

## 2020-11-26 RX ADMIN — Medication 10 ML: at 14:41

## 2020-11-26 RX ADMIN — INSULIN LISPRO 1 UNITS: 100 INJECTION, SOLUTION INTRAVENOUS; SUBCUTANEOUS at 20:56

## 2020-11-26 ASSESSMENT — PAIN DESCRIPTION - ORIENTATION
ORIENTATION: RIGHT

## 2020-11-26 ASSESSMENT — PAIN DESCRIPTION - PAIN TYPE
TYPE: ACUTE PAIN

## 2020-11-26 ASSESSMENT — PAIN DESCRIPTION - LOCATION
LOCATION: LEG

## 2020-11-26 ASSESSMENT — PAIN DESCRIPTION - DESCRIPTORS: DESCRIPTORS: SORE

## 2020-11-26 ASSESSMENT — PAIN SCALES - GENERAL
PAINLEVEL_OUTOF10: 5
PAINLEVEL_OUTOF10: 7
PAINLEVEL_OUTOF10: 8
PAINLEVEL_OUTOF10: 0
PAINLEVEL_OUTOF10: 8

## 2020-11-26 ASSESSMENT — PAIN DESCRIPTION - FREQUENCY: FREQUENCY: CONTINUOUS

## 2020-11-26 ASSESSMENT — PAIN DESCRIPTION - ONSET: ONSET: ON-GOING

## 2020-11-26 ASSESSMENT — PAIN DESCRIPTION - PROGRESSION: CLINICAL_PROGRESSION: NOT CHANGED

## 2020-11-26 NOTE — PROGRESS NOTES
Sleeping well overnight. Pt admitted with CHF exacerbation. On a lasix gtt at 10ml/hr. Diuresing well. +2-3 BLE edema. Legs hard and dry, lachydrin cream applied per orders. Skin folds red and excoriated, Nystatin powder applied. Lungs CTA, on 2L O2, not home dependent. Has SOB with activity. HR regular. Abdomen non tender with active bowel sounds. Has been continent of bowel and bladder. C/o BLE pain and medicated with PRN tylenol. Pt reported pain medicated effective. All needs assessed with Q2H rounding. Alarms active. Will monitor.  Lorrie Soni RN

## 2020-11-26 NOTE — PLAN OF CARE
Problem: Falls - Risk of:  Goal: Will remain free from falls  Description: Will remain free from falls  11/26/2020 1616 by Rashard Diertich RN  Outcome: Ongoing  Note: Patient free of falls this shift, all safety precautions in place. Problem: Falls - Risk of:  Goal: Absence of physical injury  Description: Absence of physical injury  11/26/2020 1616 by Rashard Dietrich RN  Outcome: Ongoing  Note: All safety precautions in place including nonskid socks on feet, bed exit alarm on and posey clip attached to patient when in chair, phones and call light in reach, will continue to monitor. Problem: DAILY CARE  Goal: Daily care needs are met  11/26/2020 1616 by Rashard Dietrich RN  Outcome: Ongoing  Note: Participates in hourly rounding, states needs as they arise by using call light appropriately, and does call in advance of needs. Problem: PAIN  Goal: Patient's pain/discomfort is manageable  11/26/2020 1616 by Rashard Dietrich RN  Outcome: Ongoing  Note: Patient uses pain scale appropriately. Problem: KNOWLEDGE DEFICIT  Goal: Patient/S.O. demonstrates understanding of disease process, treatment plan, medications, and discharge instructions. 11/26/2020 1616 by Rashard Dietrich RN  Outcome: Ongoing  Note: Teaching performed pertaining to safety, medications and transfers and documented in chart. Problem: DISCHARGE BARRIERS  Goal: Patient's continuum of care needs are met  11/26/2020 1616 by Rashard Dietrich RN  Outcome: Ongoing  Note: Discharge planning started upon admission,  consult in place. Problem: OXYGENATION/RESPIRATORY FUNCTION  Goal: Patient will maintain patent airway  11/26/2020 1616 by Rashard Dietrcih RN  Outcome: Ongoing  Note: Pt repositioned every 2 hours for maximum patent airway, respiratory assessment performed and charted. I&O recorded each shift.       Problem: OXYGENATION/RESPIRATORY FUNCTION  Goal: Patient will

## 2020-11-26 NOTE — PROGRESS NOTES
Clinical Pharmacy Note  Warfarin Consult    Latricia Dunlap is a 78 y.o. female receiving warfarin managed by pharmacy. Warfarin Indication: Afib, Factor V Leiden  Target INR range: 2-3   Dose prior to admission: 3 mg Tuesdays and Saturdays, 6 mg all other days     Current warfarin drug-drug interactions:     Recent Labs     11/24/20  0800 11/25/20  0616 11/26/20  0608   HGB 9.3* 9.5* 9.7*   HCT 29.1* 29.7* 30.7*   INR 1.91* 1.93* 2.16*       Assessment/Plan:    Patient's dosing schedule has been erratic due to variable INRs.  Warfarin was on hold 11/19 and 11/20 due to INR=3.3, then resumed at prior home dose of 3 mg Tuesdays and Saturdays, 6 mg all other days.      Warfarin 6 mg tonight. Daily PT/INR until stable within therapeutic range. Thank you for the consult. Will continue to follow.     Marilou Gustafson, PharmD, BCPS  11/26/2020  10:24 AM

## 2020-11-26 NOTE — PROGRESS NOTES
Hospitalist Progress Note  11/26/2020 10:41 AM    PCP: JUAN MANUEL Enciso CNP    [id number]     Date of Admission: 11/22/2020                                                                                                                     HOSPITAL COURSE    Patient demographics:  The patient  Levy Tony is a 78 y.o. female     Significant past medical history:   Patient Active Problem List   Diagnosis    DMII (diabetes mellitus, type 2) ([facility]    Essential hypertension    Hypercholesterolemia    Lower leg DVT (deep venous thromboembolism), chronic, right (HCC)    Edema of both legs    Encounter for current long-term use of anticoagulants    Homozygous Factor V Leiden mutation ([facility]    Low serum HDL    Elevated LFTs    Hypomagnesemia    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[facility]    Vitamin D deficiency    CHF with unknown LVEF (HCC)    Generalized weakness    Pulmonary vascular congestion    Chronic venous stasis dermatitis of both lower extremities    Morbid obesity due to excess calories ([facility])    Shortness of breath    Persistent atrial fibrillation (HCC)    RVF (right ventricular failure) ([facility])    Anemia         Presenting symptoms:  SOB, increased bilateral lower extremity edema    Diagnostic workup:      CONSULTS DURING ADMISSION :   PHARMACY TO DOSE WARFARIN  IP CONSULT TO SOCIAL WORK  IP CONSULT TO PODIATRY  IP CONSULT TO HEART FAILURE NURSE/COORDINATOR  IP CONSULT TO CARDIOLOGY      Patient was diagnosed with:        Treatment while inpatient:                                                                                         ----------------------------------------------------------      SUBJECTIVE COMPLAINTS- follow up for lower extremity edema    Diet: DIET LOW SODIUM 2 GM; Carb Control: 4 carb choices (60 gms)/meal; 1500 ml      OBJECTIVE:   Patient Active Problem List   Diagnosis    DMII (diabetes mellitus, type 2) ([facility]    Essential hypertension    Hypercholesterolemia  Lower leg DVT (deep venous thromboembolism), chronic, right (HCC)    Edema of both legs    Encounter for current long-term use of anticoagulants    Homozygous Factor V Leiden mutation ([facility]    Low serum HDL    Elevated LFTs    Hypomagnesemia    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HCC)    Vitamin D deficiency    CHF with unknown LVEF (HCC)    Generalized weakness    Pulmonary vascular congestion    Chronic venous stasis dermatitis of both lower extremities    Morbid obesity due to excess calories (HCC)    Shortness of breath    Persistent atrial fibrillation (HCC)    RVF (right ventricular failure) (HCC)    Anemia       Allergies  Nickel and Ibuprofen    Medications    Scheduled Meds:   warfarin  6 mg Oral Once    insulin glargine  30 Units Subcutaneous Nightly    miconazole   Topical BID    lisinopril  20 mg Oral Nightly    metoprolol succinate  50 mg Oral Nightly    atorvastatin  40 mg Oral Nightly    sodium chloride flush  10 mL Intravenous 2 times per day    insulin lispro  0-12 Units Subcutaneous TID WC    insulin lispro  0-6 Units Subcutaneous Nightly    warfarin (COUMADIN) daily dosing (placeholder)   Other RX Placeholder     Continuous Infusions:   furosemide (LASIX) 1mg/ml infusion 10 mg/hr (11/26/20 0510)    dextrose       PRN Meds:  ammonium lactate, sodium chloride flush, acetaminophen **OR** acetaminophen, polyethylene glycol, ondansetron, glucose, dextrose, glucagon (rDNA), dextrose    Vitals   Vitals /wt   Patient Vitals for the past 8 hrs:   BP Temp Temp src Pulse Resp SpO2 Weight   11/26/20 0806 (!) 100/51 97.7 °F (36.5 °C) Oral 54 16 92 % --   11/26/20 0442 111/69 97.4 °F (36.3 °C) Oral 69 17 95 % (!) 379 lb 13.6 oz (172.3 kg)        72HR INTAKE/OUTPUT:      Intake/Output Summary (Last 24 hours) at 11/26/2020 1041  Last data filed at 11/26/2020 0937  Gross per 24 hour   Intake 1216.73 ml   Output 4200 ml   Net -2983.27 ml       Exam:    Gen:   Alert and oriented ×3    Eyes: PERRL.  No sclera icterus. No conjunctival injection. ENT: No discharge. Pharynx clear. External appearance of ears and nose normal.  Neck: Trachea midline. No obvious mass. Resp: No accessory muscle use. No crackles. No wheezes. No rhonchi. CV: Regular rate. Regular rhythm. No murmur or rub. No edema. GI: Non-tender. Non-distended. No hernia. Skin: Warm, dry, normal texture and turgor. Lymph: No cervical LAD. No supraclavicular LAD. M/S: / Ext. No cyanosis. No clubbing. No joint deformity. Neuro: CN 2-12 are intact,  no neurologic deficits noted. PT/INR:   Recent Labs     11/24/20  0800 11/25/20  0616 11/26/20  0608   PROTIME 22.3* 22.5* 25.3*   INR 1.91* 1.93* 2.16*     APTT:   Recent Labs     11/23/20  1706 11/23/20  2305 11/24/20  0800 11/24/20  1414   APTT 51.2* 46.9* 69.8* 66.4*       CBC:   Recent Labs     11/24/20  0800 11/25/20  0616 11/26/20  0608   WBC 3.4* 3.2* 3.5*   HGB 9.3* 9.5* 9.7*   HCT 29.1* 29.7* 30.7*   MCV 80.8 80.4 80.6    179 175       BMP:   Recent Labs     11/24/20  0800 11/25/20  0616 11/26/20  0608    140 138   K 4.2 4.1 4.0    101 98*   CO2 30 30 30   BUN 26* 26* 28*   CREATININE 1.1 1.2 1.1       LIVER PROFILE: No results for input(s): ALKPHOS, AST, ALT, ALB, BILIDIR, BILITOT, ALKPHOS in the last 72 hours. No results for input(s): AMYLASE in the last 72 hours. No results for input(s): LIPASE in the last 72 hours. UA:No results for input(s): NITRITE, LABCAST, WBCUA, RBCUA, MUCUS in the last 72 hours. TROPONIN:   No results for input(s): Rosalva Pace in the last 72 hours. No results found for: URRFLXCULT    No results for input(s): TSHREFLEX in the last 72 hours. No components found for: [id number]  POC GLUCOSE:    Recent Labs     11/25/20  0713 11/25/20  1106 11/25/20  1633 11/25/20  2004 11/26/20  0713   POCGLU 84 110* 132* 157* 123*     No results for input(s): LABA1C in the last 72 hours.    Lab Results   Component Value Date    LABA1C 6.2 09/25/2020     Ejection fraction is visually estimated to be 55-60%.  Grade I diastolic dysfunction with normal LV filling pressures. ASSESSMENT AND PLAN    Acute on chronic right ventricular systolic heart failure  likely due to pulmonary hypertension and obesity hypoventilation syndrome  Echocardiogram shows a normal ejection fraction  weight is 393 to 379-hold Lasix drip for low blood pressure  Discontinue lisinopril and decrease dose of beta-blocker  Cardiology is following    Hypotension  Continue on midodrine  Monitor cr    Shortness of breath   cxr pulmonary vascular congestion   Expect to improve with diuresis     Persistent atrial fibrillation (HCC)  History of DVT -   continue Metoprolol and Coumadin. Bridge with Heparin as Pt's INR is subtherapeutic           Diabetes mellitus II -   Low bs  Decrease insulin dose     Essential (primary) hypertension -   continue home meds and monitor blood pressure     Hyperlipidemia -   No current evidence of Rhabdomyolysis or other adverse effects. Continue statin therapy while in the hospital     Chronic venous stasis dermatitis of both lower extremities -   Will provide supportive care.      Morbid obesity due to excess calories (Body mass index is 62.63 kg/m². ) -   Complicating assessment and treatment. Placing patient at risk for multiple co-morbidities as well as early death and contributing to the patient's presentation.  on weight loss when appropriate. Code Status: Full Code        Dispo -cc        The patient and / or the family were informed of the results of any tests, a time was given to answer questions, a plan was proposed and they agreed with plan. Parish Harley MD    This note was transcribed using [id number] [employer]. Please disregard any translational errors.

## 2020-11-26 NOTE — PROGRESS NOTES
BP 74/47 Lasix IPPB stopped per order perimeters of hold if systolic <605.  Electronically signed by Mynor Almazan RN on 11/26/2020 at 2:40 PM

## 2020-11-26 NOTE — CONSULTS
status:      Spouse name: None    Number of children: 3    Years of education: 15    Highest education level: None   Occupational History    Occupation: Retired  7/28/17   Social Needs    Financial resource strain: None    Food insecurity     Worry: None     Inability: None    Transportation needs     Medical: None     Non-medical: None   Tobacco Use    Smoking status: Never Smoker    Smokeless tobacco: Never Used   Substance and Sexual Activity    Alcohol use: Yes     Comment: OCCASIONALLY 1 or less/1-2 wk    Drug use: No     Comment: No Mj experimentation    Sexual activity: Never   Lifestyle    Physical activity     Days per week: None     Minutes per session: None    Stress: None   Relationships    Social connections     Talks on phone: None     Gets together: None     Attends Rastafarian service: None     Active member of club or organization: None     Attends meetings of clubs or organizations: None     Relationship status: None    Intimate partner violence     Fear of current or ex partner: None     Emotionally abused: None     Physically abused: None     Forced sexual activity: None   Other Topics Concern    None   Social History Narrative    No Exercise. 1/19/17    Information from [employer] EMR ending 1/31/2010    Problems    DEEP VENOUS THROMBOPHLEBITIS (ICD-453.40)    TRANSAMINASES, SERUM, ELEVATED (ICD-790.4)    HYPOMAGNESEMIA (ICD-275.2)    Hx of SEPSIS (ICD-995.91)    DIABETES MELLITUS, TYPE II (ICD-250.00)    HYPERTENSION (ICD-401. 9)    ALOPECIA (ICD-704.00)    ENCOUNTER FOR LONG-TERM USE OF ANTICOAGULANTS (ICD-V58.61)    DERMATITIS (ICD-692. 9)    CELLULITIS (ICD-682. 9)    FACTOR V DEFICIENCY (ICD-286.3)        Past Medical History:  Coronary Artery Disease, Hypertension, mixed hyperlipidemia/ low HDL/ metabolic synd. , Diabetes Type II, 2009 episode renal failure 2nd to sepsis, 2009 CELLULITIS/OPEN WOUND RIGHT ANKLE, BLOOD CLOTS (ariellaat.  PASCALE hose)/ factor V leiden homozygote. Surgical History:  Appendectomy:, Cholecystectomy: , GYN Surgery: 3 , Tubal Ligation: , 2004 \"LHC\"      Family History: . Father - HTN, CAD/ MI    Son - 1 heterozygous factor V leiden. 2  HTN    Sister - Breast Ca, HTN, DVT        Exercise: doing hand weights 15-20 3x/wk. 18     Family History:   Family History   Problem Relation Age of Onset    Kidney Disease Mother         kidney stones - larger    Coronary Art Dis Father     Heart Surgery Father 48    Hypertension Father     Heart Attack Father     COPD Sister         smoker    Diabetes Sister     Diabetes Brother     Clotting Disorder Son         Factor V Leiden heterozygote    Other Son         GERD, gout, MRSA, homocystinemia    Hypertension Son     High Cholesterol Son     Other Sister         carotid stenosis     REVIEW OF SYSTEMS:  Negative other than mentioned in the HPI           Objective     BP (!) 100/51   Pulse 54   Temp 97.7 °F (36.5 °C) (Oral)   Resp 16   Ht 5' 6.5\" (1.689 m)   Wt (!) 379 lb 13.6 oz (172.3 kg)   SpO2 92%   BMI 60.39 kg/m²      I/O:    Intake/Output Summary (Last 24 hours) at 2020 1023  Last data filed at 2020 8095  Gross per 24 hour   Intake 1216.73 ml   Output 4200 ml   Net -2983.27 ml              Wt Readings from Last 3 Encounters:   20 (!) 379 lb 13.6 oz (172.3 kg)   20 (!) 310 lb (140.6 kg)   19 (!) 318 lb (144.2 kg)       LABS:    Recent Labs     20  0616 20  0608   WBC 3.2* 3.5*   HGB 9.5* 9.7*   HCT 29.7* 30.7*    175        Recent Labs     20  0608      K 4.0   CL 98*   CO2 30   BUN 28*   CREATININE 1.1        Recent Labs     20  0800 20  1414 20  0616 20  0608   INR 1.91*  --  1.93* 2.16*   APTT 69.8* 66.4*  --   --         No results for input(s): CKTOTAL, CKMB, CKMBINDEX, TROPONINI in the last 72 hours.             Exam:     DERMATOLOGIC:   Nails 1-5 bilaterally are thickened with dystrophic changes. Positive subungual debris  Positive onycholysis  Positive erythema lower extremities consistent with chronic edema  Positive incurvation  Negative open lesions    VASCULAR:  non-pitting edema, bilaterally  The skin is hypertrophied and cobbled with brawny pigmentary changes noted bilaterally right>left   Pedal pulses non palpable due to edema    NEUROLOGIC:  joint position sense or light touch intact    ORTHOPEDIC:  No gross deformity noted. ASSESSMENT/PLAN:   Pt. is a 78 y.o. female with elongated painful hard to cut toenails due to onychomycosis  Lymphedema  cellulitis    Right leg pain  Continue Lachydrin 12%  Compression if tolerated. Encouraged patient to try gentle compression to better manage her chronic edema. OK to d/c from podiatry standpoint when medically stable. Recommend outpatient follow up for routine nail care. Please call with any further pedal complaints.      Mauricio Gan 11/26/2020 10:23 AM

## 2020-11-26 NOTE — PROGRESS NOTES
[facility]   Daily Progress Note      Admit Date:  11/22/2020    Reason for follow up visit: R heart failure    CC: \"I'm feeling a little better, a long way to go. \"    79 y/o female with PMH significant for morbid obesity, atrial fib (chronic), DVT/PE, factor V, diabetes mellitus and marked mobility issues who was admitted with bilateral LE edema and increasing KENNEDY. She does not follow up regularly with physicians and presented with worsening symptoms. She was placed on IV lasix infusion on 11/24/2020. Interval History:  Pt. seen and examined; records reviewed  BP reviewed. Remains on O2 @ 2L  Wt 380# today.    + SOB and LE edema  Denies chest pain  Remains on Lasix gtt    Subjective:  Pt with no acute overnight cardiac events. Denies chest pain, cough, palpitations or dizziness    Review of Systems:   · Constitutional: no unanticipated weight loss. There's been no change in energy level, sleep pattern, or activity level. No fevers, chills. + chronic fatigue  · Eyes: No visual changes or diplopia. No scleral icterus. · ENT: No Headaches, hearing loss or vertigo. No mouth sores or sore throat. · Cardiovascular: as reviewed in HPI  · Respiratory: No cough or wheezing, no sputum production. No hematemesis. · Gastrointestinal: No abdominal pain, appetite loss, blood in stools. No change in bowel or bladder habits. · Genitourinary: No dysuria, trouble voiding, or hematuria. · Musculoskeletal:  + limited mobility/ambulation  · Integumentary: No rash or pruritis. + chronic stasis changes lower legs  · Neurological: No headache, diplopia, change in muscle strength, numbness or tingling. · Psychiatric: No anxiety or depression. · Endocrine: No temperature intolerance. No excessive thirst, fluid intake, or urination. No tremor. · Hematologic/Lymphatic: No abnormal bruising or bleeding, blood clots or swollen lymph nodes. · Allergic/Immunologic: No nasal congestion or hives.     Objective:

## 2020-11-26 NOTE — PLAN OF CARE
Problem: Skin Integrity:  Goal: Absence of new skin breakdown  Description: Absence of new skin breakdown  Outcome: Ongoing  Note: Able to change positions in bed without assist, no evidence of skin breakdown noted. Waffle cushion in place to chair. Problem: Falls - Risk of:  Goal: Will remain free from falls  Description: Will remain free from falls  Outcome: Ongoing  Note: Pt educated on falls precautions and safety. Call light and personal belongings within reach at all times. Non skid socks in use when up. Hourly rounding and alarms active. Problem: PAIN  Goal: Patient's pain/discomfort is manageable  Outcome: Ongoing  Note: Able to rate pain using a 1-10 scale, medicated per prn orders, see MAR.  Able to verbalize a reduction in pain and/or able to fall asleep and remain asleep without any s/s of pain

## 2020-11-27 LAB
ANION GAP SERPL CALCULATED.3IONS-SCNC: 10 MMOL/L (ref 3–16)
BASOPHILS ABSOLUTE: 0 K/UL (ref 0–0.2)
BASOPHILS RELATIVE PERCENT: 0.8 %
BUN BLDV-MCNC: 31 MG/DL (ref 7–20)
CALCIUM SERPL-MCNC: 8.8 MG/DL (ref 8.3–10.6)
CHLORIDE BLD-SCNC: 96 MMOL/L (ref 99–110)
CO2: 27 MMOL/L (ref 21–32)
CREAT SERPL-MCNC: 1.2 MG/DL (ref 0.6–1.2)
EOSINOPHILS ABSOLUTE: 0.1 K/UL (ref 0–0.6)
EOSINOPHILS RELATIVE PERCENT: 2.4 %
GFR AFRICAN AMERICAN: 54
GFR NON-AFRICAN AMERICAN: 45
GLUCOSE BLD-MCNC: 106 MG/DL (ref 70–99)
GLUCOSE BLD-MCNC: 110 MG/DL (ref 70–99)
GLUCOSE BLD-MCNC: 111 MG/DL (ref 70–99)
GLUCOSE BLD-MCNC: 123 MG/DL (ref 70–99)
GLUCOSE BLD-MCNC: 126 MG/DL (ref 70–99)
GLUCOSE BLD-MCNC: 86 MG/DL (ref 70–99)
HCT VFR BLD CALC: 33.7 % (ref 36–48)
HEMOGLOBIN: 10.4 G/DL (ref 12–16)
INR BLD: 2.66 (ref 0.86–1.14)
LYMPHOCYTES ABSOLUTE: 1 K/UL (ref 1–5.1)
LYMPHOCYTES RELATIVE PERCENT: 30.3 %
MCH RBC QN AUTO: 25.7 PG (ref 26–34)
MCHC RBC AUTO-ENTMCNC: 30.8 G/DL (ref 31–36)
MCV RBC AUTO: 83.3 FL (ref 80–100)
MONOCYTES ABSOLUTE: 0.4 K/UL (ref 0–1.3)
MONOCYTES RELATIVE PERCENT: 13.2 %
NEUTROPHILS ABSOLUTE: 1.8 K/UL (ref 1.7–7.7)
NEUTROPHILS RELATIVE PERCENT: 53.3 %
PDW BLD-RTO: 20.7 % (ref 12.4–15.4)
PERFORMED ON: ABNORMAL
PLATELET # BLD: 149 K/UL (ref 135–450)
PMV BLD AUTO: 8.4 FL (ref 5–10.5)
POTASSIUM REFLEX MAGNESIUM: 4.6 MMOL/L (ref 3.5–5.1)
PROTHROMBIN TIME: 31.2 SEC (ref 10–13.2)
RBC # BLD: 4.05 M/UL (ref 4–5.2)
SODIUM BLD-SCNC: 133 MMOL/L (ref 136–145)
WBC # BLD: 3.3 K/UL (ref 4–11)

## 2020-11-27 PROCEDURE — 97535 SELF CARE MNGMENT TRAINING: CPT

## 2020-11-27 PROCEDURE — 85025 COMPLETE CBC W/AUTO DIFF WBC: CPT

## 2020-11-27 PROCEDURE — 97116 GAIT TRAINING THERAPY: CPT | Performed by: PHYSICAL THERAPIST

## 2020-11-27 PROCEDURE — 2580000003 HC RX 258: Performed by: INTERNAL MEDICINE

## 2020-11-27 PROCEDURE — 6370000000 HC RX 637 (ALT 250 FOR IP): Performed by: INTERNAL MEDICINE

## 2020-11-27 PROCEDURE — 6370000000 HC RX 637 (ALT 250 FOR IP): Performed by: HOSPITALIST

## 2020-11-27 PROCEDURE — 85610 PROTHROMBIN TIME: CPT

## 2020-11-27 PROCEDURE — 97110 THERAPEUTIC EXERCISES: CPT | Performed by: PHYSICAL THERAPIST

## 2020-11-27 PROCEDURE — 97530 THERAPEUTIC ACTIVITIES: CPT

## 2020-11-27 PROCEDURE — 1200000000 HC SEMI PRIVATE

## 2020-11-27 PROCEDURE — 6360000002 HC RX W HCPCS: Performed by: INTERNAL MEDICINE

## 2020-11-27 PROCEDURE — 99233 SBSQ HOSP IP/OBS HIGH 50: CPT | Performed by: INTERNAL MEDICINE

## 2020-11-27 PROCEDURE — 80048 BASIC METABOLIC PNL TOTAL CA: CPT

## 2020-11-27 PROCEDURE — 36415 COLL VENOUS BLD VENIPUNCTURE: CPT

## 2020-11-27 RX ORDER — WARFARIN SODIUM 3 MG/1
1.5 TABLET ORAL
Status: COMPLETED | OUTPATIENT
Start: 2020-11-27 | End: 2020-11-27

## 2020-11-27 RX ORDER — FUROSEMIDE 10 MG/ML
40 INJECTION INTRAMUSCULAR; INTRAVENOUS 2 TIMES DAILY
Status: DISCONTINUED | OUTPATIENT
Start: 2020-11-27 | End: 2020-12-02

## 2020-11-27 RX ADMIN — WARFARIN SODIUM 1.5 MG: 3 TABLET ORAL at 17:10

## 2020-11-27 RX ADMIN — Medication: at 10:49

## 2020-11-27 RX ADMIN — ATORVASTATIN CALCIUM 40 MG: 40 TABLET, FILM COATED ORAL at 20:30

## 2020-11-27 RX ADMIN — FUROSEMIDE 40 MG: 10 INJECTION, SOLUTION INTRAMUSCULAR; INTRAVENOUS at 17:11

## 2020-11-27 RX ADMIN — SODIUM CHLORIDE, PRESERVATIVE FREE 10 ML: 5 INJECTION INTRAVENOUS at 22:22

## 2020-11-27 RX ADMIN — MICONAZOLE NITRATE: 2 POWDER TOPICAL at 10:49

## 2020-11-27 RX ADMIN — INSULIN LISPRO 3 UNITS: 100 INJECTION, SOLUTION INTRAVENOUS; SUBCUTANEOUS at 08:19

## 2020-11-27 RX ADMIN — INSULIN LISPRO 3 UNITS: 100 INJECTION, SOLUTION INTRAVENOUS; SUBCUTANEOUS at 13:23

## 2020-11-27 RX ADMIN — METOPROLOL SUCCINATE 25 MG: 25 TABLET, EXTENDED RELEASE ORAL at 20:30

## 2020-11-27 RX ADMIN — MICONAZOLE NITRATE: 2 POWDER TOPICAL at 20:33

## 2020-11-27 RX ADMIN — INSULIN GLARGINE 30 UNITS: 100 INJECTION, SOLUTION SUBCUTANEOUS at 20:31

## 2020-11-27 RX ADMIN — SODIUM CHLORIDE, PRESERVATIVE FREE 10 ML: 5 INJECTION INTRAVENOUS at 10:49

## 2020-11-27 RX ADMIN — INSULIN LISPRO 3 UNITS: 100 INJECTION, SOLUTION INTRAVENOUS; SUBCUTANEOUS at 17:11

## 2020-11-27 ASSESSMENT — PAIN SCALES - GENERAL: PAINLEVEL_OUTOF10: 0

## 2020-11-27 NOTE — PROGRESS NOTES
Pt awake and AAO sitting up in recliner chair watching TV. C/o 7/10 pain to RLE. Declined pain medication. Pt admitted with RLE cellulitis and DVT. Pt also has CHF. Cardiology following. Pt's blood pressure 84/45. Lasix gtt on hold. On Midodrine. Will place call out to Cardiology for order clarification. Pt denies lightheadedness or dizziness. Blood pressure low most of day as per Epic notes. Lungs clear and decreased. SOB with activity. On O2 @ 2L/NC. Dry cough on occasion. On Telemetry and in CAF. Belly round and soft with active BS. Pt continent of B and B. 3+ edema to RLE and 2+ to LLE. Both legs red and cyn and R > L . RLE with bumpy/scaly dryness more so than L. Redness to abd folds and Nystatin ordered and Lachydrin ordered for RLE. HL to R AC site and dressing WDL. Call light in reach.

## 2020-11-27 NOTE — PROGRESS NOTES
Hospitalist Progress Note      PCP: Simona Ramires, APRN - CNP    Date of Admission: 11/22/2020        Subjective:     Sob is improving. Complains of leg pain      Medications:  Reviewed    Infusion Medications    furosemide (LASIX) 1mg/ml infusion Stopped (11/26/20 1439)    dextrose       Scheduled Medications    metoprolol succinate  25 mg Oral Nightly    insulin lispro  3 Units Subcutaneous TID WC    insulin glargine  30 Units Subcutaneous Nightly    miconazole   Topical BID    atorvastatin  40 mg Oral Nightly    sodium chloride flush  10 mL Intravenous 2 times per day    insulin lispro  0-12 Units Subcutaneous TID WC    insulin lispro  0-6 Units Subcutaneous Nightly    warfarin (COUMADIN) daily dosing (placeholder)   Other RX Placeholder     PRN Meds: ammonium lactate, sodium chloride flush, acetaminophen **OR** acetaminophen, polyethylene glycol, ondansetron, glucose, dextrose, glucagon (rDNA), dextrose      Intake/Output Summary (Last 24 hours) at 11/27/2020 0837  Last data filed at 11/27/2020 0000  Gross per 24 hour   Intake 574 ml   Output 2800 ml   Net -2226 ml       Exam:    /60   Pulse 63   Temp 97.5 °F (36.4 °C) (Oral)   Resp 16   Ht 5' 6.5\" (1.689 m)   Wt (!) 379 lb 13.6 oz (172.3 kg)   SpO2 95%   BMI 60.39 kg/m²     General appearance: No apparent distress, appears stated age and cooperative. HEENT: Pupils equal, round, and reactive to light. Conjunctivae/corneas clear. Neck: Supple, with full range of motion. No jugular venous distention. Trachea midline. Respiratory:  Normal respiratory effort. Clear to auscultation, bilaterally without Rales/Wheezes/Rhonchi. Cardiovascular: Regular rate and rhythm with normal S1/S2 without murmurs, rubs or gallops. Abdomen: Soft, non-tender, non-distended with normal bowel sounds. Musculoskeletal: No clubbing, cyanosis or edema bilaterally. Full range of motion without deformity.   Neurologic:  Neurovascularly intact

## 2020-11-27 NOTE — PROGRESS NOTES
Clinical Pharmacy Note  Warfarin Consult    Tracy Cummings is a 78 y.o. female receiving warfarin managed by pharmacy. Warfarin Indication: Afib, Factor V Leiden  Target INR range: 2-3   Dose prior to admission: 3 mg Tuesdays and Saturdays, 6 mg all other days     Current warfarin drug-drug interactions: None significant    Recent Labs     11/25/20  0616 11/26/20  0608 11/27/20  0733 11/27/20  0734   HGB 9.5* 9.7* 10.4*  --    HCT 29.7* 30.7* 33.7*  --    INR 1.93* 2.16*  --  2.66*       Assessment/Plan:    Patient's dosing schedule has been erratic due to variable INRs.  Warfarin was on hold 11/19 and 11/20 due to INR=3.3, then resumed at prior home dose of 3 mg Tuesdays and Saturdays, 6 mg all other days.      Will give a reduced dose tonight due significant increase in INR since yesterday. Warfarin 1.5 mg tonight. Daily PT/INR until stable within therapeutic range. Thank you for the consult. Will continue to follow.   Yesi Arcos, PharmD, 11/27/2020 1:38 PM

## 2020-11-27 NOTE — PROGRESS NOTES
[facility]   Daily Progress Note      Admit Date:  11/22/2020    CC: \"  79 y/o female with PMH significant for morbid obesity, atrial fib (chronic), DVT/PE, factor V, diabetes mellitus and marked mobility issues who was admitted with bilateral LE edema and increasing KENNEDY. She does not follow up regularly with physicians and presented with worsening symptoms. She was placed on IV lasix infusion on 11/24/2020.     .    Objective:   Pt had moderate diuresis on lasix drip but BP remains low,  She is also c/o Rt foot pain  /60   Pulse 63   Temp 97.5 °F (36.4 °C) (Oral)   Resp 16   Ht 5' 6.5\" (1.689 m)   Wt (!) 379 lb 13.6 oz (172.3 kg)   SpO2 95%   BMI 60.39 kg/m²       Intake/Output Summary (Last 24 hours) at 11/27/2020 0842  Last data filed at 11/27/2020 0000  Gross per 24 hour   Intake 574 ml   Output 2800 ml   Net -2226 ml     Wt Readings from Last 3 Encounters:   11/26/20 (!) 379 lb 13.6 oz (172.3 kg)   04/24/20 (!) 310 lb (140.6 kg)   09/23/19 (!) 318 lb (144.2 kg)     Telemetry:Echo 11/23/2020:  Ejection fraction is visually estimated to be 55-60%.  Grade I diastolic dysfunction with normal LV filling pressures.   Severly dilated right ventricle.   Right ventricular systolic function is mildly reduced.   The right atrium is severely dilated.   Estimated pulmonary artery systolic pressure is normal at 37 mmHg assuming a   right atrial pressure of 15 mmHg.       Physical Exam:  General:  NAD, Awake, alert and oriented X4  Skin:  Warm and dry  Neck:  Supple, no JVP appreciated, no bruit  Chest:  Clear to auscultation, no wheezes/rhonchi/rales  Cardiovascular:  Regular rate. S1S2  Abdomen:  Soft, nontender, +bowel sounds  Extremities: 2-3+ bilateral  LE edema.  Rt > Lt    Cardiac Diagnosis:  CHF and atrial fibrillation    Medications:    metoprolol succinate  25 mg Oral Nightly    insulin lispro  3 Units Subcutaneous TID WC    insulin glargine  30 Units Subcutaneous Nightly    miconazole   Topical BID    atorvastatin  40 mg Oral Nightly    sodium chloride flush  10 mL Intravenous 2 times per day    insulin lispro  0-12 Units Subcutaneous TID WC    insulin lispro  0-6 Units Subcutaneous Nightly    warfarin (COUMADIN) daily dosing (placeholder)   Other RX Placeholder      furosemide (LASIX) 1mg/ml infusion Stopped (11/26/20 1439)    dextrose       ammonium lactate, sodium chloride flush, acetaminophen **OR** acetaminophen, polyethylene glycol, ondansetron, glucose, dextrose, glucagon (rDNA), dextrose    Lab Data:  CBC:   Recent Labs     11/25/20  0616 11/26/20  0608 11/27/20  0733   WBC 3.2* 3.5* 3.3*   HGB 9.5* 9.7* 10.4*    175 149     BMP:    Recent Labs     11/25/20  0616 11/26/20  0608 11/27/20  0734    138 133*   K 4.1 4.0 4.6   CO2 30 30 27   BUN 26* 28* 31*   CREATININE 1.2 1.1 1.2     LIVR: No results for input(s): AST, ALT in the last 72 hours. INR:    Recent Labs     11/25/20  0616 11/26/20  0608 11/27/20  0734   INR 1.93* 2.16* 2.66*     APTT:   Recent Labs     11/24/20  1414   APTT 66.4*     BNP:  No results for input(s): BNP in the last 72 hours. Imaging:Echo 11/23/2020:  Ejection fraction is visually estimated to be 55-60%.  Grade I diastolic dysfunction with normal LV filling pressures.   Severly dilated right ventricle.   Right ventricular systolic function is mildly reduced.   The right atrium is severely dilated.   Estimated pulmonary artery systolic pressure is normal at 37 mmHg assuming a   right atrial pressure of 15 mmHg.       Assessment:Plan:  1) acute on chronic diastolic heart failure. AHA class III  -Diuresing steadily on IV Lasix drip  - Will switch her to lasix I/v BID since she has low BP  -Renal function continues to remain stable  - Will need OP sleep study referral  2. Atrial fib  -chronic and with controlled VR  -has been on Warfarin (discussed NOAC-she is thinking about it)  -continue Toprol     3.  H/O DVT/PE  -on warfarin (considering change to NOAC)  -maintaining therapeutic levels have been an issue     4. Foot ulcer  -suspect underlying PAD (eventual ISABEL once edema improves)  -chronic stasis dermatitis to lwoer extremities     5. Morbid obesity  -BMI 61  -consider outpatient bariatric referral     6.  Anemia  -normocytic  -further eval per Primary team     Complexity of medical decision making- high      Electronically signed by Alan Mallory MD on 11/27/2020 at 8:42 AM

## 2020-11-27 NOTE — PROGRESS NOTES
Occupational Therapy  Facility/Department: [address] IP REHAB  Daily Treatment Note  NAME: Karyle Pyo  : 1950  MRN: [id number]    Date of Service: 2020    Discharge Recommendations:  Continue to assess pending progress, Home with Home health OT, Home with assist PRN  OT Equipment Recommendations  Other: TBD - possibly bariatric RW (pt reports having RW walker at home that belonged to daughter in [address] father)    Assessment   Performance deficits / Impairments: Decreased functional mobility ; Decreased endurance;Decreased ADL status; Decreased balance  Assessment: Pt offered shower, but stated she already had sponge bath per PCA. Encouraged pt to be more indep wiht self care as she was completing this PTA. Provided pt with long sponge, educated pt using moises sock aid, reacher, long sponge. She already owns a reacher, but ordered another reacher and moises sock aid from [address] [facility] [address] while OT present. Activity tolerance continues to improve - anticipate pt will be able to return home at discharge with assist of family as needed and home OT services  Prognosis: Good  OT Education: Transfer Training;ADL Adaptive Strategies  Patient Education: sock aid, reacher  REQUIRES OT FOLLOW UP: Yes  Activity Tolerance  Activity Tolerance: Patient Tolerated treatment well  Activity Tolerance: requires rest breaks intermittantly  Safety Devices  Safety Devices in place: Yes  Type of devices: Left in chair;Call light within reach; Chair alarm in place;Gait belt;Nurse notified         Patient Diagnosis(es): The primary encounter diagnosis was Acute on chronic congestive heart failure, unspecified [facility] failure [facility] ([facility] Diagnoses of Longstanding persistent atrial fibrillation (HCC) and General weakness were also pertinent to this visit.       has a past medical history of Diabetes mellitus type 2 in obese ([facility] Edema of both legs, Elevated LFTs, 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[facility] Homozygous Factor V Leiden mutation ([facility] Hypercholesterolemia, Hypertension, Hypomagnesemia, Low serum HDL, Lower leg DVT (deep venous thromboembolism), chronic, right (HCC), Osteoarthritis of cervical spine, Renal failure, acute ([facility] and Vitamin D deficiency. has a past surgical history that includes Cholecystectomy ();  section; Appendectomy (1976); Tubal ligation (Bilateral, 1980); and Colonoscopy (2014). Restrictions  Restrictions/Precautions  Restrictions/Precautions: Fall Risk  Position Activity Restriction  Other position/activity restrictions: 2L O2, telemetry, IV in R arm  Subjective   General  Chart Reviewed: Yes, Orders, Progress Notes  Patient assessed for rehabilitation services?: Yes  Additional Pertinent Hx: Per MIRTA Hughes  Estrada Velazquez is a 78 y.o. female who presents here to the emergency department, the patient states that she recently stayed at her daughter-in-law's house, and 2 days ago woke up and felt profoundly weak in her legs, and her legs seem to be more swollen. She states that she has had shortness of breath for about 1 month, she does have a history of peripheral edema, and DVT, A. fib, it is currently on Coumadin, but states that something feels very different. She has increased shortness of breath and weakness. She rates her pain level to her legs as 4/10. \"  Family / Caregiver Present: No  Referring Practitioner: Roni  Diagnosis: CHF with unknown LVEF  Subjective  Subjective: pt met bedside, agreeable to OT, states her pain is getting better  General Comment  Comments: . Orientation  Orientation  Overall Orientation Status: Within Functional Limits  Objective    ADL  Grooming: Stand by assistance(standing at sink to wash hands)  LE Dressing: (able to doff slipper socks using reacher. Donned using moises sock aid  - she was encouraged by how easy it was to don socks using this method.   Pt preferred to complete LB dressing on commode due to lower height than recliner and this her method at home)  Toileting: Stand by assistance(urinated on commode, managed gown per self. Took extended time to initiate stream of urine)        Balance  Sitting Balance: Independent  Standing Balance: Stand by assistance  Standing Balance  Time: 2 min  Activity: standing at sink to wash hands  Toilet Transfers  Toilet - Technique: Ambulating  Equipment Used: Grab bars  Toilet Transfer: Stand by assistance  Toilet Transfers Comments: cues to use both grab bars for sit >< stand  Wheelchair Bed Transfers  Equipment Used:  Other(recliner)  Level of Asssistance: Stand by assistance  Wheelchair Transfers Comments: cues for hand placement                             Cognition  Overall Cognitive Status: [address] [facility] [address]  Times per week: 3-5  Plan weeks: by discharge  Current Treatment Recommendations: Strengthening, Patient/Caregiver Education & Training, Equipment Evaluation, Education, & procurement, Balance Training, Functional Mobility Training, Endurance Training, Safety Education & Training, Self-Care / ADL                                                    AM-PAC Score        AM-[facility] Inpatient Daily Activity Raw Score: 19 (11/27/20 [facility])  AM-PAC Inpatient ADL T-Scale Score : 40.22 (11/27/20 [facility])  ADL Inpatient CMS 0-100% Score: 42.8 (11/27/20 1435)  ADL Inpatient CMS G-Code Modifier : CK (11/27/20 1435)    Goals  Short term goals  Time Frame for Short term goals: by discharge  Short term goal 1: Pt will complete self care indep with AD  Short term goal 2: Pt will complete fx mobility indep with AD  Short term goal 3: Pt will complete transfers indep with AD  Short term goal 4: Pt will increase activity tolerance to stand for 6 min for ADL task  Short term goal 5: Pt will be indep in HEP for UE strengthening for improved activity tolerance for mobility/ADL participation  Long term goals  Time Frame for Long term goals : LTGs=STGs  Patient Goals   Patient goals : \"to get stronger\"       Therapy Time   Individual Concurrent Group Co-treatment   Time In 1330         Time Out 1430         Minutes 60         Timed Code Treatment Minutes: [address] [facility], OTR/L 200

## 2020-11-27 NOTE — PROGRESS NOTES
Pt up from bed to BR with assist x 2 to boost from sit to stand with walker and GB. Pt able to ambulate to BR with CGA and walker. Voided 900 cc yellow urine. Assist x 2 to get pt into bed, legs into bed. Tylenol given for 8/10 RLE pain. Heels up. Air mattress in place. CHF metrics reviewed with pt, daily weights, fluid intake, low salt diet, monitor for increased swelling. Pt verbalized understanding.

## 2020-11-27 NOTE — PROGRESS NOTES
Physical Therapy  Facility/Department: [address] IP REHAB  Daily Treatment Note  NAME: Eros Melchor  : 1950  MRN: [id number]    Date of Service: 2020    Discharge Recommendations:  Patient would benefit from continued therapy after discharge, 2-3 sessions per week, Home with Home health PT, S Level 3, Home with assist PRN   PT Equipment Recommendations  Equipment Needed: Yes  Mobility Devices: Fayne Oka: Rolling  Other: Pt does not have currently have access to a bariatric walker and will need to order one. HOME HEALTH CARE: LEVEL 3 SAFETY   -Initial home health evaluation to occur within 24-48 hours, in patient home    -Home health agency to establish plan of care for patient over 60 day period    -Medication Reconciliation    -PT/OT/Speech evaluations in home within 24-48 hours of discharge; including  -DME and home safety    -Frontload therapy 5 days, then 3x a week    -OT to evaluate if patient has [address] needs for personal care    - evaluation within 24-48 hours, includes evaluation of resources   and insurance to determine [place], [place], LTC, and Medicaid options    -PCP Visit scheduled within three to seven days of discharge     Eros Melchor scored a 17/24 on the AM-PAC short mobility form. Current research shows that an AM-PAC score of 18 or greater is typically associated with a discharge to the patient's home setting. Based on the patient's AM-PAC score and their current functional mobility deficits, it is recommended that the patient have 2-3 sessions per week of Physical Therapy at d/c to increase the patient's independence. At this time, this patient demonstrates the endurance and safety to discharge home with home therapy and a follow up treatment frequency of 2-3x/wk. Please see assessment section for further patient specific details. If patient discharges prior to next session this note will serve as a discharge summary.   Please see below for the latest assessment towards goals. Assessment   Body structures, Functions, Activity limitations: Decreased functional mobility ; Increased pain;Decreased balance;Decreased strength;Decreased safe awareness;Decreased endurance  Assessment: Pt is an 78y.o. year-old female that presents to acute care with SOB, increased lower extremity weakness, and generalized fatigue. She currently lives alone in an apartment with one level and no STEFFANIE. PTA pt was independent with all functional mobility. Today (11/27) pt was able to transfer sit<>stand with SBA. Cues only needed for initial sit to stand on proper hand placement, pt able to perform correctly for the rest of transfers. Able to amb approx 80' around the room with RW and CGA. Did not need to take any rest breaks and demonstrated more stability during amb this session. Pt continues to not keep walker close during amb and was not able to amb further due to increased fatigue. Continues to be lmited by anxiety, decreased strenght, endurance, balance, but is motivated to participate and improve. Would benefit from continued skilled therapy during acute stay and after D/C to further maximize functional mobility and independence pt experienced PTA. Treatment Diagnosis: decreased functional mobility  Prognosis: Good  PT Education: General Safety;Gait Training;Functional Mobility Training;Transfer Training  Barriers to Learning: anxiety  REQUIRES PT FOLLOW UP: Yes  Activity Tolerance  Activity Tolerance: Patient limited by fatigue;Patient limited by pain; Patient limited by endurance     Patient Diagnosis(es): The primary encounter diagnosis was Acute on chronic congestive heart failure, unspecified heart failure type ([facility] Diagnoses of Longstanding persistent atrial fibrillation (HCC) and General weakness were also pertinent to this visit.      has a past medical history of Diabetes mellitus type 2 in obese ([facility] Edema of both legs, Elevated LFTs, 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[facility] Homozygous Factor V Leiden mutation ([facility] Hypercholesterolemia, Hypertension, Hypomagnesemia, Low serum HDL, Lower leg DVT (deep venous thromboembolism), chronic, right (HCC), Osteoarthritis of cervical spine, Renal failure, acute ([facility] and Vitamin D deficiency. has a past surgical history that includes Cholecystectomy ();  section; Appendectomy (1976); Tubal ligation (Bilateral, 1980); and Colonoscopy (2014). Restrictions  Restrictions/Precautions  Restrictions/Precautions: Fall Risk  Position Activity Restriction  Other position/activity restrictions: 2L O2, telemetry    Subjective   General  Chart Reviewed: Yes  Additional Pertinent Hx: Per Haresh Cruz MD, \"Pt is an 78y.o. year-old female with a history of hypertension, hyperlipidemia, diabetes mellitus, chronic venous stasis dermatitis of bilateral lower extremities, atrial fibrillation, factor V Leiden deficiency and a chronic left DVT for which she takes Coumadin. Presents to the emergency room for evaluation following a 1 month history of shortness of breath and a 2-day history of increased lower extremity weakness and generalized fatigue. She states that her shortness of breath is moderately severe, worse with exertion and improved with rest.  In the emergency room she was found to have an area vascular congestion and bilateral lower extremity edema. She is being admitted for further evaluation and treatment. Associated signs and symptoms do not include chest pain, diaphoresis, orthopnea, paroxysmal nocturnal dyspnea, fever or chills. \"  Response To Previous Treatment: Patient with no complaints from previous session. Family / Caregiver Present: No  Referring Practitioner: Haresh Cruz MD  Subjective  Subjective: Pt just got back to chair after working with OT, but awake and agreeable to PT. Reports having a good day and motivated to keep working. Rates pain 7/10 in RLE while sitting. SBA  Long term goals  Time Frame for Long term goals : STG = LTG  Patient Goals   Patient goals : get stronger legs, work on exercises, go home    Plan    Plan  Times per week: 3-5  Plan weeks: during acute stay  Specific instructions for Next Treatment: pt would like to work on LE exercises  Current Treatment Recommendations: Strengthening, Home Exercise Program, Safety Education & Training, Balance Training, Endurance Training, Functional Mobility Training, Transfer Training, Gait Training  Safety Devices  Type of devices: All fall risk precautions in place, Call light within reach, Chair alarm in place, Gait belt, Patient at risk for falls, Left in chair  Restraints  Initially in place: No     Therapy Time   Individual Concurrent Group Co-treatment   Time In 1425         Time Out 1505         Minutes [address] [place] [address]  Therapist was present, directed the patient's care, made skilled judgement, and was responsible for assessment and treatment of the patient.          Electronically signed by Michael Kapadia PT on 11/27/20 at 3:31 PM EST

## 2020-11-27 NOTE — PROGRESS NOTES
Patient admitted with cellulitis right leg and CHF. A/A/O x 4. On low sodium, 1500ml FR diet, tolerating well. Medications taken whole in water. On  for DVT prophylaxis. Skin intact. On O2 at 2 L. Has been continent/incontinent of bowel and bladder. LB 11/22. Chair/bed alarms in use and call light in reach. Tele CAF. Will monitor for safety.

## 2020-11-27 NOTE — PROGRESS NOTES
Received telephone order from Dr. Jono Banuelos, Cardiology, to discontinue the Midodrine. New order read back and noted.

## 2020-11-28 LAB
ANION GAP SERPL CALCULATED.3IONS-SCNC: 8 MMOL/L (ref 3–16)
BASOPHILS ABSOLUTE: 0 K/UL (ref 0–0.2)
BASOPHILS RELATIVE PERCENT: 1.2 %
BUN BLDV-MCNC: 33 MG/DL (ref 7–20)
CALCIUM SERPL-MCNC: 9 MG/DL (ref 8.3–10.6)
CHLORIDE BLD-SCNC: 94 MMOL/L (ref 99–110)
CO2: 31 MMOL/L (ref 21–32)
CREAT SERPL-MCNC: 1.1 MG/DL (ref 0.6–1.2)
EOSINOPHILS ABSOLUTE: 0.1 K/UL (ref 0–0.6)
EOSINOPHILS RELATIVE PERCENT: 3 %
FERRITIN: 51.4 NG/ML (ref 15–150)
FOLATE: >20 NG/ML (ref 4.78–24.2)
GFR AFRICAN AMERICAN: 60
GFR NON-AFRICAN AMERICAN: 49
GLUCOSE BLD-MCNC: 104 MG/DL (ref 70–99)
GLUCOSE BLD-MCNC: 130 MG/DL (ref 70–99)
GLUCOSE BLD-MCNC: 153 MG/DL (ref 70–99)
GLUCOSE BLD-MCNC: 87 MG/DL (ref 70–99)
GLUCOSE BLD-MCNC: 87 MG/DL (ref 70–99)
HCT VFR BLD CALC: 30.5 % (ref 36–48)
HEMOGLOBIN: 9.8 G/DL (ref 12–16)
INR BLD: 2.69 (ref 0.86–1.14)
IRON SATURATION: 9 % (ref 15–50)
IRON: 28 UG/DL (ref 37–145)
LYMPHOCYTES ABSOLUTE: 0.8 K/UL (ref 1–5.1)
LYMPHOCYTES RELATIVE PERCENT: 29.9 %
MCH RBC QN AUTO: 25.8 PG (ref 26–34)
MCHC RBC AUTO-ENTMCNC: 32.2 G/DL (ref 31–36)
MCV RBC AUTO: 80.3 FL (ref 80–100)
MONOCYTES ABSOLUTE: 0.4 K/UL (ref 0–1.3)
MONOCYTES RELATIVE PERCENT: 14 %
NEUTROPHILS ABSOLUTE: 1.5 K/UL (ref 1.7–7.7)
NEUTROPHILS RELATIVE PERCENT: 51.9 %
PDW BLD-RTO: 20.4 % (ref 12.4–15.4)
PERFORMED ON: ABNORMAL
PERFORMED ON: ABNORMAL
PERFORMED ON: NORMAL
PERFORMED ON: NORMAL
PLATELET # BLD: 162 K/UL (ref 135–450)
PMV BLD AUTO: 8 FL (ref 5–10.5)
POTASSIUM REFLEX MAGNESIUM: 4 MMOL/L (ref 3.5–5.1)
PROTHROMBIN TIME: 31.5 SEC (ref 10–13.2)
RBC # BLD: 3.8 M/UL (ref 4–5.2)
SODIUM BLD-SCNC: 133 MMOL/L (ref 136–145)
TOTAL IRON BINDING CAPACITY: 313 UG/DL (ref 260–445)
VITAMIN B-12: 386 PG/ML (ref 211–911)
WBC # BLD: 2.8 K/UL (ref 4–11)

## 2020-11-28 PROCEDURE — 82728 ASSAY OF FERRITIN: CPT

## 2020-11-28 PROCEDURE — 82746 ASSAY OF FOLIC ACID SERUM: CPT

## 2020-11-28 PROCEDURE — 83550 IRON BINDING TEST: CPT

## 2020-11-28 PROCEDURE — 36415 COLL VENOUS BLD VENIPUNCTURE: CPT

## 2020-11-28 PROCEDURE — 99231 SBSQ HOSP IP/OBS SF/LOW 25: CPT | Performed by: INTERNAL MEDICINE

## 2020-11-28 PROCEDURE — 85025 COMPLETE CBC W/AUTO DIFF WBC: CPT

## 2020-11-28 PROCEDURE — 80048 BASIC METABOLIC PNL TOTAL CA: CPT

## 2020-11-28 PROCEDURE — 83540 ASSAY OF IRON: CPT

## 2020-11-28 PROCEDURE — 6360000002 HC RX W HCPCS: Performed by: INTERNAL MEDICINE

## 2020-11-28 PROCEDURE — 97535 SELF CARE MNGMENT TRAINING: CPT

## 2020-11-28 PROCEDURE — 1200000000 HC SEMI PRIVATE

## 2020-11-28 PROCEDURE — 6370000000 HC RX 637 (ALT 250 FOR IP): Performed by: INTERNAL MEDICINE

## 2020-11-28 PROCEDURE — 85610 PROTHROMBIN TIME: CPT

## 2020-11-28 PROCEDURE — 6370000000 HC RX 637 (ALT 250 FOR IP)

## 2020-11-28 PROCEDURE — 6370000000 HC RX 637 (ALT 250 FOR IP): Performed by: HOSPITALIST

## 2020-11-28 PROCEDURE — 2580000003 HC RX 258: Performed by: INTERNAL MEDICINE

## 2020-11-28 PROCEDURE — 82607 VITAMIN B-12: CPT

## 2020-11-28 PROCEDURE — 97530 THERAPEUTIC ACTIVITIES: CPT

## 2020-11-28 PROCEDURE — 83921 ORGANIC ACID SINGLE QUANT: CPT

## 2020-11-28 RX ORDER — WARFARIN SODIUM 3 MG/1
3 TABLET ORAL
Status: COMPLETED | OUTPATIENT
Start: 2020-11-28 | End: 2020-11-28

## 2020-11-28 RX ADMIN — FUROSEMIDE 40 MG: 10 INJECTION, SOLUTION INTRAMUSCULAR; INTRAVENOUS at 08:09

## 2020-11-28 RX ADMIN — INSULIN LISPRO 3 UNITS: 100 INJECTION, SOLUTION INTRAVENOUS; SUBCUTANEOUS at 17:13

## 2020-11-28 RX ADMIN — MICONAZOLE NITRATE: 2 POWDER TOPICAL at 22:18

## 2020-11-28 RX ADMIN — WARFARIN SODIUM 3 MG: 3 TABLET ORAL at 17:13

## 2020-11-28 RX ADMIN — SODIUM CHLORIDE, PRESERVATIVE FREE 10 ML: 5 INJECTION INTRAVENOUS at 22:23

## 2020-11-28 RX ADMIN — Medication: at 08:09

## 2020-11-28 RX ADMIN — INSULIN LISPRO 2 UNITS: 100 INJECTION, SOLUTION INTRAVENOUS; SUBCUTANEOUS at 22:19

## 2020-11-28 RX ADMIN — INSULIN LISPRO 3 UNITS: 100 INJECTION, SOLUTION INTRAVENOUS; SUBCUTANEOUS at 08:09

## 2020-11-28 RX ADMIN — IRON SUCROSE 200 MG: 20 INJECTION, SOLUTION INTRAVENOUS at 17:49

## 2020-11-28 RX ADMIN — ATORVASTATIN CALCIUM 40 MG: 40 TABLET, FILM COATED ORAL at 22:17

## 2020-11-28 RX ADMIN — INSULIN GLARGINE 30 UNITS: 100 INJECTION, SOLUTION SUBCUTANEOUS at 22:19

## 2020-11-28 RX ADMIN — FUROSEMIDE 40 MG: 10 INJECTION, SOLUTION INTRAMUSCULAR; INTRAVENOUS at 17:13

## 2020-11-28 RX ADMIN — MICONAZOLE NITRATE: 2 POWDER TOPICAL at 08:09

## 2020-11-28 RX ADMIN — INSULIN LISPRO 3 UNITS: 100 INJECTION, SOLUTION INTRAVENOUS; SUBCUTANEOUS at 12:22

## 2020-11-28 ASSESSMENT — PAIN SCALES - GENERAL: PAINLEVEL_OUTOF10: 0

## 2020-11-28 NOTE — PROGRESS NOTES
Occupational Therapy    Started with OT treatment and lunch was delivered. Pt requested to eat her lunch while it was hot. Retrieved a moises cloth sock aid to practice donning socks. Will attempt back.   Therapy Time     Individual Co-treatment   Time In 1115     Time Out 1130     Minutes Alex 2, [address]

## 2020-11-28 NOTE — PLAN OF CARE
Problem: Skin Integrity:  Goal: Will show no infection signs and symptoms  Description: Will show no infection signs and symptoms  Outcome: Ongoing  Goal: Absence of new skin breakdown  Description: Absence of new skin breakdown  11/28/2020 1646 by Patrica Cramer RN  Outcome: Ongoing  11/28/2020 0251 by Cristina Cruz RN  Outcome: Ongoing     Problem: Falls - Risk of:  Goal: Will remain free from falls  Description: Will remain free from falls  11/28/2020 1646 by Patrica Cramer RN  Outcome: Ongoing  11/28/2020 0251 by Cristina Cruz RN  Outcome: Ongoing  Goal: Absence of physical injury  Description: Absence of physical injury  Outcome: Ongoing     Problem: DAILY CARE  Goal: Daily care needs are met  Outcome: Ongoing     Problem: PAIN  Goal: Patient's pain/discomfort is manageable  Outcome: Ongoing     Problem: KNOWLEDGE DEFICIT  Goal: Patient/S.O. demonstrates understanding of disease process, treatment plan, medications, and discharge instructions.   Outcome: Ongoing     Problem: DISCHARGE BARRIERS  Goal: Patient's continuum of care needs are met  Outcome: Ongoing     Problem: OXYGENATION/RESPIRATORY FUNCTION  Goal: Patient will maintain patent airway  Outcome: Ongoing  Goal: Patient will achieve/maintain normal respiratory rate/effort  Description: Respiratory rate and effort will be within normal limits for the patient  Outcome: Ongoing     Problem: HEMODYNAMIC STATUS  Goal: Patient has stable vital signs and fluid balance  Outcome: Ongoing     Problem: FLUID AND ELECTROLYTE IMBALANCE  Goal: Fluid and electrolyte balance are achieved/maintained  Outcome: Ongoing     Problem: ACTIVITY INTOLERANCE/IMPAIRED MOBILITY  Goal: Mobility/activity is maintained at optimum level for patient  Outcome: Ongoing

## 2020-11-28 NOTE — PROGRESS NOTES
Clinical Pharmacy Note  Warfarin Consult    Elva Gonzalez is a 78 y.o. female receiving warfarin managed by pharmacy. Warfarin Indication: Afib, Factor V Leiden  Target INR range: 2-3   Dose prior to admission: 3 mg Tuesdays and Saturdays, 6 mg all other days     Current warfarin drug-drug interactions: None significant    Recent Labs     11/26/20  0608 11/27/20  0733 11/27/20  0734 11/28/20  0901   HGB 9.7* 10.4*  --  9.8*   HCT 30.7* 33.7*  --  30.5*   INR 2.16*  --  2.66* 2.69*       Assessment/Plan:    Patient's dosing schedule has been erratic due to variable INRs.  Warfarin was on hold 11/19 and 11/20 due to INR=3.3, then resumed at prior home dose of 3 mg Tuesdays and Saturdays, 6 mg all other days.      Patient received a reduced dose of warfarin 1.5mg yesterday, for a rapid increase in INR. We will give warfarin 3mg today and check INR in am.     Thank you for the consult. Will continue to follow.   [place], RPh, 11/28/2020 10:30 AM

## 2020-11-28 NOTE — PROGRESS NOTES
Hospitalist Progress Note      PCP: Radha Rutledge, APRN - CNP    Date of Admission: 11/22/2020    78 y.o. female with significant history of morbid obesity, afib, DVT/PE, factor V, DM, chronic venous stasis dermatitis of bilateral lower extremities, a chronic left DVT for which she takes Coumadin  - p/w c/o 1 month history of shortness of breath and a 2-day history of increased lower extremity weakness and generalized fatigue  - shortness of breath is moderately severe, worse with exertion and improved with rest  - emergency room she was found to have an area vascular congestion and bilateral lower extremity edema. Labs showed ProBNP 1346    Subjective:   Complains of lower extremity pain b/l  Says sob slowly improving      Medications:  Reviewed    Infusion Medications    dextrose       Scheduled Medications    furosemide  40 mg Intravenous BID    metoprolol succinate  25 mg Oral Nightly    insulin lispro  3 Units Subcutaneous TID WC    insulin glargine  30 Units Subcutaneous Nightly    miconazole   Topical BID    atorvastatin  40 mg Oral Nightly    sodium chloride flush  10 mL Intravenous 2 times per day    insulin lispro  0-12 Units Subcutaneous TID WC    insulin lispro  0-6 Units Subcutaneous Nightly    warfarin (COUMADIN) daily dosing (placeholder)   Other RX Placeholder     PRN Meds: ammonium lactate, sodium chloride flush, acetaminophen **OR** acetaminophen, polyethylene glycol, ondansetron, glucose, dextrose, glucagon (rDNA), dextrose      Intake/Output Summary (Last 24 hours) at 11/28/2020 1025  Last data filed at 11/28/2020 0815  Gross per 24 hour   Intake 360 ml   Output 2500 ml   Net -2140 ml       Exam:    /69   Pulse 64   Temp 97.2 °F (36.2 °C) (Oral)   Resp 20   Ht 5' 6.5\" (1.689 m)   Wt (!) 375 lb 14.2 oz (170.5 kg)   SpO2 93%   BMI 59.76 kg/m²     General appearance: No apparent distress, appears stated age and cooperative.   HEENT: Pupils equal, round, and reactive to light. Conjunctivae/corneas clear. Neck: Supple, with full range of motion. No jugular venous distention. Trachea midline. Respiratory:  Normal respiratory effort. Bilateral crackles present at the bases  Cardiovascular: Regular rate and rhythm with normal S1/S2 without murmurs, rubs or gallops. Abdomen: Soft, non-tender, non-distended with normal bowel sounds. Musculoskeletal: Chronic severe venous stasis changes, 2+ edema bilaterally  Neurologic:  Neurovascularly intact without any focal sensory/motor deficits. Cranial nerves: II-XII intact, grossly non-focal.  Psychiatric: Alert and oriented, thought content appropriate, normal insight  Capillary Refill: Brisk,< 3 seconds   Peripheral Pulses: +2 palpable, equal bilaterally       Labs:   Recent Labs     11/26/20  0608 11/27/20  0733 11/28/20  0901   WBC 3.5* 3.3* 2.8*   HGB 9.7* 10.4* 9.8*   HCT 30.7* 33.7* 30.5*    149 162     Recent Labs     11/26/20 0608 11/27/20  0734 11/28/20  0901    133* 133*   K 4.0 4.6 4.0   CL 98* 96* 94*   CO2 30 27 31   BUN 28* 31* 33*   CREATININE 1.1 1.2 1.1   CALCIUM 8.9 8.8 9.0     No results for input(s): AST, ALT, BILIDIR, BILITOT, ALKPHOS in the last 72 hours. Recent Labs     11/26/20 0608 11/27/20  0734 11/28/20  0901   INR 2.16* 2.66* 2.69*     No results for input(s): CKTOTAL, TROPONINI in the last 72 hours. Assessment/Plan:      -Acute on chronic right ventricular systolic heart failure  -Hypotension-on midodrine-resolved  -Essential hypertension--stable--BPs were on the low side  -Persistent atrial fibrillation--stable on metoprolol and Coumadin  -History of DVT/PE, factor V Leiden-on Coumadin. INR is therapeutic  -Diabetes mellitus type 2--on insulin--dose was decreased due to episodes of hypoglycemia  -Hyperlipidemia-on statin  -Chronic venous insufficiency -with stasis dermatitis  - Morbid obesity due to excess calories Body mass index is 59.76 kg/m².  - Complicating assessment and treatment. Placing patient at risk for multiple co-morbidities as well as early death and contributing to the patient's presentation.  -Mild leukopenia  -Anemia; Hgb 12.5 in 5/2010, no other Hgb levels available other than from this admission.  Anemia lawrence was sent +ve for iron deficicency, with low iron sat 9%    Plan  - She diuresed well on Lasix gtt, and changed to Lasix IV bid on 11/27, she is net -7.3 L since admission  - Her renal function continues to be stable, Bicarb stable, Cr at 1.1  - We will continue IV Lasix bid  - Monitor Renal function daily  - Replace electrolytes as needed  -Closely monitor renal function and vital signs  -Maintain strict I's and O's, fluid restriction  -Outpatient sleep study recommended  - she has a foot ulcer, concerning for PAD, will need ISABEL once edema improves  - Continue current insulin regimen, no longer hypoglycemic  - Give Venofer 200 mg x 2 doses    DVT Prophylaxis: Coumadin  Diet: DIET LOW SODIUM 2 GM; Carb Control: 4 carb choices (60 gms)/meal; 1500 ml  Code Status: Full Code    Total time spent in chart review, care of this patient > 45 min  High complexity    Rodolfo Traylor MD   Hospitalist

## 2020-11-28 NOTE — PROGRESS NOTES
Admitted to hospital for CHF. Weights continue daily with fluid loss noted. BLE skin remains red and dry with lachydrin to area as ordered. Antifungal powder to abd folds; patient tolerated well. Able to walk to bathroom with RW and SBA x 1 staff. Continues on oxygen. Up in bedside recliner per patient preference.

## 2020-11-28 NOTE — PROGRESS NOTES
Nutrition Assessment     Type and Reason for Visit: Initial(LOS)    Nutrition Recommendations/Plan:   Continue Low Na, carb control diet with 1500 mL FR. Nutrition Assessment:  LOS. Pt presented with SOB and increased BLE edema. Pt with PMH of DM and new onset of dCHF. Pt wt trending down, on lasix. Pt has had diet education from diabetic educator, HF nurse, and RD during this admission. Pt with good PO intakes since admission. No nutrition interventions at this time. Education handouts are in D/C instructions.     Malnutrition Assessment:  Malnutrition Status: No malnutrition    Nutrition Related Findings: +2/3 BLE, +1 generalized edema; BM 11/27      Current Nutrition Therapies:    DIET LOW SODIUM 2 GM; Carb Control: 4 carb choices (60 gms)/meal; 1500 ml    Anthropometric Measures:  · Height: 5' 6.5\" (168.9 cm)  · Current Body Wt: 375 lb (170.1 kg)   · BMI: 59.6    Nutrition Diagnosis:   No nutrition diagnosis at this time    Nutrition Interventions:   Food and/or Nutrient Delivery:  Continue Current Diet  Nutrition Education/Counseling:  No recommendation at this time   Coordination of Nutrition Care:  Continue to monitor while inpatient    Discharge Planning:    No discharge needs at this time     Electronically signed by Gardenia Wilcox RD, LD on 11/28/20 at 7:14 AM EST    Contact: [phone]

## 2020-11-28 NOTE — PROGRESS NOTES
Occupational Therapy  Facility/Department: [address] [facility]  Daily Treatment Note  NAME: Latricia Dunlap  : 1950  MRN: [id number]    Date of Service: 2020    Discharge Recommendations:  Continue to assess pending progress, Home with Home health OT, Home with assist PRN  OT Equipment Recommendations  Other: TBD - possibly bariatric RW (pt reports having RW walker at home that belonged to daughter in [address] father)    Assessment   Performance deficits / Impairments: Decreased functional mobility ; Decreased endurance;Decreased ADL status; Decreased balance  Assessment: Pt tolerated session well. Pt completed mobility in the room with CGA/SBA using a RW. She was able to complete her HEP with 15 reps. She was encourged to complete later this date and also to complete this evening and also tomorrow. She was able to use the sock aid to john her socks and used the reacher to doff her socks. Her granddaughter was present during the session to observe. Anticipate pt will be able to return home at discharge with assist from family and home OT services. Prognosis: Good  REQUIRES OT FOLLOW UP: Yes  Activity Tolerance  Activity Tolerance: Patient Tolerated treatment well  Safety Devices  Type of devices: Call light within reach; Chair alarm in place; Left in chair;Gait belt         Patient Diagnosis(es): The primary encounter diagnosis was Acute on chronic congestive heart failure, unspecified heart failure type ([address] Diagnoses of Longstanding persistent atrial fibrillation (HCC) and General weakness were also pertinent to this visit.       has a past medical history of Diabetes mellitus type 2 in obese ([address] Edema of both legs, Elevated LFTs, 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[address] Homozygous Factor V Leiden mutation ([address] Hypercholesterolemia, Hypertension, Hypomagnesemia, Low serum HDL, Lower leg DVT (deep venous thromboembolism), chronic, right ([address] Osteoarthritis of cervical spine, Renal failure, acute ([address] and Technique: Ambulating  Equipment Used: Other(recliner)  Level of Asssistance: Stand by assistance     Transfers  Sit to stand: Stand by assistance  Stand to sit: Stand by assistance     Type of ROM/Therapeutic Exercise  Comment: Completed HEP using red theraband with minimal cues to follow the exercises. Pt completed 15 reps of each exercise. Plan   Plan  Times per week: 3-5  Plan weeks: by discharge  Current Treatment Recommendations: Strengthening, Patient/Caregiver Education & Training, Equipment Evaluation, Education, & procurement, Balance Training, Functional Mobility Training, Endurance Training, Safety Education & Training, Self-Care / ADL    AM-PAC Score        AM-[facility] Inpatient Daily Activity Raw Score: 19 (11/28/20 1455)  AM-PAC Inpatient ADL T-Scale Score : 40.22 (11/28/20 1455)  ADL Inpatient CMS 0-100% Score: 42.8 (11/28/20 1455)  ADL Inpatient CMS G-Code Modifier : CK (11/28/20 1455)    Goals  Short term goals  Time Frame for Short term goals: by discharge  Short term goal 1: Pt will complete self care indep with AD  Short term goal 2: Pt will complete fx mobility indep with AD  Short term goal 3: Pt will complete transfers indep with AD  Short term goal 4: Pt will increase activity tolerance to stand for 6 min for ADL task  Short term goal 5: Pt will be indep in HEP for UE strengthening for improved activity tolerance for mobility/ADL participation  Long term goals  Time Frame for Long term goals : LTGs=STGs  Patient Goals   Patient goals : \"to get stronger\"       Therapy Time   Individual Concurrent Group Co-treatment   Time In 1330         Time Out 1430         Minutes 60              This note to serve as OT d/c summary if pt is d/c-ed from hospital prior to next OT session.     Jai Bolivar, [address] [place] [address]

## 2020-11-28 NOTE — PROGRESS NOTES
30 27 31   BUN 28* 31* 33*   CREATININE 1.1 1.2 1.1     LIVR: No results for input(s): AST, ALT in the last 72 hours. INR:    Recent Labs     11/26/20  0608 11/27/20  0734 11/28/20  0901   INR 2.16* 2.66* 2.69*     APTT:   No results for input(s): APTT in the last 72 hours. Results for Jannette Torres (MRN [id number]) as of 11/25/2020 09:18   Ref. Range 11/22/2020 12:22   Pro-BNP Latest Ref Range: 0 - 124 pg/mL 1,346 (H)   Troponin Latest Ref Range: <0.01 ng/mL <0.01     Echo 11/23/2020:  Ejection fraction is visually estimated to be 55-60%. Grade I diastolic dysfunction with normal LV filling pressures. Severly dilated right ventricle. Right ventricular systolic function is mildly reduced. The right atrium is severely dilated. Estimated pulmonary artery systolic pressure is normal at 37 mmHg assuming a   right atrial pressure of 15 mmHg. ECG: Atrial fib with controlled VR      Telemetry: Atrial fib with controlled VR    Assessment/Plan:    1. Acute on chronic RV systolic heart failure  On IV Lasix 40 twice daily   fluid balance: -8700  Blood pressure stable 125/69    2. Atrial fib  -chronic and with controlled VR  -has been on Warfarin (discussed NOAC-she is thinking about it)  -continue Toprol    3. H/O DVT/PE  -on warfarin (considering change to NOAC)  -maintaining therapeutic levels have been an issue    4. Foot ulcer  -suspect underlying PAD (eventual ISABEL once edema improves)  -chronic stasis dermatitis to lwoer extremities    5. Morbid obesity  -BMI 61  -consider outpatient bariatric referral    6.  Anemia  -normocytic  -further eval per Primary team        Electronically signed by Keenan Cárdenas MD on 11/28/2020 at 12:14 PM

## 2020-11-29 LAB
ANION GAP SERPL CALCULATED.3IONS-SCNC: 8 MMOL/L (ref 3–16)
BASOPHILS ABSOLUTE: 0 K/UL (ref 0–0.2)
BASOPHILS RELATIVE PERCENT: 1.5 %
BUN BLDV-MCNC: 31 MG/DL (ref 7–20)
CALCIUM SERPL-MCNC: 9.3 MG/DL (ref 8.3–10.6)
CHLORIDE BLD-SCNC: 93 MMOL/L (ref 99–110)
CO2: 36 MMOL/L (ref 21–32)
CREAT SERPL-MCNC: 1 MG/DL (ref 0.6–1.2)
EOSINOPHILS ABSOLUTE: 0.1 K/UL (ref 0–0.6)
EOSINOPHILS RELATIVE PERCENT: 3.4 %
GFR AFRICAN AMERICAN: >60
GFR NON-AFRICAN AMERICAN: 55
GLUCOSE BLD-MCNC: 110 MG/DL (ref 70–99)
GLUCOSE BLD-MCNC: 124 MG/DL (ref 70–99)
GLUCOSE BLD-MCNC: 128 MG/DL (ref 70–99)
GLUCOSE BLD-MCNC: 140 MG/DL (ref 70–99)
GLUCOSE BLD-MCNC: 166 MG/DL (ref 70–99)
HCT VFR BLD CALC: 32.4 % (ref 36–48)
HEMOGLOBIN: 10.3 G/DL (ref 12–16)
INR BLD: 2.57 (ref 0.86–1.14)
LYMPHOCYTES ABSOLUTE: 0.6 K/UL (ref 1–5.1)
LYMPHOCYTES RELATIVE PERCENT: 21.2 %
MCH RBC QN AUTO: 25.6 PG (ref 26–34)
MCHC RBC AUTO-ENTMCNC: 31.7 G/DL (ref 31–36)
MCV RBC AUTO: 80.8 FL (ref 80–100)
MONOCYTES ABSOLUTE: 0.4 K/UL (ref 0–1.3)
MONOCYTES RELATIVE PERCENT: 15.3 %
NEUTROPHILS ABSOLUTE: 1.5 K/UL (ref 1.7–7.7)
NEUTROPHILS RELATIVE PERCENT: 58.6 %
PDW BLD-RTO: 20 % (ref 12.4–15.4)
PERFORMED ON: ABNORMAL
PLATELET # BLD: 170 K/UL (ref 135–450)
PMV BLD AUTO: 8.2 FL (ref 5–10.5)
POTASSIUM REFLEX MAGNESIUM: 4 MMOL/L (ref 3.5–5.1)
PROTHROMBIN TIME: 30.1 SEC (ref 10–13.2)
RBC # BLD: 4.02 M/UL (ref 4–5.2)
SODIUM BLD-SCNC: 137 MMOL/L (ref 136–145)
WBC # BLD: 2.6 K/UL (ref 4–11)

## 2020-11-29 PROCEDURE — 85610 PROTHROMBIN TIME: CPT

## 2020-11-29 PROCEDURE — 6370000000 HC RX 637 (ALT 250 FOR IP): Performed by: INTERNAL MEDICINE

## 2020-11-29 PROCEDURE — 6370000000 HC RX 637 (ALT 250 FOR IP)

## 2020-11-29 PROCEDURE — 6370000000 HC RX 637 (ALT 250 FOR IP): Performed by: HOSPITALIST

## 2020-11-29 PROCEDURE — 6360000002 HC RX W HCPCS: Performed by: INTERNAL MEDICINE

## 2020-11-29 PROCEDURE — 1200000000 HC SEMI PRIVATE

## 2020-11-29 PROCEDURE — 99233 SBSQ HOSP IP/OBS HIGH 50: CPT | Performed by: INTERNAL MEDICINE

## 2020-11-29 PROCEDURE — 2700000000 HC OXYGEN THERAPY PER DAY

## 2020-11-29 PROCEDURE — 2580000003 HC RX 258: Performed by: INTERNAL MEDICINE

## 2020-11-29 PROCEDURE — 94760 N-INVAS EAR/PLS OXIMETRY 1: CPT

## 2020-11-29 PROCEDURE — 85025 COMPLETE CBC W/AUTO DIFF WBC: CPT

## 2020-11-29 PROCEDURE — 80048 BASIC METABOLIC PNL TOTAL CA: CPT

## 2020-11-29 RX ORDER — WARFARIN SODIUM 3 MG/1
3 TABLET ORAL
Status: COMPLETED | OUTPATIENT
Start: 2020-11-29 | End: 2020-11-29

## 2020-11-29 RX ADMIN — FUROSEMIDE 40 MG: 10 INJECTION, SOLUTION INTRAMUSCULAR; INTRAVENOUS at 08:30

## 2020-11-29 RX ADMIN — ATORVASTATIN CALCIUM 40 MG: 40 TABLET, FILM COATED ORAL at 21:12

## 2020-11-29 RX ADMIN — SODIUM CHLORIDE, PRESERVATIVE FREE 10 ML: 5 INJECTION INTRAVENOUS at 08:38

## 2020-11-29 RX ADMIN — INSULIN LISPRO 3 UNITS: 100 INJECTION, SOLUTION INTRAVENOUS; SUBCUTANEOUS at 12:29

## 2020-11-29 RX ADMIN — FUROSEMIDE 40 MG: 10 INJECTION, SOLUTION INTRAMUSCULAR; INTRAVENOUS at 17:05

## 2020-11-29 RX ADMIN — INSULIN LISPRO 3 UNITS: 100 INJECTION, SOLUTION INTRAVENOUS; SUBCUTANEOUS at 08:31

## 2020-11-29 RX ADMIN — INSULIN LISPRO 3 UNITS: 100 INJECTION, SOLUTION INTRAVENOUS; SUBCUTANEOUS at 17:05

## 2020-11-29 RX ADMIN — WARFARIN SODIUM 3 MG: 3 TABLET ORAL at 17:12

## 2020-11-29 RX ADMIN — METOPROLOL SUCCINATE 25 MG: 25 TABLET, EXTENDED RELEASE ORAL at 21:12

## 2020-11-29 RX ADMIN — MICONAZOLE NITRATE: 2 POWDER TOPICAL at 08:31

## 2020-11-29 RX ADMIN — INSULIN LISPRO 1 UNITS: 100 INJECTION, SOLUTION INTRAVENOUS; SUBCUTANEOUS at 21:12

## 2020-11-29 RX ADMIN — SODIUM CHLORIDE, PRESERVATIVE FREE 10 ML: 5 INJECTION INTRAVENOUS at 21:12

## 2020-11-29 RX ADMIN — Medication: at 08:31

## 2020-11-29 RX ADMIN — IRON SUCROSE 200 MG: 20 INJECTION, SOLUTION INTRAVENOUS at 08:31

## 2020-11-29 RX ADMIN — INSULIN GLARGINE 30 UNITS: 100 INJECTION, SOLUTION SUBCUTANEOUS at 21:12

## 2020-11-29 NOTE — CONSULTS
Pt was already seen by fellow HF RN on 11/25. Please see consult note. HF RN continues to follow. HF dc instructions are on AVS as well as on DEBI as pt follows with [facility]. Pt has a cardiology f/u appt in place on 12/4 at 2:45 pm with Dr Herson Eddy (can push back if needed).

## 2020-11-29 NOTE — PROGRESS NOTES
Hospitalist Progress Note      PCP: Johnnie Heath, APRN - CNP    Date of Admission: 11/22/2020    78 y.o. female with significant history of morbid obesity, afib, DVT/PE, factor V, DM, chronic venous stasis dermatitis of bilateral lower extremities, a chronic left DVT for which she takes Coumadin  - p/w c/o 1 month history of shortness of breath and a 2-day history of increased lower extremity weakness and generalized fatigue  - shortness of breath is moderately severe, worse with exertion and improved with rest  - emergency room she was found to have an area vascular congestion and bilateral lower extremity edema.  Labs showed ProBNP 1346    Subjective:   Continue to feel better, says legs are getting less heavy, sob still the same    Medications:  Reviewed    Infusion Medications    dextrose       Scheduled Medications    iron sucrose  200 mg Intravenous Daily    furosemide  40 mg Intravenous BID    metoprolol succinate  25 mg Oral Nightly    insulin lispro  3 Units Subcutaneous TID WC    insulin glargine  30 Units Subcutaneous Nightly    miconazole   Topical BID    atorvastatin  40 mg Oral Nightly    sodium chloride flush  10 mL Intravenous 2 times per day    insulin lispro  0-12 Units Subcutaneous TID WC    insulin lispro  0-6 Units Subcutaneous Nightly    warfarin (COUMADIN) daily dosing (placeholder)   Other RX Placeholder     PRN Meds: ammonium lactate, sodium chloride flush, acetaminophen **OR** acetaminophen, polyethylene glycol, ondansetron, glucose, dextrose, glucagon (rDNA), dextrose      Intake/Output Summary (Last 24 hours) at 11/29/2020 0729  Last data filed at 11/29/2020 0038  Gross per 24 hour   Intake 840 ml   Output 3900 ml   Net -3060 ml       Exam:    /78   Pulse 72   Temp 97.2 °F (36.2 °C) (Oral)   Resp 16   Ht 5' 6.5\" (1.689 m)   Wt (!) 375 lb 3.6 oz (170.2 kg)   SpO2 98%   BMI 59.66 kg/m²     General appearance: No apparent distress, appears stated age and cooperative. HEENT: Pupils equal, round, and reactive to light. Conjunctivae/corneas clear. Neck: Supple, with full range of motion. No jugular venous distention. Trachea midline. Respiratory:  Normal respiratory effort. Bilateral crackles present at the bases  Cardiovascular: Regular rate and rhythm with normal S1/S2 without murmurs, rubs or gallops. Abdomen: Soft, non-tender, non-distended with normal bowel sounds. Musculoskeletal: Chronic severe venous stasis changes, 2+ edema bilaterally  Neurologic:  Neurovascularly intact without any focal sensory/motor deficits. Cranial nerves: II-XII intact, grossly non-focal.  Psychiatric: Alert and oriented, thought content appropriate, normal insight  Capillary Refill: Brisk,< 3 seconds   Peripheral Pulses: +2 palpable, equal bilaterally       Labs:   Recent Labs     11/27/20 0733 11/28/20 0901   WBC 3.3* 2.8*   HGB 10.4* 9.8*   HCT 33.7* 30.5*    162     Recent Labs     11/27/20 0734 11/28/20 0901   * 133*   K 4.6 4.0   CL 96* 94*   CO2 27 31   BUN 31* 33*   CREATININE 1.2 1.1   CALCIUM 8.8 9.0     No results for input(s): AST, ALT, BILIDIR, BILITOT, ALKPHOS in the last 72 hours. Recent Labs     11/27/20 0734 11/28/20 0901   INR 2.66* 2.69*     No results for input(s): CKTOTAL, TROPONINI in the last 72 hours. Assessment/Plan:      -Acute on chronic right ventricular systolic heart failure  -Hypotension-on midodrine-resolved  -Essential hypertension--stable--BPs were on the low side  -Persistent atrial fibrillation--stable on metoprolol and Coumadin  -History of DVT/PE, factor V Leiden-on Coumadin. INR is therapeutic  -Diabetes mellitus type 2--on insulin--dose was decreased due to episodes of hypoglycemia  -Hyperlipidemia-on statin  -Chronic venous insufficiency -with stasis dermatitis  - Morbid obesity due to excess calories Body mass index is 59.66 kg/m². - Complicating assessment and treatment.  Placing patient at risk for multiple co-morbidities as well as early death and contributing to the patient's presentation.  -Mild leukopenia  -Anemia; Hgb 12.5 in 5/2010, no other Hgb levels available other than from this admission.  Anemia lawrence was sent +ve for iron deficicency, with low iron sat 9%    Plan  - She diuresed well on Lasix gtt, and changed to Lasix IV bid on 11/27, she is net -10 L since admission  -Cr is stable, continue IV Lasix bid  - Monitor Renal function daily  - Replace electrolytes as needed  - Closely monitor renal function and vital signs  - Apply ACE wraps bilaterally  - Dietician consult for low Na/cardiac diet teaching  - HF clinic followup on dc  - Maintain strict I's and O's, fluid restriction  - Outpatient sleep study recommended  - she has a foot ulcer, concerning for PAD, will need ISABEL once edema improves  - Continue current insulin regimen, no longer hypoglycemic  - Give Venofer 200 mg x 3 doses (last dose Monday 11/30)    DVT Prophylaxis: Coumadin  Diet: DIET LOW SODIUM 2 GM; Carb Control: 4 carb choices (60 gms)/meal; 1500 ml  Code Status: Full Code      Kaelyn Barnard MD   Hospitalist

## 2020-11-29 NOTE — PLAN OF CARE
Problem: Skin Integrity:  Goal: Will show no infection signs and symptoms  Description: Will show no infection signs and symptoms  Outcome: Ongoing  Note: BLE remain red with lachydrin applied per orders; patient tolerated well. Continue with current skin treatments as ordered. Problem: Falls - Risk of:  Goal: Will remain free from falls  Description: Will remain free from falls  Outcome: Ongoing  Note: Risk assessment complete with interventions in place. Patient compliant with staff assist and call light use. Call light and needed items within reach.

## 2020-11-29 NOTE — PROGRESS NOTES
Dr. Lisa Bates in with new orders noted. Ace wrap compression applied to BLE. Patient tolerated well and verbalized she was happy for the compression and stated that they felt good on her legs.

## 2020-11-29 NOTE — PROGRESS NOTES
Prat[facility]   Daily Progress Note      Admit Date:  11/22/2020    Reason for follow up visit: R heart failure    CC: \"I'm feeling a little better, a long way to go. \"    77 y/o female with PMH significant for morbid obesity, atrial fib (chronic), DVT/PE, factor V, diabetes mellitus and marked mobility issues who was admitted with bilateral LE edema and increasing KENNEDY. She does not follow up regularly with physicians and presented with worsening symptoms. She was placed on IV lasix infusion on 11/24/2020. Interval History:  On Lasix IV twice daily  Fluid balance: -12 L      Objective:   /78   Pulse 72   Temp 97.2 °F (36.2 °C) (Oral)   Resp 16   Ht 5' 6.5\" (1.689 m)   Wt (!) 375 lb 3.6 oz (170.2 kg)   SpO2 98%   BMI 59.66 kg/m²       Intake/Output Summary (Last 24 hours) at 11/29/2020 0747  Last data filed at 11/29/2020 0038  Gross per 24 hour   Intake 840 ml   Output 3500 ml   Net -2660 ml     Wt Readings from Last 3 Encounters:   11/29/20 (!) 375 lb 3.6 oz (170.2 kg)   04/24/20 (!) 310 lb (140.6 kg)   09/23/19 (!) 318 lb (144.2 kg)       Physical Exam:  General: In no acute distress. Awake, alert, and oriented X4. Up in chair. Skin:  Warm and dry. No new appearing rashes or lesions. Neck:  Supple and thick. JVD hard to ascertain d/t body habitus  Chest: Lungs clear with decreased breath sound bilaterally. No wheezes/rhonchi/rales  Cardiovascular:  Irreg; normal S1 and S2; II/VI FLEX   Abdomen: Morbidly obese, soft, nontender, +bowel sounds. Extremities:   3-4+ edema to lower extremities; diffuse erythema to lower legs bilaterally. Appearance of few healed venous ulcers. + chronic stasis changes with thickening of skin on lower legs.        CBC:   Recent Labs     11/27/20  0733 11/28/20  0901   WBC 3.3* 2.8*   HGB 10.4* 9.8*    162     BMP:    Recent Labs     11/27/20  0734 11/28/20  0901   * 133*   K 4.6 4.0   CO2 27 31   BUN 31* 33*   CREATININE 1.2 1.1     LIVR: No results for input(s): AST, ALT in the last 72 hours. INR:    Recent Labs     11/27/20  0734 11/28/20  0901   INR 2.66* 2.69*     APTT:   No results for input(s): APTT in the last 72 hours. Results for Magno Thayer (MRN [id number]) as of 11/25/2020 09:18   Ref. Range 11/22/2020 12:22   Pro-BNP Latest Ref Range: 0 - 124 pg/mL 1,346 (H)   Troponin Latest Ref Range: <0.01 ng/mL <0.01     Echo 11/23/2020:  Ejection fraction is visually estimated to be 55-60%. Grade I diastolic dysfunction with normal LV filling pressures. Severly dilated right ventricle. Right ventricular systolic function is mildly reduced. The right atrium is severely dilated. Estimated pulmonary artery systolic pressure is normal at 37 mmHg assuming a  right atrial pressure of 15 mmHg. ECG: Atrial fib with controlled VR      Assessment/Plan:    1. Acute on chronic RV systolic heart failure  On IV Lasix 40 twice daily   fluid balance: -12L    2. Atrial fib  -chronic and with controlled VR  -has been on Warfarin (discussed NOAC-she is thinking about it)  -continue Toprol    3. H/O DVT/PE  -on warfarin (considering change to NOAC)  -maintaining therapeutic levels have been an issue    4. Foot ulcer  -suspect underlying PAD (eventual ISABEL once edema improves)  -chronic stasis dermatitis to lwoer extremities    5. Morbid obesity  -BMI 61  -consider outpatient bariatric referral    6.  Anemia  -normocytic  -further eval per Primary team        Electronically signed by Yoanna Kumar MD on 11/29/2020 at 7:47 AM

## 2020-11-29 NOTE — PROGRESS NOTES
Clinical Pharmacy Note  Warfarin Consult    Elier Sigala is a 78 y.o. female receiving warfarin managed by pharmacy. Warfarin Indication: Afib, Factor V Leiden  Target INR range: 2-3   Dose prior to admission: 3 mg Tuesdays and Saturdays, 6 mg all other days     Current warfarin drug-drug interactions: None significant    Recent Labs     11/27/20  0733 11/27/20  0734 11/28/20  0901 11/29/20  1205   HGB 10.4*  --  9.8* 10.3*   HCT 33.7*  --  30.5* 32.4*   INR  --  2.66* 2.69* 2.57*       Assessment/Plan:    Patient's dosing schedule has been erratic due to variable INRs.  Warfarin was on hold 11/19 and 11/20 due to INR=3.3, then resumed at prior home dose of 3 mg Tuesdays and Saturdays, 6 mg all other days.      Patient continues with  3+ edema to lower extremities. Will give warfarin 3mg again this evening and watch INR carefully. Of note, cardiology discussed possible switch to NOAC. Patient is thinking about this. Thank you for the consult. Will continue to follow.   Christal Williamson, 11/29/2020 12:47 PM

## 2020-11-30 LAB
ANION GAP SERPL CALCULATED.3IONS-SCNC: 10 MMOL/L (ref 3–16)
BASOPHILS ABSOLUTE: 0 K/UL (ref 0–0.2)
BASOPHILS RELATIVE PERCENT: 0.9 %
BUN BLDV-MCNC: 26 MG/DL (ref 7–20)
CALCIUM SERPL-MCNC: 9.3 MG/DL (ref 8.3–10.6)
CHLORIDE BLD-SCNC: 94 MMOL/L (ref 99–110)
CO2: 34 MMOL/L (ref 21–32)
CREAT SERPL-MCNC: 0.9 MG/DL (ref 0.6–1.2)
EOSINOPHILS ABSOLUTE: 0.1 K/UL (ref 0–0.6)
EOSINOPHILS RELATIVE PERCENT: 3.8 %
GFR AFRICAN AMERICAN: >60
GFR NON-AFRICAN AMERICAN: >60
GLUCOSE BLD-MCNC: 104 MG/DL (ref 70–99)
GLUCOSE BLD-MCNC: 114 MG/DL (ref 70–99)
GLUCOSE BLD-MCNC: 140 MG/DL (ref 70–99)
GLUCOSE BLD-MCNC: 190 MG/DL (ref 70–99)
GLUCOSE BLD-MCNC: 95 MG/DL (ref 70–99)
HCT VFR BLD CALC: 32.5 % (ref 36–48)
HEMOGLOBIN: 10.2 G/DL (ref 12–16)
INR BLD: 2.39 (ref 0.86–1.14)
LYMPHOCYTES ABSOLUTE: 1 K/UL (ref 1–5.1)
LYMPHOCYTES RELATIVE PERCENT: 33.3 %
MCH RBC QN AUTO: 25.3 PG (ref 26–34)
MCHC RBC AUTO-ENTMCNC: 31.5 G/DL (ref 31–36)
MCV RBC AUTO: 80.2 FL (ref 80–100)
MONOCYTES ABSOLUTE: 0.5 K/UL (ref 0–1.3)
MONOCYTES RELATIVE PERCENT: 16.5 %
NEUTROPHILS ABSOLUTE: 1.3 K/UL (ref 1.7–7.7)
NEUTROPHILS RELATIVE PERCENT: 45.5 %
PDW BLD-RTO: 19.8 % (ref 12.4–15.4)
PERFORMED ON: ABNORMAL
PLATELET # BLD: 182 K/UL (ref 135–450)
PMV BLD AUTO: 8.3 FL (ref 5–10.5)
POTASSIUM REFLEX MAGNESIUM: 3.9 MMOL/L (ref 3.5–5.1)
PROTHROMBIN TIME: 28 SEC (ref 10–13.2)
RBC # BLD: 4.05 M/UL (ref 4–5.2)
SODIUM BLD-SCNC: 138 MMOL/L (ref 136–145)
WBC # BLD: 2.9 K/UL (ref 4–11)

## 2020-11-30 PROCEDURE — 97530 THERAPEUTIC ACTIVITIES: CPT

## 2020-11-30 PROCEDURE — 94760 N-INVAS EAR/PLS OXIMETRY 1: CPT

## 2020-11-30 PROCEDURE — 1200000000 HC SEMI PRIVATE

## 2020-11-30 PROCEDURE — 2580000003 HC RX 258: Performed by: INTERNAL MEDICINE

## 2020-11-30 PROCEDURE — 85610 PROTHROMBIN TIME: CPT

## 2020-11-30 PROCEDURE — 97535 SELF CARE MNGMENT TRAINING: CPT

## 2020-11-30 PROCEDURE — 80048 BASIC METABOLIC PNL TOTAL CA: CPT

## 2020-11-30 PROCEDURE — 97116 GAIT TRAINING THERAPY: CPT | Performed by: PHYSICAL THERAPIST

## 2020-11-30 PROCEDURE — 85025 COMPLETE CBC W/AUTO DIFF WBC: CPT

## 2020-11-30 PROCEDURE — 6370000000 HC RX 637 (ALT 250 FOR IP): Performed by: INTERNAL MEDICINE

## 2020-11-30 PROCEDURE — 6370000000 HC RX 637 (ALT 250 FOR IP): Performed by: HOSPITALIST

## 2020-11-30 PROCEDURE — 6360000002 HC RX W HCPCS: Performed by: INTERNAL MEDICINE

## 2020-11-30 PROCEDURE — 99233 SBSQ HOSP IP/OBS HIGH 50: CPT | Performed by: INTERNAL MEDICINE

## 2020-11-30 PROCEDURE — 97530 THERAPEUTIC ACTIVITIES: CPT | Performed by: PHYSICAL THERAPIST

## 2020-11-30 PROCEDURE — 36415 COLL VENOUS BLD VENIPUNCTURE: CPT

## 2020-11-30 RX ORDER — WARFARIN SODIUM 3 MG/1
6 TABLET ORAL
Status: COMPLETED | OUTPATIENT
Start: 2020-11-30 | End: 2020-11-30

## 2020-11-30 RX ADMIN — METOPROLOL SUCCINATE 25 MG: 25 TABLET, EXTENDED RELEASE ORAL at 21:23

## 2020-11-30 RX ADMIN — FUROSEMIDE 40 MG: 10 INJECTION, SOLUTION INTRAMUSCULAR; INTRAVENOUS at 08:02

## 2020-11-30 RX ADMIN — SODIUM CHLORIDE, PRESERVATIVE FREE 10 ML: 5 INJECTION INTRAVENOUS at 08:03

## 2020-11-30 RX ADMIN — ACETAMINOPHEN 650 MG: 325 TABLET ORAL at 21:23

## 2020-11-30 RX ADMIN — FUROSEMIDE 40 MG: 10 INJECTION, SOLUTION INTRAMUSCULAR; INTRAVENOUS at 17:44

## 2020-11-30 RX ADMIN — INSULIN LISPRO 1 UNITS: 100 INJECTION, SOLUTION INTRAVENOUS; SUBCUTANEOUS at 21:32

## 2020-11-30 RX ADMIN — INSULIN GLARGINE 30 UNITS: 100 INJECTION, SOLUTION SUBCUTANEOUS at 21:32

## 2020-11-30 RX ADMIN — WARFARIN SODIUM 6 MG: 3 TABLET ORAL at 17:44

## 2020-11-30 RX ADMIN — ATORVASTATIN CALCIUM 40 MG: 40 TABLET, FILM COATED ORAL at 21:24

## 2020-11-30 RX ADMIN — MICONAZOLE NITRATE: 2 POWDER TOPICAL at 08:08

## 2020-11-30 RX ADMIN — INSULIN LISPRO 3 UNITS: 100 INJECTION, SOLUTION INTRAVENOUS; SUBCUTANEOUS at 08:02

## 2020-11-30 RX ADMIN — INSULIN LISPRO 3 UNITS: 100 INJECTION, SOLUTION INTRAVENOUS; SUBCUTANEOUS at 11:57

## 2020-11-30 RX ADMIN — MICONAZOLE NITRATE: 2 POWDER TOPICAL at 21:24

## 2020-11-30 RX ADMIN — SODIUM CHLORIDE, PRESERVATIVE FREE 10 ML: 5 INJECTION INTRAVENOUS at 21:33

## 2020-11-30 RX ADMIN — INSULIN LISPRO 2 UNITS: 100 INJECTION, SOLUTION INTRAVENOUS; SUBCUTANEOUS at 16:49

## 2020-11-30 RX ADMIN — INSULIN LISPRO 3 UNITS: 100 INJECTION, SOLUTION INTRAVENOUS; SUBCUTANEOUS at 16:49

## 2020-11-30 ASSESSMENT — PAIN DESCRIPTION - LOCATION: LOCATION: LEG

## 2020-11-30 ASSESSMENT — PAIN SCALES - GENERAL
PAINLEVEL_OUTOF10: 0
PAINLEVEL_OUTOF10: 5

## 2020-11-30 ASSESSMENT — PAIN DESCRIPTION - PROGRESSION: CLINICAL_PROGRESSION: GRADUALLY IMPROVING

## 2020-11-30 ASSESSMENT — PAIN DESCRIPTION - ORIENTATION: ORIENTATION: RIGHT

## 2020-11-30 ASSESSMENT — PAIN - FUNCTIONAL ASSESSMENT: PAIN_FUNCTIONAL_ASSESSMENT: PREVENTS OR INTERFERES SOME ACTIVE ACTIVITIES AND ADLS

## 2020-11-30 ASSESSMENT — PAIN DESCRIPTION - FREQUENCY: FREQUENCY: CONTINUOUS

## 2020-11-30 ASSESSMENT — PAIN DESCRIPTION - DESCRIPTORS: DESCRIPTORS: ACHING

## 2020-11-30 ASSESSMENT — PAIN DESCRIPTION - PAIN TYPE: TYPE: ACUTE PAIN

## 2020-11-30 ASSESSMENT — PAIN DESCRIPTION - ONSET: ONSET: ON-GOING

## 2020-11-30 NOTE — CARE COORDINATION
[facility]  Diabetes Education   Progress Note       NAME:  Lien Villalobos  MEDICAL RECORD NUMBER:  [id number]  AGE: 78 y.o. GENDER: female  : 1950  TODAY'S DATE:  2020    Subjective   Reason for Diabetic Education Evaluation and Assessment: general diabetes education     Beverly Hardin is requiring less insulin in the hospital than at home. She attributes it to eating \"differnet\". She is interested in reviewing \"what to eat\".       Visit Type: follow-up      Lien Villalobos is a 78 y.o. female referred by:     [] Physician  [] Nursing  [x] Chart Review   [] Other:     PAST MEDICAL HISTORY        Diagnosis Date    Diabetes mellitus type 2 in obese [facility] 2010    Edema of both legs     Elevated LFTs     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[facility] 2018    19    Homozygous Factor V Leiden mutation ([facility] 1950    Hypercholesterolemia     Hypertension     Hypomagnesemia     Low serum HDL     Lower leg DVT (deep venous thromboembolism), chronic, right (HCC)     Osteoarthritis of cervical spine 2010    Facet arthropathy, spondylosis    Renal failure, acute (HCC)     Due to sepsis    Vitamin D deficiency 2018    13.2       PAST SURGICAL HISTORY    Past Surgical History:   Procedure Laterality Date    APPENDECTOMY  1976     SECTION      3     CHOLECYSTECTOMY  2002    gangrenous    COLONOSCOPY  2014    no polyp    TUBAL LIGATION Bilateral 1980       FAMILY HISTORY    Family History   Problem Relation Age of Onset    Kidney Disease Mother         kidney stones - larger    Coronary Art Dis Father     Heart Surgery Father 48    Hypertension Father     Heart Attack Father     COPD Sister         smoker    Diabetes Sister     Diabetes Brother     Clotting Disorder Son         Factor V Leiden heterozygote    Other Son         GERD, gout, MRSA, homocystinemia    Hypertension Son    Noemi Her High Cholesterol Son    Noemi Her Other Sister carotid stenosis       SOCIAL HISTORY    Social History     Tobacco Use    Smoking status: Never Smoker    Smokeless tobacco: Never Used   Substance Use Topics    Alcohol use: Yes     Comment: OCCASIONALLY 1 or less/1-2 wk    Drug use: No     Comment: No Mj experimentation       ALLERGIES    Allergies   Allergen Reactions    Nickel Nausea Only and Rash    Ibuprofen      Pt takes Warfarin       MEDICATIONS     warfarin  6 mg Oral Once    furosemide  40 mg Intravenous BID    metoprolol succinate  25 mg Oral Nightly    insulin lispro  3 Units Subcutaneous TID WC    insulin glargine  30 Units Subcutaneous Nightly    miconazole   Topical BID    atorvastatin  40 mg Oral Nightly    sodium chloride flush  10 mL Intravenous 2 times per day    insulin lispro  0-12 Units Subcutaneous TID WC    insulin lispro  0-6 Units Subcutaneous Nightly    warfarin (COUMADIN) daily dosing (placeholder)   Other RX Placeholder       Objective        Patient Active Problem List   Diagnosis Code    DMII (diabetes mellitus, type 2) ([facility]) E11.9    Essential hypertension I10    Hypercholesterolemia E78.00    Lower leg DVT (deep venous thromboembolism), chronic, right ([facility]) I82.5Z1    Edema of both legs R60.0    Encounter for current long-term use of anticoagulants Z79.01    Homozygous Factor [facility] Leiden mutation ([facility] D68.51    Low serum HDL R74.8    Elevated LFTs R79.89    Hypomagnesemia E83.42    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[facility]) E72.11    Vitamin D deficiency E55.9    CHF with unknown LVEF ([facility]) I50.9    Generalized weakness R53.1    Pulmonary vascular congestion R09.89    Chronic venous stasis dermatitis of both lower extremities I87.2    Morbid obesity due to excess calories ([facility]) E66.01    Shortness of breath R06.02    Persistent atrial fibrillation ([facility]) I48.19    RVF (right ventricular failure) ([facility]) I50.810    Anemia D64.9        /74   Pulse 68   Temp 97.4 °F (36.3 °C) (Oral)   Resp 18   Ht 5' 6.5\" (1.689 m)   Wt (!) 373 lb 0.3 oz (169.2 kg)   SpO2 98%   BMI 59.30 kg/m²     HgBA1c:    Lab Results   Component Value Date    LABA1C 6.2 09/25/2020       Recent Labs     11/29/20  1603 11/29/20  2043 11/30/20  0716 11/30/20  1128   POCGLU 140* 166* 104* 114*       BUN/Creatinine:    Lab Results   Component Value Date    BUN 26 11/30/2020    CREATININE 0.9 11/30/2020       Assessment        Diabetes Management and Education    Does the patient have a Primary Care Physician? Yes, Daryle Perone, APRN - CNP       Does the patient require new medication instruction? Yes, Vaishali Tereso describes a very limited site rotation plan. \"I have my high site and low site. \"       Person responsible for administration of Insulin/Medication:        [x] Self     [] Caregiver       [] Spouse       [] Other Family Member   []  Other    Injection Site:   [x] location    [x] rotation   Recommend expanding sites. Level of patient/caregiver understanding able to:       [x] Verbalized Understanding   [] Demonstrated Understanding       [] Teach Back       [] Needs Reinforcement     []  Other:        Does the patient/caregiver monitor Blood Glucoses? Yes  Reviewed glycemic control targets, testing frequency and when to call PCP. Level of patient/caregiver understanding able to:        [x] Verbalized Understanding   [] Demonstrated Understanding       [] Teach Back       [] Needs Reinforcement     []  Other:        Does the patient/caregiver follow a Meal Plan? No:    Reviewed importance of eating three meals per day and plate method for consistent carb intake. Identified carb containing foods. Reviewed fluid restriction. Reviewed low sodium guidelines. Level of patient/caregiver understanding able to:       [] Verbalized Understanding   [] Demonstrated Understanding       [] Teach Back       [x] Needs Reinforcement     []  Other:      Does the patient/caregiver understand S/S of Hypoglycemia?   No: Reports waking to BG as low as 58. Reviewed symptoms, prevention and treatment. Level of patient/caregiver understanding able to:       [x] Verbalized Understanding   [] Demonstrated Understanding       [] Teach Back       [] Needs Reinforcement     []  Other:                    Does the patient/caregiver understand S/S of Hyperglycemia? Yes    Reviewed symptoms, prevention and treatment. Level of patient/caregiver understanding able to:        [x] Verbalized Understanding   [] Demonstrated Understanding       [] Teach Back       [] Needs Reinforcement     []  Other:           Plan        Ongoing diabetes education and blood glucose monitoring.       The following educational and support materials were provided:  · My contact information   · The Diabetes Education Program:  Planning Healthy Meals   · Academy of Nutrition and Dietetics handout - Carbohydrate Counting for People with Diabetes  · Blood Glucose Log Book                                           Discharge Plan:  Discharge needs: no prescription needs identified       Teaching Time Diabetes Education:  50 minutes     Electronically signed by Natacha Botello on 11/30/2020 at 3:18 PM

## 2020-11-30 NOTE — PROGRESS NOTES
Occupational Therapy  Facility/Department: [address] IP REHAB  Daily Treatment Note  NAME: Xiomara Garcia  : 1950  MRN: [id number]    Date of Service: 2020    Discharge Recommendations:  Continue to assess pending progress, Home with Home health OT, Home with assist PRN  OT Equipment Recommendations  Other: TBD - possibly bariatric RW (pt reports having RW walker at home that belonged to daughter in [address] father)     Xiomara Garcia scored a 19/24 on the AM-PAC ADL Inpatient form. Current research shows that an AM-PAC score of 18 or greater is typically associated with a discharge to the patient's home setting. Based on the patient's AM-PAC score, and their current ADL deficits, it is recommended that the patient have 2-3 sessions per week of Occupational Therapy at d/c to increase the patient's independence. At this time, this patient demonstrates the endurance and safety to discharge home with home OT and a follow up treatment frequency of 2-3x/wk. Please see assessment section for further patient specific details. If patient discharges prior to next session this note will serve as a discharge summary. Please see below for the latest assessment towards goals. Assessment   Performance deficits / Impairments: Decreased functional mobility ; Decreased endurance;Decreased ADL status; Decreased balance  Assessment: Pt completed shower this morning to evaluate self care progress. Pt able to complete fx mobility and transfers with SBA using RW. Pt required up to min assist for bathing using long handled sponge, Min A to don socks using moises cloth sock aid, possibly due to increased fatigue after completing shower as well as pain in R LE. Pt very tearful after shower when reporting she does not feel she is at baseline with her ability to complete self care at this time.  Consoled pt and provided encouragement of progress she is making as well as current limitations at this time (pain, fatigue, activity tolerance). Assured pt that therapy will continue to work with her on acute to continue to improve activity tolerance for mobiliy ADL participation. Anticipate pt will be able to return home at discharge with assist from family and home OT. Prognosis: Good  OT Education: ADL Adaptive Strategies; Equipment  Patient Education: sock aid, reacher  REQUIRES OT FOLLOW UP: Yes  Activity Tolerance  Activity Tolerance: Patient limited by fatigue;Patient limited by pain  Activity Tolerance: Pt tolerated entire session with rest breaks and increased time to complete tasks. Pt very tearful and required cues for encouragement. Safety Devices  Safety Devices in place: Yes  Type of devices: Call light within reach; Chair alarm in place; Left in chair;Gait belt;Nurse notified(notified pt nurse Evelyn Morales) to replace telemetry and don ACE wraps)         Patient Diagnosis(es): The primary encounter diagnosis was Acute on chronic congestive heart failure, unspecified heart failure type ([facility] Diagnoses of Longstanding persistent atrial fibrillation (HCC) and General weakness were also pertinent to this visit. has a past medical history of Diabetes mellitus type 2 in obese ([address] Edema of both legs, Elevated LFTs, 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[address] Homozygous Factor V Leiden mutation ([facility] Hypercholesterolemia, Hypertension, Hypomagnesemia, Low serum HDL, Lower leg DVT (deep venous thromboembolism), chronic, right ([address] Osteoarthritis of cervical spine, Renal failure, acute ([address] and Vitamin D deficiency. has a past surgical history that includes Cholecystectomy ();  section; Appendectomy (1976); Tubal ligation (Bilateral, 1980); and Colonoscopy (2014).     Restrictions  Restrictions/Precautions  Restrictions/Precautions: Fall Risk  Position Activity Restriction  Other position/activity restrictions: 2L O2, telemetry, IV access in R arm  Subjective   General  Chart Reviewed: Yes, Progress Notes  Patient assessed for rehabilitation services?: Yes  Additional Pertinent Hx: Per MIRTA Cherry 11/22 \"Sheila Dailey is a 78 y.o. female who presents here to the emergency department, the patient states that she recently stayed at her daughter-in-law's house, and 2 days ago woke up and felt profoundly weak in her legs, and her legs seem to be more swollen. She states that she has had shortness of breath for about 1 month, she does have a history of peripheral edema, and DVT, A. fib, it is currently on Coumadin, but states that something feels very different. She has increased shortness of breath and weakness. She rates her pain level to her legs as 4/10. \"  Family / Caregiver Present: No  Referring Practitioner: Roni  Diagnosis: CHF with unknown LVEF  Subjective  Subjective: Pt met bedside, agreeable to OT and shower, reported feeling tired this morning. Per RN, okay to remove heart monitor for shower and nurse would replace after shower. General Comment  Comments: . Objective    ADL  Equipment Provided: Reacher;Sock aid;Long-handled sponge  Grooming: Setup(pt combed hair seated in recliner)  UE Bathing: min assist(seated in shower chair, assisted to fully dry under abdominal folds - left wash cloths to absorb moisture - nursing stated she would remove and place powder in folds)  LE Bathing: Min assist(Pt washed/dried LE and feet seated with AD. Pt washed shashi areas seated and dried shashi areas/buttocks standing with SBA. Min  assist for thoroughness to dry buttocks. Pt reports not able to wash private areas/crevices as thoroughly, but declined assist.)  UE Dressing: Setup(pt donned clean hospital gown with set up)  LE Dressing: Minimal assistance(Pt able to set up sock aid, but required assist to place both feet. Educated on widening sock aid to place foot. Pt donned L sock, but required assist to pull R sock up over heel.   Did not don any further LB clothing)  Toileting: Stand by assistance(Pt managed gown per self and urinated on commode)  Additional Comments: Wet towel for non skid surface. IV site covered. Telemetry removed, approved by RN. RN notified after shower to replace telemetry and don ACE wraps. Pt very fatigued after ADLs and tearful about level of assist required at this time to complete self care. Functional Mobility  Functional - Mobility Device: Rolling Walker  Activity: To/from bathroom  Assist Level: Stand by assistance  Functional Mobility Comments: Pt ambulated to/from bathroom with SBA and RW. Assist to manage 02. Toilet Transfers  Toilet - Technique: Ambulating  Equipment Used: Grab bars  Toilet Transfer: Stand by assistance  Toilet Transfers Comments: Pt ambulated to/from toilet using RW and SBA. Pt used both grab bars to assist in transfer. Shower Transfers  Shower - Transfer Type: To and From  Shower - Transfer To: Shower seat with back  Shower - Technique: Ambulating  Shower Transfers: Stand by assistance  Shower Transfers Comments: Pt completed transfer with RW and SBA. Cues for hand placement on grab bars to complete transfer and required assist to place L UE on front GB in shower. Pt able to complete with increased time. Wheelchair Bed Transfers  Wheelchair/Bed - Technique: Ambulating  Equipment Used: Other(recliner)  Level of Asssistance: Stand by assistance  Wheelchair Transfers Comments: Pt completed transfer with SBA and RW.                                                                        Plan   Plan  Times per week: 3-5  Plan weeks: by discharge  Current Treatment Recommendations: Strengthening, Patient/Caregiver Education & Training, Equipment Evaluation, Education, & procurement, Balance Training, Functional Mobility Training, Endurance Training, Safety Education & Training, Self-Care / ADL                                                  AM-PAC Score        AM-[facility] Inpatient Daily Activity Raw Score: 19 (11/30/20 1151)  AM-PAC Inpatient ADL T-Scale Score : 40.22 (11/30/20 1151)  ADL Inpatient CMS 0-100% Score: 42.8 (11/30/20 1151)  ADL Inpatient CMS G-Code Modifier : CK (11/30/20 1151)    Goals  Short term goals  Time Frame for Short term goals: by discharge  Short term goal 1: Pt will complete self care indep with AD  Short term goal 2: Pt will complete fx mobility indep with AD  Short term goal 3: Pt will complete transfers indep with AD  Short term goal 4: Pt will increase activity tolerance to stand for 6 min for ADL task  Short term goal 5: Pt will be indep in HEP for UE strengthening for improved activity tolerance for mobility/ADL participation  Long term goals  Time Frame for Long term goals : LTGs=STGs  Patient Goals   Patient goals : \"to get stronger\"       Therapy Time   Individual Concurrent Group Co-treatment   Time In 1000         Time Out 1130         Minutes 90         Timed Code Treatment Minutes: 90 Minutes      Therapist was present, directed the patient's care, made skilled judgement, and was responsible for assessment and treatment of the patient.           WILFRID Yao OTR/L  #[id number] 11/30/20 12:27 PM

## 2020-11-30 NOTE — CARE COORDINATION
LUCILLEW reviewed chart. LSW spoke with patient over the phone who reports she is going to get a shower. Her plan continues to be to return home with home care and she confirmed she does want to use [facility] or [facility] as home care options. Social Work Team following for Carmelo.   Case Oshea     Case Management   [phone]    11/30/2020  10:16 AM

## 2020-11-30 NOTE — PROGRESS NOTES
Pt up in chair. A&Ox4. No complaints overnight. Fall precautions in place, call light and bedside table within reach.

## 2020-11-30 NOTE — PLAN OF CARE
Problem: Falls - Risk of:  Goal: Will remain free from falls  Description: Will remain free from falls  11/26/2020 1616 by Catrachita Trevizo RN  Outcome: Ongoing  Note: Patient free of falls this shift, all safety precautions in place. Problem: Falls - Risk of:  Goal: Absence of physical injury  Description: Absence of physical injury  11/26/2020 1616 by Catrachita Trevizo RN  Outcome: Ongoing  Note: All safety precautions in place including nonskid socks on feet, bed exit alarm on and posey clip attached to patient when in chair, phones and call light in reach, will continue to monitor. Problem: DAILY CARE  Goal: Daily care needs are met  11/26/2020 1616 by Catrachita Trevizo RN  Outcome: Ongoing  Note: Participates in hourly rounding, states needs as they arise by using call light appropriately, and does call in advance of needs. Problem: PAIN  Goal: Patient's pain/discomfort is manageable  11/26/2020 1616 by Catrachita Trevizo RN  Outcome: Ongoing  Note: Patient uses pain scale appropriately. Problem: KNOWLEDGE DEFICIT  Goal: Patient/S.O. demonstrates understanding of disease process, treatment plan, medications, and discharge instructions. 11/26/2020 1616 by Catrachita Trevizo RN  Outcome: Ongoing  Note: Teaching performed pertaining to safety, medications and transfers and documented in chart. Problem: DISCHARGE BARRIERS  Goal: Patient's continuum of care needs are met  11/26/2020 1616 by Catrachita Trevizo RN  Outcome: Ongoing  Note: Discharge planning started upon admission,  consult in place. Problem: OXYGENATION/RESPIRATORY FUNCTION  Goal: Patient will maintain patent airway  11/26/2020 1616 by Catrachita Trevizo RN  Outcome: Ongoing  Note: Pt repositioned every 2 hours for maximum patent airway, respiratory assessment performed and charted. I&O recorded each shift.       Problem: OXYGENATION/RESPIRATORY FUNCTION  Goal: Patient will achieve/maintain normal respiratory rate/effort  Description: Respiratory rate and effort will be within normal limits for the patient  11/26/2020 1616 by Sophia Hendrickson RN  Outcome: Ongoing  Note: O2 per order, respiratory assessment performed and charted. Breathing pattern assessed with daily needs assessment during rounding every 2 hours. Problem: HEMODYNAMIC STATUS  Goal: Patient has stable vital signs and fluid balance  11/26/2020 1616 by Sophia Hendrickson RN  Outcome: Ongoing  Note: Daily weight taken and charted in the morning. Problem: FLUID AND ELECTROLYTE IMBALANCE  Goal: Fluid and electrolyte balance are achieved/maintained  11/26/2020 1616 by Sophia Hendrickson RN  Outcome: Ongoing  Note: Respiratory assessment performed and charted. Lab results checked and abnormal results reported to MD, monitoring fluids and pt encouraged to drink prescribed amount. Problem: ACTIVITY INTOLERANCE/IMPAIRED MOBILITY  Goal: Mobility/activity is maintained at optimum level for patient  11/26/2020 1616 by Sophia Hendrickson RN  Outcome: Ongoing  Note: Is cooperative and has a positive attitude towards achieving set goals.

## 2020-11-30 NOTE — PROGRESS NOTES
Progress Note  Admit Date: 11/22/2020      PCP: JUAN MANUEL Cheng - CNP     CC: F/U for chf    Days in hospital:  8    SUBJECTIVE / Interval History:  Patient complains of generalized weakness and fatigue. Has a lot of pain in her right calf  Neg 15 L  Wt 393> 373      Allergies  Nickel and Ibuprofen    Medications    Scheduled Meds:   furosemide  40 mg Intravenous BID    metoprolol succinate  25 mg Oral Nightly    insulin lispro  3 Units Subcutaneous TID WC    insulin glargine  30 Units Subcutaneous Nightly    miconazole   Topical BID    atorvastatin  40 mg Oral Nightly    sodium chloride flush  10 mL Intravenous 2 times per day    insulin lispro  0-12 Units Subcutaneous TID WC    insulin lispro  0-6 Units Subcutaneous Nightly    warfarin (COUMADIN) daily dosing (placeholder)   Other RX Placeholder     Continuous Infusions:   dextrose         PRN Meds:  ammonium lactate, sodium chloride flush, acetaminophen **OR** acetaminophen, polyethylene glycol, ondansetron, glucose, dextrose, glucagon (rDNA), dextrose    Vitals    BP (!) 120/53   Pulse 69   Temp 97.3 °F (36.3 °C) (Oral)   Resp 20   Ht 5' 6.5\" (1.689 m)   Wt (!) 373 lb 0.3 oz (169.2 kg)   SpO2 96%   BMI 59.30 kg/m²     Exam:    Gen: No distress. Eyes: PERRL. No sclera icterus. No conjunctival injection. ENT: No discharge. Pharynx clear. External appearance of ears and nose normal.  Neck: Trachea midline. No obvious mass. Resp: No accessory muscle use. No crackles. No wheezes. No rhonchi. No dullness on percussion. CV: Regular rate. Regular rhythm. No murmur or rub. + edema. GI: Non-tender. Non-distended. No hernia. Skin: Bilateral leg swelling with chronic venous stasis  Lymph: No cervical LAD. No supraclavicular LAD. M/S: No cyanosis. No clubbing. No joint deformity. Neuro: Moves all four extremities. CN 2-12 tested, no defect noted. Psych: Oriented x 3. No anxiety. Awake. Alert.  Intact judgement and insight. Data    LABS  CBC:   Recent Labs     11/28/20  0901 11/29/20  1205 11/30/20  0714   WBC 2.8* 2.6* 2.9*   HGB 9.8* 10.3* 10.2*   HCT 30.5* 32.4* 32.5*   MCV 80.3 80.8 80.2    170 182     BMP:   Recent Labs     11/28/20  0901 11/29/20  1205 11/30/20  0714   * 137 138   K 4.0 4.0 3.9   CL 94* 93* 94*   CO2 31 36* 34*   BUN 33* 31* 26*   CREATININE 1.1 1.0 0.9   GLUCOSE 104* 128* 95     POC GLUCOSE:    Recent Labs     11/29/20  0720 11/29/20  1135 11/29/20  1603 11/29/20  2043 11/30/20  0716   POCGLU 110* 124* 140* 166* 104*     LIVER PROFILE: No results for input(s): AST, ALT, LIPASE, AMYLASE, LABALBU, BILIDIR, BILITOT, ALKPHOS in the last 72 hours. PT/INR:   Recent Labs     11/28/20  0901 11/29/20  1205 11/30/20  0714   PROTIME 31.5* 30.1* 28.0*   INR 2.69* 2.57* 2.39*     APTT: No results for input(s): APTT in the last 72 hours. UA:No results for input(s): NITRITE, COLORU, PHUR, LABCAST, WBCUA, RBCUA, MUCUS, TRICHOMONAS, YEAST, BACTERIA, CLARITYU, SPECGRAV, LEUKOCYTESUR, UROBILINOGEN, BILIRUBINUR, BLOODU, GLUCOSEU, KETUA, AMORPHOUS in the last 72 hours. Microbiology:  Wound Culture: No results for input(s): WNDABS, ORG in the last 72 hours. Invalid input(s):  LABGRAM  Nasal Culture: No results for input(s): ORG, MRSAPCR in the last 72 hours. Blood Culture: No results for input(s): BC, BLOODCULT2 in the last 72 hours. Fungal Culture:   No results for input(s): FUNGSM in the last 72 hours. No results for input(s): FUNCXBLD in the last 72 hours. CSF Culture:  No results for input(s): COLORCSF, APPEARCSF, CFTUBE, CLOTCSF, WBCCSF, RBCCSF, NEUTCSF, NUMCELLSCSF, LYMPHSCSF, MONOCSF, GLUCCSF, VOLCSF in the last 72 hours. Respiratory Culture:  No results for input(s): Emeli Mercury in the last 72 hours. AFB:No results for input(s): AFBSMEAR in the last 72 hours. Urine Culture  No results for input(s): LABURIN in the last 72 hours.     RADIOLOGY:    XR CHEST (2 VW)   Final Result Vascular congestion. Suspected small pleural effusion. Increased interstitial opacities, suggestive of pulmonary edema. Enlargement of the cardiac silhouette, cardiomegaly versus pericardial   effusion. CONSULTS:    PHARMACY TO DOSE WARFARIN  IP CONSULT TO SOCIAL WORK  IP CONSULT TO PODIATRY  IP CONSULT TO HEART FAILURE NURSE/COORDINATOR  IP CONSULT TO CARDIOLOGY  IP CONSULT TO HEART FAILURE NURSE/COORDINATOR  IP CONSULT TO DIETITIAN    ASSESSMENT AND PLAN:      Principal Problem:    CHF with unknown LVEF ([facility]  Active Problems:    DMII (diabetes mellitus, type 2) ([facility] [address]    Essential hypertension    Hypercholesterolemia    Lower leg DVT (deep venous thromboembolism), chronic, right (HCC)    Homozygous Factor V Leiden mutation ([facility] [address]    Generalized weakness    Pulmonary vascular congestion    Chronic venous stasis dermatitis of both lower extremities    Morbid obesity due to excess calories (HCC)    Shortness of breath    Persistent atrial fibrillation ([facility] [address]    RVF (right ventricular failure) ([facility] [address]    Anemia  Resolved Problems:    * No resolved hospital problems. *    Patient is a 77-year-old female with a past medical history of morbid obesity, A. fib, DVT, factor V deficiency, diabetes, chronic venous stasis who presented with bilateral leg swelling with shortness of breath and fatigue. She was admitted with acute right-sided CHF. Patient was started on Lasix drip and switched to IV Lasix    Acute on chronic right sided systolic heart failure  -Continue Lasix  -Cardiology consult appreciated  -Strict VALERIO and daily weight    Right lower leg pain  -Cause unclear.   Had healing scab on her calf    Hypotension  -Improved with midodrine    Persistent A. Fib  -On Coumadin    History of DVT/PE with factor V deficiency  -On Coumadin    Diabetes type 2- currently controlled  -Continue current regimen and monitor blood sugars    Hyperlipidemia  -Continue statin    Chronic venous

## 2020-11-30 NOTE — PROGRESS NOTES
Clinical Pharmacy Note  Warfarin Consult    Zoey Moore is a 78 y.o. female receiving warfarin managed by pharmacy. Warfarin Indication: Afib, Factor V Leiden  Target INR range: 2-3   Dose prior to admission: 3 mg Tuesdays and Saturdays, 6 mg all other days     Current warfarin drug-drug interactions: None significant    Recent Labs     11/28/20  0901 11/29/20  1205 11/30/20  0714   HGB 9.8* 10.3* 10.2*   HCT 30.5* 32.4* 32.5*   INR 2.69* 2.57* 2.39*       Assessment/Plan:    Patient's dosing schedule has been erratic due to variable INRs.  Warfarin was on hold 11/19 and 11/20 due to INR=3.3, then resumed at prior home dose of 3 mg Tuesdays and Saturdays, 6 mg all other days.      Will give warfarin 6mg (home dose) this evening and watch INR carefully. Of note, cardiology discussed possible switch to NOAC. Patient is thinking about this. Thank you for the consult. Will continue to follow.   Marbella Mckay [facility], 11/30/2020 11:15 AM

## 2020-11-30 NOTE — PROGRESS NOTES
Physical Therapy  Facility/Department: [address] IP REHAB  Daily Treatment Note  NAME: Mallorie Stratton  : 1950  MRN: [id number]    Date of Service: 2020    Discharge Recommendations:  Patient would benefit from continued therapy after discharge, 2-3 sessions per week, Home with Home health PT, S Level 3, Home with assist PRN   PT Equipment Recommendations  Equipment Needed: Yes  Mobility Devices: Joce [place]: Rolling  Other: Pt does not have currently have access to a bariatric walker and will need to order one. HOME HEALTH CARE: LEVEL 3 SAFETY   -Initial home health evaluation to occur within 24-48 hours, in patient home    -Home health agency to establish plan of care for patient over 60 day period    -Medication Reconciliation    -PT/OT/Speech evaluations in home within 24-48 hours of discharge; including    -DME and home safety    -Frontload therapy 5 days, then 3x a week    -OT to evaluate if patient has [address] needs for personal care    - evaluation within 24-48 hours, includes evaluation of resources and insurance to determine [place], [place], LTC, and Medicaid options    -PCP Visit scheduled within three to seven days of discharge     Mallorie Stratton scored a 17/24 on the AM-PAC short mobility form. Current research shows that an AM-PAC score of 18 or greater is typically associated with a discharge to the patient's home setting. Based on the patient's AM-PAC score and their current functional mobility deficits, it is recommended that the patient have 2-3 sessions per week of Physical Therapy at d/c to increase the patient's independence. At this time, this patient demonstrates the endurance and safety to discharge home with home therapy and a follow up treatment frequency of 2-3x/wk. Please see assessment section for further patient specific details. If patient discharges prior to next session this note will serve as a discharge summary.   Please see below for the latest assessment towards goals. Assessment   Body structures, Functions, Activity limitations: Decreased functional mobility ; Increased pain;Decreased balance;Decreased strength;Decreased safe awareness;Decreased endurance  Assessment: Pt is an 78y.o. year-old female that presents to acute care with SOB, increased lower extremity weakness, and generalized fatigue. She currently lives alone in an apartment with one level and no STEFFANIE. PTA pt was independent with all functional mobility. Today (11/30) pt was able to trasnfer sit<>stand from chair with SBA and from toilet with SBA-CGA. CGA for when going from stand to sit as pt had difficulty knowing when she was close enough to the toilet to sit safely. No cues needed for proper hand placement or sequencing throughout session. Able to amb approx 60' with RW and SBA-CGA. Pt very motivated to continue walking more each session and was able to take a few steps into the hallway today. Next session will attempt to walk in the hallway more. Amb continues to be limited by decreased endurance. Pt continues to need cues to keep walker close but is able to stand up straighter and correct posture on own after initial cues. Continues to be limited by anxiety, decreased strength, and endurance, but is motivated to participate and improve. Would benefit from continued skilled therapy during acute stay and after D/C to further maximize functional mobility and independence pt experienced PTA. Pt does not want to go to an ARU or SNF, would like to go home and have more therapy there. Treatment Diagnosis: decreased functional mobility  Prognosis: Good  PT Education: General Safety;Gait Training;Functional Mobility Training;Transfer Training;Plan of Care  Barriers to Learning: anxiety  REQUIRES PT FOLLOW UP: Yes  Activity Tolerance  Activity Tolerance: Patient limited by fatigue;Patient limited by pain; Patient limited by endurance; Other(anxiety and fear about prognosis)     Patient Diagnosis(es): The primary encounter diagnosis was Acute on chronic congestive heart failure, unspecified heart failure type ([address] Diagnoses of Longstanding persistent atrial fibrillation (HCC) and General weakness were also pertinent to this visit. has a past medical history of Diabetes mellitus type 2 in obese ([address] Edema of both legs, Elevated LFTs, 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[address] Homozygous Factor V Leiden mutation ([address] Hypercholesterolemia, Hypertension, Hypomagnesemia, Low serum HDL, Lower leg DVT (deep venous thromboembolism), chronic, right ([address] Osteoarthritis of cervical spine, Renal failure, acute ([address] and Vitamin D deficiency. has a past surgical history that includes Cholecystectomy ();  section; Appendectomy (1976); Tubal ligation (Bilateral, 1980); and Colonoscopy (2014). Restrictions  Restrictions/Precautions  Restrictions/Precautions: Fall Risk  Position Activity Restriction  Other position/activity restrictions: 2L O2, telemetry    Subjective   General  Chart Reviewed: Yes  Additional Pertinent Hx: Per Eduardo Zelaya MD, \"Pt is an 78y.o. year-old female with a history of hypertension, hyperlipidemia, diabetes mellitus, chronic venous stasis dermatitis of bilateral lower extremities, atrial fibrillation, factor V Leiden deficiency and a chronic left DVT for which she takes Coumadin. Presents to the emergency room for evaluation following a 1 month history of shortness of breath and a 2-day history of increased lower extremity weakness and generalized fatigue. She states that her shortness of breath is moderately severe, worse with exertion and improved with rest.  In the emergency room she was found to have an area vascular congestion and bilateral lower extremity edema. She is being admitted for further evaluation and treatment.   Associated signs and symptoms do not include chest pain, diaphoresis, orthopnea, paroxysmal nocturnal dyspnea, fever or chills. \"  Response To Previous Treatment: Patient with no complaints from previous session. Family / Caregiver Present: No  Referring Practitioner: Nabila Duarte MD  Subjective  Subjective: Pt awake in chair and agreeable to PT this PM. At beginning of session, pt expressed a significant amount of fear and anxiety about her prognosis. Rates pain 7/10 in RLE while sitting. Orientation  Orientation  Overall Orientation Status: Within Functional Limits       Objective   Transfers  Sit to Stand: Stand by assistance  Stand to sit: Stand by assistance;Contact guard assistance(SBA to chair, CGA to toilet as pt had difficulty knowing when she was close enough for it to be safe to sit)  Comment: Pt was able to recall proper hand placement and sequencing for transfers throughout without any cues. No anxiety while performing transfers this session. Ambulation  Ambulation?: Yes  More Ambulation?: No  Ambulation 1  Surface: level tile  Device: Rolling Walker  Other Apparatus: O2  Assistance: Contact guard assistance;Stand by assistance  Quality of Gait: flexed posture, step through pattern, ER of bilat LEs, guarded movements, no LOB or unsteadiness noted, notable lateral trunk flexion bilat directions, decreased DF bilat, flat foot initial contact  Gait Deviations: Slow Livia; Increased PIERO; Decreased step length;Decreased step height  Distance: approx 15' from chair to bathroom, 20' and 60' around room  Comments: Pt did not need to take standing rest breaks but was able to stand for approx 2 min while having a conversation with physician. Motivated to amb further and was able to take a few steps into the hallway. Cues to keep walker close and stand up straighter. Pt was able to self correct posture after initial cues. Stairs/Curb  Stairs?: No        Exercises  Ankle Pumps: x20         Other Activities: Other (see comment)  Comment: Pt used toilet and voided, able to perform own shashi care and handwashing.  When

## 2020-11-30 NOTE — PLAN OF CARE
patient  Outcome: Ongoing     Problem: HEMODYNAMIC STATUS  Goal: Patient has stable vital signs and fluid balance  Outcome: Ongoing     Problem: FLUID AND ELECTROLYTE IMBALANCE  Goal: Fluid and electrolyte balance are achieved/maintained  Outcome: Ongoing     Problem: ACTIVITY INTOLERANCE/IMPAIRED MOBILITY  Goal: Mobility/activity is maintained at optimum level for patient  Outcome: Ongoing

## 2020-11-30 NOTE — CONSULTS
Nutrition Note    Consult for \"diet education\". RD completed diet education on 11/25. Please see RD note from then regarding education provided.     Electronically signed by Heri Dumas RD, SHANDRA on 11/30/20 at 6:51 AM EST    Contact: [phone]

## 2020-12-01 LAB
ANION GAP SERPL CALCULATED.3IONS-SCNC: 10 MMOL/L (ref 3–16)
BASOPHILS ABSOLUTE: 0 K/UL (ref 0–0.2)
BASOPHILS RELATIVE PERCENT: 1.3 %
BUN BLDV-MCNC: 23 MG/DL (ref 7–20)
CALCIUM SERPL-MCNC: 9.2 MG/DL (ref 8.3–10.6)
CHLORIDE BLD-SCNC: 97 MMOL/L (ref 99–110)
CO2: 33 MMOL/L (ref 21–32)
CREAT SERPL-MCNC: 0.8 MG/DL (ref 0.6–1.2)
EOSINOPHILS ABSOLUTE: 0.1 K/UL (ref 0–0.6)
EOSINOPHILS RELATIVE PERCENT: 3.3 %
GFR AFRICAN AMERICAN: >60
GFR NON-AFRICAN AMERICAN: >60
GLUCOSE BLD-MCNC: 107 MG/DL (ref 70–99)
GLUCOSE BLD-MCNC: 109 MG/DL (ref 70–99)
GLUCOSE BLD-MCNC: 115 MG/DL (ref 70–99)
GLUCOSE BLD-MCNC: 168 MG/DL (ref 70–99)
GLUCOSE BLD-MCNC: 176 MG/DL (ref 70–99)
HCT VFR BLD CALC: 31.5 % (ref 36–48)
HEMOGLOBIN: 9.9 G/DL (ref 12–16)
INR BLD: 2.43 (ref 0.86–1.14)
LYMPHOCYTES ABSOLUTE: 1 K/UL (ref 1–5.1)
LYMPHOCYTES RELATIVE PERCENT: 37.3 %
MCH RBC QN AUTO: 25.6 PG (ref 26–34)
MCHC RBC AUTO-ENTMCNC: 31.4 G/DL (ref 31–36)
MCV RBC AUTO: 81.5 FL (ref 80–100)
MONOCYTES ABSOLUTE: 0.4 K/UL (ref 0–1.3)
MONOCYTES RELATIVE PERCENT: 14 %
NEUTROPHILS ABSOLUTE: 1.2 K/UL (ref 1.7–7.7)
NEUTROPHILS RELATIVE PERCENT: 44.1 %
PDW BLD-RTO: 20.4 % (ref 12.4–15.4)
PERFORMED ON: ABNORMAL
PLATELET # BLD: 164 K/UL (ref 135–450)
PMV BLD AUTO: 8.4 FL (ref 5–10.5)
POTASSIUM REFLEX MAGNESIUM: 4 MMOL/L (ref 3.5–5.1)
PROTHROMBIN TIME: 28.5 SEC (ref 10–13.2)
RBC # BLD: 3.87 M/UL (ref 4–5.2)
SODIUM BLD-SCNC: 140 MMOL/L (ref 136–145)
WBC # BLD: 2.6 K/UL (ref 4–11)

## 2020-12-01 PROCEDURE — 2580000003 HC RX 258: Performed by: INTERNAL MEDICINE

## 2020-12-01 PROCEDURE — 6370000000 HC RX 637 (ALT 250 FOR IP): Performed by: INTERNAL MEDICINE

## 2020-12-01 PROCEDURE — 97530 THERAPEUTIC ACTIVITIES: CPT

## 2020-12-01 PROCEDURE — 97530 THERAPEUTIC ACTIVITIES: CPT | Performed by: PHYSICAL THERAPIST

## 2020-12-01 PROCEDURE — 36415 COLL VENOUS BLD VENIPUNCTURE: CPT

## 2020-12-01 PROCEDURE — 6360000002 HC RX W HCPCS: Performed by: INTERNAL MEDICINE

## 2020-12-01 PROCEDURE — 97116 GAIT TRAINING THERAPY: CPT | Performed by: PHYSICAL THERAPIST

## 2020-12-01 PROCEDURE — 6370000000 HC RX 637 (ALT 250 FOR IP): Performed by: HOSPITALIST

## 2020-12-01 PROCEDURE — 85025 COMPLETE CBC W/AUTO DIFF WBC: CPT

## 2020-12-01 PROCEDURE — 85610 PROTHROMBIN TIME: CPT

## 2020-12-01 PROCEDURE — 80048 BASIC METABOLIC PNL TOTAL CA: CPT

## 2020-12-01 PROCEDURE — 97535 SELF CARE MNGMENT TRAINING: CPT

## 2020-12-01 PROCEDURE — 1200000000 HC SEMI PRIVATE

## 2020-12-01 RX ORDER — WARFARIN SODIUM 3 MG/1
3 TABLET ORAL
Status: COMPLETED | OUTPATIENT
Start: 2020-12-01 | End: 2020-12-01

## 2020-12-01 RX ADMIN — FUROSEMIDE 40 MG: 10 INJECTION, SOLUTION INTRAMUSCULAR; INTRAVENOUS at 17:21

## 2020-12-01 RX ADMIN — ACETAMINOPHEN 650 MG: 325 TABLET ORAL at 09:43

## 2020-12-01 RX ADMIN — FUROSEMIDE 40 MG: 10 INJECTION, SOLUTION INTRAMUSCULAR; INTRAVENOUS at 09:02

## 2020-12-01 RX ADMIN — INSULIN LISPRO 1 UNITS: 100 INJECTION, SOLUTION INTRAVENOUS; SUBCUTANEOUS at 21:43

## 2020-12-01 RX ADMIN — INSULIN LISPRO 3 UNITS: 100 INJECTION, SOLUTION INTRAVENOUS; SUBCUTANEOUS at 17:22

## 2020-12-01 RX ADMIN — METOPROLOL SUCCINATE 25 MG: 25 TABLET, EXTENDED RELEASE ORAL at 21:35

## 2020-12-01 RX ADMIN — MICONAZOLE NITRATE: 2 POWDER TOPICAL at 21:39

## 2020-12-01 RX ADMIN — ATORVASTATIN CALCIUM 40 MG: 40 TABLET, FILM COATED ORAL at 21:35

## 2020-12-01 RX ADMIN — INSULIN LISPRO 2 UNITS: 100 INJECTION, SOLUTION INTRAVENOUS; SUBCUTANEOUS at 12:00

## 2020-12-01 RX ADMIN — ACETAMINOPHEN 650 MG: 325 TABLET ORAL at 21:36

## 2020-12-01 RX ADMIN — SODIUM CHLORIDE, PRESERVATIVE FREE 10 ML: 5 INJECTION INTRAVENOUS at 09:27

## 2020-12-01 RX ADMIN — INSULIN GLARGINE 30 UNITS: 100 INJECTION, SOLUTION SUBCUTANEOUS at 21:43

## 2020-12-01 RX ADMIN — SODIUM CHLORIDE, PRESERVATIVE FREE 10 ML: 5 INJECTION INTRAVENOUS at 21:36

## 2020-12-01 RX ADMIN — INSULIN LISPRO 3 UNITS: 100 INJECTION, SOLUTION INTRAVENOUS; SUBCUTANEOUS at 12:00

## 2020-12-01 RX ADMIN — MICONAZOLE NITRATE: 2 POWDER TOPICAL at 09:02

## 2020-12-01 RX ADMIN — INSULIN LISPRO 3 UNITS: 100 INJECTION, SOLUTION INTRAVENOUS; SUBCUTANEOUS at 08:00

## 2020-12-01 RX ADMIN — WARFARIN SODIUM 3 MG: 3 TABLET ORAL at 17:21

## 2020-12-01 ASSESSMENT — PAIN SCALES - GENERAL
PAINLEVEL_OUTOF10: 7
PAINLEVEL_OUTOF10: 0
PAINLEVEL_OUTOF10: 7
PAINLEVEL_OUTOF10: 0

## 2020-12-01 ASSESSMENT — PAIN DESCRIPTION - LOCATION
LOCATION: LEG
LOCATION: LEG

## 2020-12-01 ASSESSMENT — PAIN DESCRIPTION - FREQUENCY
FREQUENCY: INTERMITTENT
FREQUENCY: CONTINUOUS

## 2020-12-01 ASSESSMENT — PAIN - FUNCTIONAL ASSESSMENT
PAIN_FUNCTIONAL_ASSESSMENT: PREVENTS OR INTERFERES SOME ACTIVE ACTIVITIES AND ADLS
PAIN_FUNCTIONAL_ASSESSMENT: PREVENTS OR INTERFERES SOME ACTIVE ACTIVITIES AND ADLS

## 2020-12-01 ASSESSMENT — PAIN DESCRIPTION - ONSET
ONSET: ON-GOING
ONSET: ON-GOING

## 2020-12-01 ASSESSMENT — PAIN DESCRIPTION - PAIN TYPE
TYPE: ACUTE PAIN
TYPE: ACUTE PAIN

## 2020-12-01 ASSESSMENT — PAIN DESCRIPTION - DESCRIPTORS
DESCRIPTORS: ACHING
DESCRIPTORS: ACHING;DISCOMFORT

## 2020-12-01 ASSESSMENT — PAIN DESCRIPTION - PROGRESSION
CLINICAL_PROGRESSION: GRADUALLY IMPROVING
CLINICAL_PROGRESSION: GRADUALLY IMPROVING

## 2020-12-01 ASSESSMENT — PAIN DESCRIPTION - ORIENTATION
ORIENTATION: LOWER;RIGHT
ORIENTATION: RIGHT;LOWER

## 2020-12-01 NOTE — PROGRESS NOTES
Physical Therapy  Facility/Department: [address] IP REHAB  Daily Treatment Note  NAME: Ria Alexander  : 1950  MRN: [id number]    Date of Service: 2020    Discharge Recommendations:  Patient would benefit from continued therapy after discharge, 2-3 sessions per week, Home with Home health PT, S Level 3, Home with assist PRN   PT Equipment Recommendations  Equipment Needed: Yes  Other: Pt is deciding if she feels more comfortable using a bariatric RW or bariatric 4WW. As of right now, she prefers RW but needs to \"sleep on it\". HOME HEALTH CARE: LEVEL 3 SAFETY     -Initial home health evaluation to occur within 24-48 hours, in patient home    -Home health agency to establish plan of care for patient over 60 day period    -Medication Reconciliation    -PT/OT/Speech evaluations in home within 24-48 hours of discharge; including DME and home safety    -Frontload therapy 5 days, then 3x a week    -OT to evaluate if patient has [address] needs for personal care    - evaluation within 24-48 hours, includes evaluation of resources and insurance to determine [place], [place], LTC, and Medicaid options    -PCP Visit scheduled within three to seven days of discharge      Ria Alexander scored a 17/24 on the AM-PAC short mobility form. Current research shows that an AM-PAC score of 18 or greater is typically associated with a discharge to the patient's home setting. Based on the patient's AM-PAC score and their current functional mobility deficits, it is recommended that the patient have 2-3 sessions per week of Physical Therapy at d/c to increase the patient's independence. At this time, this patient demonstrates the endurance and safety to discharge home with home therapy and a follow up treatment frequency of 2-3x/wk. Please see assessment section for further patient specific details. If patient discharges prior to next session this note will serve as a discharge summary.   Please see have an area vascular congestion and bilateral lower extremity edema. She is being admitted for further evaluation and treatment. Associated signs and symptoms do not include chest pain, diaphoresis, orthopnea, paroxysmal nocturnal dyspnea, fever or chills. \"  Response To Previous Treatment: Patient with no complaints from previous session. Family / Caregiver Present: No  Referring Practitioner: Sharan Kim MD  Subjective  Subjective: Pt awake in chair and agreeable to PT this PM. Rates pain 7/10 in RLE while sitting. Stated that she is feeling tired as she just finished therapy with OT. Orientation  Orientation  Overall Orientation Status: Within Functional Limits       Objective   Transfers  Sit to Stand: Stand by assistance;Minimal Assistance;2 Person Assistance; Moderate Assistance(SBA from recliner, min A x2 from w/c the first time and mod A x1 the second time)  Stand to sit: Stand by assistance;Contact guard assistance(SBA to recliner and CGA to w/c and toilet)  Comment: Pt was able to recall proper hand placement and sequencing for transfers throughout without any cues. Pt experienced anxiety while sitting to standing from w/c and got discouraged that she was not able to do it and needed assistance. Ambulation  Ambulation?: Yes  More Ambulation?: Yes  Ambulation 1  Surface: level tile  Device: (pt pushed w/c to stimulate using a bariatric 4WW)  Other Apparatus: O2  Assistance: Contact guard assistance;Stand by assistance  Quality of Gait: flexed posture, step through pattern, ER of bilat LEs, guarded movements, no LOB or unsteadiness noted, notable lateral trunk flexion bilat directions, decreased DF bilat, flat foot initial contact  Gait Deviations: Slow Livia; Increased PIERO; Decreased step length;Decreased step height  Distance: approx 50' in hallway x2 with a rest break between bouts  Comments: Pt needed to have a seated rest break due to increased fatigue.  Motivated to amb further, but pt was not safe to do so as she does not currently have the endurance. Pt was able to push w/c with ease. Needed cues to stand up straighter, pt did not realize she was flexed forward but was able to self correct posture with cues. Ambulation 2  Surface - 2: level tile  Device 2: Rolling Walker  Other Apparatus 2: O2  Assistance 2: Stand by assistance;Contact guard assistance  Quality of Gait 2: flexed posture, step through pattern, ER of bilat LEs, guarded movements, no LOB or unsteadiness noted, notable lateral trunk flexion bilat directions, decreased DF bilat, flat foot initial contact  Gait Deviations: Slow Livia; Increased PIERO; Decreased step length;Decreased step height  Distance: approx 5', 10', and 15' while navigating in room  Comments: Right now, pt prefers to use the RW. Stairs/Curb  Stairs?: No     Balance  Posture: Fair  Sitting - Static: Good  Sitting - Dynamic: Fair  Standing - Static: Fair  Standing - Dynamic: Fair;-  Comments: Able to amb without AD for a very short distance without significant unsteadiness or LOB, but more stable with AD. Can stand statically without AD. Other Activities: Other (see comment)  Comment: Pt used toilet and voided, able to perform own shashi care, handwashing, and donning/doffing pants. When washing hands, pt rested bilat UEs on sink for support.               AM-PAC Score  -PAC Inpatient Mobility Raw Score : 17 (12/01/20 1622)  -PAC Inpatient T-Scale Score : 42.13 (12/01/20 1622)  Mobility Inpatient CMS 0-100% Score: 50.57 (12/01/20 1622)  Mobility Inpatient CMS G-Code Modifier : CK (12/01/20 1622)          Goals  Short term goals  Time Frame for Short term goals: during acute stay  Short term goal 1: bed mobility with CGA  Short term goal 2: transfers with supervision  Short term goal 3: ambulate 150' with or without AD and SBA  Long term goals  Time Frame for Long term goals : STG = LTG  Patient Goals   Patient goals : get stronger legs, work on exercises, go home    Plan    Plan  Times per week: 3-5  Plan weeks: during acute stay  Specific instructions for Next Treatment: pt would like to work on LE exercises  Current Treatment Recommendations: Strengthening, Home Exercise Program, Safety Education & Training, Balance Training, Endurance Training, Functional Mobility Training, Transfer Training, Gait Training  Safety Devices  Type of devices: All fall risk precautions in place, Call light within reach, Chair alarm in place, Gait belt, Patient at risk for falls, Left in chair  Restraints  Initially in place: No     Therapy Time   Individual Concurrent Group Co-treatment   Time In 1540         Time Out 1640         Minutes Padmini Lombardi 93, SPT  Therapist was present, directed the patient's care, made skilled judgement, and was responsible for assessment and treatment of the patient.          Electronically signed by Casandra Chamberlain PT on 12/1/20 at 4:47 PM EST

## 2020-12-01 NOTE — PROGRESS NOTES
Occupational Therapy  Facility/Department: [address] IP REHAB  Daily Treatment Note  NAME: Garrison Hanson  : 1950  MRN: [id number]    Date of Service: 2020    Discharge Recommendations:  Continue to assess pending progress, Home with Home health OT, Home with assist PRN     Garrison Hanson scored a 19/24 on the AM-PAC ADL Inpatient form. Current research shows that an AM-PAC score of 18 or greater is typically associated with a discharge to the patient's home setting. Based on the patient's AM-PAC score, and their current ADL deficits, it is recommended that the patient have 2-3 sessions per week of Occupational Therapy at d/c to increase the patient's independence. At this time, this patient demonstrates the endurance and safety to discharge home with home OT and a follow up treatment frequency of 2-3x/wk. Please see assessment section for further patient specific details. If patient discharges prior to next session this note will serve as a discharge summary. Please see below for the latest assessment towards goals. Assessment   Performance deficits / Impairments: Decreased functional mobility ; Decreased endurance;Decreased ADL status; Decreased balance  Assessment: Pt completed LB dressing task with SBA and AD. Pt completed fx mobility and transfers with SBA and RW with no LOB. Pt additionally tolerated standing at sink for 9 min with SBA with no LOB or c/o dizziness. Pt motivated to continue to work in therapy, but reports anxiety related to returning home. Discussed pt concerns with self care upon discharge, and pt reports main concern is meal prep to begin living a healthier lifestyle. Discussed meal prep as IADL and how patient will complete kitchen mobility and transport food to dining room table with RW, as it is recommended pt complete mobility with RW upon discharge. Discussed purchase of walker tray or basket for RW or bariatric [id number].  Discussed [id number] with PT, who will further evaluate during their session with pt. Will continue to assess pt progress and work with pt on acute, but recommend pt recieve home OT and assist from family upon discharge to improve activity tolerance for mobility and ADL participation. Prognosis: Good  OT Education: Energy Conservation;Transfer Training;Equipment;Plan of Care  REQUIRES OT FOLLOW UP: Yes  Activity Tolerance  Activity Tolerance: Patient limited by fatigue  Activity Tolerance: Pt tolerated entire session. Safety Devices  Type of devices: Call light within reach; Chair alarm in place; Left in chair;Gait belt;Nurse notified(PT arriving to work with pt)         Patient Diagnosis(es): The primary encounter diagnosis was Acute on chronic congestive heart failure, unspecified heart failure type ([facility] [address] Diagnoses of Longstanding persistent atrial fibrillation (HCC) and General weakness were also pertinent to this visit. has a past medical history of Diabetes mellitus type 2 in obese ([address] Edema of both legs, Elevated LFTs, 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[address] Homozygous Factor V Leiden mutation ([facility] [address] Hypercholesterolemia, Hypertension, Hypomagnesemia, Low serum HDL, Lower leg DVT (deep venous thromboembolism), chronic, right ([address] Osteoarthritis of cervical spine, Renal failure, acute ([address] and Vitamin D deficiency. has a past surgical history that includes Cholecystectomy ();  section; Appendectomy (1976); Tubal ligation (Bilateral, 1980); and Colonoscopy (2014).     Restrictions  Restrictions/Precautions  Restrictions/Precautions: Fall Risk  Position Activity Restriction  Other position/activity restrictions: 1L O2, telemetry  Subjective   General  Chart Reviewed: Yes, Progress Notes  Patient assessed for rehabilitation services?: Yes  Additional Pertinent Hx: Per MIRTA Rendon  \"Sheila Pearson is a 78 y.o. female who presents here to the emergency department, the patient states that she recently stayed at her daughter-in-law's house, and 2 days ago woke up and felt profoundly weak in her legs, and her legs seem to be more swollen. She states that she has had shortness of breath for about 1 month, she does have a history of peripheral edema, and DVT, A. fib, it is currently on Coumadin, but states that something feels very different. She has increased shortness of breath and weakness. She rates her pain level to her legs as 4/10. \"  Family / Caregiver Present: No  Referring Practitioner: Roni  Diagnosis: CHF with unknown LVEF  Subjective  Subjective: Pt met bedside, agreeable to OT, pt reports feeling anxious about dicharge planning  General Comment  Comments: . Objective    ADL  Grooming: Stand by assistance(pt stood at sink with SBA to complete oral care, comb hair, and wash face)  LE Dressing: Stand by assistance(Pt thread legs through pants using reacher and stood with SBA to pull up over hips. Pt attempted to don slide on shoes, but swelling in feet and non skid socks made it too difficult to fully don shoes.)        Balance  Sitting Balance: Independent  Standing Balance: Stand by assistance  Standing Balance  Time: 9 min  Activity: grooming at sink  Comment: Pt stood with SBA at sink with no LOB. Pt leans forward to stabilize arms on sink. Functional Mobility  Functional - Mobility Device: Rolling Walker  Activity: To/from bathroom  Assist Level: Stand by assistance  Functional Mobility Comments: Pt ambulated from recliner > bathroom > recliner (~25 ft) with SBA and RW with no LOB. Pt leaning on walker less and demonstrated more upright posture when ambulating with RW, as pt reports she ambulates w/o a device at home. Assist to manage O2. Toilet Transfers  Toilet - Technique: Ambulating  Equipment Used: Grab bars  Toilet Transfer: Stand by assistance  Toilet Transfers Comments: Pt ambulated from sink > toilet w/o device using GB on L side with SBA.  Pt used both GB to assist with transfering to toilet. Assist to manage O2. Pt reported feeling lightheaded upon standing up from commode, declined seated rest break, and recovered after ~30 sec and able to ambulate. Wheelchair Bed Transfers  Wheelchair/Bed - Technique: Ambulating  Equipment Used: Other(recliner)  Level of Asssistance: Stand by assistance  Wheelchair Transfers Comments: Pt completed transfer with SBA and RW. Assist to manage 02. Cognition  Cognition Comment: Pt reports she has been feeling depressed/anxious during hospital stay d/t medical issues and planning for discharge home to begin healthier lifestyle. Recommended pt discuss with primary care doctor.                                          Plan   Plan  Times per week: 3-5  Plan weeks: by discharge  Current Treatment Recommendations: Strengthening, Patient/Caregiver Education & Training, Equipment Evaluation, Education, & procurement, Balance Training, Functional Mobility Training, Endurance Training, Safety Education & Training, Self-Care / ADL                                                  AM-PAC Score        AM-[facility] Inpatient Daily Activity Raw Score: 19 (12/01/20 1556)  AM-PAC Inpatient ADL T-Scale Score : 40.22 (12/01/20 1556)  ADL Inpatient CMS 0-100% Score: 42.8 (12/01/20 1556)  ADL Inpatient CMS G-Code Modifier : CK (12/01/20 1556)    Goals  Short term goals  Time Frame for Short term goals: by discharge  Short term goal 1: Pt will complete self care indep with AD  Short term goal 2: Pt will complete fx mobility indep with AD  Short term goal 3: Pt will complete transfers indep with AD  Short term goal 4: Pt will increase activity tolerance to stand for 6 min for ADL task  Short term goal 5: Pt will be indep in HEP for UE strengthening for improved activity tolerance for mobility/ADL participation  Long term goals  Time Frame for Long term goals : LTGs=STGs  Patient Goals   Patient goals : \"to get stronger\"       Therapy Time   Individual Concurrent Group Co-treatment   Time In 5228         Time Out 1545         Minutes 60         Timed Code Treatment Minutes: 60 Minutes     Therapist was present, directed the patient's care, made skilled judgement, and was responsible for assessment and treatment of the patient.         Dionne Kuhn, OTS

## 2020-12-01 NOTE — PROGRESS NOTES
Progress Note  Admit Date: 11/22/2020      PCP: JUAN MANUEL Dorsey - CNP     CC: F/U for chf    Days in hospital:  9    SUBJECTIVE / Interval History:  Patient complains of generalized weakness and fatigue. Has a lot of pain in her right calf which is improved  Patient gets very tearful and upset when the discussion about going home is initiated she states she feels she is going to die at home    Discussed with family on the phone  Neg 16 L  Wt 393> 373      Allergies  Nickel and Ibuprofen    Medications    Scheduled Meds:   furosemide  40 mg Intravenous BID    metoprolol succinate  25 mg Oral Nightly    insulin lispro  3 Units Subcutaneous TID WC    insulin glargine  30 Units Subcutaneous Nightly    miconazole   Topical BID    atorvastatin  40 mg Oral Nightly    sodium chloride flush  10 mL Intravenous 2 times per day    insulin lispro  0-12 Units Subcutaneous TID WC    insulin lispro  0-6 Units Subcutaneous Nightly    warfarin (COUMADIN) daily dosing (placeholder)   Other RX Placeholder     Continuous Infusions:   dextrose         PRN Meds:  ammonium lactate, sodium chloride flush, acetaminophen **OR** acetaminophen, polyethylene glycol, ondansetron, glucose, dextrose, glucagon (rDNA), dextrose    Vitals    /77   Pulse 63   Temp 97.3 °F (36.3 °C) (Oral)   Resp 16   Ht 5' 6.5\" (1.689 m)   Wt (!) 373 lb 0.3 oz (169.2 kg)   SpO2 96%   BMI 59.30 kg/m²     Exam:    Gen: No distress. Eyes: PERRL. No sclera icterus. No conjunctival injection. ENT: No discharge. Pharynx clear. External appearance of ears and nose normal.  Neck: Trachea midline. No obvious mass. Resp: No accessory muscle use. No crackles. No wheezes. No rhonchi. No dullness on percussion. CV: Regular rate. Regular rhythm. No murmur or rub. + edema. GI: Non-tender. Non-distended. No hernia. Skin: Bilateral leg swelling with chronic venous stasis  Lymph: No cervical LAD. No supraclavicular LAD.    M/S: No cyanosis. No clubbing. No joint deformity. Neuro: Moves all four extremities. CN 2-12 tested, no defect noted. Psych: Oriented x 3. No anxiety. Awake. Alert. Intact judgement and insight. Data    LABS  CBC:   Recent Labs     11/29/20  1205 11/30/20  0714 12/01/20  0703   WBC 2.6* 2.9* 2.6*   HGB 10.3* 10.2* 9.9*   HCT 32.4* 32.5* 31.5*   MCV 80.8 80.2 81.5    182 164     BMP:   Recent Labs     11/29/20  1205 11/30/20  0714    138   K 4.0 3.9   CL 93* 94*   CO2 36* 34*   BUN 31* 26*   CREATININE 1.0 0.9   GLUCOSE 128* 95     POC GLUCOSE:    Recent Labs     11/30/20  0716 11/30/20  1128 11/30/20  1633 11/30/20  2029 12/01/20  0713   POCGLU 104* 114* 140* 190* 115*     LIVER PROFILE: No results for input(s): AST, ALT, LIPASE, AMYLASE, LABALBU, BILIDIR, BILITOT, ALKPHOS in the last 72 hours. PT/INR:   Recent Labs     11/29/20  1205 11/30/20  0714   PROTIME 30.1* 28.0*   INR 2.57* 2.39*     APTT: No results for input(s): APTT in the last 72 hours. UA:No results for input(s): NITRITE, COLORU, PHUR, LABCAST, WBCUA, RBCUA, MUCUS, TRICHOMONAS, YEAST, BACTERIA, CLARITYU, SPECGRAV, LEUKOCYTESUR, UROBILINOGEN, BILIRUBINUR, BLOODU, GLUCOSEU, KETUA, AMORPHOUS in the last 72 hours. Microbiology:  Wound Culture: No results for input(s): WNDABS, ORG in the last 72 hours. Invalid input(s):  LABGRAM  Nasal Culture: No results for input(s): ORG, MRSAPCR in the last 72 hours. Blood Culture: No results for input(s): BC, BLOODCULT2 in the last 72 hours. Fungal Culture:   No results for input(s): FUNGSM in the last 72 hours. No results for input(s): FUNCXBLD in the last 72 hours. CSF Culture:  No results for input(s): COLORCSF, APPEARCSF, CFTUBE, CLOTCSF, WBCCSF, RBCCSF, NEUTCSF, NUMCELLSCSF, LYMPHSCSF, MONOCSF, GLUCCSF, VOLCSF in the last 72 hours. Respiratory Culture:  No results for input(s): [place] Quitter in the last 72 hours. AFB:No results for input(s): AFBSMEAR in the last 72 hours.   Urine Culture  No results for input(s): LABURIN in the last 72 hours. RADIOLOGY:    XR CHEST (2 VW)   Final Result   Vascular congestion. Suspected small pleural effusion. Increased interstitial opacities, suggestive of pulmonary edema. Enlargement of the cardiac silhouette, cardiomegaly versus pericardial   effusion. CONSULTS:    PHARMACY TO DOSE WARFARIN  IP CONSULT TO SOCIAL WORK  IP CONSULT TO PODIATRY  IP CONSULT TO HEART FAILURE NURSE/COORDINATOR  IP CONSULT TO CARDIOLOGY  IP CONSULT TO HEART FAILURE NURSE/COORDINATOR  IP CONSULT TO DIETITIAN    ASSESSMENT AND PLAN:      Principal Problem:    CHF with unknown LVEF ([address]  Active Problems:    DMII (diabetes mellitus, type 2) ([facility] [address]    Essential hypertension    Hypercholesterolemia    Lower leg DVT (deep venous thromboembolism), chronic, right (HCC)    Homozygous Factor V Leiden mutation ([facility] [address]    Generalized weakness    Pulmonary vascular congestion    Chronic venous stasis dermatitis of both lower extremities    Morbid obesity due to excess calories (HCC)    Shortness of breath    Persistent atrial fibrillation ([facility] [address]    RVF (right ventricular failure) ([facility] [address]    Anemia  Resolved Problems:    * No resolved hospital problems. *    Patient is a 51-year-old female with a past medical history of morbid obesity, A. fib, DVT, factor V deficiency, diabetes, chronic venous stasis who presented with bilateral leg swelling with shortness of breath and fatigue. She was admitted with acute right-sided CHF. Patient was started on Lasix drip and switched to IV Lasix. Acute on chronic right sided systolic heart failure- improving, ct Iv diuresis  -Continue Lasix  -Cardiology consult appreciated  -Strict VALERIO and daily weight    Right lower leg pain-? Secondary to volume overload  -Cause unclear.   Had healing scab on her calf    Hypotension- improved   -Improved with midodrine    Persistent A. Fib  -On Coumadin    History of DVT/PE with factor V deficiency  -On Coumadin    Diabetes type 2- currently controlled  -Continue current regimen and monitor blood sugars    Hyperlipidemia  -Continue statin    Chronic venous insufficiency  -Continue Ace wrap    Morbid obesity with a BMI of 59  -Complicating patient's condition    Mild anemia  -got venofer    Severe anxiety with a history of depression  -Patient requesting to talk to psychiatrist, will consult      DVT Prophylaxis: COUMADIN  Diet: DIET LOW SODIUM 2 GM; Carb Control: 4 carb choices (60 gms)/meal; 1500 ml  Code Status: Full Code    PT/OT Eval Status:home care    Discharge plan - possibly tomorrow    The patient and / or the family were informed of the results of any tests, a time was given to answer questions, a plan was proposed and they agreed with plan. Discussed with consulting physicians, nursing and social work     The note was completed using EMR. Every effort was made to ensure accuracy; however, inadvertent computerized transcription errors may be present.        Mario Kemp MD

## 2020-12-01 NOTE — PROGRESS NOTES
[facility]   Daily Progress Note      Admit Date:  11/22/2020    Reason for follow up visit: R heart failure    CC: \"I'm feeling a little better, a long way to go. \"    77 y/o female with PMH significant for morbid obesity, atrial fib (chronic), DVT/PE, factor V, diabetes mellitus and marked mobility issues who was admitted with bilateral LE edema and increasing KENNEDY. She does not follow up regularly with physicians and presented with worsening symptoms. She was placed on IV lasix infusion on 11/24/2020. Interval History:  On Lasix IV twice daily  Fluid balance: -16 L  Walked in her room with PT       Objective:   /74   Pulse 68   Temp 97.4 °F (36.3 °C) (Oral)   Resp 18   Ht 5' 6.5\" (1.689 m)   Wt (!) 373 lb 0.3 oz (169.2 kg)   SpO2 98%   BMI 59.30 kg/m²       Intake/Output Summary (Last 24 hours) at 11/30/2020 1904  Last data filed at 11/30/2020 1658  Gross per 24 hour   Intake 740 ml   Output 2600 ml   Net -1860 ml     Wt Readings from Last 3 Encounters:   11/30/20 (!) 373 lb 0.3 oz (169.2 kg)   04/24/20 (!) 310 lb (140.6 kg)   09/23/19 (!) 318 lb (144.2 kg)       Physical Exam:  General: In no acute distress. Awake, alert, and oriented X4. Up in chair. Skin:  Warm and dry. No new appearing rashes or lesions. Neck:  Supple and thick. JVD hard to ascertain d/t body habitus  Chest: Lungs clear with decreased breath sound bilaterally. No wheezes/rhonchi/rales  Cardiovascular:  Irreg; normal S1 and S2; II/VI FLEX   Abdomen: Morbidly obese, soft, nontender, +bowel sounds. Extremities:   3-4+ edema to lower extremities; diffuse erythema to lower legs bilaterally. Appearance of few healed venous ulcers. + chronic stasis changes with thickening of skin on lower legs.        CBC:   Recent Labs     11/28/20  0901 11/29/20  1205 11/30/20  0714   WBC 2.8* 2.6* 2.9*   HGB 9.8* 10.3* 10.2*    170 182     BMP:    Recent Labs     11/28/20  0901 11/29/20  1205 11/30/20  0714   * 137 138   K 4.0 4.0 3.9   CO2 31 36* 34*   BUN 33* 31* 26*   CREATININE 1.1 1.0 0.9     LIVR: No results for input(s): AST, ALT in the last 72 hours. INR:    Recent Labs     11/28/20  0901 11/29/20  1205 11/30/20  0714   INR 2.69* 2.57* 2.39*     APTT:   No results for input(s): APTT in the last 72 hours. Results for Smita Penn (MRN [id number]) as of 11/25/2020 09:18   Ref. Range 11/22/2020 12:22   Pro-BNP Latest Ref Range: 0 - 124 pg/mL 1,346 (H)   Troponin Latest Ref Range: <0.01 ng/mL <0.01     Echo 11/23/2020:  Ejection fraction is visually estimated to be 55-60%. Grade I diastolic dysfunction with normal LV filling pressures. Severly dilated right ventricle. Right ventricular systolic function is mildly reduced. The right atrium is severely dilated. Estimated pulmonary artery systolic pressure is normal at 37 mmHg assuming a  right atrial pressure of 15 mmHg. ECG: Atrial fib with controlled VR      Assessment/Plan:    1. Acute on chronic RV systolic heart failure  On IV Lasix 40 twice daily ; continue for another 24-48 hrs and then switch to PO   Will need outpatient RHC     2. Atrial fib  -chronic and with controlled VR  -has been on Warfarin (discussed NOAC-she is thinking about it)  -continue Toprol    3. H/O DVT/PE  -on warfarin (considering change to NOAC)  -maintaining therapeutic levels have been an issue    4. Foot ulcer  -suspect underlying PAD (eventual ISABEL once edema improves)  -chronic stasis dermatitis to lwoer extremities    5.  Morbid obesity  -BMI 61  -consider outpatient bariatric referral      Baron Adrianne MD [address], Interventional Cardiology, and Peripheral Vascular [address] [facility]   (C): [phone]  (O): [phone]

## 2020-12-01 NOTE — CONSULTS
health and weight       warfarin  3 mg Oral Once    acetaZOLAMIDE  250 mg Oral Daily    furosemide  40 mg Intravenous BID    metoprolol succinate  25 mg Oral Nightly    insulin lispro  3 Units Subcutaneous TID WC    insulin glargine  30 Units Subcutaneous Nightly    miconazole   Topical BID    atorvastatin  40 mg Oral Nightly    sodium chloride flush  10 mL Intravenous 2 times per day    insulin lispro  0-12 Units Subcutaneous TID WC    insulin lispro  0-6 Units Subcutaneous Nightly    warfarin (COUMADIN) daily dosing (placeholder)   Other RX Placeholder     ammonium lactate, sodium chloride flush, acetaminophen **OR** acetaminophen, polyethylene glycol, ondansetron, glucose, dextrose, glucagon (rDNA), dextrose    Examination  Review of Systems - General ROS: negative except weakness with ambulation    Recent Results (from the past 168 hour(s))   POCT Glucose    Collection Time: 11/25/20  4:33 PM   Result Value Ref Range    POC Glucose 132 (H) 70 - 99 mg/dl    Performed on ACCU-CHEK    POCT Glucose    Collection Time: 11/25/20  8:04 PM   Result Value Ref Range    POC Glucose 157 (H) 70 - 99 mg/dl    Performed on ACCU-CHEK    Protime-INR    Collection Time: 11/26/20  6:08 AM   Result Value Ref Range    Protime 25.3 (H) 10.0 - 13.2 sec    INR 2.16 (H) 0.86 - 3.87   Basic Metabolic Panel w/ Reflex to MG    Collection Time: 11/26/20  6:08 AM   Result Value Ref Range    Sodium 138 136 - 145 mmol/L    Potassium reflex Magnesium 4.0 3.5 - 5.1 mmol/L    Chloride 98 (L) 99 - 110 mmol/L    CO2 30 21 - 32 mmol/L    Anion Gap 10 3 - 16    Glucose 120 (H) 70 - 99 mg/dL    BUN 28 (H) 7 - 20 mg/dL    CREATININE 1.1 0.6 - 1.2 mg/dL    GFR Non- 49 (A) >60    GFR  60 (A) >60    Calcium 8.9 8.3 - 10.6 mg/dL   CBC auto differential    Collection Time: 11/26/20  6:08 AM   Result Value Ref Range    WBC 3.5 (L) 4.0 - 11.0 K/uL    RBC 3.81 (L) 4.00 - 5.20 M/uL    Hemoglobin 9.7 (L) 12.0 - 16.0 g/dL    Hematocrit 30.7 (L) 36.0 - 48.0 %    MCV 80.6 80.0 - 100.0 fL    MCH 25.6 (L) 26.0 - 34.0 pg    MCHC 31.8 31.0 - 36.0 g/dL    RDW 19.8 (H) 12.4 - 15.4 %    Platelets 394 617 - 996 K/uL    MPV 7.8 5.0 - 10.5 fL    Neutrophils % 54.3 %    Lymphocytes % 29.2 %    Monocytes % 13.4 %    Eosinophils % 2.4 %    Basophils % 0.7 %    Neutrophils Absolute 1.9 1.7 - 7.7 K/uL    Lymphocytes Absolute 1.0 1.0 - 5.1 K/uL    Monocytes Absolute 0.5 0.0 - 1.3 K/uL    Eosinophils Absolute 0.1 0.0 - 0.6 K/uL    Basophils Absolute 0.0 0.0 - 0.2 K/uL   POCT Glucose    Collection Time: 11/26/20  7:13 AM   Result Value Ref Range    POC Glucose 123 (H) 70 - 99 mg/dl    Performed on ACCU-CHEK    POCT Glucose    Collection Time: 11/26/20 11:14 AM   Result Value Ref Range    POC Glucose 154 (H) 70 - 99 mg/dl    Performed on ACCU-CHEK    POCT Glucose    Collection Time: 11/26/20  4:41 PM   Result Value Ref Range    POC Glucose 116 (H) 70 - 99 mg/dl    Performed on ACCU-CHEK    POCT Glucose    Collection Time: 11/26/20  8:00 PM   Result Value Ref Range    POC Glucose 163 (H) 70 - 99 mg/dl    Performed on ACCU-CHEK    POCT Glucose    Collection Time: 11/27/20  7:14 AM   Result Value Ref Range    POC Glucose 111 (H) 70 - 99 mg/dl    Performed on ACCU-CHEK    CBC auto differential    Collection Time: 11/27/20  7:33 AM   Result Value Ref Range    WBC 3.3 (L) 4.0 - 11.0 K/uL    RBC 4.05 4.00 - 5.20 M/uL    Hemoglobin 10.4 (L) 12.0 - 16.0 g/dL    Hematocrit 33.7 (L) 36.0 - 48.0 %    MCV 83.3 80.0 - 100.0 fL    MCH 25.7 (L) 26.0 - 34.0 pg    MCHC 30.8 (L) 31.0 - 36.0 g/dL    RDW 20.7 (H) 12.4 - 15.4 %    Platelets 602 182 - 417 K/uL    MPV 8.4 5.0 - 10.5 fL    Neutrophils % 53.3 %    Lymphocytes % 30.3 %    Monocytes % 13.2 %    Eosinophils % 2.4 %    Basophils % 0.8 %    Neutrophils Absolute 1.8 1.7 - 7.7 K/uL    Lymphocytes Absolute 1.0 1.0 - 5.1 K/uL    Monocytes Absolute 0.4 0.0 - 1.3 K/uL    Eosinophils Absolute 0.1 0.0 - 0.6 K/uL Basophils Absolute 0.0 0.0 - 0.2 K/uL   Protime-INR    Collection Time: 11/27/20  7:34 AM   Result Value Ref Range    Protime 31.2 (H) 10.0 - 13.2 sec    INR 2.66 (H) 0.86 - 7.26   Basic Metabolic Panel w/ Reflex to MG    Collection Time: 11/27/20  7:34 AM   Result Value Ref Range    Sodium 133 (L) 136 - 145 mmol/L    Potassium reflex Magnesium 4.6 3.5 - 5.1 mmol/L    Chloride 96 (L) 99 - 110 mmol/L    CO2 27 21 - 32 mmol/L    Anion Gap 10 3 - 16    Glucose 86 70 - 99 mg/dL    BUN 31 (H) 7 - 20 mg/dL    CREATININE 1.2 0.6 - 1.2 mg/dL    GFR Non-African American 45 (A) >60    GFR  54 (A) >60    Calcium 8.8 8.3 - 10.6 mg/dL   POCT Glucose    Collection Time: 11/27/20 12:03 PM   Result Value Ref Range    POC Glucose 106 (H) 70 - 99 mg/dl    Performed on ACCU-CHEK    POCT Glucose    Collection Time: 11/27/20  4:25 PM   Result Value Ref Range    POC Glucose 123 (H) 70 - 99 mg/dl    Performed on ACCU-CHEK    POCT Glucose    Collection Time: 11/27/20  8:23 PM   Result Value Ref Range    POC Glucose 126 (H) 70 - 99 mg/dl    Performed on ACCU-CHEK    POCT Glucose    Collection Time: 11/27/20  9:45 PM   Result Value Ref Range    POC Glucose 110 (H) 70 - 99 mg/dl    Performed on ACCU-CHEK    POCT Glucose    Collection Time: 11/28/20  7:33 AM   Result Value Ref Range    POC Glucose 87 70 - 99 mg/dl    Performed on ACCU-CHEK    Protime-INR    Collection Time: 11/28/20  9:01 AM   Result Value Ref Range    Protime 31.5 (H) 10.0 - 13.2 sec    INR 2.69 (H) 0.86 - 7.63   Basic Metabolic Panel w/ Reflex to MG    Collection Time: 11/28/20  9:01 AM   Result Value Ref Range    Sodium 133 (L) 136 - 145 mmol/L    Potassium reflex Magnesium 4.0 3.5 - 5.1 mmol/L    Chloride 94 (L) 99 - 110 mmol/L    CO2 31 21 - 32 mmol/L    Anion Gap 8 3 - 16    Glucose 104 (H) 70 - 99 mg/dL    BUN 33 (H) 7 - 20 mg/dL    CREATININE 1.1 0.6 - 1.2 mg/dL    GFR Non- 49 (A) >60    GFR  60 (A) >60    Calcium 9.0 8.3 - 10.6 mg/dL   CBC auto differential    Collection Time: 11/28/20  9:01 AM   Result Value Ref Range    WBC 2.8 (L) 4.0 - 11.0 K/uL    RBC 3.80 (L) 4.00 - 5.20 M/uL    Hemoglobin 9.8 (L) 12.0 - 16.0 g/dL    Hematocrit 30.5 (L) 36.0 - 48.0 %    MCV 80.3 80.0 - 100.0 fL    MCH 25.8 (L) 26.0 - 34.0 pg    MCHC 32.2 31.0 - 36.0 g/dL    RDW 20.4 (H) 12.4 - 15.4 %    Platelets 339 352 - 954 K/uL    MPV 8.0 5.0 - 10.5 fL    Neutrophils % 51.9 %    Lymphocytes % 29.9 %    Monocytes % 14.0 %    Eosinophils % 3.0 %    Basophils % 1.2 %    Neutrophils Absolute 1.5 (L) 1.7 - 7.7 K/uL    Lymphocytes Absolute 0.8 (L) 1.0 - 5.1 K/uL    Monocytes Absolute 0.4 0.0 - 1.3 K/uL    Eosinophils Absolute 0.1 0.0 - 0.6 K/uL    Basophils Absolute 0.0 0.0 - 0.2 K/uL   Iron and TIBC    Collection Time: 11/28/20 11:31 AM   Result Value Ref Range    Iron 28 (L) 37 - 145 ug/dL    TIBC 313 260 - 445 ug/dL    Iron Saturation 9 (L) 15 - 50 %   Ferritin    Collection Time: 11/28/20 11:31 AM   Result Value Ref Range    Ferritin 51.4 15.0 - 150.0 ng/mL   Vitamin B12 & Folate    Collection Time: 11/28/20 11:31 AM   Result Value Ref Range    Vitamin B-12 386 211 - 911 pg/mL    Folate >20.00 4.78 - 24.20 ng/mL   Methylmalonic Acid, Serum    Collection Time: 11/28/20 11:31 AM   Result Value Ref Range    Methylmalonic Acid 0.67 (H) 0.00 - 0.40 umol/L   POCT Glucose    Collection Time: 11/28/20 11:33 AM   Result Value Ref Range    POC Glucose 87 70 - 99 mg/dl    Performed on ACCU-CHEK    POCT Glucose    Collection Time: 11/28/20  4:28 PM   Result Value Ref Range    POC Glucose 130 (H) 70 - 99 mg/dl    Performed on ACCU-CHEK    POCT Glucose    Collection Time: 11/28/20 10:07 PM   Result Value Ref Range    POC Glucose 153 (H) 70 - 99 mg/dl    Performed on ACCU-CHEK    POCT Glucose    Collection Time: 11/29/20  7:20 AM   Result Value Ref Range    POC Glucose 110 (H) 70 - 99 mg/dl    Performed on ACCU-CHEK    POCT Glucose    Collection Time: 11/29/20 11:35 AM Result Value Ref Range    POC Glucose 124 (H) 70 - 99 mg/dl    Performed on ACCU-CHEK    Protime-INR    Collection Time: 11/29/20 12:05 PM   Result Value Ref Range    Protime 30.1 (H) 10.0 - 13.2 sec    INR 2.57 (H) 0.86 - 8.72   Basic Metabolic Panel w/ Reflex to MG    Collection Time: 11/29/20 12:05 PM   Result Value Ref Range    Sodium 137 136 - 145 mmol/L    Potassium reflex Magnesium 4.0 3.5 - 5.1 mmol/L    Chloride 93 (L) 99 - 110 mmol/L    CO2 36 (H) 21 - 32 mmol/L    Anion Gap 8 3 - 16    Glucose 128 (H) 70 - 99 mg/dL    BUN 31 (H) 7 - 20 mg/dL    CREATININE 1.0 0.6 - 1.2 mg/dL    GFR Non-African American 55 (A) >60    GFR African American >60 >60    Calcium 9.3 8.3 - 10.6 mg/dL   CBC auto differential    Collection Time: 11/29/20 12:05 PM   Result Value Ref Range    WBC 2.6 (L) 4.0 - 11.0 K/uL    RBC 4.02 4.00 - 5.20 M/uL    Hemoglobin 10.3 (L) 12.0 - 16.0 g/dL    Hematocrit 32.4 (L) 36.0 - 48.0 %    MCV 80.8 80.0 - 100.0 fL    MCH 25.6 (L) 26.0 - 34.0 pg    MCHC 31.7 31.0 - 36.0 g/dL    RDW 20.0 (H) 12.4 - 15.4 %    Platelets 266 200 - 603 K/uL    MPV 8.2 5.0 - 10.5 fL    Neutrophils % 58.6 %    Lymphocytes % 21.2 %    Monocytes % 15.3 %    Eosinophils % 3.4 %    Basophils % 1.5 %    Neutrophils Absolute 1.5 (L) 1.7 - 7.7 K/uL    Lymphocytes Absolute 0.6 (L) 1.0 - 5.1 K/uL    Monocytes Absolute 0.4 0.0 - 1.3 K/uL    Eosinophils Absolute 0.1 0.0 - 0.6 K/uL    Basophils Absolute 0.0 0.0 - 0.2 K/uL   POCT Glucose    Collection Time: 11/29/20  4:03 PM   Result Value Ref Range    POC Glucose 140 (H) 70 - 99 mg/dl    Performed on ACCU-CHEK    POCT Glucose    Collection Time: 11/29/20  8:43 PM   Result Value Ref Range    POC Glucose 166 (H) 70 - 99 mg/dl    Performed on ACCU-CHEK    Protime-INR    Collection Time: 11/30/20  7:14 AM   Result Value Ref Range    Protime 28.0 (H) 10.0 - 13.2 sec    INR 2.39 (H) 0.86 - 7.51   Basic Metabolic Panel w/ Reflex to MG    Collection Time: 11/30/20  7:14 AM   Result Value Ref Range    Sodium 138 136 - 145 mmol/L    Potassium reflex Magnesium 3.9 3.5 - 5.1 mmol/L    Chloride 94 (L) 99 - 110 mmol/L    CO2 34 (H) 21 - 32 mmol/L    Anion Gap 10 3 - 16    Glucose 95 70 - 99 mg/dL    BUN 26 (H) 7 - 20 mg/dL    CREATININE 0.9 0.6 - 1.2 mg/dL    GFR Non-African American >60 >60    GFR African American >60 >60    Calcium 9.3 8.3 - 10.6 mg/dL   CBC auto differential    Collection Time: 11/30/20  7:14 AM   Result Value Ref Range    WBC 2.9 (L) 4.0 - 11.0 K/uL    RBC 4.05 4.00 - 5.20 M/uL    Hemoglobin 10.2 (L) 12.0 - 16.0 g/dL    Hematocrit 32.5 (L) 36.0 - 48.0 %    MCV 80.2 80.0 - 100.0 fL    MCH 25.3 (L) 26.0 - 34.0 pg    MCHC 31.5 31.0 - 36.0 g/dL    RDW 19.8 (H) 12.4 - 15.4 %    Platelets 682 314 - 432 K/uL    MPV 8.3 5.0 - 10.5 fL    Neutrophils % 45.5 %    Lymphocytes % 33.3 %    Monocytes % 16.5 %    Eosinophils % 3.8 %    Basophils % 0.9 %    Neutrophils Absolute 1.3 (L) 1.7 - 7.7 K/uL    Lymphocytes Absolute 1.0 1.0 - 5.1 K/uL    Monocytes Absolute 0.5 0.0 - 1.3 K/uL    Eosinophils Absolute 0.1 0.0 - 0.6 K/uL    Basophils Absolute 0.0 0.0 - 0.2 K/uL   POCT Glucose    Collection Time: 11/30/20  7:16 AM   Result Value Ref Range    POC Glucose 104 (H) 70 - 99 mg/dl    Performed on ACCU-CHEK    POCT Glucose    Collection Time: 11/30/20 11:28 AM   Result Value Ref Range    POC Glucose 114 (H) 70 - 99 mg/dl    Performed on ACCU-CHEK    POCT Glucose    Collection Time: 11/30/20  4:33 PM   Result Value Ref Range    POC Glucose 140 (H) 70 - 99 mg/dl    Performed on ACCU-CHEK    POCT Glucose    Collection Time: 11/30/20  8:29 PM   Result Value Ref Range    POC Glucose 190 (H) 70 - 99 mg/dl    Performed on ACCU-CHEK    Basic Metabolic Panel w/ Reflex to MG    Collection Time: 12/01/20  7:03 AM   Result Value Ref Range    Sodium 140 136 - 145 mmol/L    Potassium reflex Magnesium 4.0 3.5 - 5.1 mmol/L    Chloride 97 (L) 99 - 110 mmol/L    CO2 33 (H) 21 - 32 mmol/L    Anion Gap 10 3 - 16    Glucose Chloride 96 (L) 99 - 110 mmol/L    CO2 37 (H) 21 - 32 mmol/L    Anion Gap 7 3 - 16    Glucose 110 (H) 70 - 99 mg/dL    BUN 23 (H) 7 - 20 mg/dL    CREATININE 0.8 0.6 - 1.2 mg/dL    GFR Non-African American >60 >60    GFR African American >60 >60    Calcium 9.3 8.3 - 10.6 mg/dL   CBC auto differential    Collection Time: 12/02/20  6:04 AM   Result Value Ref Range    WBC 2.9 (L) 4.0 - 11.0 K/uL    RBC 3.94 (L) 4.00 - 5.20 M/uL    Hemoglobin 10.1 (L) 12.0 - 16.0 g/dL    Hematocrit 32.0 (L) 36.0 - 48.0 %    MCV 81.3 80.0 - 100.0 fL    MCH 25.6 (L) 26.0 - 34.0 pg    MCHC 31.4 31.0 - 36.0 g/dL    RDW 20.2 (H) 12.4 - 15.4 %    Platelets 391 159 - 161 K/uL    MPV 8.6 5.0 - 10.5 fL    Neutrophils % 46.5 %    Lymphocytes % 34.9 %    Monocytes % 14.4 %    Eosinophils % 3.0 %    Basophils % 1.2 %    Neutrophils Absolute 1.4 (L) 1.7 - 7.7 K/uL    Lymphocytes Absolute 1.0 1.0 - 5.1 K/uL    Monocytes Absolute 0.4 0.0 - 1.3 K/uL    Eosinophils Absolute 0.1 0.0 - 0.6 K/uL    Basophils Absolute 0.0 0.0 - 0.2 K/uL   POCT Glucose    Collection Time: 12/02/20  7:04 AM   Result Value Ref Range    POC Glucose 136 (H) 70 - 99 mg/dl    Performed on ACCU-CHEK    POCT Glucose    Collection Time: 12/02/20 11:28 AM   Result Value Ref Range    POC Glucose 130 (H) 70 - 99 mg/dl    Performed on ACCU-CHEK        Vital Signs BP (!) 110/51   Pulse 61   Temp 97.3 °F (36.3 °C) (Oral)   Resp 16   Ht 5' 6.5\" (1.689 m)   Wt (!) 355 lb 6.1 oz (161.2 kg)   SpO2 92%   BMI 56.50 kg/m²     Appearance    alert, cooperative, crying  Speech    spontaneous, normal rate and normal volume  Mood    Depressed  Affect    depressed affect  Thought Content    hopelessness, helplessness and excessive guilt  Thought Process    linear and goal directed  Associations    logical connections  Insight    Good  Judgment    Intact  No abnormal movements, tics or mannerisms.     Orientation    oriented to person, place, time, and general circumstances  Memory    recent and remote memory intact  Attention/Concentration    intact  Language    0 - no aphasia, normal  Fund of Knowledge    intact  Suicide Assessment    no suicidal ideation      History:      I have reviewed recent documentation for this patient:  Jose Washburn is a 78 y.o. female  who  has a past medical history of Diabetes mellitus type 2 in obese ([address] Edema of both legs, Elevated LFTs, 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[address] Homozygous Factor V Leiden mutation ([address] Hypercholesterolemia, Hypertension, Hypomagnesemia, Low serum HDL, Lower leg DVT (deep venous thromboembolism), chronic, right ([address] Osteoarthritis of cervical spine, Renal failure, acute ([address] and Vitamin D deficiency. CC:    Chief Complaint   Patient presents with    Shortness of Breath    Skin Ulcer     right calf       Context:  78 y.o. female now to [facility], THE ED with comp;aints of weakness and swelling of the legs. States that she has been feeling SOB for 1 month. Patient wanting psych consultation for severe anxiety and hx of depression. Experiencing increased anxiety and depression due to CHF diagnosis. Knowing that she has to change to a heart healthy diet is really giving her anxiety. Discussed ways to potentially start losing weight by setting monthly goals so that it does not overwhelm her. Given her outpatient resources. She does not want medications at this time. Depressed, crying, and tearful as she does not want to die and leave her grandchildren behind. \" I still have things I want to do and people to live for. \"      Past Medical History:   Diagnosis Date    Diabetes mellitus type 2 in obese ([address] 05/08/2010    Edema of both legs     Elevated LFTs     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[address] 1/22/2018    19    Homozygous Factor V Leiden mutation ([address] 1950    Hypercholesterolemia     Hypertension     Hypomagnesemia     Low serum HDL     Lower leg DVT (deep venous thromboembolism), chronic, right ([address]     Osteoarthritis of cervical spine 03/12/2010 CELLULITIS (ICD-682. 9)    FACTOR V DEFICIENCY (ICD-286.3)        Past Medical History:  Coronary Artery Disease, Hypertension, mixed hyperlipidemia/ low HDL/ metabolic synd. , Diabetes Type II, 2009 episode renal failure 2nd to sepsis, 2009 CELLULITIS/OPEN WOUND RIGHT ANKLE, BLOOD CLOTS (bilat. PASCALE hose)/ factor V leiden homozygote. Surgical History:  Appendectomy:, Cholecystectomy: , GYN Surgery: 3 , Tubal Ligation: , 2004 \"[facility]\"      Family History: . Father - HTN, CAD/ MI    Son - 1 heterozygous factor V leiden. 2  HTN    Sister - Breast Ca, HTN, DVT        Exercise: doing hand weights 15-20 3x/wk. 18       Family History   Problem Relation Age of Onset    Kidney Disease Mother         kidney stones - larger    Coronary Art Dis Father     Heart Surgery Father 48    Hypertension Father     Heart Attack Father     COPD Sister         smoker    Diabetes Sister     Diabetes Brother     Clotting Disorder Son         Factor V Leiden heterozygote    Other Son         GERD, gout, MRSA, homocystinemia    Hypertension Son     High Cholesterol Son     Other Sister         carotid stenosis       Family Hx: Denies    Thank you for allowing me to participate in this patients care. If you have any concerns, please contact me on the psych consult line.      Gael Schirmer, [address] [facility]  Psychiatry

## 2020-12-01 NOTE — CARE COORDINATION
LSW reviewed chart. New:  Psych order as pt is expressing concern about thinking of going home/dying at home. Plan was home with either [facility] or [address] [place] [address]  was home with Bariatric  walker or Bariatric 4 wh walker from [address] [place] [address]. Will need an IMM but today would not be the day to talk with her about going home.   Maribel Pizarro, [place]     Case Management   [phone]    12/1/2020  4:29 PM

## 2020-12-02 PROBLEM — F33.1 MDD (MAJOR DEPRESSIVE DISORDER), RECURRENT EPISODE, MODERATE (HCC): Status: ACTIVE | Noted: 2020-12-02

## 2020-12-02 LAB
ANION GAP SERPL CALCULATED.3IONS-SCNC: 7 MMOL/L (ref 3–16)
BASOPHILS ABSOLUTE: 0 K/UL (ref 0–0.2)
BASOPHILS RELATIVE PERCENT: 1.2 %
BUN BLDV-MCNC: 23 MG/DL (ref 7–20)
CALCIUM SERPL-MCNC: 9.3 MG/DL (ref 8.3–10.6)
CHLORIDE BLD-SCNC: 96 MMOL/L (ref 99–110)
CO2: 37 MMOL/L (ref 21–32)
CREAT SERPL-MCNC: 0.8 MG/DL (ref 0.6–1.2)
EOSINOPHILS ABSOLUTE: 0.1 K/UL (ref 0–0.6)
EOSINOPHILS RELATIVE PERCENT: 3 %
GFR AFRICAN AMERICAN: >60
GFR NON-AFRICAN AMERICAN: >60
GLUCOSE BLD-MCNC: 110 MG/DL (ref 70–99)
GLUCOSE BLD-MCNC: 128 MG/DL (ref 70–99)
GLUCOSE BLD-MCNC: 130 MG/DL (ref 70–99)
GLUCOSE BLD-MCNC: 136 MG/DL (ref 70–99)
GLUCOSE BLD-MCNC: 147 MG/DL (ref 70–99)
HCT VFR BLD CALC: 32 % (ref 36–48)
HEMOGLOBIN: 10.1 G/DL (ref 12–16)
INR BLD: 2.59 (ref 0.86–1.14)
LYMPHOCYTES ABSOLUTE: 1 K/UL (ref 1–5.1)
LYMPHOCYTES RELATIVE PERCENT: 34.9 %
MCH RBC QN AUTO: 25.6 PG (ref 26–34)
MCHC RBC AUTO-ENTMCNC: 31.4 G/DL (ref 31–36)
MCV RBC AUTO: 81.3 FL (ref 80–100)
METHYLMALONIC ACID: 0.67 UMOL/L (ref 0–0.4)
MONOCYTES ABSOLUTE: 0.4 K/UL (ref 0–1.3)
MONOCYTES RELATIVE PERCENT: 14.4 %
NEUTROPHILS ABSOLUTE: 1.4 K/UL (ref 1.7–7.7)
NEUTROPHILS RELATIVE PERCENT: 46.5 %
PDW BLD-RTO: 20.2 % (ref 12.4–15.4)
PERFORMED ON: ABNORMAL
PLATELET # BLD: 162 K/UL (ref 135–450)
PMV BLD AUTO: 8.6 FL (ref 5–10.5)
POTASSIUM REFLEX MAGNESIUM: 4 MMOL/L (ref 3.5–5.1)
PROTHROMBIN TIME: 30.3 SEC (ref 10–13.2)
RBC # BLD: 3.94 M/UL (ref 4–5.2)
SODIUM BLD-SCNC: 140 MMOL/L (ref 136–145)
WBC # BLD: 2.9 K/UL (ref 4–11)

## 2020-12-02 PROCEDURE — 6370000000 HC RX 637 (ALT 250 FOR IP): Performed by: HOSPITALIST

## 2020-12-02 PROCEDURE — 97530 THERAPEUTIC ACTIVITIES: CPT

## 2020-12-02 PROCEDURE — 85610 PROTHROMBIN TIME: CPT

## 2020-12-02 PROCEDURE — 1200000000 HC SEMI PRIVATE

## 2020-12-02 PROCEDURE — 36415 COLL VENOUS BLD VENIPUNCTURE: CPT

## 2020-12-02 PROCEDURE — 85025 COMPLETE CBC W/AUTO DIFF WBC: CPT

## 2020-12-02 PROCEDURE — 6370000000 HC RX 637 (ALT 250 FOR IP): Performed by: INTERNAL MEDICINE

## 2020-12-02 PROCEDURE — 2580000003 HC RX 258: Performed by: INTERNAL MEDICINE

## 2020-12-02 PROCEDURE — 97116 GAIT TRAINING THERAPY: CPT | Performed by: PHYSICAL THERAPIST

## 2020-12-02 PROCEDURE — 99231 SBSQ HOSP IP/OBS SF/LOW 25: CPT | Performed by: NURSE PRACTITIONER

## 2020-12-02 PROCEDURE — 97530 THERAPEUTIC ACTIVITIES: CPT | Performed by: PHYSICAL THERAPIST

## 2020-12-02 PROCEDURE — 94761 N-INVAS EAR/PLS OXIMETRY MLT: CPT

## 2020-12-02 PROCEDURE — 2700000000 HC OXYGEN THERAPY PER DAY

## 2020-12-02 PROCEDURE — 80048 BASIC METABOLIC PNL TOTAL CA: CPT

## 2020-12-02 PROCEDURE — 97535 SELF CARE MNGMENT TRAINING: CPT

## 2020-12-02 PROCEDURE — 6360000002 HC RX W HCPCS: Performed by: INTERNAL MEDICINE

## 2020-12-02 RX ORDER — ACETAZOLAMIDE 250 MG/1
250 TABLET ORAL DAILY
Status: COMPLETED | OUTPATIENT
Start: 2020-12-02 | End: 2020-12-03

## 2020-12-02 RX ORDER — FUROSEMIDE 40 MG/1
40 TABLET ORAL 2 TIMES DAILY
Status: DISCONTINUED | OUTPATIENT
Start: 2020-12-02 | End: 2020-12-03 | Stop reason: HOSPADM

## 2020-12-02 RX ORDER — WARFARIN SODIUM 3 MG/1
3 TABLET ORAL
Status: COMPLETED | OUTPATIENT
Start: 2020-12-02 | End: 2020-12-02

## 2020-12-02 RX ADMIN — Medication: at 22:10

## 2020-12-02 RX ADMIN — INSULIN LISPRO 3 UNITS: 100 INJECTION, SOLUTION INTRAVENOUS; SUBCUTANEOUS at 08:51

## 2020-12-02 RX ADMIN — INSULIN LISPRO 3 UNITS: 100 INJECTION, SOLUTION INTRAVENOUS; SUBCUTANEOUS at 17:17

## 2020-12-02 RX ADMIN — ACETAZOLAMIDE 250 MG: 250 TABLET ORAL at 15:00

## 2020-12-02 RX ADMIN — INSULIN LISPRO 3 UNITS: 100 INJECTION, SOLUTION INTRAVENOUS; SUBCUTANEOUS at 12:50

## 2020-12-02 RX ADMIN — WARFARIN SODIUM 3 MG: 3 TABLET ORAL at 17:17

## 2020-12-02 RX ADMIN — METOPROLOL SUCCINATE 25 MG: 25 TABLET, EXTENDED RELEASE ORAL at 22:06

## 2020-12-02 RX ADMIN — ATORVASTATIN CALCIUM 40 MG: 40 TABLET, FILM COATED ORAL at 22:06

## 2020-12-02 RX ADMIN — ACETAMINOPHEN 650 MG: 325 TABLET ORAL at 22:06

## 2020-12-02 RX ADMIN — INSULIN GLARGINE 30 UNITS: 100 INJECTION, SOLUTION SUBCUTANEOUS at 22:05

## 2020-12-02 RX ADMIN — MICONAZOLE NITRATE: 2 POWDER TOPICAL at 08:49

## 2020-12-02 RX ADMIN — SODIUM CHLORIDE, PRESERVATIVE FREE 10 ML: 5 INJECTION INTRAVENOUS at 22:06

## 2020-12-02 RX ADMIN — MICONAZOLE NITRATE: 2 POWDER TOPICAL at 22:06

## 2020-12-02 RX ADMIN — FUROSEMIDE 40 MG: 10 INJECTION, SOLUTION INTRAMUSCULAR; INTRAVENOUS at 08:49

## 2020-12-02 RX ADMIN — SODIUM CHLORIDE, PRESERVATIVE FREE 10 ML: 5 INJECTION INTRAVENOUS at 08:49

## 2020-12-02 RX ADMIN — INSULIN LISPRO 1 UNITS: 100 INJECTION, SOLUTION INTRAVENOUS; SUBCUTANEOUS at 22:05

## 2020-12-02 RX ADMIN — FUROSEMIDE 40 MG: 40 TABLET ORAL at 17:17

## 2020-12-02 ASSESSMENT — PAIN DESCRIPTION - ONSET: ONSET: ON-GOING

## 2020-12-02 ASSESSMENT — PAIN SCALES - GENERAL
PAINLEVEL_OUTOF10: 0
PAINLEVEL_OUTOF10: 5
PAINLEVEL_OUTOF10: 0

## 2020-12-02 ASSESSMENT — PAIN DESCRIPTION - FREQUENCY: FREQUENCY: CONTINUOUS

## 2020-12-02 ASSESSMENT — PAIN DESCRIPTION - LOCATION: LOCATION: LEG

## 2020-12-02 ASSESSMENT — PAIN DESCRIPTION - DESCRIPTORS: DESCRIPTORS: ACHING

## 2020-12-02 ASSESSMENT — PAIN DESCRIPTION - ORIENTATION: ORIENTATION: RIGHT

## 2020-12-02 ASSESSMENT — PAIN DESCRIPTION - PAIN TYPE: TYPE: ACUTE PAIN

## 2020-12-02 ASSESSMENT — PAIN DESCRIPTION - PROGRESSION: CLINICAL_PROGRESSION: NOT CHANGED

## 2020-12-02 NOTE — PROGRESS NOTES
Clinical Pharmacy Note  Warfarin Consult    Kristal Cormier is a 78 y.o. female receiving warfarin managed by pharmacy. Warfarin Indication: Afib, Factor V Leiden deficiency, chronic LLE DVT  Target INR range: 2-3   Dose prior to admission: 3 mg Tuesdays and Saturdays, 6 mg all other days    Current warfarin drug-drug interactions:     Recent Labs     11/30/20  0714 12/01/20  0703 12/01/20  1140 12/02/20  0604   HGB 10.2* 9.9*  --  10.1*   HCT 32.5* 31.5*  --  32.0*   INR 2.39*  --  2.43* 2.59*       Assessment/Plan:    Patient's dosing schedule has been erratic due to variable INRs.  Warfarin was on hold 11/19 and 11/20 due to INR=3.3, then resumed at prior home dose of 3 mg Tuesdays and Saturdays, 6 mg all other days.       Will give warfarin 3mg this evening and watch INR carefully. Of note, cardiology discussed possible switch to NOAC. Patient is thinking about this. Thank you for the consult. Will continue to follow.     Keily Roche, PharmD, BCPS  12/2/2020  7:03 AM

## 2020-12-02 NOTE — PLAN OF CARE
Problem: Skin Integrity:  Goal: Will show no infection signs and symptoms  Description: Will show no infection signs and symptoms  Outcome: Ongoing  Goal: Absence of new skin breakdown  Description: Absence of new skin breakdown  Outcome: Ongoing     Problem: Falls - Risk of:  Goal: Will remain free from falls  Description: Will remain free from falls  Outcome: Ongoing  Goal: Absence of physical injury  Description: Absence of physical injury  Outcome: Ongoing     Problem: DAILY CARE  Goal: Daily care needs are met  Outcome: Ongoing     Problem: PAIN  Goal: Patient's pain/discomfort is manageable  Outcome: Ongoing     Problem: OXYGENATION/RESPIRATORY FUNCTION  Goal: Patient will maintain patent airway  Outcome: Ongoing     Problem: HEMODYNAMIC STATUS  Goal: Patient has stable vital signs and fluid balance  Outcome: Ongoing     Problem: FLUID AND ELECTROLYTE IMBALANCE  Goal: Fluid and electrolyte balance are achieved/maintained  Outcome: Ongoing     Problem: ACTIVITY INTOLERANCE/IMPAIRED MOBILITY  Goal: Mobility/activity is maintained at optimum level for patient  Outcome: Ongoing     Problem: Pain:  Goal: Pain level will decrease  Description: Pain level will decrease  Outcome: Ongoing

## 2020-12-02 NOTE — PROGRESS NOTES
Slept in the recliner. Ace wraps taken off at hs. On fluid restrictions at 1500ml/day. O2 at 2l/nc. Micotin powder to excoriated areas. Fall precautions in place. Call light and bedside table in reach.

## 2020-12-02 NOTE — PROGRESS NOTES
Progress Note  Admit Date: 11/22/2020      PCP: JUAN MANUEL Gloria CNP     CC: F/U for chf    Days in hospital:  10    SUBJECTIVE / Interval History:  Patient feels a little tired. Otherwise has no complaints. Leg pain improved. Still very anxious    Discussed with family on the phone  Neg 17 L  Wt 393> 355      Allergies  Nickel and Ibuprofen    Medications    Scheduled Meds:   warfarin  3 mg Oral Once    furosemide  40 mg Intravenous BID    metoprolol succinate  25 mg Oral Nightly    insulin lispro  3 Units Subcutaneous TID WC    insulin glargine  30 Units Subcutaneous Nightly    miconazole   Topical BID    atorvastatin  40 mg Oral Nightly    sodium chloride flush  10 mL Intravenous 2 times per day    insulin lispro  0-12 Units Subcutaneous TID WC    insulin lispro  0-6 Units Subcutaneous Nightly    warfarin (COUMADIN) daily dosing (placeholder)   Other RX Placeholder     Continuous Infusions:   dextrose         PRN Meds:  ammonium lactate, sodium chloride flush, acetaminophen **OR** acetaminophen, polyethylene glycol, ondansetron, glucose, dextrose, glucagon (rDNA), dextrose    Vitals    /80   Pulse 85   Temp 98 °F (36.7 °C) (Oral)   Resp 16   Ht 5' 6.5\" (1.689 m)   Wt (!) 355 lb 6.1 oz (161.2 kg)   SpO2 96%   BMI 56.50 kg/m²     Exam:    Gen: No distress. Eyes: PERRL. No sclera icterus. No conjunctival injection. ENT: No discharge. Pharynx clear. External appearance of ears and nose normal.  Neck: Trachea midline. No obvious mass. Resp: No accessory muscle use. No crackles. No wheezes. No rhonchi. No dullness on percussion. CV: Regular rate. Regular rhythm. No murmur or rub. + edema. GI: Non-tender. Non-distended. No hernia. Skin: Bilateral leg swelling with chronic venous stasis  Lymph: No cervical LAD. No supraclavicular LAD. M/S: No cyanosis. No clubbing. No joint deformity. Neuro: Moves all four extremities. CN 2-12 tested, no defect noted.   Psych: hours.    RADIOLOGY:    XR CHEST (2 VW)   Final Result   Vascular congestion. Suspected small pleural effusion. Increased interstitial opacities, suggestive of pulmonary edema. Enlargement of the cardiac silhouette, cardiomegaly versus pericardial   effusion. CONSULTS:    PHARMACY TO DOSE WARFARIN  IP CONSULT TO SOCIAL WORK  IP CONSULT TO PODIATRY  IP CONSULT TO HEART FAILURE NURSE/COORDINATOR  IP CONSULT TO CARDIOLOGY  IP CONSULT TO HEART FAILURE NURSE/COORDINATOR  IP CONSULT TO DIETITIAN  IP CONSULT TO PSYCHIATRY    ASSESSMENT AND PLAN:      Principal Problem:    CHF with unknown LVEF ([address]  Active Problems:    DMII (diabetes mellitus, type 2) ([address]    Essential hypertension    Hypercholesterolemia    Lower leg DVT (deep venous thromboembolism), chronic, right (HCC)    Homozygous Factor V Leiden mutation ([address]    Generalized weakness    Pulmonary vascular congestion    Chronic venous stasis dermatitis of both lower extremities    Morbid obesity due to excess calories (HCC)    Shortness of breath    Persistent atrial fibrillation ([address]    RVF (right ventricular failure) ([facility]    Anemia  Resolved Problems:    * No resolved hospital problems. *    Patient is a 24-year-old female with a past medical history of morbid obesity, A. fib, DVT, factor V deficiency, diabetes, chronic venous stasis who presented with bilateral leg swelling with shortness of breath and fatigue. She was admitted with acute right-sided CHF. Patient was started on Lasix drip and switched to IV Lasix. Patient was diuresed 17 L. Weight at the time of discharge is 355 pounds    Acute on chronic right sided systolic heart failure- improving, ct Iv diuresis,  switch to po today  -Continue Lasix  -Cardiology consult appreciated  -Strict VALERIO and daily weight    Right lower leg pain-? Secondary to volume overload  -Cause unclear.   Had healing scab on her calf    Hypotension- improved   -Improved with midodrine    Persistent A. Fib  -On Coumadin    History of DVT/PE with factor V deficiency  -On Coumadin    Diabetes type 2- currently controlled  -Continue current regimen and monitor blood sugars    Hyperlipidemia  -Continue statin    Chronic venous insufficiency  -Continue Ace wrap    Morbid obesity with a BMI of 59  -Complicating patient's condition    Mild anemia  -got venofer    Severe anxiety with a history of depression  -Patient requesting to talk to psychiatrist, will consult      DVT Prophylaxis: COUMADIN  Diet: DIET LOW SODIUM 2 GM; Carb Control: 4 carb choices (60 gms)/meal; 1500 ml  Code Status: Full Code    PT/OT Eval Status:home care    Discharge plan -monitor diuresis on oral Lasix. Discharge home tomorrow if there is good diuresis    The patient and / or the family were informed of the results of any tests, a time was given to answer questions, a plan was proposed and they agreed with plan. Discussed with consulting physicians, nursing and social work     The note was completed using EMR. Every effort was made to ensure accuracy; however, inadvertent computerized transcription errors may be present.        Nicole Hill MD

## 2020-12-02 NOTE — PROGRESS NOTES
Physical Therapy  Facility/Department: [address] IP   Daily Treatment Note  NAME: Brea Huddleston  : 1950  MRN: [id number]    Date of Service: 2020    Discharge Recommendations:  Patient would benefit from continued therapy after discharge, 2-3 sessions per week, Home with Home health PT, S Level 3, Home with assist PRN   PT Equipment Recommendations  Equipment Needed: Yes  Mobility Devices: Laverta Jaun: Rolling  Other: Pt has confirmed that she is set on using a bariatric RW. HOME HEALTH CARE: LEVEL 3 SAFETY     -Initial home health evaluation to occur within 24-48 hours, in patient home   -Home health agency to establish plan of care for patient over 60 day period   -Medication Reconciliation   -PT/OT/Speech evaluations in home within 24-48 hours of discharge; including DME and home safety   -Frontload therapy 5 days, then 3x a week   -OT to evaluate if patient has [address] needs for personal care   - evaluation within 24-48 hours, includes evaluation of resources and insurance to determine [place], [place], LTC, and Medicaid options   -PCP Visit scheduled within three to seven days of discharge     Brea Huddleston scored a 17/24 on the AM-PAC short mobility form. Current research shows that an AM-PAC score of 18 or greater is typically associated with a discharge to the patient's home setting. Based on the patient's AM-PAC score and their current functional mobility deficits, it is recommended that the patient have 2-3 sessions per week of Physical Therapy at d/c to increase the patient's independence. At this time, this patient demonstrates the endurance and safety to discharge home with home therapy and a follow up treatment frequency of 2-3x/wk. Please see assessment section for further patient specific details. If patient discharges prior to next session this note will serve as a discharge summary. Please see below for the latest assessment towards goals. Assessment   Body structures, Functions, Activity limitations: Decreased functional mobility ; Increased pain;Decreased balance;Decreased strength;Decreased safe awareness;Decreased endurance  Assessment: Pt is an 78y.o. year-old female that presents to acute care with SOB, increased lower extremity weakness, and generalized fatigue. She currently lives alone in an apartment with one level and no STEFFANIE. PTA pt was independent with all functional mobility. Today (12/2) pt able to transfer sit<>stand from recliner and toilet with SBA and increased time. Cues for hand placement only needed for initial sit to stand. Able to amb 15' in room with CGA without O2, SpO2 dropped to 84% and took an extended rest break to bring up to 94%. Abm 130' in hallway with SBA-CGA, RW, w/c follow, and 1L O2. SpO2 92% after first bout, 88% after second bout. Cues to stay close to RW. Continues to be limited by anxiety, decreased strength, endurance, and balance. Would benefit from continued skilled therapy during acute stay and after D/C to further maximize functional mobility and independence. Pt does not want to go to an ARU or SNF, would like to go home with home therapy. Pt continues to prefer using a bariatric RW. Treatment Diagnosis: decreased functional mobility  Prognosis: Good  PT Education: General Safety;Gait Training;Functional Mobility Training;Transfer Training; Adaptive Device Training  Barriers to Learning: anxiety  REQUIRES PT FOLLOW UP: Yes  Activity Tolerance  Activity Tolerance: Patient limited by fatigue;Patient limited by pain; Patient limited by endurance; Other(anxiety)     Patient Diagnosis(es): The primary encounter diagnosis was Acute on chronic congestive heart failure, unspecified heart failure type ([facility] Diagnoses of Longstanding persistent atrial fibrillation (HCC) and General weakness were also pertinent to this visit.      has a past medical history of Diabetes mellitus type 2 in obese ([address] Edema of both legs, Elevated LFTs, 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[facility] [facility] Homozygous Factor V Leiden mutation ([facility] [address] [facility] Hypercholesterolemia, Hypertension, Hypomagnesemia, Low serum HDL, Lower leg DVT (deep venous thromboembolism), chronic, right (HCC), Osteoarthritis of cervical spine, Renal failure, acute ([facility] and Vitamin D deficiency. has a past surgical history that includes Cholecystectomy ();  section; Appendectomy (1976); Tubal ligation (Bilateral, 1980); and Colonoscopy (2014). Restrictions  Restrictions/Precautions  Restrictions/Precautions: Fall Risk  Position Activity Restriction  Other position/activity restrictions: telemetry, 1L O2    Subjective   General  Chart Reviewed: Yes  Additional Pertinent Hx: Per Kumar Almaguer MD, \"Pt is an 78y.o. year-old female with a history of hypertension, hyperlipidemia, diabetes mellitus, chronic venous stasis dermatitis of bilateral lower extremities, atrial fibrillation, factor V Leiden deficiency and a chronic left DVT for which she takes Coumadin. Presents to the emergency room for evaluation following a 1 month history of shortness of breath and a 2-day history of increased lower extremity weakness and generalized fatigue. She states that her shortness of breath is moderately severe, worse with exertion and improved with rest.  In the emergency room she was found to have an area vascular congestion and bilateral lower extremity edema. She is being admitted for further evaluation and treatment. Associated signs and symptoms do not include chest pain, diaphoresis, orthopnea, paroxysmal nocturnal dyspnea, fever or chills. \"  Response To Previous Treatment: Patient with no complaints from previous session. Family / Caregiver Present: No  Referring Practitioner: Kumar Almaguer MD  Subjective  Subjective: Pt awake in chair and agreeable to PT this PM. Rates pain 6/10 in RLE while sitting. Stated she is feeling exhausted today.   General Comment  Comments: Baseline SpO2 90% with no O2. Orientation  Orientation  Overall Orientation Status: Within Functional Limits       Objective   Transfers  Sit to Stand: Stand by assistance  Stand to sit: Stand by assistance  Comment: Pt only needed cues for first sit to stand, no cues rest of session. Sit<>stand from recliner and toilet. Extended time required to perform. Ambulation  Ambulation?: Yes  More Ambulation?: No  Ambulation 1  Surface: level tile  Device: Rolling Walker  Other Apparatus: O2;Wheelchair follow  Assistance: Contact guard assistance;Stand by assistance  Quality of Gait: flexed posture, step through pattern, ER of bilat LEs, guarded movements, no LOB or unsteadiness noted, notable lateral trunk flexion bilat directions, decreased DF bilat, flat foot initial contact  Gait Deviations: Slow Livia; Increased PIERO; Decreased step length;Decreased step height  Distance: approx 15' from chair to toilet, 130' x2 in hallway with rest break between bouts  Comments: Amb in room without O2, but SpO2 dropped to 84% and took extened time to recover to 94% while taking a seated rest break. Put on 1L for amb in hallway and was 92% after first bout and 88% after second bout. Extended time to increase to 91%. No standing rest breaks required. Cues to keep close to walker. Stairs/Curb  Stairs?: No                  Other Activities: Other (see comment)  Comment: Pt used toilet and voided, able to perform own shashi care, handwashing, and donning/doffing pants. When washing hands, pt rested bilat UEs on sink for support.              AM-PAC Score  AM-PAC Inpatient Mobility Raw Score : 17 (12/02/20 1607)  AM-PAC Inpatient T-Scale Score : 42.13 (12/02/20 1607)  Mobility Inpatient CMS 0-100% Score: 50.57 (12/02/20 1607)  Mobility Inpatient CMS G-Code Modifier : CK (12/02/20 1607)          Goals  Short term goals  Time Frame for Short term goals: during acute stay  Short term goal 1: bed mobility with CGA  Short term goal 2: transfers with supervision  Short term goal 3: ambulate 150' with or without AD and SBA  Long term goals  Time Frame for Long term goals : STG = LTG  Patient Goals   Patient goals : get stronger legs, work on exercises, go home    Plan    Plan  Times per week: 3-5  Plan weeks: during acute stay  Specific instructions for Next Treatment: pt would like to work on LE exercises  Current Treatment Recommendations: Strengthening, Home Exercise Program, Safety Education & Training, Balance Training, Endurance Training, Functional Mobility Training, Transfer Training, Gait Training  Safety Devices  Type of devices: All fall risk precautions in place, Call light within reach, Chair alarm in place, Gait belt, Patient at risk for falls, Left in chair  Restraints  Initially in place: No     Therapy Time   Individual Concurrent Group Co-treatment   Time In 1500         Time Out 1615         Minutes 75                 SHAYLEE Cox  Therapist was present, directed the patient's care, made skilled judgement, and was responsible for assessment and treatment of the patient.          Electronically signed by Antonina Anne PT on 12/2/20 at 5:31 PM EST

## 2020-12-02 NOTE — PROGRESS NOTES
Occupational Therapy  Facility/Department: [address] IP REHAB  Daily Treatment Note  NAME: Tracy Cummings  : 1950  MRN: [id number]    Date of Service: 2020    Discharge Recommendations:  Continue to assess pending progress, Home with Home health OT, Home with assist PRN     Tracy Cummings scored a 19/24 on the AM-PAC ADL Inpatient form. Current research shows that an AM-PAC score of 18 or greater is typically associated with a discharge to the patient's home setting. Based on the patient's AM-PAC score, and their current ADL deficits, it is recommended that the patient have 2-3 sessions per week of Occupational Therapy at d/c to increase the patient's independence. At this time, this patient demonstrates the endurance and safety to discharge home with home OT and a follow up treatment frequency of 2-3x/wk. Please see assessment section for further patient specific details. If patient discharges prior to next session this note will serve as a discharge summary. Please see below for the latest assessment towards goals. Assessment   Performance deficits / Impairments: Decreased functional mobility ; Decreased endurance;Decreased ADL status; Decreased balance  Assessment: Pt tolerated treatment well. Pt completed ADLs with SBA and RW. Pt standing tolerance continues to improve, as pt was able to tolerate 7-8 min of standing to complete kitchen mobility before requiring seated rest break. Kitchen mobility was completed with SBA and no device, but additional fx mobility was completed with SBA and RW. Pt plans to complete kitchen mobility at home without a device, but will require a RW for additional fx mobility so provided education regarding a walker basket or walker tray to transport items from kitchen. Pt remains limited by decreased activity tolerance and fatigue, but will continue to work with pt on acute to improve the above deficits.  Recommend pt recieve home OT and assist prn upon discharge to improve fx mobility for ADL participation. Prognosis: Good  OT Education: Plan of Care;Equipment; Energy Conservation  Patient Education: pursed lip breathing  REQUIRES OT FOLLOW UP: Yes  Activity Tolerance  Activity Tolerance: Patient limited by fatigue  Activity Tolerance: Pt tolerated entire session but reported feeling very tired at end. Safety Devices  Safety Devices in place: Yes  Type of devices: Call light within reach; Chair alarm in place; Left in chair;Gait belt         Patient Diagnosis(es): The primary encounter diagnosis was Acute on chronic congestive heart failure, unspecified heart failure type ([facility] Diagnoses of Longstanding persistent atrial fibrillation (HCC) and General weakness were also pertinent to this visit. has a past medical history of Diabetes mellitus type 2 in obese ([facility] [address] Edema of both legs, Elevated LFTs, 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[facility] [address] Homozygous Factor V Leiden mutation ([facility] [address] Hypercholesterolemia, Hypertension, Hypomagnesemia, Low serum HDL, Lower leg DVT (deep venous thromboembolism), chronic, right ([facility] [address] Osteoarthritis of cervical spine, Renal failure, acute ([facility] [address] and Vitamin D deficiency. has a past surgical history that includes Cholecystectomy ();  section; Appendectomy (1976); Tubal ligation (Bilateral, 1980); and Colonoscopy (2014). Restrictions  Restrictions/Precautions  Restrictions/Precautions: Fall Risk  Position Activity Restriction  Other position/activity restrictions: 1L O2, telemetry  Subjective   General  Chart Reviewed: Yes, Progress Notes  Patient assessed for rehabilitation services?: Yes  Additional Pertinent Hx: Per MIRTA Quesada  Gala Mejia is a 78 y.o. female who presents here to the emergency department, the patient states that she recently stayed at her daughter-in-law's house, and 2 days ago woke up and felt profoundly weak in her legs, and her legs seem to be more swollen.   She states that Comments: Pt ambulated from recliner > bathroom > w/c (~15 ft) with SBA and RW with no LOB. Pt additionally completed fx mobility in therapy dept kitchen  - pt held onto countertop surfaces for support but able to ambulate within kitchen with no device and SBA with no LOB. Assist to manage 02. Toilet Transfers  Toilet - Technique: Ambulating  Equipment Used: Grab bars  Toilet Transfer: Stand by assistance  Toilet Transfers Comments: Pt completed transfer with RW and SBA. Assist to manage O2. Wheelchair Bed Transfers  Wheelchair/Bed - Technique: Ambulating  Equipment Used: Wheelchair; Other(recliner)  Level of Asssistance: Stand by assistance  Wheelchair Transfers Comments: Pt completed transfers with SBA and RW. Assist to manage 02.      Transfers  Sit to stand: Stand by assistance  Stand to sit: Stand by assistance                                                                 Plan   Plan  Times per week: 3-5  Plan weeks: by discharge  Current Treatment Recommendations: Strengthening, Patient/Caregiver Education & Training, Equipment Evaluation, Education, & procurement, Balance Training, Functional Mobility Training, Endurance Training, Safety Education & Training, Self-Care / ADL                                                  AM-PAC Score        AM-[facility] Inpatient Daily Activity Raw Score: 19 (12/02/20 1037)  AM-PAC Inpatient ADL T-Scale Score : 40.22 (12/02/20 1037)  ADL Inpatient CMS 0-100% Score: 42.8 (12/02/20 1037)  ADL Inpatient CMS G-Code Modifier : CK (12/02/20 1037)    Goals  Short term goals  Time Frame for Short term goals: by discharge  Short term goal 1: Pt will complete self care indep with AD  Short term goal 2: Pt will complete fx mobility indep with AD  Short term goal 3: Pt will complete transfers indep with AD  Short term goal 4: Pt will increase activity tolerance to stand for 6 min for ADL task  Short term goal 5: Pt will be indep in HEP for UE strengthening for improved activity tolerance for mobility/ADL participation  Long term goals  Time Frame for Long term goals : LTGs=STGs  Patient Goals   Patient goals : \"to get stronger\"       Therapy Time   Individual Concurrent Group Co-treatment   Time In 0925         Time Out 1025         Minutes 60         Timed Code Treatment Minutes: 60 Minutes      Therapist was present, directed the patient's care, made skilled judgement, and was responsible for assessment and treatment of the patient.           WILFRID Kauffman, OTR/L  #[id number] 12/2/20 12:33 PM

## 2020-12-03 VITALS
WEIGHT: 293 LBS | RESPIRATION RATE: 18 BRPM | BODY MASS INDEX: 45.99 KG/M2 | DIASTOLIC BLOOD PRESSURE: 74 MMHG | HEART RATE: 59 BPM | OXYGEN SATURATION: 97 % | HEIGHT: 67 IN | TEMPERATURE: 97.3 F | SYSTOLIC BLOOD PRESSURE: 126 MMHG

## 2020-12-03 LAB
ANION GAP SERPL CALCULATED.3IONS-SCNC: 7 MMOL/L (ref 3–16)
BASOPHILS ABSOLUTE: 0 K/UL (ref 0–0.2)
BASOPHILS RELATIVE PERCENT: 1.1 %
BUN BLDV-MCNC: 19 MG/DL (ref 7–20)
CALCIUM SERPL-MCNC: 9.4 MG/DL (ref 8.3–10.6)
CHLORIDE BLD-SCNC: 95 MMOL/L (ref 99–110)
CO2: 36 MMOL/L (ref 21–32)
CREAT SERPL-MCNC: 0.7 MG/DL (ref 0.6–1.2)
EOSINOPHILS ABSOLUTE: 0.1 K/UL (ref 0–0.6)
EOSINOPHILS RELATIVE PERCENT: 3.1 %
GFR AFRICAN AMERICAN: >60
GFR NON-AFRICAN AMERICAN: >60
GLUCOSE BLD-MCNC: 109 MG/DL (ref 70–99)
GLUCOSE BLD-MCNC: 110 MG/DL (ref 70–99)
GLUCOSE BLD-MCNC: 153 MG/DL (ref 70–99)
GLUCOSE BLD-MCNC: 158 MG/DL (ref 70–99)
HCT VFR BLD CALC: 31.2 % (ref 36–48)
HEMOGLOBIN: 10 G/DL (ref 12–16)
INR BLD: 2.47 (ref 0.86–1.14)
LYMPHOCYTES ABSOLUTE: 1.1 K/UL (ref 1–5.1)
LYMPHOCYTES RELATIVE PERCENT: 36.2 %
MCH RBC QN AUTO: 25.9 PG (ref 26–34)
MCHC RBC AUTO-ENTMCNC: 32 G/DL (ref 31–36)
MCV RBC AUTO: 81 FL (ref 80–100)
MONOCYTES ABSOLUTE: 0.4 K/UL (ref 0–1.3)
MONOCYTES RELATIVE PERCENT: 13.5 %
NEUTROPHILS ABSOLUTE: 1.4 K/UL (ref 1.7–7.7)
NEUTROPHILS RELATIVE PERCENT: 46.1 %
PDW BLD-RTO: 20.8 % (ref 12.4–15.4)
PERFORMED ON: ABNORMAL
PLATELET # BLD: 166 K/UL (ref 135–450)
PMV BLD AUTO: 8.8 FL (ref 5–10.5)
POTASSIUM REFLEX MAGNESIUM: 3.8 MMOL/L (ref 3.5–5.1)
PROTHROMBIN TIME: 28.9 SEC (ref 10–13.2)
RBC # BLD: 3.85 M/UL (ref 4–5.2)
SODIUM BLD-SCNC: 138 MMOL/L (ref 136–145)
WBC # BLD: 3 K/UL (ref 4–11)

## 2020-12-03 PROCEDURE — 2580000003 HC RX 258: Performed by: INTERNAL MEDICINE

## 2020-12-03 PROCEDURE — 2700000000 HC OXYGEN THERAPY PER DAY

## 2020-12-03 PROCEDURE — 94680 O2 UPTK RST&XERS DIR SIMPLE: CPT

## 2020-12-03 PROCEDURE — 6370000000 HC RX 637 (ALT 250 FOR IP): Performed by: INTERNAL MEDICINE

## 2020-12-03 PROCEDURE — 85610 PROTHROMBIN TIME: CPT

## 2020-12-03 PROCEDURE — 97530 THERAPEUTIC ACTIVITIES: CPT | Performed by: PHYSICAL THERAPIST

## 2020-12-03 PROCEDURE — 97530 THERAPEUTIC ACTIVITIES: CPT

## 2020-12-03 PROCEDURE — 94761 N-INVAS EAR/PLS OXIMETRY MLT: CPT

## 2020-12-03 PROCEDURE — 97535 SELF CARE MNGMENT TRAINING: CPT

## 2020-12-03 PROCEDURE — 85025 COMPLETE CBC W/AUTO DIFF WBC: CPT

## 2020-12-03 PROCEDURE — 97116 GAIT TRAINING THERAPY: CPT | Performed by: PHYSICAL THERAPIST

## 2020-12-03 PROCEDURE — 36415 COLL VENOUS BLD VENIPUNCTURE: CPT

## 2020-12-03 PROCEDURE — 80048 BASIC METABOLIC PNL TOTAL CA: CPT

## 2020-12-03 PROCEDURE — 6370000000 HC RX 637 (ALT 250 FOR IP): Performed by: HOSPITALIST

## 2020-12-03 RX ORDER — INSULIN DEGLUDEC INJECTION 100 U/ML
40 INJECTION, SOLUTION SUBCUTANEOUS DAILY
Qty: 75 ML | Refills: 2
Start: 2020-12-03 | End: 2021-02-24 | Stop reason: ALTCHOICE

## 2020-12-03 RX ORDER — AMMONIUM LACTATE 12 G/100G
LOTION TOPICAL
Qty: 1 BOTTLE | Refills: 0 | Status: SHIPPED | OUTPATIENT
Start: 2020-12-03 | End: 2020-12-09

## 2020-12-03 RX ORDER — WARFARIN SODIUM 3 MG/1
6 TABLET ORAL
Status: COMPLETED | OUTPATIENT
Start: 2020-12-03 | End: 2020-12-03

## 2020-12-03 RX ORDER — FUROSEMIDE 40 MG/1
40 TABLET ORAL 2 TIMES DAILY
Qty: 90 TABLET | Refills: 1 | Status: SHIPPED | OUTPATIENT
Start: 2020-12-03 | End: 2021-01-15 | Stop reason: SDUPTHER

## 2020-12-03 RX ADMIN — FUROSEMIDE 40 MG: 40 TABLET ORAL at 09:02

## 2020-12-03 RX ADMIN — INSULIN LISPRO 3 UNITS: 100 INJECTION, SOLUTION INTRAVENOUS; SUBCUTANEOUS at 09:03

## 2020-12-03 RX ADMIN — MICONAZOLE NITRATE: 2 POWDER TOPICAL at 09:03

## 2020-12-03 RX ADMIN — INSULIN LISPRO 3 UNITS: 100 INJECTION, SOLUTION INTRAVENOUS; SUBCUTANEOUS at 17:07

## 2020-12-03 RX ADMIN — SODIUM CHLORIDE, PRESERVATIVE FREE 10 ML: 5 INJECTION INTRAVENOUS at 09:03

## 2020-12-03 RX ADMIN — ACETAZOLAMIDE 250 MG: 250 TABLET ORAL at 09:02

## 2020-12-03 RX ADMIN — INSULIN LISPRO 2 UNITS: 100 INJECTION, SOLUTION INTRAVENOUS; SUBCUTANEOUS at 12:39

## 2020-12-03 RX ADMIN — INSULIN LISPRO 3 UNITS: 100 INJECTION, SOLUTION INTRAVENOUS; SUBCUTANEOUS at 12:39

## 2020-12-03 RX ADMIN — FUROSEMIDE 40 MG: 40 TABLET ORAL at 17:07

## 2020-12-03 RX ADMIN — INSULIN LISPRO 2 UNITS: 100 INJECTION, SOLUTION INTRAVENOUS; SUBCUTANEOUS at 17:07

## 2020-12-03 RX ADMIN — WARFARIN SODIUM 6 MG: 3 TABLET ORAL at 17:07

## 2020-12-03 ASSESSMENT — PAIN SCALES - GENERAL: PAINLEVEL_OUTOF10: 0

## 2020-12-03 NOTE — CARE COORDINATION
[facility] unable to staff referral for homecare. Voice message left for DC planner [phone]. Electronically signed by Anish Forte LPN on 35/6/20 at 08:10 AM EST See ACC TE.

## 2020-12-03 NOTE — CARE COORDINATION
[facility]  Diabetes Education   Progress Note       NAME:  Shayla Madrigal  MEDICAL RECORD NUMBER:  [id number]  AGE: 78 y.o. GENDER: female  : 1950  TODAY'S DATE:  12/3/2020    Subjective   Reason for Diabetic Education Evaluation and Assessment: diabetes support    Yumiko Temple rates her willingness to change her meal planning habits as \"high\". She has questions regarding carbs and sodium recommendations.       Visit Type: follow-up      Shayla Madrigal is a 78 y.o. female referred by:     [x] Physician   [] Nursing  [] Chart Review   [] Other:     PAST MEDICAL HISTORY        Diagnosis Date    Diabetes mellitus type 2 in obese ([facility] 2010    Edema of both legs     Elevated LFTs     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[facility] 2018    19    Homozygous Factor V Leiden mutation ([facility] 1950    Hypercholesterolemia     Hypertension     Hypomagnesemia     Low serum HDL     Lower leg DVT (deep venous thromboembolism), chronic, right (HCC)     Osteoarthritis of cervical spine 2010    Facet arthropathy, spondylosis    Renal failure, acute (HCC)     Due to sepsis    Vitamin D deficiency 2018    13.2       PAST SURGICAL HISTORY    Past Surgical History:   Procedure Laterality Date    APPENDECTOMY  1976     SECTION      3     CHOLECYSTECTOMY      gangrenous    COLONOSCOPY  2014    no polyp    TUBAL LIGATION Bilateral 1980       FAMILY HISTORY    Family History   Problem Relation Age of Onset    Kidney Disease Mother         kidney stones - larger    Coronary Art Dis Father     Heart Surgery Father 48    Hypertension Father     Heart Attack Father     COPD Sister         smoker    Diabetes Sister     Diabetes Brother     Clotting Disorder Son         Factor V Leiden heterozygote    Other Son         GERD, gout, MRSA, homocystinemia    Hypertension Son     High Cholesterol Son     Other Sister         carotid stenosis       SOCIAL HISTORY    Social History     Tobacco Use    Smoking status: Never Smoker    Smokeless tobacco: Never Used   Substance Use Topics    Alcohol use: Yes     Comment: OCCASIONALLY 1 or less/1-2 wk    Drug use: No     Comment: No Mj experimentation       ALLERGIES    Allergies   Allergen Reactions    Nickel Nausea Only and Rash    Ibuprofen      Pt takes Warfarin       MEDICATIONS     furosemide  40 mg Oral BID    metoprolol succinate  25 mg Oral Nightly    insulin lispro  3 Units Subcutaneous TID WC    insulin glargine  30 Units Subcutaneous Nightly    miconazole   Topical BID    atorvastatin  40 mg Oral Nightly    sodium chloride flush  10 mL Intravenous 2 times per day    insulin lispro  0-12 Units Subcutaneous TID WC    insulin lispro  0-6 Units Subcutaneous Nightly    warfarin (COUMADIN) daily dosing (placeholder)   Other RX Placeholder       Objective        Patient Active Problem List   Diagnosis Code    DMII (diabetes mellitus, type 2) ([facility]) E11.9    Essential hypertension I10    Hypercholesterolemia E78.00    Lower leg DVT (deep venous thromboembolism), chronic, right ([facility]) I82.5Z1    Edema of both legs R60.0    Encounter for current long-term use of anticoagulants Z79.01    Homozygous Factor V Leiden mutation ([facility] D68.51    Low serum HDL R74.8    Elevated LFTs R79.89    Hypomagnesemia E83.42    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[facility]) E72.11    Vitamin D deficiency E55.9    CHF with unknown LVEF ([facility]) I50.9    Generalized weakness R53.1    Pulmonary vascular congestion R09.89    Chronic venous stasis dermatitis of both lower extremities I87.2    Morbid obesity due to excess calories ([facility]) E66.01    Shortness of breath R06.02    Persistent atrial fibrillation ([facility]) I48.19    RVF (right ventricular failure) ([facility]) I50.810    Anemia D64.9    MDD (major depressive disorder), recurrent episode, moderate ([facility]) F33.1    Acute on chronic congestive heart failure ([facility]) I50.9        BP (!) 154/94 Pulse 65   Temp 97.2 °F (36.2 °C) (Oral)   Resp 20   Ht 5' 6.5\" (1.689 m)   Wt (!) 355 lb 6.1 oz (161.2 kg)   SpO2 94%   BMI 56.50 kg/m²     HgBA1c:    Lab Results   Component Value Date    LABA1C 6.2 09/25/2020       Recent Labs     12/02/20  1602 12/02/20  2146 12/03/20  0709 12/03/20  1110   POCGLU 128* 147* 109* 158*       BUN/Creatinine:    Lab Results   Component Value Date    BUN 19 12/03/2020    CREATININE 0.7 12/03/2020       Assessment        Diabetes Management and Education    Does the patient have a Primary Care Physician? Yes, JUAN MANUEL Mora CNP       Does the patient require new medication instruction? Yes  Reinforced avoiding preferred injection sites. Injection Site:   [x] location    [x] rotation     Level of patient/caregiver understanding able to:      [x] Verbalized Understanding   [] Demonstrated Understanding       [x] Teach Back       [] Needs Reinforcement     []  Other:        Does the patient/caregiver monitor Blood Glucoses? Yes  Reviewed glycemic control targets, testing frequency and when to call PCP. Level of patient/caregiver understanding able to:        [x] Verbalized Understanding   [] Demonstrated Understanding       [] Teach Back       [] Needs Reinforcement     []  Other:        Does the patient/caregiver follow a Meal Plan? No: States is motivated to make meals changes but lack confidence. Reviewed importance of eating three meals per day and plate method for consistent carb intake. Reinforced low sodium guidelines. Level of patient/caregiver understanding able to:       [x] Verbalized Understanding   [] Demonstrated Understanding       [] Teach Back       [] Needs Reinforcement     []  Other: :           Plan        Ongoing diabetes education and blood glucose monitoring. Notified Hilda Bell MD of Sheila's intent to make meal planning changes. Recommend a lower dose of home insulin than prior to admission.         Teaching Time Diabetes Education:  20 minutes     Electronically signed by Destiny Mackay on 12/3/2020 at 12:03 PM

## 2020-12-03 NOTE — PROGRESS NOTES
[facility]    Respiratory Therapy   Home Oxygen Evaluation        Name: Gala Mejia  Medical Record Number: [id number]  Age: 78 y.o.   Gender:  female   : 1950  Today's date: 12/3/2020  Room: R4Q-3950/3259-01      Assessment        BP (!) 154/94   Pulse 65   Temp 97.2 °F (36.2 °C) (Oral)   Resp 20   Ht 5' 6.5\" (1.689 m)   Wt (!) 355 lb 6.1 oz (161.2 kg)   SpO2 94%   BMI 56.50 kg/m²     Patient Active Problem List   Diagnosis    DMII (diabetes mellitus, type 2) (HCC)    Essential hypertension    Hypercholesterolemia    Lower leg DVT (deep venous thromboembolism), chronic, right (HCC)    Edema of both legs    Encounter for current long-term use of anticoagulants    Homozygous Factor V Leiden mutation ([facility]    Low serum HDL    Elevated LFTs    Hypomagnesemia    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HCC)    Vitamin D deficiency    CHF with unknown LVEF (HCC)    Generalized weakness    Pulmonary vascular congestion    Chronic venous stasis dermatitis of both lower extremities    Morbid obesity due to excess calories (HCC)    Shortness of breath    Persistent atrial fibrillation (HCC)    RVF (right ventricular failure) (HCC)    Anemia    MDD (major depressive disorder), recurrent episode, moderate (HCC)    Acute on chronic congestive heart failure (HCC)       Social History:  Social History     Tobacco Use    Smoking status: Never Smoker    Smokeless tobacco: Never Used   Substance Use Topics    Alcohol use: Yes     Comment: OCCASIONALLY 1 or less/1-2 wk    Drug use: No     Comment: No Mj experimentation       Patient Room Air saturation at rest 95  %  Patient Room Air saturation upon ambulation 86 %    Oxygen saturations of 88% or less on RA qualifies patient for Home Oxygen    Patient resting on 1  lmp  with an oxygen saturation of  98 %     Patient ambulated on 1 lpm with an oxygen saturation of 92%    Qualifying patient for home oxygen with ambulation and continuous flow  @ 1 lpm.      In your clinical assessment does the Patient Require Portable Oxygen Tanks?     Yes           [facility] @ [address] [facility] home care company contacted to follow care of patients home oxygen needs on 12/3/2020 at 12:38 PM    Patient/caregiver was educated on Home Oxygen process:  Yes      Level of patient/caregiver understanding able to:   [x] Verbalize understanding   [] Demonstrate understanding       [x] Teach back        [] Needs reinforcement        []  No available caregiver               []  Other:     Response to education:  Very Good     Time Spent with Home O2 Set Up:  10  minutes     Kayla Arboleda RCP on 12/3/2020 at 12:38 PM

## 2020-12-03 NOTE — CARE COORDINATION
DISCHARGE SUMMARY     DATE OF DISCHARGE: 12/3/2020    DISCHARGE DESTINATION: Home with [facility]. Sw spoke with home care agency regarding referral--they can accept and will pull referral from [facility]. TRANSPORTATION: Family to transport home    COMMENTS: Sw spoke with patient regarding dc and informed her [facility] unable to staff. Her second home care choice was available. She is agreeable to this. John Paul presented patient with IMM letter. Patient to receive HD RW at [place]. John Paul left Nakia at [facility] a message regarding order and dc today.       Electronically signed by Gertrude Hammer on 12/3/2020 at 12:47 PM

## 2020-12-03 NOTE — CARE COORDINATION
Suzanne following for [facility]. Need DME Orders. Notified RN.     Thank you for the referral.  Electronically signed by Jeni Tariq on 12/3/2020 at 10:07 AM Cell ph# [phone]

## 2020-12-03 NOTE — PROGRESS NOTES
Progress Note  Admit Date: 11/22/2020      PCP: Jerri Jose, APRN - CNP     CC: F/U for chf    Days in hospital:  11    SUBJECTIVE / Interval History:  Patient is very tearful . Anxious about going home   Refusing to go to rehab       Neg 17.6 L  Wt 393> 355      Allergies  Nickel and Ibuprofen    Medications    Scheduled Meds:   furosemide  40 mg Oral BID    metoprolol succinate  25 mg Oral Nightly    insulin lispro  3 Units Subcutaneous TID WC    insulin glargine  30 Units Subcutaneous Nightly    miconazole   Topical BID    atorvastatin  40 mg Oral Nightly    sodium chloride flush  10 mL Intravenous 2 times per day    insulin lispro  0-12 Units Subcutaneous TID WC    insulin lispro  0-6 Units Subcutaneous Nightly    warfarin (COUMADIN) daily dosing (placeholder)   Other RX Placeholder     Continuous Infusions:   dextrose         PRN Meds:  ammonium lactate, sodium chloride flush, acetaminophen **OR** acetaminophen, polyethylene glycol, ondansetron, glucose, dextrose, glucagon (rDNA), dextrose    Vitals    BP (!) 154/94   Pulse 65   Temp 97.2 °F (36.2 °C) (Oral)   Resp 20   Ht 5' 6.5\" (1.689 m)   Wt (!) 355 lb 6.1 oz (161.2 kg)   SpO2 94%   BMI 56.50 kg/m²     Exam:    Gen: No distress. Eyes: PERRL. No sclera icterus. No conjunctival injection. ENT: No discharge. Pharynx clear. External appearance of ears and nose normal.  Neck: Trachea midline. No obvious mass. Resp: No accessory muscle use. No crackles. No wheezes. No rhonchi. No dullness on percussion. CV: Regular rate. Regular rhythm. No murmur or rub. + edema. GI: Non-tender. Non-distended. No hernia. Skin: Bilateral leg swelling with chronic venous stasis  Lymph: No cervical LAD. No supraclavicular LAD. M/S: No cyanosis. No clubbing. No joint deformity. Neuro: Moves all four extremities. CN 2-12 tested, no defect noted. Psych: Oriented x 3. No anxiety. Awake. Alert. Intact judgement and insight.     Data LABS  CBC:   Recent Labs     12/01/20  0703 12/02/20  0604 12/03/20  0605   WBC 2.6* 2.9* 3.0*   HGB 9.9* 10.1* 10.0*   HCT 31.5* 32.0* 31.2*   MCV 81.5 81.3 81.0    162 166     BMP:   Recent Labs     12/01/20  0703 12/02/20  0604 12/03/20  0605    140 138   K 4.0 4.0 3.8   CL 97* 96* 95*   CO2 33* 37* 36*   BUN 23* 23* 19   CREATININE 0.8 0.8 0.7   GLUCOSE 109* 110* 110*     POC GLUCOSE:    Recent Labs     12/02/20  1128 12/02/20  1602 12/02/20  2146 12/03/20  0709 12/03/20  1110   POCGLU 130* 128* 147* 109* 158*     LIVER PROFILE: No results for input(s): AST, ALT, LIPASE, AMYLASE, LABALBU, BILIDIR, BILITOT, ALKPHOS in the last 72 hours. PT/INR:   Recent Labs     12/01/20  1140 12/02/20  0604 12/03/20  0605   PROTIME 28.5* 30.3* 28.9*   INR 2.43* 2.59* 2.47*     APTT: No results for input(s): APTT in the last 72 hours. UA:No results for input(s): NITRITE, COLORU, PHUR, LABCAST, WBCUA, RBCUA, MUCUS, TRICHOMONAS, YEAST, BACTERIA, CLARITYU, SPECGRAV, LEUKOCYTESUR, UROBILINOGEN, BILIRUBINUR, BLOODU, GLUCOSEU, KETUA, AMORPHOUS in the last 72 hours. Microbiology:  Wound Culture: No results for input(s): WNDABS, ORG in the last 72 hours. Invalid input(s):  LABGRAM  Nasal Culture: No results for input(s): ORG, MRSAPCR in the last 72 hours. Blood Culture: No results for input(s): BC, BLOODCULT2 in the last 72 hours. Fungal Culture:   No results for input(s): FUNGSM in the last 72 hours. No results for input(s): FUNCXBLD in the last 72 hours. CSF Culture:  No results for input(s): COLORCSF, APPEARCSF, CFTUBE, CLOTCSF, WBCCSF, RBCCSF, NEUTCSF, NUMCELLSCSF, LYMPHSCSF, MONOCSF, GLUCCSF, VOLCSF in the last 72 hours. Respiratory Culture:  No results for input(s): Dianne July in the last 72 hours. AFB:No results for input(s): AFBSMEAR in the last 72 hours. Urine Culture  No results for input(s): LABURIN in the last 72 hours. RADIOLOGY:    XR CHEST (2 VW)   Final Result   Vascular congestion. Suspected small pleural effusion. Increased interstitial opacities, suggestive of pulmonary edema. Enlargement of the cardiac silhouette, cardiomegaly versus pericardial   effusion. CONSULTS:    PHARMACY TO DOSE WARFARIN  IP CONSULT TO SOCIAL WORK  IP CONSULT TO PODIATRY  IP CONSULT TO HEART FAILURE NURSE/COORDINATOR  IP CONSULT TO CARDIOLOGY  IP CONSULT TO HEART FAILURE NURSE/COORDINATOR  IP CONSULT TO DIETITIAN  IP CONSULT TO PSYCHIATRY  IP CONSULT TO HOME CARE NEEDS    ASSESSMENT AND PLAN:      Principal Problem:    CHF with unknown LVEF ([facility]  Active Problems:    DMII (diabetes mellitus, type 2) ([facility]    Essential hypertension    Hypercholesterolemia    Lower leg DVT (deep venous thromboembolism), chronic, right (HCC)    Homozygous Factor V Leiden mutation (HCC)    Generalized weakness    Pulmonary vascular congestion    Chronic venous stasis dermatitis of both lower extremities    Morbid obesity due to excess calories (HCC)    Shortness of breath    Persistent atrial fibrillation (HCC)    RVF (right ventricular failure) (HCC)    Anemia    MDD (major depressive disorder), recurrent episode, moderate (HCC)    Acute on chronic congestive heart failure ([facility]  Resolved Problems:    * No resolved hospital problems. *    Patient is a 70-year-old female with a past medical history of morbid obesity, A. fib, DVT, factor V deficiency, diabetes, chronic venous stasis who presented with bilateral leg swelling with shortness of breath and fatigue. She was admitted with acute right-sided CHF. Patient was started on Lasix drip and switched to IV Lasix. Patient was diuresed 17 L. Weight at the time of discharge is 355 pounds. Patient is discharged on Lasix 40 twice daily and needs close follow-up with cardiology. Patient is needing 1 L of oxygen at the time of discharge and possibly has a component of hypoventilation syndrome.   Patient blood sugars well controlled in the hospital and patient agreed with plan. Discussed with consulting physicians, nursing and social work     The note was completed using EMR. Every effort was made to ensure accuracy; however, inadvertent computerized transcription errors may be present.        Gary Barrett MD

## 2020-12-03 NOTE — PROGRESS NOTES
Occupational Therapy  Facility/Department: [facility]   Daily Treatment Note  NAME: Diana Hollins  : 1950  MRN: [id number]    Date of Service: 12/3/2020    Discharge Recommendations:  Continue to assess pending progress, Home with Home health OT, Home with assist PRN     Diana Hollins scored a 19/24 on the AM-PAC ADL Inpatient form. Current research shows that an AM-PAC score of 18 or greater is typically associated with a discharge to the patient's home setting. Based on the patient's AM-PAC score, and their current ADL deficits, it is recommended that the patient have 2-3 sessions per week of Occupational Therapy at d/c to increase the patient's independence. At this time, this patient demonstrates the endurance and safety to discharge home with home OT and a follow up treatment frequency of 2-3x/wk. Please see assessment section for further patient specific details. If patient discharges prior to next session this note will serve as a discharge summary. Please see below for the latest assessment towards goals. Assessment   Performance deficits / Impairments: Decreased functional mobility ; Decreased endurance;Decreased ADL status; Decreased balance  Assessment: Pt completed shower this morning to evalute ADL status. Pt completed bathing with Min A, d/t limited ROM to wash/dry buttocks area. Pt completed UB dressing with Setup and LB dressing with Supervision using AD. Provided further education regarding use of sock aid, reacher, long handled sponge, and toilet aid in order to complete ADLs indep. Pt remains SBA for most transfers d/t assist for O2, so discussed how to manage O2 at home when completing fx mobility if needed at discharge. Pt completed fx mobility bathroom to transfer to recliner with Supervision and pt managed O2. Pt  Pt remains on 1L O2 and may be going home with oxygen upon discharge.  Pt very tearful throughout session after being informed of discharge today, Dr Burgess Gallegos (Bilateral, 02/12/1980); and Colonoscopy (01/01/2014). Restrictions  Restrictions/Precautions  Restrictions/Precautions: Fall Risk  Position Activity Restriction  Other position/activity restrictions: telemetry, 1L O2  Subjective   General  Chart Reviewed: Yes, Progress Notes  Patient assessed for rehabilitation services?: Yes  Additional Pertinent Hx: Per MIRTA Corona 11/22 Julius Wade is a 78 y.o. female who presents here to the emergency department, the patient states that she recently stayed at her daughter-in-law's house, and 2 days ago woke up and felt profoundly weak in her legs, and her legs seem to be more swollen. She states that she has had shortness of breath for about 1 month, she does have a history of peripheral edema, and DVT, A. fib, it is currently on Coumadin, but states that something feels very different. She has increased shortness of breath and weakness. She rates her pain level to her legs as 4/10. \"  Family / Caregiver Present: No  Referring Practitioner: Roni  Diagnosis: CHF with unknown LVEF  Subjective  Subjective: Pt sitting on toilet upon OT arrival, agreeable to OT, shower (approved by RN). Pt reports pain in R LE. General Comment  Comments: . Objective    ADL  Grooming: Stand by assistance(Pt completed oral care and hair standing at sink with SBA.)  UE Bathing: Supervision(seated in shower chair)  LE Bathing: Minimal assistance(Pt washed/dried LE, feet, and shashi areas while seated using AD. Pt stood with SBA and required assist to wash/dry buttocks area)  UE Dressing: Setup(Pt donned clean hospital gown with set up)  LE Dressing: Supervision(Pt doffed/donned non skid socks with AD.)  Toileting: Stand by assistance(Pt mangaged pants/gown per self, completed hygiene after BM using toilet aid while standing with SBA.)  Additional Comments: Wet towel for non skid surface. IV site covered. Telemetry removed, approved by RN.  RN notified after shower to replace telemetry. Extended time taken for ADL tasks, education regarding equipment, use of oxygen     Balance  Standing Balance: Supervision  Standing Balance  Time: 5 min  Activity: grooming at sink  Comment: Pt stood with Supervision for grooming with no LOB or SOB. Functional Mobility  Functional - Mobility Device: Rolling Walker  Activity: To/from bathroom  Assist Level: Supervision  Functional Mobility Comments: Pt ambulated from bathroom > recliner with SBA and RW with no LOB. Pt managed O2. Toilet Transfers  Toilet - Technique: Ambulating  Equipment Used: Grab bars  Toilet Transfer: Stand by assistance  Toilet Transfers Comments: Pt completed transfer with RW and SBA. Assist to manage O2. Shower Transfers  Shower - Transfer From: Other  Shower - Transfer Type: To and From  Shower - Transfer To: Shower seat with back  Shower - Technique: Ambulating  Shower Transfers: Stand by assistance  Shower Transfers Comments: Pt completed transfer with RW and SBA. Assist to manage O2. Wheelchair Bed Transfers  Wheelchair/Bed - Technique: Ambulating  Equipment Used: Other(recliner)  Level of Asssistance: Supervision  Wheelchair Transfers Comments: Pt completed transfers with SBA and RW. Pt managed O2 when transferring to recliner.      Transfers  Sit to stand: Stand by assistance  Stand to sit: Stand by assistance                                                                 Plan   Plan  Times per week: 3-5  Plan weeks: by discharge  Current Treatment Recommendations: Strengthening, Patient/Caregiver Education & Training, Equipment Evaluation, Education, & procurement, Balance Training, Functional Mobility Training, Endurance Training, Safety Education & Training, Self-Care / ADL                                                  AM-PAC Score        AM-[facility] Inpatient Daily Activity Raw Score: 19 (12/03/20 1009)  AM-PAC Inpatient ADL T-Scale Score : 40.22 (12/03/20 1009)  ADL Inpatient CMS 0-100% Score: 42.8 (12/03/20 1009)  ADL Inpatient CMS G-Code Modifier : CK (12/03/20 1009)    Goals  Short term goals  Time Frame for Short term goals: by discharge  Short term goal 1: Pt will complete self care indep with AD  Short term goal 2: Pt will complete fx mobility indep with AD  Short term goal 3: Pt will complete transfers indep with AD  Short term goal 4: Pt will increase activity tolerance to stand for 6 min for ADL task  Short term goal 5: Pt will be indep in HEP for UE strengthening for improved activity tolerance for mobility/ADL participation  Long term goals  Time Frame for Long term goals : LTGs=STGs  Patient Goals   Patient goals : \"to get stronger\"       Therapy Time   Individual Concurrent Group Co-treatment   Time In 0825         Time Out 1005         Minutes 100         Timed Code Treatment Minutes: 100 Minutes     Therapist was present, directed the patient's care, made skilled judgement, and was responsible for assessment and treatment of the patient.         WILFRID Chaparro OTR/ESTHER  #[id number] 12/3/20 10:26 AM

## 2020-12-03 NOTE — DISCHARGE SUMMARY
Hospital Medicine Discharge Summary      Patient ID: Madhuri Sherman      Patient's PCP: JUAN MANUEL Roque CNP    Admit Date: 11/22/2020     Discharge Date: 12/3/2020  The patient was seen and examined on day of discharge and this discharge summary is in conjunction with any daily progress note from day of discharge. Admitting Physician: Tory Treviño MD    Discharge Physician: Taylor Valle MD     Admitted for   Chief Complaint   Patient presents with    Shortness of Breath    Skin Ulcer     right calf       Admitting Diagnosis CHF with unknown LVEF ([address] [I50.9]    Discharge Diagnoses: Active Hospital Problems    Diagnosis Date Noted    MDD (major depressive disorder), recurrent episode, moderate ([address] [F33.1] 12/02/2020    Acute on chronic congestive heart failure (HCC) [I50.9]     RVF (right ventricular failure) ([address] [I50.810]     Anemia [D64.9]     CHF with unknown LVEF ([address] [I50.9] 11/22/2020    Generalized weakness [R53.1] 11/22/2020    Pulmonary vascular congestion [R09.89] 11/22/2020    Chronic venous stasis dermatitis of both lower extremities [I87.2] 11/22/2020    Morbid obesity due to excess calories ([address] [E66.01] 11/22/2020    Shortness of breath [R06.02] 11/22/2020    Persistent atrial fibrillation ([address] [I48.19] 11/22/2020    Lower leg DVT (deep venous thromboembolism), chronic, right (HCC) [I82.5Z1]     Essential hypertension [I10]     Hypercholesterolemia [E78.00]     DMII (diabetes mellitus, type 2) ([address] [E11.9] 05/08/2010    Homozygous Factor V Leiden mutation [facility]) [D68.51] 1950       Follow Up: Primary Care Physician in one week    PCP to Follow up on   Needs follow-up with cardiology in 1 week. Patient's oxygen needs to be evaluated and will need to be weaned off if possible.   Patient's blood sugar control needs to be optimized          Hospital Course:   Patient is a 61-year-old female with a past medical history of morbid obesity, A. fib, DVT, factor V deficiency, diabetes, chronic venous stasis who presented with bilateral leg swelling with shortness of breath and fatigue. She was admitted with acute right-sided CHF. Patient was started on Lasix drip and switched to IV Lasix. Patient was diuresed 17 L. Weight at the time of discharge is 355 pounds. Patient is discharged on Lasix 40 twice daily and needs close follow-up with cardiology. Patient is needing 1 L of oxygen at the time of discharge and possibly has a component of hypoventilation syndrome. Patient blood sugars well controlled in the hospital and patient spoke to diabetic educator and is agreeable to lifestyle changes hence diabetic medication doses have been decreased at the time of discharge. She will need close follow-up with her PCP     Acute on chronic right sided systolic heart failure- improving,  Ct PO diuretic on discharge  -Continue Lasix  -Cardiology consult appreciated  -Strict VALERIO and daily weight     Right lower leg pain-? Secondary to volume overload  -Cause unclear.   Had healing scab on her calf     Hypotension- improved   -Improved with midodrine     Persistent A. Fib  -On Coumadin     History of DVT/PE with factor V deficiency  -On Coumadin     Diabetes type 2- currently controlled, decrease home dose of insulin as blood sugars well controlled in the hospital. Pt states she is changing her diet   -Continue current regimen and monitor blood sugars     Hyperlipidemia  -Continue statin     Chronic venous insufficiency  -Continue Ace wrap     Morbid obesity with a BMI of 59  -Complicating patient's condition     Mild anemia  -got venofer     Severe anxiety with a history of depression  -will need out patient FU         Consults:     PHARMACY TO DOSE WARFARIN  IP CONSULT TO SOCIAL WORK  IP CONSULT TO PODIATRY  IP CONSULT TO HEART FAILURE NURSE/COORDINATOR  IP CONSULT TO CARDIOLOGY  IP CONSULT TO HEART FAILURE NURSE/COORDINATOR  IP CONSULT TO DIETITIAN  IP CONSULT TO PSYCHIATRY  IP CONSULT TO HOME CARE NEEDS        Disposition: home    Discharged Condition: Stable    Code Status: Full Code    Activity: activity as tolerated    Diet: cardiac diet            Labs: For convenience and continuity at follow-up the following most recent labs are provided:    CBC:   Lab Results   Component Value Date    WBC 3.0 12/03/2020    HGB 10.0 12/03/2020    HCT 31.2 12/03/2020     12/03/2020       RENAL:   Lab Results   Component Value Date     12/03/2020    K 3.8 12/03/2020    CL 95 12/03/2020    CO2 36 12/03/2020    BUN 19 12/03/2020    CREATININE 0.7 12/03/2020           Discharge Medications:    Isela Alexandre   Home Medication Instructions [id number]    Printed on:12/03/20 1263   Medication Information                      ACCU-CHEK APURVA PLUS strip  CHECK SUGAR 3 TO 4 TIMES A DAY             ACCU-CHEK MULTICLIX LANCETS MISC  TEST FOUR TIMES DAILY AS NEEDED FOR LOW OR HIGH BLOOD SUGARS             ammonium lactate (LAC-HYDRIN) 12 % lotion  Apply topically as needed.              atorvastatin (LIPITOR) 40 MG tablet  Take 1 tablet by mouth nightly             B-D UF III MINI PEN NEEDLES 31G X 5 MM MISC  USE DAILY AS DIRECTED             folic acid (FOLVITE) 1 MG tablet  Take 2 tablets by mouth daily             furosemide (LASIX) 40 MG tablet  Take 1 tablet by mouth 2 times daily             insulin aspart (NOVOLOG) 100 UNIT/ML injection vial  Inject 10 Units into the skin 3 times daily (before meals)             Insulin Degludec (TRESIBA FLEXTOUCH) 100 UNIT/ML SOPN  Inject 40 Units into the skin daily             metoprolol succinate (TOPROL XL) 50 MG extended release tablet  TAKE 1 TABLET BY MOUTH DAILY             potassium chloride (KLOR-CON M) 10 MEQ extended release tablet  TAKE 1 TABLET BY MOUTH DAILY             warfarin (COUMADIN) 6 MG tablet  TAKE 1 TABLET BY MOUTH DAILY OR AS DIRECTED                 Future Appointments   Date Time Provider Carole Barry   12/9/2020  4:15 PM Ronal Morgan MD [facility]       Time Spent on discharge is more than 45 minutes in the examination, evaluation, counseling and review of medications and discharge plan. Signed:  Akua Wilcox MD   12/3/2020    The note was completed using EMR. Every effort was made to ensure accuracy; however, inadvertent computerized transcription errors may be present. Thank you JUAN MANUEL Kidd CNP for the opportunity to be involved in this patient's care. If you have any questions or concerns please feel free to contact me at [phone].

## 2020-12-03 NOTE — PROGRESS NOTES
Physical Therapy  Facility/Department: [address]  Daily Treatment Note and D/C Summary  NAME: Mallorie Stratton  : 1950  MRN: [id number]    Date of Service: 12/3/2020    Discharge Recommendations:  Patient would benefit from continued therapy after discharge, 2-3 sessions per week, Home with Home health PT, S Level 3, Home with assist PRN   PT Equipment Recommendations  Equipment Needed: Yes  Mobility Devices: Joce Perez: Rolling  Other: Pt has confirmed that she is set on using a bariatric RW. HOME HEALTH CARE: LEVEL 3 SAFETY  -Initial home health evaluation to occur within 24-48 hours, in patient home   -Home health agency to establish plan of care for patient over 60 day period   -Medication Reconciliation   -PT/OT/Speech evaluations in home within 24-48 hours of discharge; including DME and home safety   -Frontload therapy 5 days, then 3x a week   -OT to evaluate if patient has [address] needs for personal care   - evaluation within 24-48 hours, includes evaluation of resources and insurance to determine [place], [place], LTC, and Medicaid options   -PCP visit scheduled within three to seven days of discharge     Mallorie Stratton scored a 17/24 on the AM-PAC short mobility form. Current research shows that an AM-PAC score of 18 or greater is typically associated with a discharge to the patient's home setting. Based on the patient's AM-PAC score and their current functional mobility deficits, it is recommended that the patient have 2-3 sessions per week of Physical Therapy at d/c to increase the patient's independence. At this time, this patient demonstrates the endurance and safety to discharge home with home therapy and a follow up treatment frequency of 2-3x/wk. Please see assessment section for further patient specific details. If patient discharges prior to next session this note will serve as a discharge summary. Please see below for the latest assessment towards goals. Assessment   Body structures, Functions, Activity limitations: Decreased functional mobility ; Increased pain;Decreased balance;Decreased strength;Decreased safe awareness;Decreased endurance  Assessment: Pt is an 78y.o. year-old female that presents to acute care with SOB, increased lower extremity weakness, and generalized fatigue. She currently lives alone in an apartment with one level and no STEFFANIE. PTA pt was independent with all functional mobility. Today (12/3) pt able to transfer sit<>stand from recliner with SBA and increased time. Cues for hand placement only needed for initial sit to stand. Able to amb 130' with SBA and RW while on 1L O2. After first bout, SpO2 was 84% and required extended rest break to increase. After second bout, was 93%. No cues needed during amb. Continues to be limited by anxiety, decreased strength, endurance, and balance. Would benefit from continued skilled therapy after D/C to maximize functional mobility and independence with home PT. Anticipate D/C home 12/3. Treatment Diagnosis: decreased functional mobility  Prognosis: Good  PT Education: Plan of Care;Transfer Training;General Safety;Gait Training;Functional Mobility Training  Patient Education: Provided extensive education on diabetic diet and CHF fluid restrictions. Barriers to Learning: anxiety  REQUIRES PT FOLLOW UP: Yes  Activity Tolerance  Activity Tolerance: Patient limited by endurance; Patient Tolerated treatment well     Patient Diagnosis(es): The primary encounter diagnosis was Acute on chronic congestive heart failure, unspecified heart failure type ([address] Diagnoses of Longstanding persistent atrial fibrillation ([address] General weakness, and Edema of both legs were also pertinent to this visit.      has a past medical history of Diabetes mellitus type 2 in obese ([address] Edema of both legs, Elevated LFTs, 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[address] Homozygous Factor V Leiden mutation ([address] Hypercholesterolemia, Hypertension, Transfers  Sit to Stand: Stand by assistance  Stand to sit: Stand by assistance  Comment: Pt only needed cues for first sit to stand, no cues rest of session. Sit<>stand from recliner. Extended time required to perform. SpO2 once standing was 87% on 1L, required standing break to bring up. Pt reported feeling dizzy and demonstrated sway. Ambulation  Ambulation?: Yes  More Ambulation?: No  Ambulation 1  Surface: level tile  Device: Rolling Walker  Other Apparatus: O2  Assistance: Stand by assistance  Quality of Gait: step through pattern, ER of bilat LEs, guarded movements, no LOB or unsteadiness noted, notable lateral trunk flexion bilat directions, decreased DF bilat, flat foot initial contact  Gait Deviations: Decreased step length;Decreased step height  Distance: 130' x2 in hallway with rest breaks between bouts  Comments: SpO2 84% on 1L after first bout and took extended time to recover to 91% while taking a seated rest break. 93% after second bout. No cues or standing rest breaks required. Balance  Posture: Fair  Sitting - Static: Good  Sitting - Dynamic: Fair;+  Standing - Static: Fair;+  Standing - Dynamic: Fair;-  Comments: Able to stand without bilat UE support and don pants. Unable to amb more than a few steps without AD. Other Activities: Other (see comment)  Comment: Pt donned pants without assistance, required CGA for steadying during as pt did not use bilat UE support and felt dizzy.               AM-PAC Score  AM-PAC Inpatient Mobility Raw Score : 17 (12/03/20 1156)  AM-PAC Inpatient T-Scale Score : 42.13 (12/03/20 1156)  Mobility Inpatient CMS 0-100% Score: 50.57 (12/03/20 1156)  Mobility Inpatient CMS G-Code Modifier : CK (12/03/20 1156)          Goals  Short term goals  Time Frame for Short term goals: during acute stay  Short term goal 1: bed mobility with CGA  Short term goal 2: transfers with supervision  Short term goal 3: ambulate 150' with or without AD and SBA  Long term goals  Time Frame for Long term goals : STG = LTG  Patient Goals   Patient goals : get stronger legs, work on exercises, go home    Plan    Plan  Times per week: 3-5  Plan weeks: during acute stay  Specific instructions for Next Treatment: pt would like to work on LE exercises  Current Treatment Recommendations: Strengthening, Home Exercise Program, Safety Education & Training, Balance Training, Endurance Training, Functional Mobility Training, Transfer Training, Gait Training  Safety Devices  Type of devices: All fall risk precautions in place, Call light within reach, Chair alarm in place, Gait belt, Patient at risk for falls, Left in chair  Restraints  Initially in place: No     Therapy Time   Individual Concurrent Group Co-treatment   Time In 1035         Time Out 1205         Minutes SHAYLEE Anderson  Therapist was present, directed the patient's care, made skilled judgement, and was responsible for assessment and treatment of the patient.          Electronically signed by Jami Munoz PT on 12/3/20 at 2:35 PM EST

## 2020-12-03 NOTE — PROGRESS NOTES
Discharge order received. Med and instructions reviewed. Equipment and oxygen to go home with patient. Prescriptions e scribed to [facility]. Discharged home with daughter.

## 2020-12-03 NOTE — PROGRESS NOTES
Pt sleeping well in the chair. Admitted with CHF exacerbation. Lungs clear but remains on 1L O2, was unable to wean during the day. HR regular. On telemetry showing NSR. Abdomen non tender with active bowel sounds. Has been continent of urine. C/o BLE pain and medicated with PRN tylenol. +3 RLE and +2 LLE edema. Legs dry and flaky. Lotion applied. All needs assessed with rounding. Will monitor.  Elayne Aguilar RN

## 2020-12-03 NOTE — CARE COORDINATION
[facility] [employer] rep delivered requested Heavy [employer] Getting to patient and reviewed insurance coverage and equipment set up with patient. Suzanne/Katherine received referral from RT for HOME OXYGEN      Will need DME ORDERS     Suzanne/Katherine rep will verify patient's insurance and once DME Orders are received, Suzanne/Katherine rep will follow up with patient prior to discharge to deliver E-tank.     Thank you for the referral.  Electronically signed by Debbie Mchugh on 12/3/2020 at 1:26 PM  Cell ph# [phone]

## 2020-12-03 NOTE — PROGRESS NOTES
Clinical Pharmacy Note  Warfarin Consult    Brea Huddleston is a 78 y.o. female receiving warfarin managed by pharmacy. Warfarin Indication: Afib, Factor V Leiden deficiency, chronic LLE DVT  Target INR range: 2-3   Dose prior to admission: 3 mg Tuesdays and Saturdays, 6 mg all other days    Current warfarin drug-drug interactions:     Recent Labs     12/01/20  0703 12/01/20  1140 12/02/20  0604 12/03/20  0605   HGB 9.9*  --  10.1* 10.0*   HCT 31.5*  --  32.0* 31.2*   INR  --  2.43* 2.59* 2.47*       Assessment/Plan:    Patient's dosing schedule has been erratic due to variable INRs.  Warfarin was on hold 11/19 and 11/20 due to INR=3.3, then resumed at prior home dose of 3 mg Tuesdays and Saturdays, 6 mg all other days.       Will give warfarin 6 mg this evening and watch INR carefully. Of note, cardiology discussed possible switch to NOAC. Patient is thinking about this. Thank you for the consult. Will continue to follow.     Dolly Fischer, [address] [facility]  12/3/2020  1:12 PM

## 2020-12-03 NOTE — PLAN OF CARE
Problem: Skin Integrity:  Goal: Absence of new skin breakdown  Description: Absence of new skin breakdown  12/3/2020 1000 by Miquel Christina RN  Outcome: Ongoing  Note: Skin assessment completed on admission and every shift. Barrier wipes used in the event of incontinence. Pressure relief techniques used as needed while in chair and in bed. Position changes encouraged at least every two hours while in bed. Problem: Falls - Risk of:  Goal: Will remain free from falls  Description: Will remain free from falls  12/3/2020 1000 by Miquel Christina RN  Outcome: Ongoing  Note: Fall risk band on patient. Orange light on outside of room. Non skid footwear in place. Alarms used appropriately. Patient instructed to call and wait for staff before getting up. Rounding done to anticipate needs. Appropriate safety devices used for transfers. Problem: KNOWLEDGE DEFICIT  Goal: Patient/S.O. demonstrates understanding of disease process, treatment plan, medications, and discharge instructions. 12/3/2020 1000 by Miquel Christina RN  Outcome: Ongoing  Note: Educate patient about medications, teaching-disease process, and safety awareness.

## 2020-12-03 NOTE — CARE COORDINATION
Suzanne/Katherine rep delivered an E-tank to the patient and reviewed insurance coverage, equipment set up, supply reorder process, oxygen safety, and Home O2 delivery & rental process with patient. Written info on the above topics was provided to the patient and daughter. Patient will need to call Suzanne/Katherine at [phone] when leaving hospital in order to activate delivery of Concentrator to pt's home. Notified RN.     Thank you for the referral.  Electronically signed by Emily Little on 12/3/2020 at 4:58 PM  Cell ph# [phone]

## 2020-12-03 NOTE — PROGRESS NOTES
Patient is A/A/O x4. Admitted with acute CHF. On 1L O2, does get SOB with exertion. D/C order in place, she is anxious and tearful about going home. NSR on tele, VSS. Denies pain. Will monitor.

## 2020-12-05 ENCOUNTER — TELEPHONE (OUTPATIENT)
Dept: FAMILY MEDICINE CLINIC | Age: 70
End: 2020-12-05

## 2020-12-05 NOTE — PROGRESS NOTES
Patient called during on-call hours to discuss low blood sugars. Patient was just discharged from hospital on Thursday and was told to call primary care for any blood sugars under 80. Patient's blood sugar at lunchtime was 76 and she still took her 10 units of NovoLog insulin. Patient's blood sugar at dinnertime was 71 she still took her 10 units of insulin and then called on-call to discuss what she should do next. Patient was instructed on making sure she eats carbs along with protein to maintain blood sugars. Patient was also told not to take any more insulin this weekend for any blood sugars less than 100. Reviewed difference between carbs and available proteins within patient's cabinet and refrigerator. Patient verbalized understanding and is instructed to eat dinner since she already took her 10 units of insulin when blood sugar was only 71 and be sure to include a protein with her dinner as well as check her blood sugar at bedtime tonight and eat a snack with protein before bedtime. Patient is to follow-up with cardiology this upcoming week and has an appointment scheduled with [facility] nurse practitioner on December 16.

## 2020-12-06 ENCOUNTER — FOLLOWUP TELEPHONE ENCOUNTER (OUTPATIENT)
Dept: INPATIENT UNIT | Age: 70
End: 2020-12-06

## 2020-12-07 ENCOUNTER — TELEPHONE (OUTPATIENT)
Dept: FAMILY MEDICINE CLINIC | Age: 70
End: 2020-12-07

## 2020-12-07 NOTE — TELEPHONE ENCOUNTER
 Skin Ulcer       right calf      Chronic venous insufficiency  -Continue Ace wrap   THIS IS IN THE HOSPITAL NOTES. Romeo Melvin

## 2020-12-07 NOTE — TELEPHONE ENCOUNTER
Ruddy Berry from [address] [place] [address] wants to know when she should check Sheila's PT & INR? She was discharged from [facility] last Friday. [address] [place] [address] is going to do [address]. Please give her a call back. Needs to speak about orders for wrapping her legs.

## 2020-12-07 NOTE — PROGRESS NOTES
BENIGNO 7433  Called patient for 72 hour HF hospital follow up. Patient answered phone and was agreeable to spend a few moments with me for HF follow up. Pt reports she is feeling \"okay\". She states her weight is \"barely changed\" and her breathing remains \"okay\". She has all her medications and is taking them as directed. She shares concern for her blood sugars \"going low\". Chart shows she spoke with PCP office on 12/5 and she confirms BS has been better today. Pt states she is \"trying to eat better\". She thinks this might be contributing to her low sugar. I urged her to continue with better diet habits and to get insulin dosages lowered. We discussed the myriad of health issues that potentially result with hyperglycemia. She voices willingness to \"do whatever is needed\" to avoid dialysis, etc.   Pt is aware of upcoming appt with Dr Joce Peres 12/9. She shares no barriers to attending.

## 2020-12-07 NOTE — TELEPHONE ENCOUNTER
Continue ace wraps- they are wrapped due to venous insufficiency from what I can tell  From the note

## 2020-12-09 ENCOUNTER — OFFICE VISIT (OUTPATIENT)
Dept: CARDIOLOGY CLINIC | Age: 70
End: 2020-12-09
Payer: MEDICARE

## 2020-12-09 VITALS
DIASTOLIC BLOOD PRESSURE: 84 MMHG | HEART RATE: 62 BPM | TEMPERATURE: 97.1 F | OXYGEN SATURATION: 98 % | WEIGHT: 293 LBS | SYSTOLIC BLOOD PRESSURE: 122 MMHG | HEIGHT: 67 IN | BODY MASS INDEX: 45.99 KG/M2

## 2020-12-09 PROCEDURE — 93000 ELECTROCARDIOGRAM COMPLETE: CPT | Performed by: INTERNAL MEDICINE

## 2020-12-09 PROCEDURE — 99214 OFFICE O/P EST MOD 30 MIN: CPT | Performed by: INTERNAL MEDICINE

## 2020-12-09 NOTE — PATIENT INSTRUCTIONS
sodium\" may still have too much sodium. Be sure to read the label to see how much sodium you are getting. · Buy fresh vegetables, or frozen vegetables without added sauces. Buy low-sodium versions of canned vegetables, soups, and other canned goods. Prepare low-sodium meals  · Cut back on the amount of salt you use in cooking. This will help you adjust to the taste. Do not add salt after cooking. One teaspoon of salt has about 2,300 mg of sodium. · Take the salt shaker off the table. · Flavor your food with garlic, lemon juice, onion, vinegar, herbs, and spices. Do not use soy sauce, lite soy sauce, steak sauce, onion salt, garlic salt, celery salt, mustard, or ketchup on your food. · Use low-sodium salad dressings, sauces, and ketchup. Or make your own salad dressings and sauces without adding salt. · Use less salt (or none) when recipes call for it. You can often use half the salt a recipe calls for without losing flavor. Other foods such as rice, pasta, and grains do not need added salt. · Rinse canned vegetables, and cook them in fresh water. This removes some--but not all--of the salt. · Avoid water that is naturally high in sodium or that has been treated with water softeners, which add sodium. Call your local water company to find out the sodium content of your water supply. If you buy bottled water, read the label and choose a sodium-free brand. Avoid high-sodium foods  · Avoid eating:  ? Smoked, cured, salted, and canned meat, fish, and poultry. ? Ham, che, hot dogs, and luncheon meats. ? Regular, hard, and processed cheese and regular peanut butter. ? Crackers with salted tops, and other salted snack foods such as pretzels, chips, and salted popcorn. ? Frozen prepared meals, unless labeled low-sodium. ? Canned and dried soups, broths, and bouillon, unless labeled sodium-free or low-sodium. ? Canned vegetables, unless labeled sodium-free or low-sodium. ?  [place] fries, pizza, tacos, and other fast foods. ? Pickles, olives, ketchup, and other condiments, especially soy sauce, unless labeled sodium-free or low-sodium. Where can you learn more? Go to [email or web address] and sign in to your [employer]op[employer] account. Enter F061 in the [facility] box to learn more about \"Low Sodium Diet (2,000 Millig[address]): Care Instructions. \"     If you do not have an account, please click on the \"Sign Up Now\" link. Current as of: August 22, 2019               Content Version: 12.6  © 2402-0647 [employer]. Care instructions adapted under license by [facility] ([facility]). If you have questions about a medical condition or this instruction, always ask your healthcare professional. [address] any warranty or liability for your use of this information.

## 2020-12-09 NOTE — PROGRESS NOTES
History:   has a past surgical history that includes Cholecystectomy ();  section; Appendectomy (1976); Tubal ligation (Bilateral, 1980); and Colonoscopy (2014). Social History:   reports that she has never smoked. She has never used smokeless tobacco. She reports current alcohol use. She reports that she does not use drugs. Family History:  family history includes COPD in her sister; Clotting Disorder in her son; Coronary Art Dis in her father; Diabetes in her brother and sister; Heart Attack in her father; Heart Surgery (age of onset: 48) in her father; High Cholesterol in her son; Hypertension in her father and son; Kidney Disease in her mother; Other in her sister and son. Home Medications:  Were reviewed and are listed in nursing record and/or below  Prior to Admission medications    Medication Sig Start Date End Date Taking?  Authorizing Provider   furosemide (LASIX) 40 MG tablet Take 1 tablet by mouth 2 times daily 12/3/20  Yes Lorene Small MD   insulin aspart (NOVOLOG) 100 UNIT/ML injection vial Inject 10 Units into the skin 3 times daily (before meals) 12/3/20  Yes Lorene Small MD   Insulin Degludec (TRESIBA FLEXTOUCH) 100 UNIT/ML SOPN Inject 40 Units into the skin daily 12/3/20  Yes Lorene Small MD   metoprolol succinate (TOPROL XL) 50 MG extended release tablet TAKE 1 TABLET BY MOUTH DAILY  Patient taking differently: Take 50 mg by mouth nightly TAKE 1 TABLET BY MOUTH DAILY 10/20/20  Yes Lazarus Ishihara, APRN - CNP   potassium chloride (KLOR-CON M) 10 MEQ extended release tablet TAKE 1 TABLET BY MOUTH DAILY  Patient taking differently: Take 10 mEq by mouth nightly  10/20/20  Yes JUAN MANUEL Sauer CNP   warfarin (COUMADIN) 6 MG tablet TAKE 1 TABLET BY MOUTH DAILY OR AS DIRECTED  Patient taking differently: See Admin Instructions 6 mg daily except 3 mg on Tuesday and 20  Yes Tatyana Owens MD   folic acid (FOLVITE) 1 MG tablet Take 2 tablets by mouth daily  Patient taking differently: Take 2 mg by mouth nightly  6/26/20 12/9/20 Yes JUAN MANUEL Carlisle CNP   B-D UF III MINI PEN NEEDLES 31G X 5 MM MISC USE DAILY AS DIRECTED 4/13/20  Yes JUAN MANUEL Carlisle CNP   ACCU-CHEK APURVA PLUS strip CHECK SUGAR 3 TO 4 TIMES A DAY 4/3/20  Yes JUAN MANUEL Carlisle CNP   atorvastatin (LIPITOR) 40 MG tablet Take 1 tablet by mouth nightly 12/30/19  Yes JUAN MANUEL Carlisle CNP   ACCU-CHEK MULTICLIX LANCETS MISC TEST FOUR TIMES DAILY AS NEEDED FOR LOW OR HIGH BLOOD SUGARS 8/2/19  Yes JUAN MANUEL Gutierrez CNP          Allergies:  Nickel and Ibuprofen     Review of Systems:   · Constitutional: no unanticipated weight loss. There's been no change in energy level, sleep pattern, or activity level. No fevers, chills. · Eyes: No visual changes or diplopia. No scleral icterus. · ENT: No Headaches, hearing loss or vertigo. No mouth sores or sore throat. · Cardiovascular: as reviewed in HPI  · Respiratory: No cough or wheezing, no sputum production. No hemoptysis. · Gastrointestinal: No abdominal pain, appetite loss, blood in stools. No change in bowel or bladder habits. · Genitourinary: No dysuria, trouble voiding, or hematuria. · Musculoskeletal:  No gait disturbance, no joint complaints. · Integumentary: No rash or pruritis. · Neurological: No headache, diplopia, change in muscle strength, numbness or tingling. · Psychiatric: No anxiety or depression. · Endocrine: No temperature intolerance. No excessive thirst, fluid intake, or urination. No tremor. · Hematologic/Lymphatic: No abnormal bruising or bleeding, blood clots or swollen lymph nodes. · Allergic/Immunologic: No nasal congestion or hives.     Objective:   PHYSICAL EXAM:    Vitals:    12/09/20 1623   BP: 122/84   Pulse: 62   Temp: 97.1 °F (36.2 °C)   SpO2: 98%    Weight: (!) 348 lb 12.8 oz (158.2 kg)       General Appearance:  Alert, cooperative, no distress, appears stated age. Head:  Normocephalic, without obvious abnormality, atraumatic. Eyes:  Pupils equal and round. No scleral icterus. Mouth: Moist mucosa, no pharyngeal erythema. Nose: Nares normal. No drainage or sinus tenderness. Neck: Supple, symmetrical, trachea midline. No adenopathy. No tenderness/mass/nodules. No carotid bruit or elevated JVD. Lungs:   Clear to auscultation bilaterally, respirations unlabored. No wheeze, rales, or rhonchi. Chest Wall:  No tenderness or deformity. Heart:  Regular rate. S1/S2 normal. No murmur, rub, or gallop. Abdomen:   Soft, non-tender, bowel sounds active. Musculoskeletal: No muscle wasting or digital clubbing. Extremities: Extremities normal, atraumatic. No cyanosis or edema. Pulses: 2+ radial and carotid pulses, symmetric. Skin: No rashes or lesions. Pysch: Normal mood and affect. Alert and oriented x 4.    Neurologic: Normal gross motor and sensory exam.       Labs     CBC:   Lab Results   Component Value Date    WBC 3.0 12/03/2020    RBC 3.85 12/03/2020    HGB 10.0 12/03/2020    HCT 31.2 12/03/2020    MCV 81.0 12/03/2020    RDW 20.8 12/03/2020     12/03/2020     CMP:  Lab Results   Component Value Date     12/03/2020    K 3.8 12/03/2020    CL 95 12/03/2020    CO2 36 12/03/2020    BUN 19 12/03/2020    CREATININE 0.7 12/03/2020    GFRAA >60 12/03/2020    GFRAA >60 03/12/2010    AGRATIO 1.3 06/16/2020    LABGLOM >60 12/03/2020    GLUCOSE 110 12/03/2020    PROT 6.7 06/16/2020    CALCIUM 9.4 12/03/2020    BILITOT 1.2 06/16/2020    ALKPHOS 120 06/16/2020    AST 18 06/16/2020    ALT 9 06/16/2020     PT/INR:  No results found for: PTINR  HgBA1c:  Lab Results   Component Value Date/Time    LABA1C 6.2 09/25/2020 09:00 AM     Troponin:   Lab Results   Component Value Date/Time    TROPONINI <0.01 11/22/2020 12:22 PM         CURRENT Medications:  Current Outpatient Medications on File Prior to Visit   Medication Sig Dispense Refill    furosemide (LASIX) 40 MG tablet Take 1 tablet by mouth 2 times daily 90 tablet 1    insulin aspart (NOVOLOG) 100 UNIT/ML injection vial Inject 10 Units into the skin 3 times daily (before meals) 20 mL 0    Insulin Degludec (TRESIBA FLEXTOUCH) 100 UNIT/ML SOPN Inject 40 Units into the skin daily 75 mL 2    metoprolol succinate (TOPROL XL) 50 MG extended release tablet TAKE 1 TABLET BY MOUTH DAILY (Patient taking differently: Take 50 mg by mouth nightly TAKE 1 TABLET BY MOUTH DAILY) 90 tablet 0    potassium chloride (KLOR-CON M) 10 MEQ extended release tablet TAKE 1 TABLET BY MOUTH DAILY (Patient taking differently: Take 10 mEq by mouth nightly ) 90 tablet 0    warfarin (COUMADIN) 6 MG tablet TAKE 1 TABLET BY MOUTH DAILY OR AS DIRECTED (Patient taking differently: See Admin Instructions 6 mg daily except 3 mg on Tuesday and Saturday) 90 tablet 1    folic acid (FOLVITE) 1 MG tablet Take 2 tablets by mouth daily (Patient taking differently: Take 2 mg by mouth nightly ) 180 tablet 1    B-D UF III MINI PEN NEEDLES 31G X 5 MM MISC USE DAILY AS DIRECTED 100 each 5    ACCU-CHEK APURVA PLUS strip CHECK SUGAR 3 TO 4 TIMES A  strip 5    atorvastatin (LIPITOR) 40 MG tablet Take 1 tablet by mouth nightly 90 tablet 3    ACCU-CHEK MULTICLIX LANCETS MISC TEST FOUR TIMES DAILY AS NEEDED FOR LOW OR HIGH BLOOD SUGARS 100 each 1     No current facility-administered medications on file prior to visit. Assessment and Plan   1) Chronic RV systolic heart failure  Appears compenstated   Repeat echocardiogram in 6 months. 2) Atrial fibrillation  Discontinue Warfarin  Initiate Eliquis 5 mg BID-Start after 72 hours of last dose of Warfarin  Toprol XL     3) H/O DVT/PE  Discontinue Warfarin  Initiate Eliquis 5 mg BID-Start after 72 hours of last dose of Warfarin. 4) Morbid Obesity  Encouraged weight loss. Sleep study. Bariatric team      Follow up in 6 months.      Thank you for allowing us to

## 2020-12-11 ENCOUNTER — TELEPHONE (OUTPATIENT)
Dept: FAMILY MEDICINE CLINIC | Age: 70
End: 2020-12-11

## 2020-12-11 NOTE — TELEPHONE ENCOUNTER
CALLED AND LEFT DETAILED MESSAGE FOR RORY AT [facility] CARE AND ADVISED ON DR. Alon Denis NOTE . [place]

## 2020-12-11 NOTE — TELEPHONE ENCOUNTER
[address] visit x2. Reduce long acting insulin by 2-3 units and can continue to reduce by 1-2 units every 2-3 days until fasting sugars are staying in  range. Make sure she has crackers or orange juice at hand to use if low.

## 2020-12-14 ENCOUNTER — TELEPHONE (OUTPATIENT)
Dept: PHARMACY | Age: 70
End: 2020-12-14

## 2020-12-14 ENCOUNTER — TELEPHONE (OUTPATIENT)
Dept: FAMILY MEDICINE CLINIC | Age: 70
End: 2020-12-14

## 2020-12-14 NOTE — TELEPHONE ENCOUNTER
Patient wants to speak with Jean Cartwright concerning her sugar. Today 121. Summer told her to call this morning. Please give her a call back. [place]. Phone no. [phone]    She needs to know what she needs to do about taking her medication.

## 2020-12-14 NOTE — TELEPHONE ENCOUNTER
Outbound call to patient from outpatient wellness center to schedule new heart failure/DM virtual visit.   Appointment scheduled for Monday, December 21 at 11:00 AM.

## 2020-12-15 ENCOUNTER — TELEPHONE (OUTPATIENT)
Dept: FAMILY MEDICINE CLINIC | Age: 70
End: 2020-12-15

## 2020-12-15 NOTE — TELEPHONE ENCOUNTER
Juan Jose Show just fax over a order for oxygen - POC, instead of the tanks, because she uses a walker. Please sign and date order. JUST FYI     Please give her a call back if we do not receive the fax.

## 2020-12-16 ENCOUNTER — VIRTUAL VISIT (OUTPATIENT)
Dept: FAMILY MEDICINE CLINIC | Age: 70
End: 2020-12-16
Payer: MEDICARE

## 2020-12-16 PROCEDURE — 1111F DSCHRG MED/CURRENT MED MERGE: CPT | Performed by: NURSE PRACTITIONER

## 2020-12-16 PROCEDURE — 99214 OFFICE O/P EST MOD 30 MIN: CPT | Performed by: NURSE PRACTITIONER

## 2020-12-16 ASSESSMENT — ENCOUNTER SYMPTOMS: SHORTNESS OF BREATH: 1

## 2020-12-16 NOTE — PROGRESS NOTES
2020     Tracy Cummings (:  1950) is a 78 y.o. female, here for evaluation of the following medical concerns:  Tracy Cummings is a 78 y.o. female being evaluated by a Virtual Visit (video visit) encounter to address concerns as mentioned above. A caregiver was present when appropriate. Due to this being a TeleHealth encounter (During [place] [address] public [facility] emergency), evaluation of the following organ systems was limited: Vitals/Constitutional/EENT/Resp/CV/GI//MS/Neuro/Skin/Heme-Lymph-Imm. Pursuant to the emergency declaration under the [address] [place] [address], [address] [place] [address] authority and the [employer] Act, this Virtual Visit was conducted with patient's (and/or legal guardian's) consent, to reduce the patient's risk of exposure to COVID-19 and provide necessary medical care. The patient (and/or legal guardian) has also been advised to contact this office for worsening conditions or problems, and seek emergency medical treatment and/or call 911 if deemed necessary. Patient identification was verified at the start of the visit: Yes    Total time spent for this encounter: 20 MIN    Services were provided through a video synchronous discussion virtually to substitute for in-person clinic visit. Patient and provider were located at their individual homes. --JUAN MANUEL Sanderson CNP on 2020 at 3:13 PM    An electronic signature was used to authenticate this note.   HPI   Chief Complaint   Patient presents with    Follow-Up from [facility] Course: Patient is a 77-year-old female with a past medical history of morbid obesity, A. fib, DVT, factor V deficiency, diabetes, chronic venous stasis who presented with bilateral leg swelling with shortness of breath and fatigue.  She was admitted with acute right-sided CHF.  Patient was started on Lasix drip and switched to IV Lasix.  Patient was diuresed 17 L.  Weight at the time of discharge is 355 pounds.  Patient is discharged on Lasix 40 twice daily and needs close follow-up with cardiology.  Patient is needing 1 L of oxygen at the time of discharge and possibly has a component of hypoventilation syndrome.  Patient blood sugars well controlled in the hospital and patient spoke to diabetic educator and is agreeable to lifestyle changes hence diabetic medication doses have been decreased at the time of discharge.  She will need close follow-up with her PCP     Acute on chronic right sided systolic heart failure- improving,  Ct PO diuretic on discharge  -Continue Lasix  -Cardiology consult appreciated  -Strict VALERIO and daily weight     Right lower leg pain-?  Secondary to volume overload  -Cause unclear.  Had healing scab on her calf     Hypotension- improved   -Improved with midodrine     Persistent A. Fib  -On Coumadin     History of DVT/PE with factor V deficiency  -On Coumadin     Diabetes type 2- currently controlled, decrease home dose of insulin as blood sugars well controlled in the hospital. Pt states she is changing her diet   -Continue current regimen and monitor blood sugars     Hyperlipidemia  -Continue statin     Chronic venous insufficiency  -Continue Ace wrap     Morbid obesity with a BMI of 59  -Complicating patient's condition     Mild anemia  -got venofer     Severe anxiety with a history of depression  -will need out patient FU     Pt was in the  Hospital from 11/28 and 12/3 She went  to sleep one night and she woke up the next day and felt like a tub of ballons - she went to the er daughters house had to use a walker because she was tired and SOB but did not realize this -  She was on a lasix  Diuresed 17 L 355 lbs when she left the hospital she is weighing herself daily without clothe today she is 341 =  She is taking the lasix 40 mg BID with 10 KCL- She was on lasix but now on Eliquis he has seen the cardiologist  Dr Barry Syed  12/9  She is using 02 2l with strenuous activity and 1L- she has ot /pt coming to the house still feels a little weak -  Fluid restrictions 50 oz daily and sodium restriction   DIABETES  Her blood sugars this am was 130 without  the tresiba - she is still using the regular insulin novolog  Blood sugar today after breakfast was 154- she has not changed her eating habits - no regular exercise  Review of Systems   Respiratory: Positive for shortness of breath. Cardiovascular: Negative for palpitations and leg swelling. Endocrine: Negative for polydipsia, polyphagia and polyuria. All other systems reviewed and are negative. Prior to Visit Medications    Medication Sig Taking?  Authorizing Provider   apixaban (ELIQUIS) 5 MG TABS tablet Take 1 tablet by mouth 2 times daily Yes Virgil Bernal MD   furosemide (LASIX) 40 MG tablet Take 1 tablet by mouth 2 times daily Yes Gary Barrett MD   metoprolol succinate (TOPROL XL) 50 MG extended release tablet TAKE 1 TABLET BY MOUTH DAILY  Patient taking differently: Take 50 mg by mouth nightly TAKE 1 TABLET BY MOUTH DAILY Yes JUAN MANUEL Higginbotham CNP   potassium chloride (KLOR-CON M) 10 MEQ extended release tablet TAKE 1 TABLET BY MOUTH DAILY  Patient taking differently: Take 10 mEq by mouth nightly  Yes JUAN MANUEL Lozano CNP   folic acid (FOLVITE) 1 MG tablet Take 2 tablets by mouth daily  Patient taking differently: Take 2 mg by mouth nightly  Yes JUAN MANUEL Higginbotham CNP ACCU-CHEK APURVA PLUS strip CHECK SUGAR 3 TO 4 TIMES A DAY Yes JUAN MANUEL Trinidad CNP   atorvastatin (LIPITOR) 40 MG tablet Take 1 tablet by mouth nightly Yes JUAN MANUEL Trinidad CNP   ACCU-CHEK MULTICLIX LANCETS MISC TEST FOUR TIMES DAILY AS NEEDED FOR LOW OR HIGH BLOOD SUGARS Yes JUAN MANUEL Kidd CNP   insulin aspart (NOVOLOG) 100 UNIT/ML injection vial Inject 10 Units into the skin 3 times daily (before meals)  Patient not taking: Reported on 12/16/2020  Akua Wilcox MD   Insulin Degludec (TRESIBA FLEXTOUCH) 100 UNIT/ML SOPN Inject 40 Units into the skin daily  Patient not taking: Reported on 12/16/2020  Akua Wilcox MD   B-D UF III MINI PEN NEEDLES 31G X 5 MM MISC USE DAILY AS DIRECTED  JUAN MANUEL Trinidad CNP        Social History     Tobacco Use    Smoking status: Never Smoker    Smokeless tobacco: Never Used   Substance Use Topics    Alcohol use: Yes     Comment: OCCASIONALLY 1 or less/1-2 wk        There were no vitals filed for this visit. Estimated body mass index is 55.45 kg/m² as calculated from the following:    Height as of 12/9/20: 5' 6.5\" (1.689 m). Weight as of 12/9/20: 348 lb 12.8 oz (158.2 kg). Physical Exam     Post-Discharge Transitional Care Management Services or Hospital Follow Up      Diana Hollins   YOB: 1950    Date of Office Visit:  12/16/2020  Date of Hospital Admission: 11/22/20  Date of Hospital Discharge: 12/3/20  Risk of hospital readmission (high >=14%.  Medium >=10%) :Readmission Risk Score: 15      Care management risk score Rising risk (score 2-5) and Complex Care (Scores >=6): 3     Non face to face  following discharge, date last encounter closed (first attempt may have been earlier): *No documented post hospital discharge outreach found in the last 14 days    Call initiated 2 business days of discharge: *No response recorded in the last 14 days    Patient Active Problem List   Diagnosis  DMII (diabetes mellitus, type 2) ([facility]    Essential hypertension    Hypercholesterolemia    Lower leg DVT (deep venous thromboembolism), chronic, right (HCC)    Edema of both legs    Encounter for current long-term use of anticoagulants    Homozygous Factor V Leiden mutation ([facility]    Low serum HDL    Elevated LFTs    Hypomagnesemia    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HCC)    Vitamin D deficiency    CHF with unknown LVEF (HCC)    Generalized weakness    Pulmonary vascular congestion    Chronic venous stasis dermatitis of both lower extremities    Morbid obesity due to excess calories (HCC)    Shortness of breath    Persistent atrial fibrillation (HCC)    RVF (right ventricular failure) (HCC)    Anemia    MDD (major depressive disorder), recurrent episode, moderate (HCC)    Acute on chronic congestive heart failure (HCC)       Allergies   Allergen Reactions    Nickel Nausea Only and Rash    Ibuprofen      Pt takes Warfarin       Medications listed as ordered at the time of discharge from hospital   [address] Medication Instructions JIM:    Printed on:12/16/20 7822   Medication Information                      ACCU-CHEK APURVA PLUS strip  CHECK SUGAR 3 TO 4 TIMES A DAY             ACCU-CHEK MULTICLIX LANCETS MISC  TEST FOUR TIMES DAILY AS NEEDED FOR LOW OR HIGH BLOOD SUGARS             apixaban (ELIQUIS) 5 MG TABS tablet  Take 1 tablet by mouth 2 times daily             atorvastatin (LIPITOR) 40 MG tablet  Take 1 tablet by mouth nightly             B-D UF III MINI PEN NEEDLES 31G X 5 MM MISC  USE DAILY AS DIRECTED             folic acid (FOLVITE) 1 MG tablet  Take 2 tablets by mouth daily             furosemide (LASIX) 40 MG tablet  Take 1 tablet by mouth 2 times daily             insulin aspart (NOVOLOG) 100 UNIT/ML injection vial  Inject 10 Units into the skin 3 times daily (before meals)             Insulin Degludec (TRESIBA FLEXTOUCH) 100 UNIT/ML SOPN  Inject 40 Units into the skin daily There is no height or weight on file to calculate BMI. Wt Readings from Last 3 Encounters:   12/09/20 (!) 348 lb 12.8 oz (158.2 kg)   12/03/20 (!) 355 lb 6.1 oz (161.2 kg)   04/24/20 (!) 310 lb (140.6 kg)     BP Readings from Last 3 Encounters:   12/09/20 122/84   12/03/20 126/74   09/23/19 116/60        Physical Exam:  Neurologic: gait and coordination normal and speech normal    Trinity Lara was seen today for follow-up from hospital.    Diagnoses and all orders for this visit:    CHF with unknown LVEF ([facility]  -     ME DISCHARGE MEDS RECONCILED W/ CURRENT OUTPATIENT MED LIST  ER- INPT NOTES REVIEWED - IMAGING - LABS AND DX TEST REVIEWES  Echo: 11/23/2020  Ejection fraction is visually estimated to be 55-60%. Grade I diastolic dysfunction with normal LV filling pressures. Severly dilated right ventricle. Right ventricular systolic function is mildly reduced. The right atrium is severely dilated.   Estimated pulmonary artery systolic pressure is normal at 37 mmHg assuming a right atrial pressure of 15 mmHg.   SHE HAS SEEN DR Alia Moy WILL FOLLOW UP AS INSTRUCTED  CONTINUE ELIQUIS - TOPROL AND  ADVISED TO TRY TO WEAN OFF O2- START WITH TAKING IT OFF WHILE AT HOME MAKING SURE SHE CHECKS HER O2 SAT -NORMAL RANGE DW PT WILL BUY PULSE OX -  ADVISED TO CALL CARDIOLOGY IF SHE HAS A FIVE POUND WEIGHT GAIN IN ONE DAY    Chronic venous stasis dermatitis of both lower extremities  -     ME DISCHARGE MEDS RECONCILED W/ CURRENT OUTPATIENT MED LIST  ADVISED TO WRAP LEGS WITH ACE WRAPS  ENCOURAGED TO ELEVATE AS MUCH AS POSSIBLE AS WELL AS WATCHING SODIUM INTAKE  CONSIDER COMPRESSION STOCKINGS    Type 2 diabetes mellitus without complication, with long-term current use of insulin (HCC)  -     ME DISCHARGE MEDS RECONCILED W/ CURRENT OUTPATIENT MED LIST  CONTINUE TO HOLD TRESIBA IF FSBS LESS THAN 150  CONTINUE  NOVOLOG  FOLLOW UP IN 3-4 WEEKS        Medical Decision Making: moderate complexity

## 2020-12-17 ENCOUNTER — OFFICE VISIT (OUTPATIENT)
Dept: SLEEP MEDICINE | Age: 70
End: 2020-12-17
Payer: MEDICARE

## 2020-12-17 VITALS
TEMPERATURE: 98.7 F | WEIGHT: 293 LBS | DIASTOLIC BLOOD PRESSURE: 73 MMHG | SYSTOLIC BLOOD PRESSURE: 131 MMHG | HEART RATE: 76 BPM | BODY MASS INDEX: 54.37 KG/M2

## 2020-12-17 PROCEDURE — 99204 OFFICE O/P NEW MOD 45 MIN: CPT | Performed by: PSYCHIATRY & NEUROLOGY

## 2020-12-17 ASSESSMENT — ENCOUNTER SYMPTOMS
GASTROINTESTINAL NEGATIVE: 1
CHOKING: 0
EYES NEGATIVE: 1
ALLERGIC/IMMUNOLOGIC NEGATIVE: 1
APNEA: 0
SHORTNESS OF BREATH: 1

## 2020-12-17 ASSESSMENT — SLEEP AND FATIGUE QUESTIONNAIRES
HOW LIKELY ARE YOU TO NOD OFF OR FALL ASLEEP WHILE WATCHING TV: 1
HOW LIKELY ARE YOU TO NOD OFF OR FALL ASLEEP WHEN YOU ARE A PASSENGER IN A CAR FOR AN HOUR WITHOUT A BREAK: 0
HOW LIKELY ARE YOU TO NOD OFF OR FALL ASLEEP IN A CAR, WHILE STOPPED FOR A FEW MINUTES IN TRAFFIC: 0
NECK CIRCUMFERENCE (INCHES): 18
HOW LIKELY ARE YOU TO NOD OFF OR FALL ASLEEP WHILE SITTING AND READING: 0
HOW LIKELY ARE YOU TO NOD OFF OR FALL ASLEEP WHILE SITTING INACTIVE IN A PUBLIC PLACE: 1
HOW LIKELY ARE YOU TO NOD OFF OR FALL ASLEEP WHILE LYING DOWN TO REST IN THE AFTERNOON WHEN CIRCUMSTANCES PERMIT: 1
HOW LIKELY ARE YOU TO NOD OFF OR FALL ASLEEP WHILE SITTING QUIETLY AFTER LUNCH WITHOUT ALCOHOL: 0
HOW LIKELY ARE YOU TO NOD OFF OR FALL ASLEEP WHILE SITTING AND TALKING TO SOMEONE: 0
ESS TOTAL SCORE: 3

## 2020-12-17 NOTE — PROGRESS NOTES
MD ISABEL Odonnell Board Certified in Sleep Medicine  Certified [facility] Sleep Medicine  Board Certified in Neurology [address]  [address] [facility] [address]  [address] [place] [address] [place] [facility] [address], [address]           [address]  [address] [place] [address] [phone]    Subjective:     Patient ID: Wilfredo Duarte is a 78 y.o. female. Chief Complaint   Patient presents with    New Patient     Np, Hx of snoring, pt states that she has never had a sleep study       HPI:        Wilfredo Duarte is a 78 y.o. female referred by Dr. Marcelo Montana for a sleep evaluation. She complains of snoring, tossing and turning, excessive daytime sleepiness, feels sleepy during the day, take naps during the day but she denies snorting, choking, periods of not breathing, knees buckling with laughing, completely or partially paralyzed while falling asleep or waking up, noisy environment, uncomfortable room temperature, uncomfortable bedding. Symptoms began a few years ago, gradually worsening since that time. The patient's bed-partner confirmed the snoring without stopped breathing at night. On O2 at 1-2 LPM for a month since she was discharged from the hospital.     SLEEP SCHEDULE: Goes to bed around 11 PM in the weekdays and 11 PM-12 AM in the weekends. It usually takes the patient 20 minutes to fall asleep. The patient gets up 0-1 per night to go to the bathroom. The Patient finally gets up at 8 AM during the weekdays and 9 AM in the weekends. The Patient scored Total score: 3 on Pablo Sleepiness Scale ( more than 10 is indicative of daytime sleepiness)The patient takes daily nap for 10-15 minutes and usually is not refreshing nap. Previous evaluation and treatment has included- none.   The Patient has been obese for many years and tried, has lost17 in the last 30 days,      DOT/CDL - N/A  FAA/'slicense - N/A  CHF with EF 55% on Echocardiogram dated 11/22/2020   Persistent A-Fib and stable. HTN is controlled. CO2 30  21 - 32 mmol/L Final 11/24/2020  8:00 AM     Previous Report(s) Reviewed: historical medical records       Social History     Socioeconomic History    Marital status:      Spouse name: Not on file    Number of children: 3    Years of education: 15    Highest education level: Not on file   Occupational History    Occupation: Retired  7/28/17   Social Needs    Financial resource strain: Not on file    Food insecurity     Worry: Not on file     Inability: Not on file   [employer] needs     Medical: Not on file     Non-medical: Not on file   Tobacco Use    Smoking status: Never Smoker    Smokeless tobacco: Never Used   Substance and Sexual Activity    Alcohol use: Yes     Comment: OCCASIONALLY 1 or less/1-2 wk    Drug use: No     Comment: No Mj experimentation    Sexual activity: Never   Lifestyle    Physical activity     Days per week: Not on file     Minutes per session: Not on file    Stress: Not on file   Relationships    Social connections     Talks on phone: Not on file     Gets together: Not on file     Attends Alevism service: Not on file     Active member of club or organization: Not on file     Attends meetings of clubs or organizations: Not on file     Relationship status: Not on file    Intimate partner violence     Fear of current or ex partner: Not on file     Emotionally abused: Not on file     Physically abused: Not on file     Forced sexual activity: Not on file   Other Topics Concern    Not on file   Social History Narrative    No Exercise.  1/19/17    Information from [facility] EMR ending 1/31/2010    Problems    DEEP VENOUS THROMBOPHLEBITIS (ICD-453.40)    TRANSAMINASES, SERUM, ELEVATED (ICD-790.4)    HYPOMAGNESEMIA (ICD-275.2)    Hx of SEPSIS (ICD-995.91) DIABETES MELLITUS, TYPE II (ICD-250.00)    HYPERTENSION (ICD-401. 9)    ALOPECIA (ICD-704.00)    ENCOUNTER FOR LONG-TERM USE OF ANTICOAGULANTS (ICD-V58.61)    DERMATITIS (ICD-692. 9)    CELLULITIS (ICD-682. 9)    FACTOR V DEFICIENCY (ICD-286.3)        Past Medical History:  Coronary Artery Disease, Hypertension, mixed hyperlipidemia/ low HDL/ metabolic synd. , Diabetes Type II, 2009 episode renal failure 2nd to sepsis, 2009 CELLULITIS/OPEN WOUND RIGHT ANKLE, BLOOD CLOTS (bilat. PASCALE hose)/ factor V leiden homozygote. Surgical History:  Appendectomy:, Cholecystectomy: , GYN Surgery: 3 , Tubal Ligation: , 2004 \"C\"      Family History: . Father - HTN, CAD/ MI    Son - 1 heterozygous factor V leiden. 2  HTN    Sister - Breast Ca, HTN, DVT        Exercise: doing hand weights 15-20 3x/wk. 18       Prior to Admission medications    Medication Sig Start Date End Date Taking?  Authorizing Provider   apixaban (ELIQUIS) 5 MG TABS tablet Take 1 tablet by mouth 2 times daily 20  Yes Merlinda Bass, MD   furosemide (LASIX) 40 MG tablet Take 1 tablet by mouth 2 times daily 12/3/20  Yes Marine Gray MD   insulin aspart (NOVOLOG) 100 UNIT/ML injection vial Inject 10 Units into the skin 3 times daily (before meals) 12/3/20  Yes Marine Gray MD   metoprolol succinate (TOPROL XL) 50 MG extended release tablet TAKE 1 TABLET BY MOUTH DAILY  Patient taking differently: Take 50 mg by mouth nightly TAKE 1 TABLET BY MOUTH DAILY 10/20/20  Yes JUAN MANUEL Damon CNP   potassium chloride (KLOR-CON M) 10 MEQ extended release tablet TAKE 1 TABLET BY MOUTH DAILY  Patient taking differently: Take 10 mEq by mouth nightly  10/20/20  Yes JUAN MANUEL Damon CNP   B-D UF III MINI PEN NEEDLES 31G X 5 MM MISC USE DAILY AS DIRECTED 20  Yes JUAN MANUEL Chan CNP   ACCU-CHEK APURVA PLUS strip CHECK SUGAR 3 TO 4 TIMES A DAY 4/3/20  Yes JUAN MANUEL Dorsey - TANIKA atorvastatin (LIPITOR) 40 MG tablet Take 1 tablet by mouth nightly 12/30/19  Yes JUAN MANUEL Toth CNP   ACCU-CHEK MULTICLIX LANCETS MISC TEST FOUR TIMES DAILY AS NEEDED FOR LOW OR HIGH BLOOD SUGARS 8/2/19  Yes JUAN MANUEL Lozano CNP   Insulin Degludec (TRESIBA FLEXTOUCH) 100 UNIT/ML SOPN Inject 40 Units into the skin daily  Patient not taking: Reported on 12/17/2020 12/3/20   Gary Barrett MD   folic acid (FOLVITE) 1 MG tablet Take 2 tablets by mouth daily  Patient taking differently: Take 2 mg by mouth nightly  6/26/20 12/16/20  JUAN MANUEL Higginbotham CNP       Allergies as of 12/17/2020 - Review Complete 12/17/2020   Allergen Reaction Noted    Nickel Nausea Only and Rash 10/19/2017    Ibuprofen  05/10/2014       Patient Active Problem List   Diagnosis    DMII (diabetes mellitus, type 2) ([address]    Essential hypertension    Hypercholesterolemia    Lower leg DVT (deep venous thromboembolism), chronic, right (HCC)    Edema of both legs    Encounter for current long-term use of anticoagulants    Homozygous Factor V Leiden mutation ([address]    Low serum HDL    Elevated LFTs    Hypomagnesemia    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[address]    Vitamin D deficiency    CHF with unknown LVEF (HCC)    Generalized weakness    Pulmonary vascular congestion    Chronic venous stasis dermatitis of both lower extremities    Morbid obesity due to excess calories (HCC)    Shortness of breath    Persistent atrial fibrillation (HCC)    RVF (right ventricular failure) (HCC)    Anemia    MDD (major depressive disorder), recurrent episode, moderate (HCC)    Acute on chronic congestive heart failure ([address]       Past Medical History:   Diagnosis Date    Diabetes mellitus type 2 in obese ([address] 05/08/2010    Edema of both legs     Elevated LFTs     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[address] 1/22/2018    19    Homozygous Factor V Leiden mutation ([address] 1950    Hypercholesterolemia     Hypertension     Hypomagnesemia  Low serum HDL     Lower leg DVT (deep venous thromboembolism), chronic, right (HCC)     Osteoarthritis of cervical spine 2010    Facet arthropathy, spondylosis    Renal failure, acute (HCC)     Due to sepsis    Vitamin D deficiency 2018    13.2       Past Surgical History:   Procedure Laterality Date    APPENDECTOMY  1976     SECTION      3     CHOLECYSTECTOMY  2002    gangrenous    COLONOSCOPY  2014    no polyp    TUBAL LIGATION Bilateral 1980       Family History   Problem Relation Age of Onset    Kidney Disease Mother         kidney stones - larger    Coronary Art Dis Father     Heart Surgery Father 48    Hypertension Father     Heart Attack Father     COPD Sister         smoker    Diabetes Sister     Diabetes Brother     Clotting Disorder Son         Factor V Leiden heterozygote    Other Son         GERD, gout, MRSA, homocystinemia    Hypertension Son     High Cholesterol Son     Other Sister         carotid stenosis       Review of Systems   Constitutional: Positive for fatigue. Negative for unexpected weight change. HENT: Positive for congestion. Eyes: Negative. Respiratory: Positive for shortness of breath. Negative for apnea and choking. Cardiovascular: Positive for leg swelling. Gastrointestinal: Negative. Endocrine: Negative. Genitourinary: Negative for frequency. Musculoskeletal: Negative. Skin: Negative. Allergic/Immunologic: Negative. Neurological: Negative for headaches. Psychiatric/Behavioral: The patient is nervous/anxious. Objective:     Vitals:  Weight BMI Neck circumference    Wt Readings from Last 3 Encounters:   20 (!) 342 lb (155.1 kg)   20 (!) 348 lb 12.8 oz (158.2 kg)   20 (!) 355 lb 6.1 oz (161.2 kg)    Body mass index is 54.37 kg/m².  Neck circumference: 18     BP HR SaO2   BP Readings from Last 3 Encounters:   20 131/73   20 122/84   20 126/74 pathophysiology of obstructive sleep apnea syndrome and the methods for evaluating its presence and severity. Patient was counseled to avoid driving and other potentially hazardous circumstances if the patient is experiencing excessive sleepiness. Treatment considerations include the use of nasal CPAP, oral dental appliance or a surgical intervention, which should be based on otolarygologic findings, In the meantime, the patient should be cautioned to avoid the use of alcohol or other depressant medications because of potential for increasing the duration and severity of apnea and cautioned regarding driving or operating and dangerous equipment if the patient is experiencing daytime sleepiness. .    Most likely treating the ANGELO will have position impact on HTN, A-Fib and CHF control. We discussed the proportionality between weight and AHI. With 10% weight change, the AHI has a 27% proportionate change. With 20% weight change, the AHI has a 45-50% proportionate change. Orders Placed This Encounter   Procedures    Baseline Diagnostic Sleep Study    Sleep Study with PAP Titration       Return in about 3 months (around 3/17/2021) for to review the PSG and CPAP usage, Reveiwing CPAP usage and compliance report and tro.     Fred Ybarra MD  Medical Director - [facility]

## 2020-12-17 NOTE — PATIENT INSTRUCTIONS
Patient Education        Sleep Apnea: Care Instructions  Your Care Instructions     Sleep apnea means that you frequently stop breathing for 10 seconds or longer during sleep. It can be mild to severe, based on the number of times an hour that you stop breathing or have slowed breathing. Blocked or narrowed airways in your nose, mouth, or throat can cause sleep apnea. Your airway can become blocked when your throat muscles and tongue relax during sleep. You can treat sleep apnea at home by making lifestyle changes. You also can use a CPAP breathing machine that keeps tissues in the throat from blocking your airway. Or your doctor may suggest that you use a breathing device while you sleep. It helps keep your airway open. This could be a device that you put in your mouth. In some cases, surgery may be needed to remove enlarged tissues in the throat. Follow-up care is a key part of your treatment and safety. Be sure to make and go to all appointments, and call your doctor if you are having problems. It's also a good idea to know your test results and keep a list of the medicines you take. How can you care for yourself at home? · Lose weight, if needed. It may reduce the number of times you stop breathing or have slowed breathing. · Sleep on your side. It may stop mild apnea. If you tend to roll onto your back, sew a pocket in the back of your pajama top. Put a tennis ball into the pocket, and stitch the pocket shut. This will help keep you from sleeping on your back. · Avoid alcohol and medicines such as sleeping pills and sedatives before bed. · Do not smoke. Smoking can make sleep apnea worse. If you need help quitting, talk to your doctor about stop-smoking programs and medicines. These can increase your chances of quitting for good. · Prop up the head of your bed 4 to 6 inches by putting bricks under the legs of the bed.   · Treat breathing problems, such as a stuffy nose, caused by a cold or allergies. · Try a continuous positive airway pressure (CPAP) breathing machine if your doctor recommends it. The machine keeps your airway open when you sleep. · If CPAP does not work for you, ask your doctor if you can try other breathing machines. A bilevel positive airway pressure machine uses one type of air pressure for breathing in and another type for breathing out. Another device raises or lowers air pressure as needed while you breathe. · Talk to your doctor if:  ? Your nose feels dry or bleeds when you use one of these machines. You may need to increase moisture in the air. A humidifier may help. ? Your nose is runny or stuffy from using a breathing machine. Decongestants or a corticosteroid nasal spray may help. ? You are sleepy during the day and it gets in the way of the normal things you do. Do not drive when you are drowsy. When should you call for help? Watch closely for changes in your health, and be sure to contact your doctor if:    · You still have sleep apnea even though you have made lifestyle changes.     · You are thinking of trying a device such as CPAP.     · You are having problems using a CPAP or similar machine. Where can you learn more? Go to https://365 Good Teacher.[employer]. org and sign in to your "[employer] account. Enter Q359 in the [facility] box to learn more about \"Sleep Apnea: Care Instructions. \"     If you do not have an account, please click on the \"Sign Up Now\" link. Current as of: February 24, 2020               Content Version: 12.6  © 2006-2020 Healthwise, Incorporated. Care instructions adapted under license by [facility] [employer] [facility] ([facility]). If you have questions about a medical condition or this instruction, always ask your healthcare professional. [place] [address] any warranty or liability for your use of this information.          Patient Education        Heart Failure and Sleep Apnea: Care Instructions  Your Care Instructions     Sleep apnea is fairly common in people with heart failure. Sleep apnea means you stop breathing for 10 seconds or longer during sleep. It may cause you to snore loudly and not sleep well, so you wake up feeling tired. Getting treatment for sleep apnea can help you sleep and feel better. It may also help keep your heart failure from getting worse. Follow-up care is a key part of your treatment and safety. Be sure to make and go to all appointments, and call your doctor if you are having problems. It's also a good idea to know your test results and keep a list of the medicines you take. How can you care for yourself at home? · Lose weight, if needed. It may reduce the number of times you stop breathing or have slowed breathing. · Go to bed at the same time every night. · Sleep on your side. It may stop mild apnea. If you tend to roll onto your back, sew a pocket in the back of your pajama top. Put a tennis ball into the pocket, and stitch the pocket shut. This will help keep you from sleeping on your back. · Avoid alcohol and medicines such as sleeping pills and sedatives before bed. · Do not smoke. Smoking can make heart failure and sleep apnea worse. If you need help quitting, talk to your doctor about stop-smoking programs and medicines. These can increase your chances of quitting for good. · Prop up the head of your bed 4 to 6 inches by putting bricks under the legs of the bed. · Try a continuous positive airway pressure (CPAP) breathing machine if your doctor recommends it. The machine keeps your airway from closing when you sleep. It may take time for you to get used to CPAP. · If your nose feels dry or bleeds when you use one of these machines, talk with your doctor about increasing moisture in the air. A humidifier may help. · If your nose is runny or stuffy from using a breathing machine, talk with your doctor before using medicines to relieve congestion.   · Talk to your doctor if you are sleepy during the day and it gets in the way of the normal things you do. Do not drive when you are drowsy. When should you call for help? Watch closely for changes in your health, and be sure to contact your doctor if you have any problems. Where can you learn more? Go to https://chpepiceweb.[employer]. org and sign in to your [employer] account. Enter A820 in the [employer] box to learn more about \"Heart Failure and Sleep Apnea: Care Instructions. \"     If you do not have an account, please click on the \"Sign Up Now\" link. Current as of: December 16, 2019               Content Version: 12.6  © 9596-1109 [employer]. Care instructions adapted under license by [facility] ([facility]). If you have questions about a medical condition or this instruction, always ask your healthcare professional. [address] any warranty or liability for your use of this information.

## 2020-12-18 ENCOUNTER — TELEPHONE (OUTPATIENT)
Dept: FAMILY MEDICINE CLINIC | Age: 70
End: 2020-12-18

## 2020-12-18 NOTE — TELEPHONE ENCOUNTER
Patient has some drainage from her legs. They are red and swollen and some of the little blisters have opened. Yellowish color to the drainage. Patient has been using imnnium lactate to put on this just seeing if we need to add anything else?   Please advise

## 2020-12-21 ENCOUNTER — VIRTUAL VISIT (OUTPATIENT)
Dept: PHARMACY | Age: 70
End: 2020-12-21
Payer: MEDICARE

## 2020-12-21 ENCOUNTER — TELEPHONE (OUTPATIENT)
Dept: FAMILY MEDICINE CLINIC | Age: 70
End: 2020-12-21

## 2020-12-21 PROCEDURE — 99211 OFF/OP EST MAY X REQ PHY/QHP: CPT | Performed by: NURSE PRACTITIONER

## 2020-12-21 NOTE — PROGRESS NOTES
[facility] [place]  Heart Failure    [address]  Phone: [phone]  Fax: [phone]      NAME: Mallorie Stratton  MEDICAL RECORD NUMBER:  [id number]  AGE: 78 y.o.    GENDER: female  : 1950  EPISODE DATE:  2020      Chief Complaint   Patient presents with    Congestive Heart Failure        Past Medical History:   Diagnosis Date    Diabetes mellitus type 2 in obese ([facility] [address] 2010    Edema of both legs     Elevated LFTs     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[facility] [address] 2018    19    Homozygous Factor V Leiden mutation ([facility] 1950    Hypercholesterolemia     Hypertension     Hypomagnesemia     Low serum HDL     Lower leg DVT (deep venous thromboembolism), chronic, right (HCC)     Osteoarthritis of cervical spine 2010    Facet arthropathy, spondylosis    Renal failure, acute (HCC)     Due to sepsis    Vitamin D deficiency 2018    13.2      Past Surgical History:   Procedure Laterality Date    APPENDECTOMY  1976     SECTION      3     CHOLECYSTECTOMY  2002    gangrenous    COLONOSCOPY  2014    no polyp    TUBAL LIGATION Bilateral 1980     Family History   Problem Relation Age of Onset    Kidney Disease Mother         kidney stones - larger    Coronary Art Dis Father     Heart Surgery Father 48    Hypertension Father     Heart Attack Father     COPD Sister         smoker    Diabetes Sister     Diabetes Brother     Clotting Disorder Son         Factor V Leiden heterozygote    Other Son         GERD, gout, MRSA, homocystinemia    Hypertension Son     High Cholesterol Son     Other Sister         carotid stenosis     Social History     Tobacco Use    Smoking status: Never Smoker    Smokeless tobacco: Never Used   Substance Use Topics    Alcohol use: Yes     Comment: OCCASIONALLY 1 or less/1-2 wk    Drug use: No     Comment: No Mj experimentation She has a medical history significant for diastolic heart failure, A. fib, HTN, DM 2, ANGELO, and venous stasis. Current BMI is 54. She is on 1 L of oxygen and seems very motivated to improve her health specifically asking many questions related to her diet. Tells me that she has home health nursing, PT and OT coming to the house and also was approved for 2 in-home dietitian visits by her insurance. She is taking notes as we talk today. We spent a great deal of time discussing healthy food options that are low in sodium and carbohydrates. We will continue to discuss this at follow-up visits to reinforce the education. She tells me that overall she feels great and has been weighing herself daily. She has already made dietary modifications and tells me that each day she gets on the scale she weighs less and less. This is reinforcing to her and also helps to keep her motivation strong. She is watching her sodium intake as well as her fluids and keeping her intake at 64 ounces or less. She has all of her medications and reports good compliance. Reports good sleep however had an appointment with sleep medicine on December 17 and is scheduled for a sleep study on January 7. She does have a history of sleep apnea not currently using CPAP. Denies symptoms of acute exacerbation of her heart failure. States has so shortness of breath only with exertion. No edema, abdominal fullness, chest pain, palpitations, orthopnea. Sleeps in a lounge chair recliner. She is a diabetic and her last A1c resulted at 6.2 on September 25, 2020. Her [place] was discontinued and her NovoLog is only for blood glucose readings over 160. Review of Systems:   Constitutional: Negative for fatigue, Negative for activity change, appetite change, chills, diaphoresis, fever and unexpected weight change. HENT: Negative for congestion, ear pain, postnasal drip, rhinorrhea, sinus pressure, sinus pain, sneezing, sore throat and trouble swallowing. Eyes: Negative for discharge and redness. Respiratory: Negative for cough, chest tightness, sputum, shortness of breath and wheezing. Cardiovascular: Negative for chest pain, palpitations and leg swelling. Gastrointestinal: Negative for abdominal distention, abdominal pain, constipation, diarrhea, nausea and vomiting. Genitourinary: Negative for decreased urine volume, difficulty urinating, dysuria and hematuria. Skin: Negative for pallor and rash. Neurological: Negative for dizziness, tremors, weakness, light-headedness and headaches. Psychiatric/Behavioral: Negative for confusion. The patient is not nervous/anxious. Functional Activity New York Heart Association Classification  Patient Symptoms   []   Class I No limitation of physical activity. Ordinary physical activity does not cause undue fatigue, palpitation, dyspnea (shortness of breath). [x]   Class II Slight limitation of physical activity. Comfortable at rest. Ordinary physical activity results in fatigue, palpitation, dyspnea (shortness of breath). []   Class III  Vinod limitation of physical activity. Comfortable at rest.  Less than ordinary activity causes fatigue, palpitation, dyspnea. []   Class IV Unable to carry on any physical activity without discomfort. Symptoms of heart failure at rest.  If any physical activity is undertaken, discomfort increases.     Shortness of Breath:   []   None   [x]   Dyspnea on exertion   []   Dyspnea with normal activities  []   Dyspnea at rest  []   Dyspnea while sleeping    Patient Findings:   []  Missed doses  []  Diet changes  []  Sodium intake changes    []  Alcohol intake changes  []  Night time cough  []  Edema    []  Activity changes   []  Fatigue that limits activity []  Acute illness Outpatient Medications Marked as Taking for the 12/21/20 encounter (Virtual Visit) with JUAN MANUEL Apple CNP   Medication Sig Dispense Refill    apixaban (ELIQUIS) 5 MG TABS tablet Take 1 tablet by mouth 2 times daily 180 tablet 1    furosemide (LASIX) 40 MG tablet Take 1 tablet by mouth 2 times daily 90 tablet 1    insulin aspart (NOVOLOG) 100 UNIT/ML injection vial Inject 10 Units into the skin 3 times daily (before meals) (Patient taking differently: Inject 10 Units into the skin 3 times daily (before meals) If BG reading is >160) 20 mL 0    metoprolol succinate (TOPROL XL) 50 MG extended release tablet TAKE 1 TABLET BY MOUTH DAILY (Patient taking differently: Take 50 mg by mouth nightly TAKE 1 TABLET BY MOUTH DAILY) 90 tablet 0    potassium chloride (KLOR-CON M) 10 MEQ extended release tablet TAKE 1 TABLET BY MOUTH DAILY (Patient taking differently: Take 10 mEq by mouth nightly ) 90 tablet 0    folic acid (FOLVITE) 1 MG tablet Take 2 tablets by mouth daily (Patient taking differently: Take 2 mg by mouth nightly ) 180 tablet 1    B-D UF III MINI PEN NEEDLES 31G X 5 MM MISC USE DAILY AS DIRECTED 100 each 5    ACCU-CHEK APURVA PLUS strip CHECK SUGAR 3 TO 4 TIMES A  strip 5    atorvastatin (LIPITOR) 40 MG tablet Take 1 tablet by mouth nightly 90 tablet 3    ACCU-CHEK MULTICLIX LANCETS MISC TEST FOUR TIMES DAILY AS NEEDED FOR LOW OR HIGH BLOOD SUGARS 100 each 1       [x]Reviewed heart failure medications including how they work and potential side effects. [x]Advised patient to avoid NSAIDs. Get With The Guidelines  Ms. Esparza is on a Beta-Blocker for (HFrEF) (systolic) EF </= 89%  Yes    Ms. Esparza is on an Ace-Inhibitor / ARB / Entresto for (HFrEF) (systolic) EF </= 80% No      Ms. Esparza is on a Aldosterone Receptor Antagonist for (HFrEF) (systolic) EF </= 11% or EF </= 40% with MI (Okay to use if SCr </= 2.5mg/dL in men, SCr </= 2mg/dL in women; Potassium < 5.0meq/L) No No symptoms of acute exacerbation today. Continue daily weights, sodium and fluid reduction. Action plan reviewed today with patient. Continue management and follow-up with cardiology. BMI 50.0-59.9, adult [facility])     Discussed the importance of weight loss as it pertains to her chronic health conditions. Patient verbalizes understanding of this and identifies weight loss as her primary health goal.  We will continue to work with her goal and discussed ways to lose weight with each follow-up. No orders of the defined types were placed in this encounter. Follow up with wellness center: January 6th 2021  Time spent in visit today including counseling and education: 30 minutes. *This note was transcribed using [id number]. Please disregard any translational errors.          Electronically signed by JUAN MANUEL Rodrigues CNP, on 12/23/2020 at 1:38 PM

## 2020-12-21 NOTE — TELEPHONE ENCOUNTER
Patient has a question for Summer - She has a yeast infection on her stomach, nurse told her to try Gold Bond - does she need a RX for this or can she buy this over the counter. Is this good to use? Please give her a call back. Immanuel Higgins.  Phone [phone]

## 2020-12-23 NOTE — ASSESSMENT & PLAN NOTE
No symptoms of acute exacerbation today. Continue daily weights, sodium and fluid reduction. Action plan reviewed today with patient. Continue management and follow-up with cardiology.

## 2020-12-23 NOTE — ASSESSMENT & PLAN NOTE
Current A1c 6.2. Discussed the importance of carbohydrate reduction and weight loss to help manage her diabetes. Continue management and follow-up with PCP.

## 2020-12-23 NOTE — ASSESSMENT & PLAN NOTE
Discussed the importance of weight loss as it pertains to her chronic health conditions. Patient verbalizes understanding of this and identifies weight loss as her primary health goal.  We will continue to work with her goal and discussed ways to lose weight with each follow-up.

## 2020-12-28 ENCOUNTER — TELEPHONE (OUTPATIENT)
Dept: FAMILY MEDICINE CLINIC | Age: 70
End: 2020-12-28

## 2020-12-30 ENCOUNTER — TELEPHONE (OUTPATIENT)
Dept: FAMILY MEDICINE CLINIC | Age: 70
End: 2020-12-30

## 2020-12-31 PROBLEM — Z79.01 ENCOUNTER FOR CURRENT LONG-TERM USE OF ANTICOAGULANTS: Status: RESOLVED | Noted: 2017-10-30 | Resolved: 2020-12-17

## 2021-01-06 ENCOUNTER — VIRTUAL VISIT (OUTPATIENT)
Dept: PHARMACY | Age: 71
End: 2021-01-06
Payer: MEDICARE

## 2021-01-06 DIAGNOSIS — Z79.4 TYPE 2 DIABETES MELLITUS WITHOUT COMPLICATION, WITH LONG-TERM CURRENT USE OF INSULIN (HCC): ICD-10-CM

## 2021-01-06 DIAGNOSIS — E11.9 TYPE 2 DIABETES MELLITUS WITHOUT COMPLICATION, WITH LONG-TERM CURRENT USE OF INSULIN (HCC): ICD-10-CM

## 2021-01-06 DIAGNOSIS — I50.33 ACUTE ON CHRONIC DIASTOLIC CONGESTIVE HEART FAILURE (HCC): Primary | ICD-10-CM

## 2021-01-06 PROCEDURE — 99211 OFF/OP EST MAY X REQ PHY/QHP: CPT | Performed by: NURSE PRACTITIONER

## 2021-01-06 NOTE — PROGRESS NOTES
[facility]  Heart Failure    [address]  Phone: [phone]  Fax: [phone]      NAME: Huy Sigala  MEDICAL RECORD NUMBER:  [id number]  AGE: 78 y.o.    GENDER: female  : 1950  EPISODE DATE:  2021      Chief Complaint   Patient presents with    Congestive Heart Failure        Past Medical History:   Diagnosis Date    Diabetes mellitus type 2 in obese ([address] 2010    Edema of both legs     Elevated LFTs     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[facility] [address] 2018    19    Homozygous Factor V Leiden mutation ([facility] 1950    Hypercholesterolemia     Hypertension     Hypomagnesemia     Low serum HDL     Lower leg DVT (deep venous thromboembolism), chronic, right (HCC)     Osteoarthritis of cervical spine 2010    Facet arthropathy, spondylosis    Renal failure, acute (HCC)     Due to sepsis    Vitamin D deficiency 2018    13.2      Past Surgical History:   Procedure Laterality Date    APPENDECTOMY  1976     SECTION      3     CHOLECYSTECTOMY  2002    gangrenous    COLONOSCOPY  2014    no polyp    TUBAL LIGATION Bilateral 1980     Family History   Problem Relation Age of Onset    Kidney Disease Mother         kidney stones - larger    Coronary Art Dis Father     Heart Surgery Father 48    Hypertension Father     Heart Attack Father     COPD Sister         smoker    Diabetes Sister     Diabetes Brother     Clotting Disorder Son         Factor V Leiden heterozygote    Other Son         GERD, gout, MRSA, homocystinemia    Hypertension Son     High Cholesterol Son     Other Sister         carotid stenosis     Social History     Tobacco Use    Smoking status: Never Smoker    Smokeless tobacco: Never Used   Substance Use Topics    Alcohol use: Yes     Comment: OCCASIONALLY 1 or less/1-2 wk    Drug use: No     Comment: No Mj experimentation     Counseling given: Not Answered     Immunization History   Administered Date(s) Administered    Influenza Vaccine, unspecified formulation 09/04/2009    Influenza Virus Vaccine 09/04/2009    Influenza, High Dose (Fluzone 65 yrs and older) 10/30/2017, 10/02/2018    Influenza, Triv, inactivated, subunit, adjuvanted, IM (Fluad 65 yrs and older) 09/23/2019, 09/18/2020    Pneumococcal Conjugate 13-valent ([id number]) 02/22/2018    Pneumococcal Polysaccharide ([id number]) 11/06/2007, 09/23/2019    Tdap (Boostrix, Adacel) 03/05/2018     Allergies   Allergen Reactions    Nickel Nausea Only and Rash    Ibuprofen      Pt takes Warfarin          ECHO EF%: 55-60 Date: 11/22/20      Last Hospitalization: 11/22/2020    History of ANGELO: Yes, sleep study scheduled for January 17  []BIPAP/CPAP use:   []Education about proper cleaning completed today     Subjective   HPI: Ms. Sera Carey is a 78 y.o. female being evaluated by a virtual visit via telephone encounter from patient's home due to [facility]-29 public health emergency. Due to the circumstances physical examination is very limited. This visit was conducted with the patient's consent for billing of her insurance. A telephone visit was necessary today to reduce the patient's exposure to COVID-19 and to provide necessary medical care. This patient has been advised to contact this office, her cardiologist, or primary care physician if symptoms develop or to call 911 for emergency medical treatment if deemed necessary. She has a medical history significant for diastolic heart failure, A. fib, HTN, DM 2, ANGELO, and venous stasis. Current BMI is 54. She is on 1 L of oxygen and seems very motivated to improve her health specifically asking many questions related to her diet. Tells me that she has home health nursing, PT and OT coming to the house and also was approved for 2 in-home dietitian visits by her insurance.     She is currently reading the book how not to die and is interested in buying the cookbook. I asked her to download the "[employer] fitness pal xiang to her phone in order to track her dietary intake and learn more about the foods that she is eating she is taking notes as we talk today. We spent a great deal of time discussing healthy food options that are low in sodium and carbohydrates. We will continue to discuss this at follow-up visits to reinforce the education. She tells me that overall she feels great and has been weighing herself daily. She has already made dietary modifications and tells me that each day she gets on the scale she weighs less and less. This is reinforcing to her and also helps to keep her motivation strong. She is watching her sodium intake as well as her fluids and keeping her intake at 64 ounces or less. She has all of her medications and reports good compliance. She tells me that she gets up every hour to walk around her apartment as instructed by PT and OT. Reports good sleep however had an appointment with sleep medicine on December 17 and is scheduled for a sleep study on January 17. She does have a history of sleep apnea not currently using CPAP. She reports good sleep and gets about 6 hours at night plus an hour to 2-hour nap during the day. Denies symptoms of acute exacerbation of her heart failure. States has so shortness of breath only with exertion. No edema beyond baseline (uses leg wraps), abdominal fullness, chest pain, palpitations, orthopnea. Sleeps in a lounge chair recliner. She is a diabetic and her last A1c resulted at 6.2 on September 25, 2020. Her [place] was discontinued and her NovoLog is only for blood glucose readings over 160. Review of Systems:   Constitutional: Negative for fatigue, Negative for activity change, appetite change, chills, diaphoresis, fever and unexpected weight change.    HENT: Negative for congestion, ear pain, postnasal drip, rhinorrhea, sinus pressure, sinus pain, sneezing, sore throat and trouble swallowing. Eyes: Negative for discharge and redness. Respiratory: Negative for cough, chest tightness, sputum, shortness of breath and wheezing. Cardiovascular: Negative for chest pain, palpitations and leg swelling. Gastrointestinal: Negative for abdominal distention, abdominal pain, constipation, diarrhea, nausea and vomiting. Genitourinary: Negative for decreased urine volume, difficulty urinating, dysuria and hematuria. Skin: Negative for pallor and rash. Neurological: Negative for dizziness, tremors, weakness, light-headedness and headaches. Psychiatric/Behavioral: Negative for confusion. The patient is not nervous/anxious. Sleep: Negative for restless sleep, snoring, frequent waking       Functional Activity New York Heart Association Classification  Patient Symptoms   []   Class I No limitation of physical activity. Ordinary physical activity does not cause undue fatigue, palpitation, dyspnea (shortness of breath). [x]   Class II Slight limitation of physical activity. Comfortable at rest. Ordinary physical activity results in fatigue, palpitation, dyspnea (shortness of breath). []   Class III  Vinod limitation of physical activity. Comfortable at rest.  Less than ordinary activity causes fatigue, palpitation, dyspnea. []   Class IV Unable to carry on any physical activity without discomfort. Symptoms of heart failure at rest.  If any physical activity is undertaken, discomfort increases. Shortness of Breath:   []   None   [x]   Dyspnea on exertion   []   Dyspnea with normal activities  []   Dyspnea at rest  []   Dyspnea while sleeping    Patient Findings:   []  Missed doses  []  Diet changes  []  Sodium intake changes    []  Alcohol intake changes  []  Night time cough  []  Edema    []  Activity changes   []  Fatigue that limits activity []  Acute illness  []  Early saiety, abd fullness []  Chest pain   []  Other        Objective: There were no vitals taken for this visit.   BP Readings from Last 3 Encounters:   12/17/20 131/73   12/09/20 122/84   12/03/20 126/74     Wt Readings from Last 6 Encounters:   12/17/20 (!) 342 lb (155.1 kg)   12/09/20 (!) 348 lb 12.8 oz (158.2 kg)   12/03/20 (!) 355 lb 6.1 oz (161.2 kg)   04/24/20 (!) 310 lb (140.6 kg)   09/23/19 (!) 318 lb (144.2 kg)   02/12/19 (!) 322 lb 3.2 oz (146.1 kg)     Physical Exam:   Constitutional: Oriented to person, place, and time. No distress detected. Psychiatric: Normal mood and affect.      BMP:   Lab Results   Component Value Date     12/03/2020    K 3.8 12/03/2020    CL 95 12/03/2020    CO2 36 12/03/2020    BUN 19 12/03/2020    CREATININE 0.7 12/03/2020       CBC:    Lab Results   Component Value Date    WBC 3.0 12/03/2020    HGB 10.0 12/03/2020    HCT 31.2 12/03/2020     12/03/2020     HgA1C:   Lab Results   Component Value Date    LABA1C 6.2 09/25/2020    LABA1C 6.5 09/23/2019    LABA1C 7.2 02/12/2019     TSH: No results found for: TSH  Lipids:   Lab Results   Component Value Date    CHOL 132 12/26/2019    TRIG 71 12/26/2019    HDL 51 12/26/2019    LDLCALC 67 12/26/2019     ProBNP:   Lab Results   Component Value Date    PROBNP 1,346 11/22/2020       [x]Reviewed daily weight log and assessed self management skills including early recognition and notification of exacerbation   [x]Encouraged daily weights and to call with increase of 3 pounds in one day or 5 pounds in one week or weight increased above dry weight    [x]Discussed fluid restriction and sodium restriction as well as nutrition goals   [x]Weight loss counseling performed, including carbohydrate and calorie reduction  [x]Exercise Counseling performed          Medications reviewed:   [x] compared patient's bottles with EPIC list  [] patient did not bring medication bottles      Current medications:  Outpatient Medications Marked as Taking for the 1/6/21 encounter (Virtual Visit) with Bonita Schimke, APRN - CNP   Medication Sig Dispense Refill   [place] See apixaban (ELIQUIS) 5 MG TABS tablet Take 1 tablet by mouth 2 times daily 180 tablet 1    furosemide (LASIX) 40 MG tablet Take 1 tablet by mouth 2 times daily 90 tablet 1    insulin aspart (NOVOLOG) 100 UNIT/ML injection vial Inject 10 Units into the skin 3 times daily (before meals) (Patient taking differently: Inject 10 Units into the skin 3 times daily (before meals) If BG reading is >160) 20 mL 0    metoprolol succinate (TOPROL XL) 50 MG extended release tablet TAKE 1 TABLET BY MOUTH DAILY (Patient taking differently: Take 50 mg by mouth nightly TAKE 1 TABLET BY MOUTH DAILY) 90 tablet 0    potassium chloride (KLOR-CON M) 10 MEQ extended release tablet TAKE 1 TABLET BY MOUTH DAILY (Patient taking differently: Take 10 mEq by mouth nightly ) 90 tablet 0    folic acid (FOLVITE) 1 MG tablet Take 2 tablets by mouth daily (Patient taking differently: Take 2 mg by mouth nightly ) 180 tablet 1    B-D UF III MINI PEN NEEDLES 31G X 5 MM MISC USE DAILY AS DIRECTED 100 each 5    ACCU-CHEK APURVA PLUS strip CHECK SUGAR 3 TO 4 TIMES A  strip 5    atorvastatin (LIPITOR) 40 MG tablet Take 1 tablet by mouth nightly 90 tablet 3    ACCU-CHEK MULTICLIX LANCETS MISC TEST FOUR TIMES DAILY AS NEEDED FOR LOW OR HIGH BLOOD SUGARS 100 each 1       [x]Reviewed heart failure medications including how they work and potential side effects. [x]Advised patient to avoid NSAIDs. Get With The Guidelines  Ms. Esparza is on a Beta-Blocker for (HFrEF) (systolic) EF </= 37%  Yes    Ms. Esparza is on an Ace-Inhibitor / ARB / Entresto for (HFrEF) (systolic) EF </= 36% No      Ms. Esparza is on a Aldosterone Receptor Antagonist for (HFrEF) (systolic) EF </= 77% or EF </= 40% with MI (Okay to use if SCr </= 2.5mg/dL in men, SCr </= 2mg/dL in women; Potassium < 5.0meq/L) No      Ms. Esparza is on a Diuretic Yes    If the above guidelines are not met, reason documented above or recommendations made: Yes.       [] Advanced Directives completed and scanned  [x] Advanced Directives need to be addressed        Barriers to Adherence: Verbalizes interest, Seeking additional information (pamphlets, classes, etc) and Active attentive participant    Environmental Concerns: not applicable    Wong Hinson is a 78 y.o. female evaluated via telephone on 1/6/2021. Consent:  She and/or health care decision maker is aware that that she may receive a bill for this telephone service, depending on her insurance coverage, and has provided verbal consent to proceed: Yes      Documentation:  I communicated with the patient and/or health care decision maker about her chronic heart failure. Details of this discussion including any medical advice provided: See visit note      I affirm this is a Patient Initiated Episode with a Patient who has not had a related appointment within my department in the past 7 days or scheduled within the next 24 hours. Patient identification was verified at the start of the visit: Yes    Total Time: minutes: 21-30 minutes    Note: not billable if this call serves to triage the patient into an appointment for the relevant concern      ANJELICA MA      A heart failure binder has been provided prior to discharge in addition to extensive education including the information noted above. Assessment/Plan     Problem List Items Addressed This Visit     DMII (diabetes mellitus, type 2) ([facility] [address]     Current A1c 6.2. Discussed the importance of carbohydrate reduction and weight loss to help manage her diabetes. Continue management and follow-up with PCP. Acute on chronic congestive heart failure (HCC) - Primary     No symptoms of acute exacerbation today. Continue daily weights, sodium and fluid reduction. Understands the importance of weight loss. Action plan reviewed today with patient. Continue management and follow-up with cardiology.          BMI 50.0-59.9, adult [facility])     Discussed the importance

## 2021-01-06 NOTE — ASSESSMENT & PLAN NOTE
No symptoms of acute exacerbation today. Continue daily weights, sodium and fluid reduction. Understands the importance of weight loss. Action plan reviewed today with patient. Continue management and follow-up with cardiology.

## 2021-01-06 NOTE — ASSESSMENT & PLAN NOTE
Discussed the importance of weight loss as it pertains to her chronic health conditions. Patient verbalizes understanding of this and identifies weight loss as her primary health goal.  We will continue to work with her goal and discuss ways to lose weight with each follow-up.

## 2021-01-10 RX ORDER — FOLIC ACID 1 MG/1
2 TABLET ORAL DAILY
Qty: 180 TABLET | Refills: 1 | Status: SHIPPED | OUTPATIENT
Start: 2021-01-10 | End: 2021-07-09

## 2021-01-11 ENCOUNTER — OFFICE VISIT (OUTPATIENT)
Dept: PRIMARY CARE CLINIC | Age: 71
End: 2021-01-11
Payer: MEDICARE

## 2021-01-11 ENCOUNTER — TELEPHONE (OUTPATIENT)
Dept: FAMILY MEDICINE CLINIC | Age: 71
End: 2021-01-11

## 2021-01-11 DIAGNOSIS — E78.00 HYPERCHOLESTEROLEMIA: ICD-10-CM

## 2021-01-11 DIAGNOSIS — R74.8 LOW SERUM HDL: Chronic | ICD-10-CM

## 2021-01-11 DIAGNOSIS — Z20.828 EXPOSURE TO SARS-ASSOCIATED CORONAVIRUS: Primary | ICD-10-CM

## 2021-01-11 PROCEDURE — 99211 OFF/OP EST MAY X REQ PHY/QHP: CPT | Performed by: NURSE PRACTITIONER

## 2021-01-11 RX ORDER — ATORVASTATIN CALCIUM 40 MG/1
TABLET, FILM COATED ORAL
Qty: 90 TABLET | Refills: 0 | Status: SHIPPED | OUTPATIENT
Start: 2021-01-11 | End: 2021-04-05

## 2021-01-11 NOTE — PROGRESS NOTES
Braeden Esparza received a viral test for COVID-19. They were educated on isolation and quarantine as appropriate. For any symptoms, they were directed to seek care from their PCP, given contact information to establish with a doctor, directed to an urgent care or the emergency room.

## 2021-01-11 NOTE — TELEPHONE ENCOUNTER
Patient needs to speak with Summer about the orders for her O2. Was told on Friday the paperwork was in Kamlesh folder  Also was supposed to have PT but says it has been declined by [employer].  Please advise

## 2021-01-11 NOTE — PATIENT INSTRUCTIONS

## 2021-01-12 LAB — SARS-COV-2, NAA: NOT DETECTED

## 2021-01-13 NOTE — TELEPHONE ENCOUNTER
I spoke with pt yesterday - plan is to try to get off the oxygen - will order pulse ox from [facility]

## 2021-01-15 ENCOUNTER — TELEPHONE (OUTPATIENT)
Dept: FAMILY MEDICINE CLINIC | Age: 71
End: 2021-01-15

## 2021-01-15 DIAGNOSIS — R60.0 EDEMA OF BOTH LEGS: ICD-10-CM

## 2021-01-15 RX ORDER — FUROSEMIDE 40 MG/1
40 TABLET ORAL 2 TIMES DAILY
Qty: 90 TABLET | Refills: 1 | Status: SHIPPED | OUTPATIENT
Start: 2021-01-15 | End: 2021-04-30

## 2021-01-15 NOTE — TELEPHONE ENCOUNTER
NOT SURE WHAT MED THIS IS- I SPOKE TO PT SHE STATED THE ONLY REFILL SHE NEEDS RIGHT NOW IS HER LASIX 40MG.  PLEASE SEND TO TITI - PT DOES NOT NEED A CALL BACK

## 2021-01-17 ENCOUNTER — HOSPITAL ENCOUNTER (OUTPATIENT)
Dept: SLEEP CENTER | Age: 71
Discharge: HOME | End: 2021-01-17
Payer: MEDICARE

## 2021-01-17 DIAGNOSIS — I48.19 PERSISTENT ATRIAL FIBRILLATION (HCC): ICD-10-CM

## 2021-01-17 DIAGNOSIS — Z99.81 O2 DEPENDENT: ICD-10-CM

## 2021-01-17 DIAGNOSIS — E66.01 CLASS 3 SEVERE OBESITY DUE TO EXCESS CALORIES WITH SERIOUS COMORBIDITY AND BODY MASS INDEX (BMI) OF 50.0 TO 59.9 IN ADULT (HCC): ICD-10-CM

## 2021-01-17 DIAGNOSIS — G47.33 OBSTRUCTIVE SLEEP APNEA: ICD-10-CM

## 2021-01-17 DIAGNOSIS — Z86.79 H/O CHF: ICD-10-CM

## 2021-01-17 DIAGNOSIS — I10 ESSENTIAL HYPERTENSION: ICD-10-CM

## 2021-01-17 PROCEDURE — 95810 POLYSOM 6/> YRS 4/> PARAM: CPT

## 2021-01-17 PROCEDURE — 95810 POLYSOM 6/> YRS 4/> PARAM: CPT | Performed by: PSYCHIATRY & NEUROLOGY

## 2021-01-18 DIAGNOSIS — T50.905A DRUG-INDUCED HYPOKALEMIA: ICD-10-CM

## 2021-01-18 DIAGNOSIS — E87.6 DRUG-INDUCED HYPOKALEMIA: ICD-10-CM

## 2021-01-18 DIAGNOSIS — I10 ESSENTIAL HYPERTENSION: ICD-10-CM

## 2021-01-18 RX ORDER — LISINOPRIL 40 MG/1
TABLET ORAL
Qty: 90 TABLET | Refills: 0 | OUTPATIENT
Start: 2021-01-18

## 2021-01-19 ENCOUNTER — TELEPHONE (OUTPATIENT)
Dept: PULMONOLOGY | Age: 71
End: 2021-01-19

## 2021-01-19 RX ORDER — POTASSIUM CHLORIDE 750 MG/1
TABLET, EXTENDED RELEASE ORAL
Qty: 90 TABLET | Refills: 0 | Status: SHIPPED | OUTPATIENT
Start: 2021-01-19 | End: 2021-03-18 | Stop reason: DRUGHIGH

## 2021-01-19 RX ORDER — METOPROLOL SUCCINATE 50 MG/1
TABLET, EXTENDED RELEASE ORAL
Qty: 90 TABLET | Refills: 0 | Status: SHIPPED | OUTPATIENT
Start: 2021-01-19 | End: 2021-04-23

## 2021-01-19 NOTE — TELEPHONE ENCOUNTER
Spoke to Fatimahginette Covington and let her know her PSG indicates she is positive for mild sleep apnea with and AHI of 5.3 events/hr and episodes of oxygen desaturation to 90% while on O2/1L  Titration is recommended, however Mihir Covington is unsure if she wants to do this so she would like to come in to see Dr Gricelda Rhoades and discuss her results.  Scheduled 2/1/20 to discuss

## 2021-01-21 ENCOUNTER — VIRTUAL VISIT (OUTPATIENT)
Dept: PHARMACY | Age: 71
End: 2021-01-21
Payer: MEDICARE

## 2021-01-21 DIAGNOSIS — E11.9 TYPE 2 DIABETES MELLITUS WITHOUT COMPLICATION, WITH LONG-TERM CURRENT USE OF INSULIN (HCC): ICD-10-CM

## 2021-01-21 DIAGNOSIS — Z79.4 TYPE 2 DIABETES MELLITUS WITHOUT COMPLICATION, WITH LONG-TERM CURRENT USE OF INSULIN (HCC): ICD-10-CM

## 2021-01-21 DIAGNOSIS — I50.33 ACUTE ON CHRONIC DIASTOLIC CONGESTIVE HEART FAILURE (HCC): ICD-10-CM

## 2021-01-21 DIAGNOSIS — G47.33 OBSTRUCTIVE SLEEP APNEA: Primary | ICD-10-CM

## 2021-01-21 PROCEDURE — 99211 OFF/OP EST MAY X REQ PHY/QHP: CPT | Performed by: NURSE PRACTITIONER

## 2021-01-21 NOTE — PROGRESS NOTES
[facility]  Heart Failure    [address], [address]  Phone: [phone]  Fax: [phone]      NAME: Laura Henriquez  MEDICAL RECORD NUMBER:  [id number]  AGE: 78 y.o.    GENDER: female  : 1950  EPISODE DATE:  2021      Chief Complaint   Patient presents with    Congestive Heart Failure        Past Medical History:   Diagnosis Date    Diabetes mellitus type 2 in obese ([facility] [address] 2010    Edema of both legs     Elevated LFTs     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[facility] [address] 2018    19    Homozygous Factor V Leiden mutation ([facility] 1950    Hypercholesterolemia     Hypertension     Hypomagnesemia     Low serum HDL     Lower leg DVT (deep venous thromboembolism), chronic, right (HCC)     Osteoarthritis of cervical spine 2010    Facet arthropathy, spondylosis    Renal failure, acute (HCC)     Due to sepsis    Vitamin D deficiency 2018    13.2      Past Surgical History:   Procedure Laterality Date    APPENDECTOMY  1976     SECTION      3     CHOLECYSTECTOMY  2002    gangrenous    COLONOSCOPY  2014    no polyp    TUBAL LIGATION Bilateral 1980     Family History   Problem Relation Age of Onset    Kidney Disease Mother         kidney stones - larger    Coronary Art Dis Father     Heart Surgery Father 48    Hypertension Father     Heart Attack Father     COPD Sister         smoker    Diabetes Sister     Diabetes Brother     Clotting Disorder Son         Factor V Leiden heterozygote    Other Son         GERD, gout, MRSA, homocystinemia    Hypertension Son     High Cholesterol Son     Other Sister         carotid stenosis     Social History     Tobacco Use    Smoking status: Never Smoker    Smokeless tobacco: Never Used   Substance Use Topics    Alcohol use: Yes     Comment: OCCASIONALLY 1 or less/1-2 wk    Drug use: No     Comment: No Mj experimentation     Counseling given: Not Answered     Immunization History   Administered Date(s) Administered    Influenza Vaccine, unspecified formulation 09/04/2009    Influenza Virus Vaccine 09/04/2009    Influenza, High Dose (Fluzone 65 yrs and older) 10/30/2017, 10/02/2018    Influenza, Triv, inactivated, subunit, adjuvanted, IM (Fluad 65 yrs and older) 09/23/2019, 09/18/2020    Pneumococcal Conjugate 13-valent ([id number]) 02/22/2018    Pneumococcal Polysaccharide ([id number]) 11/06/2007, 09/23/2019    Tdap (Boostrix, Adacel) 03/05/2018     Allergies   Allergen Reactions    Nickel Nausea Only and Rash    Ibuprofen      Pt takes Warfarin        ECHO EF%: 55-60 Date: 11/22/2020      Last Hospitalization: 11/22/2020    History of ANGEOL: Yes, sleep medicine follow-up February 1  []BIPAP/CPAP use:   []Education about proper cleaning completed today     Subjective   HPI: Ms. Adenike Harkins is a 78 y.o. female being evaluated by a virtual visit via telephone encounter from patient's home due to [id number] public health emergency. Due to the circumstances physical examination is very limited. This visit was conducted with the patient's consent for billing of her insurance. A telephone visit was necessary today to reduce the patient's exposure to COVID-19 and to provide necessary medical care. This patient has been advised to contact this office, her cardiologist, or primary care physician if symptoms develop or to call 911 for emergency medical treatment if deemed necessary. She has a medical history significant for diastolic heart failure, A. fib, HTN, DM 2, ANGELO, and venous stasis.  Current BMI is 54.  She is on 1 L of oxygen and seems very motivated to improve her health specifically asking many questions related to her diet.  Tells me that PT and OT are no longer coming to the house.  She was approved for 2 in-home dietitian visits by her insurance which were completed.    She is currently reading the book 'how not to die' and is interested in buying the cookbook. I asked her to download the [employer] fitness pal xiang to her phone in order to track her dietary intake and learn more about the foods that she is eating she is taking notes as we talk today.  We spent a great deal of time discussing healthy food options that are low in sodium and carbohydrates.  We will continue to discuss this at follow-up visits to reinforce the education. Esperanza Correia tells me that overall she feels great and has been weighing herself daily. Esperanza Correia has already made dietary modifications and tells me that each day she gets on the scale she weighs less and less.  This is reinforcing to her and also helps to keep her motivation strong.  She is watching her sodium intake as well as her fluids and keeping her intake at 64 ounces or less.  She has all of her medications and reports good compliance. She tells me that she gets up every hour to walk around her apartment as instructed by PT and OT. Esperanza Correia does have a history of sleep apnea not currently using CPAP.    She reports good sleep and gets about 6 hours at night plus an hour to 2-hour nap during the day. Denies symptoms of acute exacerbation of her heart failure.  States has so shortness of breath only with exertion.  No edema beyond baseline (uses leg wraps), abdominal fullness, chest pain, palpitations, orthopnea.  Sleeps in a lounge chair recliner.  She is a diabetic and her last A1c resulted at 6.2 on September 25, 2020. She is very concerned that her next A1c is going to be elevated and she has worked so hard to get it under 6.5. Arlana Mew was discontinued and her NovoLog is only for blood glucose readings over 160. I suggested she talk to her PCP about adding Metformin. Review of Systems:   Constitutional: Negative for fatigue, Negative for activity change, appetite change, chills, diaphoresis, fever and unexpected weight change.    HENT: Negative for congestion, ear pain, postnasal drip, rhinorrhea, sinus pressure, sinus pain, sneezing, sore throat and trouble swallowing. Eyes: Negative for discharge and redness. Respiratory: Negative for cough, chest tightness, sputum, shortness of breath and wheezing. Cardiovascular: Negative for chest pain, palpitations and leg swelling. Gastrointestinal: Negative for abdominal distention, abdominal pain, constipation, diarrhea, nausea and vomiting. Genitourinary: Negative for decreased urine volume, difficulty urinating, dysuria and hematuria. Skin: Negative for pallor and rash. Neurological: Negative for dizziness, tremors, weakness, light-headedness and headaches. Psychiatric/Behavioral: Negative for confusion. The patient is not nervous/anxious. Sleep: Negative for restless sleep, snoring, frequent waking       Functional Activity New York Heart Association Classification  Patient Symptoms   []   Class I No limitation of physical activity. Ordinary physical activity does not cause undue fatigue, palpitation, dyspnea (shortness of breath). [x]   Class II Slight limitation of physical activity. Comfortable at rest. Ordinary physical activity results in fatigue, palpitation, dyspnea (shortness of breath). []   Class III  Vinod limitation of physical activity. Comfortable at rest.  Less than ordinary activity causes fatigue, palpitation, dyspnea. []   Class IV Unable to carry on any physical activity without discomfort. Symptoms of heart failure at rest.  If any physical activity is undertaken, discomfort increases.     Shortness of Breath:   []   None   [x]   Dyspnea on exertion   []   Dyspnea with normal activities  []   Dyspnea at rest  []   Dyspnea while sleeping    Patient Findings:   []  Missed doses  []  Diet changes  []  Sodium intake changes    []  Alcohol intake changes  []  Night time cough  []  Edema    []  Activity changes   []  Fatigue that limits activity []  Acute illness  []  Early saiety, abd fullness []  Chest pain   [] Other        Objective: There were no vitals taken for this visit. BP Readings from Last 3 Encounters:   12/17/20 131/73   12/09/20 122/84   12/03/20 126/74     Wt Readings from Last 6 Encounters:   12/17/20 (!) 342 lb (155.1 kg)   12/09/20 (!) 348 lb 12.8 oz (158.2 kg)   12/03/20 (!) 355 lb 6.1 oz (161.2 kg)   04/24/20 (!) 310 lb (140.6 kg)   09/23/19 (!) 318 lb (144.2 kg)   02/12/19 (!) 322 lb 3.2 oz (146.1 kg)     Physical Exam:   Constitutional: Oriented to person, place, and time. No distress detected. Psychiatric: Normal mood and affect.      BMP:   Lab Results   Component Value Date     12/03/2020    K 3.8 12/03/2020    CL 95 12/03/2020    CO2 36 12/03/2020    BUN 19 12/03/2020    CREATININE 0.7 12/03/2020       CBC:    Lab Results   Component Value Date    WBC 3.0 12/03/2020    HGB 10.0 12/03/2020    HCT 31.2 12/03/2020     12/03/2020     HgA1C:   Lab Results   Component Value Date    LABA1C 6.2 09/25/2020    LABA1C 6.5 09/23/2019    LABA1C 7.2 02/12/2019     TSH: No results found for: TSH  Lipids:   Lab Results   Component Value Date    CHOL 132 12/26/2019    TRIG 71 12/26/2019    HDL 51 12/26/2019    LDLCALC 67 12/26/2019     ProBNP:   Lab Results   Component Value Date    PROBNP 1,346 11/22/2020       [x]Reviewed daily weight log and assessed self management skills including early recognition and notification of exacerbation   [x]Encouraged daily weights and to call with increase of 3 pounds in one day or 5 pounds in one week or weight increased above dry weight    [x]Discussed fluid restriction and sodium restriction as well as nutrition goals   [x]Weight loss counseling performed, including carbohydrate and calorie reduction  [x]Exercise Counseling performed      Medications reviewed:   [x] compared patient's bottles with EPIC list  [] patient did not bring medication bottles      Current medications:  Outpatient Medications Marked as Taking for the 1/21/21 encounter (Virtual Visit) with Elton Albarran, APRN - CNP   Medication Sig Dispense Refill    potassium chloride (KLOR-CON M) 10 MEQ extended release tablet TAKE 1 TABLET BY MOUTH DAILY 90 tablet 0    metoprolol succinate (TOPROL XL) 50 MG extended release tablet TAKE 1 TABLET BY MOUTH DAILY 90 tablet 0    furosemide (LASIX) 40 MG tablet Take 1 tablet by mouth 2 times daily 90 tablet 1    atorvastatin (LIPITOR) 40 MG tablet TAKE 1 TABLET BY MOUTH EVERY NIGHT 90 tablet 0    folic acid (FOLVITE) 1 MG tablet TAKE 2 TABLETS BY MOUTH DAILY 180 tablet 1    apixaban (ELIQUIS) 5 MG TABS tablet Take 1 tablet by mouth 2 times daily 180 tablet 1    insulin aspart (NOVOLOG) 100 UNIT/ML injection vial Inject 10 Units into the skin 3 times daily (before meals) (Patient taking differently: Inject 10 Units into the skin 3 times daily (before meals) If BG reading is >160) 20 mL 0    B-D UF III MINI PEN NEEDLES 31G X 5 MM MISC USE DAILY AS DIRECTED 100 each 5    ACCU-CHEK APRUVA PLUS strip CHECK SUGAR 3 TO 4 TIMES A  strip 5    ACCU-CHEK MULTICLIX LANCETS MISC TEST FOUR TIMES DAILY AS NEEDED FOR LOW OR HIGH BLOOD SUGARS 100 each 1       [x]Reviewed heart failure medications including how they work and potential side effects. [x]Advised patient to avoid NSAIDs. Get With The Guidelines  Ms. Esparza is on a Beta-Blocker for (HFrEF) (systolic) EF </= 14%  Yes    Ms. Esparza is on an Ace-Inhibitor / ARB / Entresto for (HFrEF) (systolic) EF </= 44% No      Ms. Esparza is on a Aldosterone Receptor Antagonist for (HFrEF) (systolic) EF </= 68% or EF </= 40% with MI (Okay to use if SCr </= 2.5mg/dL in men, SCr </= 2mg/dL in women; Potassium < 5.0meq/L) No      Ms. Esparza is on a Diuretic Yes    If the above guidelines are not met, reason documented above or recommendations made: Yes.       [] Advanced Directives completed and scanned  [x] Advanced Directives need to be addressed      Barriers to Adherence: Verbalizes interest, Seeking additional information (pamphlets, classes, etc) and Active attentive participant    Environmental Concerns: not applicable    Paco Machado is a 78 y.o. female evaluated via telephone on 1/21/2021. Consent:  She and/or health care decision maker is aware that that she may receive a bill for this telephone service, depending on her insurance coverage, and has provided verbal consent to proceed: Yes      Documentation:  I communicated with the patient and/or health care decision maker about her chronic health conditions. Details of this discussion including any medical advice provided: See visit note      I affirm this is a Patient Initiated Episode with a Patient who has not had a related appointment within my department in the past 7 days or scheduled within the next 24 hours. Patient identification was verified at the start of the visit: Yes    Total Time: minutes: 21-30 minutes    Note: not billable if this call serves to triage the patient into an appointment for the relevant concern      ANJELICA MA      A heart failure binder has been provided prior to discharge in addition to extensive education including the information noted above. Assessment/Plan     Problem List Items Addressed This Visit     DMII (diabetes mellitus, type 2) ([facility] [address]     Current A1c 6.2 Due for follow-up A1c with PCP follow-up. Spent a great deal of time today talking about carbohydrate reduction and continued weight loss and exercise to help manage her type 2 diabetes. Acute on chronic congestive heart failure (HCC)     No symptoms of acute exacerbation. Continue daily weights, sodium and fluid reduction. Verbalizes the understanding that weight loss and exercise is important to manage her chronic conditions. Action plan reviewed again today with patient. Continue management and follow-up with cardiology.          Obstructive sleep apnea - Primary     Has follow-up with sleep medicine scheduled for February 1 to discuss results of sleep study. No orders of the defined types were placed in this encounter. Follow up with wellness center: Follow-up for 3 weeks scheduled  Time spent in visit today including counseling and education: 30 minutes. *This note was transcribed using [employer]. Please disregard any translational errors.          Electronically signed by JUAN MANUEL Morse CNP, on 1/21/2021 at 11:21 AM

## 2021-01-21 NOTE — ASSESSMENT & PLAN NOTE
Current A1c 6.2 Due for follow-up A1c with PCP follow-up. Spent a great deal of time today talking about carbohydrate reduction and continued weight loss and exercise to help manage her type 2 diabetes.

## 2021-01-26 ENCOUNTER — TELEPHONE (OUTPATIENT)
Dept: FAMILY MEDICINE CLINIC | Age: 71
End: 2021-01-26

## 2021-01-26 NOTE — TELEPHONE ENCOUNTER
[facility] CARE ORDERS SENT FROM 1/13 I LET HIM KNOW THEY ONLY MD IN THE OFFICE IS OUT AND WE HAVE TO GET PPW TO HIM AT HOME AND THEN HE RETURNS IT HE STATED DUE TO COVID CNPS ARE ABLE TO SIGN 485S?  DO YOU KNOW ANYTHING ABOUT THIS?KW

## 2021-02-24 ENCOUNTER — OFFICE VISIT (OUTPATIENT)
Dept: FAMILY MEDICINE CLINIC | Age: 71
End: 2021-02-24
Payer: MEDICARE

## 2021-02-24 VITALS
BODY MASS INDEX: 43.95 KG/M2 | DIASTOLIC BLOOD PRESSURE: 78 MMHG | TEMPERATURE: 97 F | SYSTOLIC BLOOD PRESSURE: 128 MMHG | OXYGEN SATURATION: 96 % | HEIGHT: 67 IN | WEIGHT: 280 LBS

## 2021-02-24 DIAGNOSIS — E66.9 DIABETES MELLITUS TYPE 2 IN OBESE (HCC): ICD-10-CM

## 2021-02-24 DIAGNOSIS — E11.9 TYPE 2 DIABETES MELLITUS WITHOUT COMPLICATION, WITH LONG-TERM CURRENT USE OF INSULIN (HCC): ICD-10-CM

## 2021-02-24 DIAGNOSIS — I87.2 CHRONIC VENOUS STASIS DERMATITIS OF BOTH LOWER EXTREMITIES: ICD-10-CM

## 2021-02-24 DIAGNOSIS — E11.69 DIABETES MELLITUS TYPE 2 IN OBESE (HCC): ICD-10-CM

## 2021-02-24 DIAGNOSIS — Z79.4 TYPE 2 DIABETES MELLITUS WITHOUT COMPLICATION, WITH LONG-TERM CURRENT USE OF INSULIN (HCC): ICD-10-CM

## 2021-02-24 DIAGNOSIS — I50.9 CHF WITH UNKNOWN LVEF (HCC): Primary | ICD-10-CM

## 2021-02-24 DIAGNOSIS — E66.01 CLASS 3 SEVERE OBESITY WITH SERIOUS COMORBIDITY AND BODY MASS INDEX (BMI) OF 40.0 TO 44.9 IN ADULT, UNSPECIFIED OBESITY TYPE (HCC): ICD-10-CM

## 2021-02-24 LAB — HBA1C MFR BLD: 6.7 %

## 2021-02-24 PROCEDURE — 83036 HEMOGLOBIN GLYCOSYLATED A1C: CPT | Performed by: NURSE PRACTITIONER

## 2021-02-24 PROCEDURE — 99214 OFFICE O/P EST MOD 30 MIN: CPT | Performed by: NURSE PRACTITIONER

## 2021-02-24 RX ORDER — LANCETS
EACH MISCELLANEOUS
Qty: 100 EACH | Refills: 1 | Status: SHIPPED | OUTPATIENT
Start: 2021-02-24 | End: 2021-02-24

## 2021-02-24 RX ORDER — LANCETS
EACH MISCELLANEOUS
Qty: 306 EACH | Refills: 0 | Status: SHIPPED | OUTPATIENT
Start: 2021-02-24 | End: 2021-09-20

## 2021-02-24 ASSESSMENT — ENCOUNTER SYMPTOMS: RESPIRATORY NEGATIVE: 1

## 2021-02-24 NOTE — PATIENT INSTRUCTIONS
Patient Education        Learning About Diabetes Food Guidelines  Your Care Instructions     Meal planning is important to manage diabetes. It helps keep your blood sugar at a target level (which you set with your doctor). You don't have to eat special foods. You can eat what your family eats, including sweets once in a while. But you do have to pay attention to how often you eat and how much you eat of certain foods. You may want to work with a dietitian or a certified diabetes educator (CDE) to help you plan meals and snacks. A dietitian or CDE can also help you lose weight if that is one of your goals. What should you know about eating carbs? Managing the amount of carbohydrate (carbs) you eat is an important part of healthy meals when you have diabetes. Carbohydrate is found in many foods. · Learn which foods have carbs. And learn the amounts of carbs in different foods. ? Bread, cereal, pasta, and rice have about 15 grams of carbs in a serving. A serving is 1 slice of bread (1 ounce), ½ cup of cooked cereal, or 1/3 cup of cooked pasta or rice. ? Fruits have 15 grams of carbs in a serving. A serving is 1 small fresh fruit, such as an apple or orange; ½ of a banana; ½ cup of cooked or canned fruit; ½ cup of fruit juice; 1 cup of melon or raspberries; or 2 tablespoons of dried fruit. ? Milk and no-sugar-added yogurt have 15 grams of carbs in a serving. A serving is 1 cup of milk or 2/3 cup of no-sugar-added yogurt. ? Starchy vegetables have 15 grams of carbs in a serving. A serving is ½ cup of mashed potatoes or sweet potato; 1 cup winter squash; ½ of a small baked potato; ½ cup of cooked beans; or ½ cup cooked corn or green peas. · Learn how much carbs to eat each day and at each meal. A dietitian or CDE can teach you how to keep track of the amount of carbs you eat. This is called carbohydrate counting. · If you are not sure how to count carbohydrate grams, use the Plate Method to plan meals.  It is a good, quick way to make sure that you have a balanced meal. It also helps you spread carbs throughout the day. ? Divide your plate by types of foods. Put non-starchy vegetables on half the plate, meat or other protein food on one-quarter of the plate, and a grain or starchy vegetable in the final quarter of the plate. To this you can add a small piece of fruit and 1 cup of milk or yogurt, depending on how many carbs you are supposed to eat at a meal.  · Try to eat about the same amount of carbs at each meal. Do not \"save up\" your daily allowance of carbs to eat at one meal.  · Proteins have very little or no carbs per serving. Examples of proteins are beef, chicken, turkey, fish, eggs, tofu, cheese, cottage cheese, and peanut butter. A serving size of meat is 3 ounces, which is about the size of a deck of cards. Examples of meat substitute serving sizes (equal to 1 ounce of meat) are 1/4 cup of cottage cheese, 1 egg, 1 tablespoon of peanut butter, and ½ cup of tofu. How can you eat out and still eat healthy? · Learn to estimate the serving sizes of foods that have carbohydrate. If you measure food at home, it will be easier to estimate the amount in a serving of restaurant food. · If the meal you order has too much carbohydrate (such as potatoes, corn, or baked beans), ask to have a low-carbohydrate food instead. Ask for a salad or green vegetables. · If you use insulin, check your blood sugar before and after eating out to help you plan how much to eat in the future. · If you eat more carbohydrate at a meal than you had planned, take a walk or do other exercise. This will help lower your blood sugar. What else should you know? · Limit saturated fat, such as the fat from meat and dairy products. This is a healthy choice because people who have diabetes are at higher risk of heart disease. So choose lean cuts of meat and nonfat or low-fat dairy products.  Use olive or canola oil instead of butter or shortening may faint. · You have sudden weight gain, such as more than 2 to 3 pounds in a day or 5 pounds in a week. (Your doctor may suggest a different range of weight gain.)  · You have increased swelling in your legs, ankles, or feet. · You are so tired or weak that you can't do your usual activities. · You are not sleeping well. Shortness of breath wakes you up at night. You need extra pillows. Red zone. This is an emergency. Call 911. You have symptoms of sudden heart failure. For example:  · You have severe trouble breathing. · You cough up pink, foamy mucus. · You have a new irregular or fast heartbeat. You have symptoms of a heart attack. These may include:  · Chest pain or pressure, or a strange feeling in the chest.  · Sweating. · Shortness of breath. · Nausea or vomiting. · Pain, pressure, or a strange feeling in the back, neck, jaw, or upper belly or in one or both shoulders or arms. · Lightheadedness or sudden weakness. · A fast or irregular heartbeat. If you have symptoms of a heart attack: After you call 911, the  may tell you to chew 1 adult-strength or 2 to 4 low-dose aspirin. Wait for an ambulance. Do not try to drive yourself. Follow-up care is a key part of your treatment and safety. Be sure to make and go to all appointments, and call your doctor if you are having problems. It's also a good idea to know your test results and keep a list of the medicines you take. Where can you learn more? Go to https://[employer]. org and sign in to your [employer] account. Enter T174 in the [employer] box to learn more about \"Learning About Heart Failure Zones. \"     If you do not have an account, please click on the \"Sign Up Now\" link. Current as of: December 16, 2019               Content Version: 12.6  © 3670-2416 [employer]. Care instructions adapted under license by [facility].  If you have questions about a medical condition or this instruction, always ask your healthcare professional. [place] [address] any warranty or liability for your use of this information.

## 2021-02-24 NOTE — PROGRESS NOTES
Dee Beckwith (:  1950) is a 78 y.o. female,Established patient, here for evaluation of the following chief complaint(s): Other (WANTS TO DISCUSS COMING OFF O2 AT REST ROOM AIR STAYED ABOUT 96 WALKED DOWN THE RIVAS AND DROPPED DOWN TO 93)  Yahir العلي was seen today for other.     Diagnoses and all orders for this visit:    CHF with unknown LVEF ([facility]  CONTINUE TOPROL - LASIX AND KCL  CONTINUE MONITORING WEIGHT  LIMITING SALT/WATER INTAKE -   ENCOURAGED TO ONLY USE O2 WHEN ABSOLUTELY NEEDED NOT WHEN SHE IS TIRED  SHE DID WELL  WITH THE WALK TEST  GOAL OF DISCONTINUING IN THE NEXT MONTH ADVISED TO SPEAK WITH CARDIOLOGIST AS WELL  WILL SEND SCRIPT FOR PULSE OX TO STEFANIE  Diabetes mellitus type 2 in obese ([facility]  Type 2 diabetes mellitus without complication, with long-term current use of insulin (HCC)  Type 2 diabetes mellitus without complication, with long-term current use of insulin (HCC)    -     POCT glycosylated hemoglobin (Hb A1C) 6.7  -     insulin aspart (NOVOLOG) 100 UNIT/ML injection vial; Inject 10 Units into the skin 3 times daily (before meals)  : Accu-Chek Multiclix Lancets MISC; TEST FOUR TIMES DAILY AS NEEDED FOR LOW OR HIGH BLOOD SUGARS  FOLLOW UP IN OFFICE IN TWO WEEKS      Class 3 severe obesity with serious comorbidity and body mass index (BMI) of 40.0 to 44.9 in adult, unspecified obesity type ([facility]   ENCOURAGED TO INCREASE PHYSICAL ACTIVITY TO 30 MIN 4-5 X WEEKLY CHAIR EXERCISES REVIEWED  CONTINUE DIETARY CHANGES AS WELL  Chronic venous stasis dermatitis of both lower extremities  CONTINUE LASIX  SODIUM RESTRICTION  ENCOURAGEDT O WEAR COMPRESSION STOCKINGS WILL SEND SCRIPT T STEFANIE  : Accu-Chek Multiclix Lancets MISC; TEST FOUR TIMES DAILY AS NEEDED FOR LOW OR HIGH BLOOD SUGARS    WILL SCHEDULE FOR COVID VACCINE    SUBJECTIVE/OBJECTIVE:  HPI   CHF DIAGNOSIS   280 pounds today  Pt would like to get off her oxygen she is wearing it at home  at 1 L increasing to 2 l with exertion she did have a sleep study and was told she had a mild case of sleep apnea - has no suni to use when she goes out for shirt runs was able to go to a  for 2.5 hours  She does not have  A pulse ox at home diagnosed with CHF last year seeing Dr Jorge A Francisco she is watching her salt intake  She is weighing herself daily  No more than 1 pound  She takes 40 mg lasix daily with 10 meq KCL toprol daily     Review of Systems   Respiratory: Negative. Cardiovascular: Negative. All other systems reviewed and are negative. Physical Exam  Vitals signs reviewed. Constitutional:       General: She is awake. She is not in acute distress. Appearance: Normal appearance. She is well-developed. She is morbidly obese. She is not ill-appearing, toxic-appearing or diaphoretic. Cardiovascular:      Comments: RIGHT LEG HYPERPIGMENTED WITH CLEAR BLISTERS NOTED NO WEEPING    LEFT LEG HYPERPIGMENTED  Pulmonary:      Breath sounds: Normal breath sounds and air entry. Comments: O2 SAT 98 % AT REST - 93 TO 94 % AMBULATING LENGTH OF HALLWAY  Neurological:      Mental Status: She is alert and oriented to person, place, and time. Psychiatric:         Attention and Perception: Attention and perception normal.         Mood and Affect: Mood and affect normal.         Speech: Speech normal.         Behavior: Behavior normal. Behavior is cooperative. Thought Content: Thought content normal.         Cognition and Memory: Cognition and memory normal.         Judgment: Judgment normal.         ASSESSMENT/PLAN:  1. Diabetes mellitus type 2 in obese (HCC)  -     POCT glycosylated hemoglobin (Hb A1C)      No follow-ups on file. An electronic signature was used to authenticate this note.     --JUAN MANUEL Owusu - CNP

## 2021-02-25 ENCOUNTER — OFFICE VISIT (OUTPATIENT)
Dept: SLEEP MEDICINE | Age: 71
End: 2021-02-25
Payer: MEDICARE

## 2021-02-25 ENCOUNTER — TELEPHONE (OUTPATIENT)
Dept: ADMINISTRATIVE | Age: 71
End: 2021-02-25

## 2021-02-25 VITALS
SYSTOLIC BLOOD PRESSURE: 126 MMHG | TEMPERATURE: 95.1 F | HEIGHT: 67 IN | RESPIRATION RATE: 14 BRPM | HEART RATE: 85 BPM | WEIGHT: 283 LBS | BODY MASS INDEX: 44.42 KG/M2 | DIASTOLIC BLOOD PRESSURE: 70 MMHG | OXYGEN SATURATION: 90 %

## 2021-02-25 DIAGNOSIS — Z99.81 ON HOME OXYGEN THERAPY: ICD-10-CM

## 2021-02-25 DIAGNOSIS — G47.33 OBSTRUCTIVE SLEEP APNEA: Primary | ICD-10-CM

## 2021-02-25 PROCEDURE — 99212 OFFICE O/P EST SF 10 MIN: CPT | Performed by: PSYCHIATRY & NEUROLOGY

## 2021-02-25 NOTE — PATIENT INSTRUCTIONS
help, you might try a different type of machine. Some machines have air pressure that adjusts on its own. Others have air pressures that are different when you breathe in than when you breathe out. This may reduce discomfort caused by too much pressure in your nose. Where can you learn more? Go to https://chpepiceweb.Evalve. org and sign in to your [employer] account. Enter H200 in the [employer]iaoS[employer]fm box to learn more about \"Learning About CPAP for Sleep Apnea. \"     If you do not have an account, please click on the \"Sign Up Now\" link. Current as of: February 24, 2020               Content Version: 12.6  © 7434-8101 [employer]. Care instructions adapted under license by [facility] ([facility]). If you have questions about a medical condition or this instruction, always ask your healthcare professional. [address] any warranty or liability for your use of this information.

## 2021-02-25 NOTE — PROGRESS NOTES
MD LEILA Medina Board Certified in Sleep Medicine  Certified in [address] [place] [address] Certified in Neurology [address]  [address]  [phone]   [address] [place] [address], [address] [place] [address]                      [address]  [address] [place] [id number] [phone]    Subjective:     Patient ID: Devan Guzman is a 78 y.o. female. Chief Complaint   Patient presents with    Follow-up       HPI:        Devan Guzman is a 78 y.o. female was seen today as a follow for obstructive sleep apnea. The patient underwent comprehensive polysomnogram on 01/17/2021, the overnight registration revealed mild obstructive sleep apnea with apnea hypopnea index of 5.3/hr with lowest O2 saturation of 89%, patient spent about 1.4 minutes below 90% while on O2. We discussed the result and treatment options, she lost 60 pounds since last visit. DOT/CDL - N/A        Previous Report(s)Reviewed: historical medical records         Social History     Socioeconomic History    Marital status:       Spouse name: Not on file    Number of children: 3    Years of education: 15    Highest education level: Not on file   Occupational History    Occupation: Retired  7/28/17   Social Needs    Financial resource strain: Not on file    Food insecurity     Worry: Not on file     Inability: Not on file   [employer] needs     Medical: Not on file     Non-medical: Not on file   Tobacco Use    Smoking status: Never Smoker    Smokeless tobacco: Never Used   Substance and Sexual Activity    Alcohol use: Yes     Comment: OCCASIONALLY 1 or less/1-2 wk    Drug use: No     Comment: No Mj experimentation    Sexual activity: Never   Lifestyle    Physical activity     Days per week: Not on file     Minutes per session: Not on file    Stress: Not on file   Relationships    Social connections     Talks on phone: Not on file     Gets together: Not on file     Attends Church service: Not on file     Active member of club or organization: Not on file     Attends meetings of clubs or organizations: Not on file     Relationship status: Not on file    Intimate partner violence     Fear of current or ex partner: Not on file     Emotionally abused: Not on file     Physically abused: Not on file     Forced sexual activity: Not on file   Other Topics Concern    Not on file   Social History Narrative    No Exercise. 19    Information from [facility] EMR ending 2010    Problems    DEEP VENOUS THROMBOPHLEBITIS (ICD-453.40)    TRANSAMINASES, SERUM, ELEVATED (ICD-790.4)    HYPOMAGNESEMIA (ICD-275.2)    Hx of SEPSIS (ICD-995.91)    DIABETES MELLITUS, TYPE II (ICD-250.00)    HYPERTENSION (ICD-401. 9)    ALOPECIA (ICD-704.00)    ENCOUNTER FOR LONG-TERM USE OF ANTICOAGULANTS (ICD-V58.61)    DERMATITIS (ICD-692. 9)    CELLULITIS (ICD-682. 9)    FACTOR V DEFICIENCY (ICD-286.3)        Past Medical History:  Coronary Artery Disease, Hypertension, mixed hyperlipidemia/ low HDL/ metabolic synd. , Diabetes Type II, 2009 episode renal failure 2nd to sepsis, 2009 CELLULITIS/OPEN WOUND RIGHT ANKLE, BLOOD CLOTS (bilat. PASCALE hose)/ factor V leiden homozygote. Surgical History:  Appendectomy:, Cholecystectomy: , GYN Surgery: 3 , Tubal Ligation: , 2004 \"[facility]\"      Family History: . Father - HTN, CAD/ MI    Son - 1 heterozygous factor V leiden. 2  HTN    Sister - Breast Ca, HTN, DVT        Exercise: doing hand weights 15-20 3x/wk. 20       Prior to Admission medications    Medication Sig Start Date End Date Taking?  Authorizing Provider   insulin aspart (NOVOLOG) 100 UNIT/ML injection vial Inject 10 Units into the skin 3 times daily (before meals) 21   Summer Rodriguez, APRN - CNP   Accu-Chek Multiclix Lancets MISC TEST FOUR TIMES DAILY AS DIRECTED AS NEEDED FOR LOW OR HIGH BLOOD SUGAR 2/24/21   JUAN MANUEL Olea CNP   potassium chloride (KLOR-CON M) 10 MEQ extended release tablet TAKE 1 TABLET BY MOUTH DAILY 1/19/21   JUAN MANUEL Olea CNP   metoprolol succinate (TOPROL XL) 50 MG extended release tablet TAKE 1 TABLET BY MOUTH DAILY 1/19/21   JUAN MANUEL Olea CNP   furosemide (LASIX) 40 MG tablet Take 1 tablet by mouth 2 times daily 1/15/21   JUAN MANUEL Cueva CNP   atorvastatin (LIPITOR) 40 MG tablet TAKE 1 TABLET BY MOUTH EVERY NIGHT 1/11/21   JUAN MANUEL Olea CNP   folic acid (FOLVITE) 1 MG tablet TAKE 2 TABLETS BY MOUTH DAILY 1/10/21 4/10/21  JUAN MANUEL Echeverria CNP   apixaban (ELIQUIS) 5 MG TABS tablet Take 1 tablet by mouth 2 times daily 12/9/20   Betzy Jordan MD   B-D UF III MINI PEN NEEDLES 31G X 5 MM MISC USE DAILY AS DIRECTED 4/13/20   JUAN MANUEL Echeverria CNP   ACCU-CHEK APURVA PLUS strip CHECK SUGAR 3 TO 4 TIMES A DAY 4/3/20   JUAN MANUEL Echeverria CNP       Allergies as of 02/25/2021 - Review Complete 02/25/2021   Allergen Reaction Noted    Nickel Nausea Only and Rash 10/19/2017    Ibuprofen  05/10/2014       Patient Active Problem List   Diagnosis    DMII (diabetes mellitus, type 2) ([facility]    Essential hypertension    Hypercholesterolemia    Edema of both legs    Homozygous Factor V Leiden mutation ([facility]    Low serum HDL    Elevated LFTs    Hypomagnesemia    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[facility]    Vitamin D deficiency    CHF with unknown LVEF ([facility]    Generalized weakness    Pulmonary vascular congestion    Chronic venous stasis dermatitis of both lower extremities    Morbid obesity due to excess calories (HCC)    Shortness of breath    Persistent atrial fibrillation (HCC)    RVF (right ventricular failure) (HCC)    Anemia    MDD (major depressive disorder), recurrent episode, moderate (HCC)    Acute on chronic congestive heart failure (HCC)    BMI 50.0-59.9, adult (HCC)    Obstructive sleep apnea    PLMD (periodic limb movement disorder)       Past Medical History:   Diagnosis Date    Diabetes mellitus type 2 in obese ([facility] 2010    Edema of both legs     Elevated LFTs     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[facility] 2018    19    Homozygous Factor V Leiden mutation ([facility] 1950    Hypercholesterolemia     Hypertension     Hypomagnesemia     Low serum HDL     Lower leg DVT (deep venous thromboembolism), chronic, right (HCC)     Osteoarthritis of cervical spine 2010    Facet arthropathy, spondylosis    Renal failure, acute (HCC)     Due to sepsis    Vitamin D deficiency 2018    13.2       Past Surgical History:   Procedure Laterality Date    APPENDECTOMY  1976     SECTION      3     CHOLECYSTECTOMY  2002    gangrenous    COLONOSCOPY  2014    no polyp    TUBAL LIGATION Bilateral 1980       Family History   Problem Relation Age of Onset    Kidney Disease Mother         kidney stones - larger    Coronary Art Dis Father     Heart Surgery Father 48    Hypertension Father     Heart Attack Father     COPD Sister         smoker    Diabetes Sister     Diabetes Brother     Clotting Disorder Son         Factor V Leiden heterozygote    Other Son         GERD, gout, MRSA, homocystinemia    Hypertension Son     High Cholesterol Son     Other Sister         carotid stenosis       Review of Systems    Objective:     Vitals:  Weight BMI Neck circumference    Wt Readings from Last 3 Encounters:   21 283 lb (128.4 kg)   21 280 lb (127 kg)   20 (!) 342 lb (155.1 kg)    Body mass index is 44.99 kg/m².        BP HR SaO2   BP Readings from Last 3 Encounters:   21 126/70   21 128/78   20 131/73    Pulse Readings from Last 3 Encounters:   21 85   20 76   20 62    SpO2 Readings from Last 3 Encounters:   21 90%   21 96%   12/09/20 98%        Themandibular molar Class :   [x]1 []2 []3      Mallampati I Airway Classification:   []1 []2 [x]3 []4      Physical Exam  Vitals signs and nursing note reviewed. Constitutional:       Appearance: She is obese. HENT:      Head: Atraumatic. Nose: Nose normal.      Mouth/Throat:      Mouth: Mucous membranes are moist.   Eyes:      Extraocular Movements: Extraocular movements intact. Neck:      Musculoskeletal: Normal range of motion and neck supple. Cardiovascular:      Rate and Rhythm: Rhythm irregular. Comments: Irregular irregularity   Pulmonary:      Effort: Pulmonary effort is normal.      Breath sounds: Normal breath sounds. Musculoskeletal: Normal range of motion. Skin:     General: Skin is warm. Neurological:      General: No focal deficit present. Psychiatric:         Mood and Affect: Mood normal.         :   Mild Obstructive Sleep Apnea/Hypopnea Syndrome with H/O CHF and A-Fib cmwp. Diagnosis Orders   1. Obstructive sleep apnea  Sleep Study with PAP Titration   2. On home oxygen therapy  Sleep Study with PAP Titration     Plan:     CPAP/BiPAP titration with O2 per protocol. Orders Placed This Encounter   Procedures    Sleep Study with PAP Titration       Return in about 2 months (around 4/25/2021).     Joaquín Santiago MD  Medical Director - [facility]

## 2021-03-03 ENCOUNTER — IMMUNIZATION (OUTPATIENT)
Dept: PRIMARY CARE CLINIC | Age: 71
End: 2021-03-03
Payer: MEDICARE

## 2021-03-03 PROCEDURE — 91301 COVID-19, MODERNA VACCINE 100MCG/0.5ML DOSE: CPT | Performed by: FAMILY MEDICINE

## 2021-03-03 PROCEDURE — 0011A COVID-19, MODERNA VACCINE 100MCG/0.5ML DOSE: CPT | Performed by: FAMILY MEDICINE

## 2021-03-15 ENCOUNTER — HOSPITAL ENCOUNTER (OUTPATIENT)
Dept: SLEEP CENTER | Age: 71
Discharge: HOME | End: 2021-03-15
Payer: MEDICARE

## 2021-03-15 LAB — SARS-COV-2: NOT DETECTED

## 2021-03-15 PROCEDURE — U0003 INFECTIOUS AGENT DETECTION BY NUCLEIC ACID (DNA OR RNA); SEVERE ACUTE RESPIRATORY SYNDROME CORONAVIRUS 2 (SARS-COV-2) (CORONAVIRUS DISEASE [COVID-19]), AMPLIFIED PROBE TECHNIQUE, MAKING USE OF HIGH THROUGHPUT TECHNOLOGIES AS DESCRIBED BY CMS-2020-01-R: HCPCS

## 2021-03-17 ENCOUNTER — HOSPITAL ENCOUNTER (OUTPATIENT)
Dept: SLEEP CENTER | Age: 71
Discharge: HOME | End: 2021-03-17
Payer: MEDICARE

## 2021-03-17 ENCOUNTER — OFFICE VISIT (OUTPATIENT)
Dept: FAMILY MEDICINE CLINIC | Age: 71
End: 2021-03-17
Payer: MEDICARE

## 2021-03-17 VITALS
TEMPERATURE: 97.1 F | DIASTOLIC BLOOD PRESSURE: 86 MMHG | HEIGHT: 67 IN | SYSTOLIC BLOOD PRESSURE: 134 MMHG | WEIGHT: 273 LBS | RESPIRATION RATE: 14 BRPM | OXYGEN SATURATION: 99 % | HEART RATE: 58 BPM | BODY MASS INDEX: 42.85 KG/M2

## 2021-03-17 DIAGNOSIS — Z79.4 TYPE 2 DIABETES MELLITUS WITHOUT COMPLICATION, WITH LONG-TERM CURRENT USE OF INSULIN (HCC): Primary | ICD-10-CM

## 2021-03-17 DIAGNOSIS — Z79.4 TYPE 2 DIABETES MELLITUS WITHOUT COMPLICATION, WITH LONG-TERM CURRENT USE OF INSULIN (HCC): ICD-10-CM

## 2021-03-17 DIAGNOSIS — E72.11 HOMOCYSTINEMIA (HCC): Chronic | ICD-10-CM

## 2021-03-17 DIAGNOSIS — I87.2 CHRONIC VENOUS STASIS DERMATITIS OF BOTH LOWER EXTREMITIES: ICD-10-CM

## 2021-03-17 DIAGNOSIS — E66.01 MORBID OBESITY WITH BMI OF 40.0-44.9, ADULT (HCC): ICD-10-CM

## 2021-03-17 DIAGNOSIS — Z99.81 ON HOME OXYGEN THERAPY: ICD-10-CM

## 2021-03-17 DIAGNOSIS — E11.9 TYPE 2 DIABETES MELLITUS WITHOUT COMPLICATION, WITH LONG-TERM CURRENT USE OF INSULIN (HCC): ICD-10-CM

## 2021-03-17 DIAGNOSIS — E78.00 HYPERCHOLESTEROLEMIA: Chronic | ICD-10-CM

## 2021-03-17 DIAGNOSIS — E11.9 TYPE 2 DIABETES MELLITUS WITHOUT COMPLICATION, WITH LONG-TERM CURRENT USE OF INSULIN (HCC): Primary | ICD-10-CM

## 2021-03-17 DIAGNOSIS — G47.33 OBSTRUCTIVE SLEEP APNEA: ICD-10-CM

## 2021-03-17 LAB
A/G RATIO: 1.1 (ref 1.1–2.2)
ALBUMIN SERPL-MCNC: 3.8 G/DL (ref 3.4–5)
ALP BLD-CCNC: 121 U/L (ref 40–129)
ALT SERPL-CCNC: 9 U/L (ref 10–40)
ANION GAP SERPL CALCULATED.3IONS-SCNC: 14 MMOL/L (ref 3–16)
AST SERPL-CCNC: 22 U/L (ref 15–37)
BILIRUB SERPL-MCNC: 3.3 MG/DL (ref 0–1)
BUN BLDV-MCNC: 11 MG/DL (ref 7–20)
CALCIUM SERPL-MCNC: 9.4 MG/DL (ref 8.3–10.6)
CHLORIDE BLD-SCNC: 98 MMOL/L (ref 99–110)
CHOLESTEROL, FASTING: 137 MG/DL (ref 0–199)
CO2: 29 MMOL/L (ref 21–32)
CREAT SERPL-MCNC: 0.7 MG/DL (ref 0.6–1.2)
CREATININE URINE POCT: 50
GFR AFRICAN AMERICAN: >60
GFR NON-AFRICAN AMERICAN: >60
GLOBULIN: 3.5 G/DL
GLUCOSE BLD-MCNC: 164 MG/DL (ref 70–99)
HDLC SERPL-MCNC: 44 MG/DL (ref 40–60)
HOMOCYSTEINE: 18 UMOL/L (ref 0–10)
LDL CHOLESTEROL CALCULATED: 70 MG/DL
MICROALBUMIN/CREAT 24H UR: 30 MG/G{CREAT}
MICROALBUMIN/CREAT UR-RTO: ABNORMAL
POTASSIUM SERPL-SCNC: 3.3 MMOL/L (ref 3.5–5.1)
SODIUM BLD-SCNC: 141 MMOL/L (ref 136–145)
TOTAL PROTEIN: 7.3 G/DL (ref 6.4–8.2)
TRIGLYCERIDE, FASTING: 115 MG/DL (ref 0–150)
VLDLC SERPL CALC-MCNC: 23 MG/DL

## 2021-03-17 PROCEDURE — 82044 UR ALBUMIN SEMIQUANTITATIVE: CPT | Performed by: NURSE PRACTITIONER

## 2021-03-17 PROCEDURE — 99213 OFFICE O/P EST LOW 20 MIN: CPT | Performed by: NURSE PRACTITIONER

## 2021-03-17 PROCEDURE — 95811 POLYSOM 6/>YRS CPAP 4/> PARM: CPT

## 2021-03-17 PROCEDURE — 36415 COLL VENOUS BLD VENIPUNCTURE: CPT | Performed by: NURSE PRACTITIONER

## 2021-03-17 SDOH — ECONOMIC STABILITY: FOOD INSECURITY: WITHIN THE PAST 12 MONTHS, THE FOOD YOU BOUGHT JUST DIDN'T LAST AND YOU DIDN'T HAVE MONEY TO GET MORE.: NEVER TRUE

## 2021-03-17 SDOH — ECONOMIC STABILITY: INCOME INSECURITY: HOW HARD IS IT FOR YOU TO PAY FOR THE VERY BASICS LIKE FOOD, HOUSING, MEDICAL CARE, AND HEATING?: NOT HARD AT ALL

## 2021-03-17 SDOH — ECONOMIC STABILITY: TRANSPORTATION INSECURITY
IN THE PAST 12 MONTHS, HAS THE LACK OF TRANSPORTATION KEPT YOU FROM MEDICAL APPOINTMENTS OR FROM GETTING MEDICATIONS?: NO

## 2021-03-17 ASSESSMENT — PATIENT HEALTH QUESTIONNAIRE - PHQ9
SUM OF ALL RESPONSES TO PHQ9 QUESTIONS 1 & 2: 0
SUM OF ALL RESPONSES TO PHQ QUESTIONS 1-9: 0
1. LITTLE INTEREST OR PLEASURE IN DOING THINGS: 0

## 2021-03-17 NOTE — PATIENT INSTRUCTIONS
Patient Education        High Blood Pressure: Care Instructions  Overview     It's normal for blood pressure to go up and down throughout the day. But if it stays up, you have high blood pressure. Another name for high blood pressure is hypertension. Despite what a lot of people think, high blood pressure usually doesn't cause headaches or make you feel dizzy or lightheaded. It usually has no symptoms. But it does increase your risk of stroke, heart attack, and other problems. You and your doctor will talk about your risks of these problems based on your blood pressure. Your doctor will give you a goal for your blood pressure. Your goal will be based on your health and your age. Lifestyle changes, such as eating healthy and being active, are always important to help lower blood pressure. You might also take medicine to reach your blood pressure goal.  Follow-up care is a key part of your treatment and safety. Be sure to make and go to all appointments, and call your doctor if you are having problems. It's also a good idea to know your test results and keep a list of the medicines you take. How can you care for yourself at home? Medical treatment  · If you stop taking your medicine, your blood pressure will go back up. You may take one or more types of medicine to lower your blood pressure. Be safe with medicines. Take your medicine exactly as prescribed. Call your doctor if you think you are having a problem with your medicine. · Talk to your doctor before you start taking aspirin every day. Aspirin can help certain people lower their risk of a heart attack or stroke. But taking aspirin isn't right for everyone, because it can cause serious bleeding. · See your doctor regularly. You may need to see the doctor more often at first or until your blood pressure comes down.   · If you are taking blood pressure medicine, talk to your doctor before you take decongestants or anti-inflammatory medicine, such as ibuprofen. Some of these medicines can raise blood pressure. · Learn how to check your blood pressure at home. Lifestyle changes  · Stay at a healthy weight. This is especially important if you put on weight around the waist. Losing even 10 pounds can help you lower your blood pressure. · If your doctor recommends it, get more exercise. Walking is a good choice. Bit by bit, increase the amount you walk every day. Try for at least 30 minutes on most days of the week. You also may want to swim, bike, or do other activities. · Avoid or limit alcohol. Talk to your doctor about whether you can drink any alcohol. · Try to limit how much sodium you eat to less than 2,300 milligrams (mg) a day. Your doctor may ask you to try to eat less than 1,500 mg a day. · Eat plenty of fruits (such as bananas and oranges), vegetables, legumes, whole grains, and low-fat dairy products. · Lower the amount of saturated fat in your diet. Saturated fat is found in animal products such as milk, cheese, and meat. Limiting these foods may help you lose weight and also lower your risk for heart disease. · Do not smoke. Smoking increases your risk for heart attack and stroke. If you need help quitting, talk to your doctor about stop-smoking programs and medicines. These can increase your chances of quitting for good. When should you call for help? Call  911 anytime you think you may need emergency care. This may mean having symptoms that suggest that your blood pressure is causing a serious heart or blood vessel problem. Your blood pressure may be over 180/120. For example, call 911 if:    · You have symptoms of a heart attack. These may include:  ? Chest pain or pressure, or a strange feeling in the chest.  ? Sweating. ? Shortness of breath. ? Nausea or vomiting. ? Pain, pressure, or a strange feeling in the back, neck, jaw, or upper belly or in one or both shoulders or arms. ? Lightheadedness or sudden weakness.   ? A fast or irregular heartbeat.     · You have symptoms of a stroke. These may include:  ? Sudden numbness, tingling, weakness, or loss of movement in your face, arm, or leg, especially on only one side of your body. ? Sudden vision changes. ? Sudden trouble speaking. ? Sudden confusion or trouble understanding simple statements. ? Sudden problems with walking or balance. ? A sudden, severe headache that is different from past headaches.     · You have severe back or belly pain. Do not wait until your blood pressure comes down on its own. Get help right away. Call your doctor now or seek immediate care if:    · Your blood pressure is much higher than normal (such as 180/120 or higher), but you don't have symptoms.     · You think high blood pressure is causing symptoms, such as:  ? Severe headache.  ? Blurry vision. Watch closely for changes in your health, and be sure to contact your doctor if:    · Your blood pressure measures higher than your doctor recommends at least 2 times. That means the top number is higher or the bottom number is higher, or both.     · You think you may be having side effects from your blood pressure medicine. Where can you learn more? Go to https://Nonaboxpe280 Northeb.[employer]Viva. org and sign in to your [employer] account. Enter Y403 in the [employer] box to learn more about \"High Blood Pressure: Care Instructions. \"     If you do not have an account, please click on the \"Sign Up Now\" link. Current as of: August 31, 2020               Content Version: 12.8  © 2006-2021 [employer]. Care instructions adapted under license by [facility] ([facility]). If you have questions about a medical condition or this instruction, always ask your healthcare professional. [place] [address] any warranty or liability for your use of this information.          Patient Education        DASH Diet: Care Instructions  Your Care Instructions     The DASH diet is an eating plan that can help lower your blood pressure. DASH stands for Dietary Approaches to Stop Hypertension. Hypertension is high blood pressure. The DASH diet focuses on eating foods that are high in calcium, potassium, and magnesium. These nutrients can lower blood pressure. The foods that are highest in these nutrients are fruits, vegetables, low-fat dairy products, nuts, seeds, and legumes. But taking calcium, potassium, and magnesium supplements instead of eating foods that are high in those nutrients does not have the same effect. The DASH diet also includes whole grains, fish, and poultry. The DASH diet is one of several lifestyle changes your doctor may recommend to lower your high blood pressure. Your doctor may also want you to decrease the amount of sodium in your diet. Lowering sodium while following the DASH diet can lower blood pressure even further than just the DASH diet alone. Follow-up care is a key part of your treatment and safety. Be sure to make and go to all appointments, and call your doctor if you are having problems. It's also a good idea to know your test results and keep a list of the medicines you take. How can you care for yourself at home? Following the DASH diet  · Eat 4 to 5 servings of fruit each day. A serving is 1 medium-sized piece of fruit, ½ cup chopped or canned fruit, 1/4 cup dried fruit, or 4 ounces (½ cup) of fruit juice. Choose fruit more often than fruit juice. · Eat 4 to 5 servings of vegetables each day. A serving is 1 cup of lettuce or raw leafy vegetables, ½ cup of chopped or cooked vegetables, or 4 ounces (½ cup) of vegetable juice. Choose vegetables more often than vegetable juice. · Get 2 to 3 servings of low-fat and fat-free dairy each day. A serving is 8 ounces of milk, 1 cup of yogurt, or 1 ½ ounces of cheese. · Eat 6 to 8 servings of grains each day.  A serving is 1 slice of bread, 1 ounce of dry cereal, or ½ cup of cooked rice, pasta, or cooked cereal. Try to choose whole-grain products as much as possible. · Limit lean meat, poultry, and fish to 2 servings each day. A serving is 3 ounces, about the size of a deck of cards. · Eat 4 to 5 servings of nuts, seeds, and legumes (cooked dried beans, lentils, and split peas) each week. A serving is 1/3 cup of nuts, 2 tablespoons of seeds, or ½ cup of cooked beans or peas. · Limit fats and oils to 2 to 3 servings each day. A serving is 1 teaspoon of vegetable oil or 2 tablespoons of salad dressing. · Limit sweets and added sugars to 5 servings or less a week. A serving is 1 tablespoon jelly or jam, ½ cup sorbet, or 1 cup of lemonade. · Eat less than 2,300 milligrams (mg) of sodium a day. If you limit your sodium to 1,500 mg a day, you can lower your blood pressure even more. · Be aware that all of these are the suggested number of servings for people who eat 1,800 to 2,000 calories a day. Your recommended number of servings may be different if you need more or fewer calories. Tips for success  · Start small. Do not try to make dramatic changes to your diet all at once. You might feel that you are missing out on your favorite foods and then be more likely to not follow the plan. Make small changes, and stick with them. Once those changes become habit, add a few more changes. · Try some of the following:  ? Make it a goal to eat a fruit or vegetable at every meal and at snacks. This will make it easy to get the recommended amount of fruits and vegetables each day. ? Try yogurt topped with fruit and nuts for a snack or healthy dessert. ? Add lettuce, tomato, cucumber, and onion to sandwiches. ? Combine a ready-made pizza crust with low-fat mozzarella cheese and lots of vegetable toppings. Try using tomatoes, squash, spinach, broccoli, carrots, cauliflower, and onions. ? Have a variety of cut-up vegetables with a low-fat dip as an appetizer instead of chips and dip. ? Sprinkle sunflower seeds or chopped almonds over salads.  Or try adding chopped walnuts or almonds to cooked vegetables. ? Try some vegetarian meals using beans and peas. Add garbanzo or kidney beans to salads. Make burritos and tacos with mashed silveira beans or black beans. Where can you learn more? Go to https://chpepiceweb.[email or web address] org and sign in to your [employer] account. Enter U886 in the [employer] box to learn more about \"DASH Diet: Care Instructions. \"     If you do not have an account, please click on the \"Sign Up Now\" link. Current as of: August 31, 2020               Content Version: 12.8  © 2006-2021 [employer]. Care instructions adapted under license by [facility] ([facility]). If you have questions about a medical condition or this instruction, always ask your healthcare professional. [address] any warranty or liability for your use of this information.

## 2021-03-17 NOTE — PROGRESS NOTES
Rona Branham (:  1950) is a 73 y.o. female,Established patient, here for evaluation of the following chief complaint(s):    Diabetes (FOLLOW UP/ PATIENT NEEDS TO FIND A NEW EYE [address])    Koffi Christiansen was seen today for diabetes. Diagnoses and all orders for this visit:    Type 2 diabetes mellitus without complication, with long-term current use of insulin (HCC)  POCT microalbumin 6.7  COMPREHENSIVE METABOLIC PANEL; Future  Amb External Referral To Ophthalmology- Dr Marina Mendez as prescribed  Well controlled - no oral agents at this time        Morbid obesity with BMI of 40.0-44.9, adult ([address]  Continue current diet changes    Chronic venous stasis dermatitis of both lower extremities  Encouraged to wear compression stockings  Limit sodium intake   Elevate legs as much as possible    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HCC)  -     HOMOCYSTEINE, SERUM; Future    Hypercholesterolemia  -     Lipid, Fasting; Future          SUBJECTIVE/OBJECTIVE:  HPI  ROUTINE FOLLOW UP DIABETES  SHE IS FASTING TODAY  DECLINES COLON CANCER SCREENING- DEXA SCAN AT THIS TIME SHE  WILL DO LATER  LAST HGA1C 6.7  SHE IS CHECKING HER BLOOD SIGARS AT HOME - RANGING   IF MORE THAN 160 SHE TAKES THE 10 UNITS OF REGULAR INSULIN THIS OCCURS ABOUT EVERY THREE WEEKS FOR THE MOST PART IT  OR LESS  SHE IS DOWN  LAST MONTH 280  SHE HAS NOT HAD ANY INCREASED THIRST - FREQUENT URINATION - DOES HAVE DIFFICULTY SEEING  THINGS UP CLOSE- SHE HAS HAD CATARACT SURGERY IN THEPAST- SHE IS NOT ON ANY ORAL MEDICATION- SHE IS WATCHING WHAT SHE EATS- DECREASING PORTION SIZES- SHE NEEDS a new eye doctor     Hypertension: Patient here for follow-up of elevated blood pressure. She is not exercising and is adherent to low salt diet. Blood pressure is well controlled at home. Cardiac symptoms none. Patient denies none.   Cardiovascular risk factors: advanced age (older than 54 for men, 72 for women), diabetes mellitus, dyslipidemia, hypertension, obesity (BMI >= 30 kg/m2) and sedentary lifestyle. Use of agents associated with hypertension: none. History of target organ damage: none. Review of Systems   Eyes: Positive for visual disturbance. Cardiovascular: Negative. Endocrine: Negative for polydipsia, polyphagia and polyuria. All other systems reviewed and are negative. Physical Exam  Vitals signs reviewed. Constitutional:       General: She is awake. She is not in acute distress. Appearance: Normal appearance. She is well-developed and well-groomed. She is morbidly obese. She is not ill-appearing, toxic-appearing or diaphoretic. Cardiovascular:      Rate and Rhythm: Normal rate and regular rhythm. Heart sounds: Normal heart sounds, S1 normal and S2 normal. Heart sounds not distant. No murmur. No systolic murmur. No diastolic murmur. No friction rub. No gallop. No S3 or S4 sounds. Pulmonary:      Effort: Pulmonary effort is normal.      Breath sounds: Normal breath sounds and air entry. Skin:     Comments: Lower extremities left reddened - right reddened with several intact blisters no weeping   Neurological:      Mental Status: She is alert. Psychiatric:         Attention and Perception: Attention and perception normal.         Mood and Affect: Mood and affect normal.         Speech: Speech normal.         Behavior: Behavior normal. Behavior is cooperative. Thought Content:  Thought content normal.         Cognition and Memory: Cognition and memory normal.         Judgment: Judgment normal.

## 2021-03-18 ENCOUNTER — TELEPHONE (OUTPATIENT)
Dept: PULMONOLOGY | Age: 71
End: 2021-03-18

## 2021-03-18 DIAGNOSIS — T50.905A DRUG-INDUCED HYPOKALEMIA: ICD-10-CM

## 2021-03-18 DIAGNOSIS — E87.6 DRUG-INDUCED HYPOKALEMIA: ICD-10-CM

## 2021-03-18 RX ORDER — POTASSIUM CHLORIDE 20 MEQ/1
20 TABLET, EXTENDED RELEASE ORAL DAILY
Qty: 90 TABLET | Refills: 1 | Status: SHIPPED | OUTPATIENT
Start: 2021-03-18 | End: 2021-08-25 | Stop reason: SDUPTHER

## 2021-03-18 NOTE — PROGRESS NOTES
Breanna Esparza         : 1950  [] [address]     [] [address]      [] [address]     [][facility]    [] [address]  [] [facility]   [] Other:  Diagnosis: [x] ANGELO (G47.33) [] CSA (G47.31) [] Apnea (G47.30)   Length of Need: [] 12 Months [x] 99 Months [] Other:    Machine (SAHRA!): [x] Respironics Dream Station      Auto [x] ResMed AirSense     Auto [] Other:     []  CPAP () [x] Bilevel ()   Mode: [] Auto [] Spontaneous    Mode: [x] Auto [] Spontaneous                     14/10 cm     Comfort Settings:   - Ramp Pressure: 5 cmH2O                                        - Ramp time: 15 min                                     -  Flex/EPR - 3 full time                                    - For ResMed Bilevel (TiMax-4 sec   TiMin- 0.2 sec)     Humidifier: [x] Heated ()        [x] Water chamber replacement ()/ 1 per 6 months        Mask:  Please always start with the mask the patient used during the titraion   [x] Nasal () /1 per 3 months [x] Full Face () /1 per 3 months   [x] Patient choice -Size and fit mask [x] Patient Choice - Size and fit mask   [] Dispense:  [] Dispense:    [x] Headgear () / 1 per 3 months [x] Headgear () / 1 per 3 months   [x] Replacement Nasal Cushion ()/2 per month [x] Interface Replacement ()/1 per month   [x] Replacement Nasal Pillows ()/2 per month         Tubing: [x] Heated ()/1 per 3 months    [] Standard ()/1 per 3 months [] Other:           Filters: [x] Non-disposable ()/1 per 6 months     [x] Ultra-Fine, Disposable ()/2 per month        Miscellaneous: [x] Chin Strap ()/ 1 per 6 months [] O2 bleed-in:       LPM   [] Oximetry on CPAP/Bilevel []  Other:          Start Order Date: 21    MEDICAL JUSTIFICATION:  I, the undersigned, certify that the above prescribed supplies are medically necessary for this patients wellbeing.   In my opinion, the supplies are both reasonable and necessary in reference to accepted standards of medicalpractice in treatment of this patients condition.     Sia Krishnamurthy MD      NPI: [id number]       Order Signed Date: 03/18/21    Electronically signed by Sia Krishnamurthy MD on 3/18/2021 at 11:43 AM

## 2021-03-18 NOTE — TELEPHONE ENCOUNTER
Karlie calling stating she had her titration done last night and wants to use [facility] as her DME since she also uses them for her O2.

## 2021-03-23 ENCOUNTER — TELEPHONE (OUTPATIENT)
Dept: PULMONOLOGY | Age: 71
End: 2021-03-23

## 2021-03-23 PROCEDURE — 95811 POLYSOM 6/>YRS CPAP 4/> PARM: CPT | Performed by: PSYCHIATRY & NEUROLOGY

## 2021-03-23 NOTE — TELEPHONE ENCOUNTER
Attempted to contact patient,  VM not set up. History of mild sleep apnea and Central Sleep apnea adequately controlled on Bipap at  pressure of 14/10    DME choice [facility]; faxed today since patient had called earlier in the week with her DME choice.

## 2021-03-31 ENCOUNTER — IMMUNIZATION (OUTPATIENT)
Dept: PRIMARY CARE CLINIC | Age: 71
End: 2021-03-31
Payer: MEDICARE

## 2021-03-31 PROCEDURE — 0012A COVID-19, MODERNA VACCINE 100MCG/0.5ML DOSE: CPT | Performed by: FAMILY MEDICINE

## 2021-03-31 PROCEDURE — 91301 COVID-19, MODERNA VACCINE 100MCG/0.5ML DOSE: CPT | Performed by: FAMILY MEDICINE

## 2021-04-05 DIAGNOSIS — E78.00 HYPERCHOLESTEROLEMIA: ICD-10-CM

## 2021-04-05 DIAGNOSIS — R74.8 LOW SERUM HDL: Chronic | ICD-10-CM

## 2021-04-05 RX ORDER — ATORVASTATIN CALCIUM 40 MG/1
TABLET, FILM COATED ORAL
Qty: 90 TABLET | Refills: 0 | Status: SHIPPED | OUTPATIENT
Start: 2021-04-05 | End: 2021-07-20

## 2021-04-08 LAB
CATARACTS: NEGATIVE
DIABETIC RETINOPATHY: NEGATIVE
GLAUCOMA: NEGATIVE
INTRAOCULAR PRESSURE EYE: NORMAL
VISUAL ACUITY DISTANCE LEFT EYE: NORMAL
VISUAL ACUITY DISTANCE RIGHT EYE: NORMAL

## 2021-04-16 DIAGNOSIS — E11.69 DIABETES MELLITUS TYPE 2 IN OBESE (HCC): Chronic | ICD-10-CM

## 2021-04-16 DIAGNOSIS — E66.9 DIABETES MELLITUS TYPE 2 IN OBESE (HCC): Chronic | ICD-10-CM

## 2021-04-16 RX ORDER — BLOOD SUGAR DIAGNOSTIC
STRIP MISCELLANEOUS
Qty: 300 STRIP | Refills: 5 | Status: SHIPPED | OUTPATIENT
Start: 2021-04-16

## 2021-04-22 ENCOUNTER — TELEPHONE (OUTPATIENT)
Dept: PHARMACY | Age: 71
End: 2021-04-22

## 2021-04-23 DIAGNOSIS — I10 ESSENTIAL HYPERTENSION: ICD-10-CM

## 2021-04-23 RX ORDER — METOPROLOL SUCCINATE 50 MG/1
TABLET, EXTENDED RELEASE ORAL
Qty: 90 TABLET | Refills: 0 | Status: SHIPPED | OUTPATIENT
Start: 2021-04-23 | End: 2021-08-03

## 2021-04-27 ENCOUNTER — VIRTUAL VISIT (OUTPATIENT)
Dept: PHARMACY | Age: 71
End: 2021-04-27
Payer: MEDICARE

## 2021-04-27 DIAGNOSIS — G47.33 OBSTRUCTIVE SLEEP APNEA: ICD-10-CM

## 2021-04-27 DIAGNOSIS — Z79.4 TYPE 2 DIABETES MELLITUS WITHOUT COMPLICATION, WITH LONG-TERM CURRENT USE OF INSULIN (HCC): ICD-10-CM

## 2021-04-27 DIAGNOSIS — I50.33 ACUTE ON CHRONIC DIASTOLIC CONGESTIVE HEART FAILURE (HCC): Primary | ICD-10-CM

## 2021-04-27 DIAGNOSIS — E11.9 TYPE 2 DIABETES MELLITUS WITHOUT COMPLICATION, WITH LONG-TERM CURRENT USE OF INSULIN (HCC): ICD-10-CM

## 2021-04-27 PROCEDURE — 99211 OFF/OP EST MAY X REQ PHY/QHP: CPT | Performed by: NURSE PRACTITIONER

## 2021-04-27 NOTE — ASSESSMENT & PLAN NOTE
No symptoms of acute exacerbation today. Continue daily weights, sodium and fluid restrictions. Emergency action plan reviewed again today with patient. Continue management and follow-up with cardiology as scheduled in June 2021.

## 2021-04-27 NOTE — PROGRESS NOTES
[facility]  Heart Failure    [address], [address]  Phone: [phone]  Fax: [phone]      NAME: Ruth Connolly  MEDICAL RECORD NUMBER:  [id number]  AGE: 73 y.o.    GENDER: female  : 1950  EPISODE DATE:  2021      Chief Complaint   Patient presents with    Congestive Heart Failure        Past Medical History:   Diagnosis Date    Diabetes mellitus type 2 in obese ([facility] 2010    Edema of both legs     Elevated LFTs     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[facility] 2018    19    Homozygous Factor V Leiden mutation ([facility] 1950    Hypercholesterolemia     Hypertension     Hypomagnesemia     Low serum HDL     Lower leg DVT (deep venous thromboembolism), chronic, right (HCC)     Osteoarthritis of cervical spine 2010    Facet arthropathy, spondylosis    Renal failure, acute (HCC)     Due to sepsis    Vitamin D deficiency 2018    13.2      Past Surgical History:   Procedure Laterality Date    APPENDECTOMY  1976     SECTION      3     CHOLECYSTECTOMY  2002    gangrenous    COLONOSCOPY  2014    no polyp    TUBAL LIGATION Bilateral 1980     Family History   Problem Relation Age of Onset    Kidney Disease Mother         kidney stones - larger    Coronary Art Dis Father     Heart Surgery Father 48    Hypertension Father     Heart Attack Father     COPD Sister         smoker    Diabetes Sister     Diabetes Brother     Clotting Disorder Son         Factor V Leiden heterozygote    Other Son         GERD, gout, MRSA, homocystinemia    Hypertension Son     High Cholesterol Son     Other Sister         carotid stenosis     Social History     Tobacco Use    Smoking status: Never Smoker    Smokeless tobacco: Never Used   Substance Use Topics    Alcohol use: Yes     Comment: OCCASIONALLY 1 or less/1-2 wk    Drug use: No     Comment: No Mj experimentation     Counseling given: Not Answered     Immunization History   Administered Date(s) Administered    COVID-19, Moderna, PF, 100mcg/0.5mL 03/03/2021, 03/31/2021    Influenza Vaccine, unspecified formulation 09/04/2009    Influenza Virus Vaccine 09/04/2009    Influenza, High Dose (Fluzone 65 yrs and older) 10/30/2017, 10/02/2018    Influenza, Triv, inactivated, subunit, adjuvanted, IM (Fluad 65 yrs and older) 09/23/2019, 09/18/2020    Pneumococcal Conjugate 13-valent ([id number]) 02/22/2018    Pneumococcal Polysaccharide ([id number]) 11/06/2007, 09/23/2019    Tdap (Boostrix, Adacel) 03/05/2018     Allergies   Allergen Reactions    Nickel Nausea Only and Rash    Ibuprofen      Pt takes Warfarin        ECHO EF%: 55-60  Date: 11/2020      Last Hospitalization: 11/2020    History of ANGELO: Yes  [x]BIPAP/CPAP use:   [x]Education about proper cleaning completed today     Subjective   HPI: Ms. Arturo Byrne is a 73 y.o. female being evaluated by a virtual visit via telephone encounter from patient's home due to [id number]-04 public health emergency. Due to the circumstances physical examination is very limited. This visit was conducted with the patient's consent for billing of her insurance. A telephone visit was necessary today to reduce the patient's exposure to COVID-19 and to provide necessary medical care. This patient has been advised to contact this office, her cardiologist, or primary care physician if symptoms develop or to call 911 for emergency medical treatment if deemed necessary. She has a medical history significant for diastolic heart failure, A. fib, HTN, DM II, ANGELO, and venous stasis ulcers. Current BMI is @40. Remberto Kingston is very motivated to improve her health.   She tells me that overall she feels great and has been weighing herself daily. Remberto Kingston has made significant dietary modifications and tells me that each day she gets on the scale and weighs less and less. Remberto Kingston has lost 90 pounds since the beginning of her health reform journey. This is reinforcing to her and also helps to keep her motivation strong. Ana Recinos is watching her sodium intake as well as her fluids and keeping her intake at 64 ounces or less.  She has all of her medications and reports good compliance.  She is able to walk further and feels more steady. Continues physical therapy exercises as well as walking in the park with her daughter. Ana Recinos does have a history of sleep apnea and recently started using CPAP.  States since she started using CPAP she feels that she has a lot more energy and is no longer taking afternoon naps.  Understands how CPAP helps with her heart function. Denies symptoms of acute exacerbation of her heart failure today.  She has no shortness of breath only with exertion.  No edema beyond baseline (uses leg wraps), no abdominal fullness, chest pain, palpitations, orthopnea.   She is a diabetic and her last A1c resulted at 6.7 in February 2021. Review of Systems:   Constitutional: Negative for fatigue, Negative for activity change, appetite change, chills, diaphoresis, fever and unexpected weight change. HENT: Negative for congestion, ear pain, postnasal drip, rhinorrhea, sinus pressure, sinus pain, sneezing, sore throat and trouble swallowing. Eyes: Negative for discharge and redness. Respiratory: Negative for cough, chest tightness, sputum, shortness of breath and wheezing. Cardiovascular: Negative for chest pain, palpitations and leg swelling. Gastrointestinal: Negative for abdominal distention, abdominal pain, constipation, diarrhea, nausea and vomiting. Genitourinary: Negative for decreased urine volume, difficulty urinating, dysuria and hematuria. Skin: Negative for pallor and rash. Neurological: Negative for dizziness, tremors, weakness, light-headedness and headaches. Psychiatric/Behavioral: Negative for confusion. The patient is not nervous/anxious.   Sleep: Negative for restless sleep, snoring, frequent waking Functional Activity New York Heart Association Classification  Patient Symptoms   []   Class I No limitation of physical activity. Ordinary physical activity does not cause undue fatigue, palpitation, dyspnea (shortness of breath). [x]   Class II Slight limitation of physical activity. Comfortable at rest. Ordinary physical activity results in fatigue, palpitation, dyspnea (shortness of breath). []   Class III  Vinod limitation of physical activity. Comfortable at rest.  Less than ordinary activity causes fatigue, palpitation, dyspnea. []   Class IV Unable to carry on any physical activity without discomfort. Symptoms of heart failure at rest.  If any physical activity is undertaken, discomfort increases. Shortness of Breath:   []   None   [x]   Dyspnea on exertion   []   Dyspnea with normal activities  []   Dyspnea at rest  []   Dyspnea while sleeping    Patient Findings:   []  Missed doses  []  Diet changes  []  Sodium intake changes    []  Alcohol intake changes  []  Night time cough  []  Edema    []  Activity changes   []  Fatigue that limits activity []  Acute illness  []  Early saiety, abd fullness []  Chest pain   []  Other        Objective: There were no vitals taken for this visit. BP Readings from Last 3 Encounters:   03/17/21 134/86   02/25/21 126/70   02/24/21 128/78     Wt Readings from Last 6 Encounters:   03/17/21 273 lb (123.8 kg)   02/25/21 283 lb (128.4 kg)   02/24/21 280 lb (127 kg)   12/17/20 (!) 342 lb (155.1 kg)   12/09/20 (!) 348 lb 12.8 oz (158.2 kg)   12/03/20 (!) 355 lb 6.1 oz (161.2 kg)     Physical Exam:   Constitutional: Oriented to person, place, and time. No distress detected. Psychiatric: Normal mood and affect.      BMP:   Lab Results   Component Value Date     03/17/2021    K 3.3 03/17/2021    K 3.8 12/03/2020    CL 98 03/17/2021    CO2 29 03/17/2021    BUN 11 03/17/2021    CREATININE 0.7 03/17/2021       CBC:    Lab Results   Component Value Date    WBC 3.0 12/03/2020    HGB 10.0 12/03/2020    HCT 31.2 12/03/2020     12/03/2020     HgA1C:   Lab Results   Component Value Date    LABA1C 6.7 02/24/2021    LABA1C 6.2 09/25/2020    LABA1C 6.5 09/23/2019     TSH: No results found for: TSH  Lipids:   Lab Results   Component Value Date    CHOL 132 12/26/2019    TRIG 71 12/26/2019    HDL 44 03/17/2021    LDLCALC 70 03/17/2021     ProBNP:   Lab Results   Component Value Date    PROBNP 1,346 11/22/2020       [x]Reviewed daily weight log and assessed self management skills including early recognition and notification of exacerbation   [x]Encouraged daily weights and to call with increase of 3 pounds in one day or 5 pounds in one week or weight increased above dry weight    [x]Discussed fluid restriction and sodium restriction as well as nutrition goals   [x]Weight loss counseling performed, including carbohydrate and calorie reduction  [x]Exercise Counseling performed          Medications reviewed:   [x] compared patient's bottles with EPIC list  [] patient did not bring medication bottles      Current medications:  Outpatient Medications Marked as Taking for the 4/27/21 encounter (Virtual Visit) with JUAN MANUEL Dahl CNP   Medication Sig Dispense Refill    metoprolol succinate (TOPROL XL) 50 MG extended release tablet TAKE 1 TABLET BY MOUTH DAILY 90 tablet 0    ACCU-CHEK APURVA PLUS strip CHECK SUGAR THREE TO FOUR TIMES DAILY 300 strip 5    atorvastatin (LIPITOR) 40 MG tablet TAKE 1 TABLET BY MOUTH EVERY NIGHT 90 tablet 0    potassium chloride (KLOR-CON M) 20 MEQ extended release tablet Take 1 tablet by mouth daily 90 tablet 1    insulin aspart (NOVOLOG) 100 UNIT/ML injection vial Inject 10 Units into the skin 3 times daily (before meals) 20 mL 0    Accu-Chek Multiclix Lancets MISC TEST FOUR TIMES DAILY AS DIRECTED AS NEEDED FOR LOW OR HIGH BLOOD SUGAR 306 each 0    furosemide (LASIX) 40 MG tablet Take 1 tablet by mouth 2 times daily 90 tablet 1    folic acid (FOLVITE) 1 MG tablet TAKE 2 TABLETS BY MOUTH DAILY 180 tablet 1    apixaban (ELIQUIS) 5 MG TABS tablet Take 1 tablet by mouth 2 times daily 180 tablet 1    B-D UF III MINI PEN NEEDLES 31G X 5 MM MISC USE DAILY AS DIRECTED 100 each 5       [x]Reviewed heart failure medications including how they work and potential side effects. [x]Advised patient to avoid NSAIDs. Get With The Guidelines  Ms. Esparza is on a Beta-Blocker for (HFrEF) (systolic) EF </= 91%  Yes    Ms. Esparza is on an Ace-Inhibitor / ARB / Entresto for (HFrEF) (systolic) EF </= 15% No      Ms. Esparza is on a Aldosterone Receptor Antagonist for (HFrEF) (systolic) EF </= 59% or EF </= 40% with MI (Okay to use if SCr </= 2.5mg/dL in men, SCr </= 2mg/dL in women; Potassium < 5.0meq/L) No      Ms. Esparza is on a Diuretic Yes    If the above guidelines are not met, reason documented above or recommendations made: Yes.       [] Advanced Directives completed and scanned  [x] Advanced Directives need to be addressed        Barriers to Adherence: Verbalizes interest, Seeking additional information (pamphlets, classes, etc) and Active attentive participant    Environmental Concerns: not applicable    Gerson Isabel is a 73 y.o. female evaluated via telephone on 4/27/2021. Consent:  She and/or health care decision maker is aware that that she may receive a bill for this telephone service, depending on her insurance coverage, and has provided verbal consent to proceed: Yes      Documentation:  I communicated with the patient and/or health care decision maker about her chronic health conditions. Details of this discussion including any medical advice provided: See visit note      I affirm this is a Patient Initiated Episode with a Patient who has not had a related appointment within my department in the past 7 days or scheduled within the next 24 hours.     Patient identification was verified at the start of the visit:

## 2021-04-27 NOTE — ASSESSMENT & PLAN NOTE
Continue daily use of CPAP with sleep. Patient is happy with results. Has follow-up scheduled in June with sleep medicine will discuss the need to continue oxygen therapy.

## 2021-04-30 DIAGNOSIS — R60.0 EDEMA OF BOTH LEGS: ICD-10-CM

## 2021-04-30 RX ORDER — CALCIUM CARB/VITAMIN D3/VIT K1 500-100-40
TABLET,CHEWABLE ORAL
Qty: 100 EACH | Refills: 5 | Status: SHIPPED | OUTPATIENT
Start: 2021-04-30 | End: 2021-08-25 | Stop reason: SDUPTHER

## 2021-04-30 RX ORDER — FUROSEMIDE 40 MG/1
TABLET ORAL
Qty: 90 TABLET | Refills: 1 | Status: SHIPPED | OUTPATIENT
Start: 2021-04-30 | End: 2021-06-15

## 2021-05-10 DIAGNOSIS — E11.69 DIABETES MELLITUS TYPE 2 IN OBESE (HCC): ICD-10-CM

## 2021-05-10 DIAGNOSIS — E66.9 DIABETES MELLITUS TYPE 2 IN OBESE (HCC): ICD-10-CM

## 2021-05-12 ENCOUNTER — HOSPITAL ENCOUNTER (OUTPATIENT)
Dept: NON INVASIVE DIAGNOSTICS | Age: 71
Discharge: HOME | End: 2021-05-12
Payer: MEDICARE

## 2021-05-12 DIAGNOSIS — R06.02 SHORTNESS OF BREATH: ICD-10-CM

## 2021-05-12 DIAGNOSIS — I50.810 RVF (RIGHT VENTRICULAR FAILURE) (HCC): ICD-10-CM

## 2021-05-12 LAB
LV EF: 58 %
LVEF MODALITY: NORMAL

## 2021-05-12 PROCEDURE — 93306 TTE W/DOPPLER COMPLETE: CPT

## 2021-05-14 ENCOUNTER — TELEPHONE (OUTPATIENT)
Dept: CARDIOLOGY CLINIC | Age: 71
End: 2021-05-14

## 2021-05-14 DIAGNOSIS — R06.02 SHORTNESS OF BREATH: Primary | ICD-10-CM

## 2021-05-21 NOTE — TELEPHONE ENCOUNTER
Spoke to Katie blood and let her know how Dr Ethan Chapman would be doing the procedure. She is agreeable to proceed. I let her know that I will call her Monday with a date and time.

## 2021-05-21 NOTE — TELEPHONE ENCOUNTER
Rosibel Castelan called in this afternoon to let Margarita Love know she's unavailable the first week of June, June 9th and June 14th.      You can reach Rosibel Castelan at [phone]

## 2021-05-21 NOTE — TELEPHONE ENCOUNTER
Attempted to call patient. Unable to leave voicemail. I did speak with Dr Ronen Polanco to confirm that he could use her arm for the [address] Will await her return call prior to scheduling.

## 2021-05-21 NOTE — TELEPHONE ENCOUNTER
[address] scheduled for 6/15/2021 at 1:00   Arrive at 11:30    Scheduled for right heart catheterization      The morning of your procedure you will park in the hospital parking lot and report directly to the cath lab to check in.     Pre-Procedure Instructions   1. You will need to fast for at least 8 hours prior to procedure. No caffeine the morning of.   2. You will need to hold your anticoagulation, Eliquis for 2 days prior. 3. Hold your diuretic, Lasix the morning of procedure. 4. Hold all diabetic medications including, Metfomin. If you take Lantus/Levemir only take ½ your normal dose the evening before. All other medications can be taken in the morning with sips of water. 5. Do not use any lotions, creams or perfume the morning of procedure. 6. Pre-procedure lab work will need to be completed 5-7 days prior to procedure. 7. Please have a responsible adult to drive you home after procedure. We advise you have someone stay with you for the first 24 hours for precautionary measures. Depending on your procedure you may require an overnight stay. 8. Cath lab will provide you with all post procedure instructions. If you have any questions regarding the procedure itself or medications, please call [phone] and ask to speak with a nurse. Case published to Valery and form emailed to Gume arana in the cath lab.

## 2021-05-24 NOTE — TELEPHONE ENCOUNTER
Patient called back and is agreeable to date and time. Reviewed instructions. Also sent her email with instructions.

## 2021-06-01 ENCOUNTER — OFFICE VISIT (OUTPATIENT)
Dept: SLEEP MEDICINE | Age: 71
End: 2021-06-01
Payer: MEDICARE

## 2021-06-01 VITALS
WEIGHT: 254 LBS | RESPIRATION RATE: 20 BRPM | DIASTOLIC BLOOD PRESSURE: 88 MMHG | OXYGEN SATURATION: 97 % | SYSTOLIC BLOOD PRESSURE: 120 MMHG | HEIGHT: 67 IN | HEART RATE: 72 BPM | BODY MASS INDEX: 39.87 KG/M2 | TEMPERATURE: 97.5 F

## 2021-06-01 DIAGNOSIS — G47.33 OSA ON CPAP: Primary | ICD-10-CM

## 2021-06-01 DIAGNOSIS — Z99.89 DEPENDENCE ON OTHER ENABLING MACHINES AND DEVICES: ICD-10-CM

## 2021-06-01 DIAGNOSIS — Z99.89 OSA ON CPAP: Primary | ICD-10-CM

## 2021-06-01 PROCEDURE — 99213 OFFICE O/P EST LOW 20 MIN: CPT | Performed by: PSYCHIATRY & NEUROLOGY

## 2021-06-01 ASSESSMENT — SLEEP AND FATIGUE QUESTIONNAIRES
HOW LIKELY ARE YOU TO NOD OFF OR FALL ASLEEP WHILE SITTING INACTIVE IN A PUBLIC PLACE: 0
HOW LIKELY ARE YOU TO NOD OFF OR FALL ASLEEP WHILE SITTING AND TALKING TO SOMEONE: 0
HOW LIKELY ARE YOU TO NOD OFF OR FALL ASLEEP WHEN YOU ARE A PASSENGER IN A CAR FOR AN HOUR WITHOUT A BREAK: 0
HOW LIKELY ARE YOU TO NOD OFF OR FALL ASLEEP WHILE SITTING AND READING: 1
ESS TOTAL SCORE: 2
HOW LIKELY ARE YOU TO NOD OFF OR FALL ASLEEP WHILE WATCHING TV: 1
HOW LIKELY ARE YOU TO NOD OFF OR FALL ASLEEP WHILE LYING DOWN TO REST IN THE AFTERNOON WHEN CIRCUMSTANCES PERMIT: 0
HOW LIKELY ARE YOU TO NOD OFF OR FALL ASLEEP WHILE SITTING QUIETLY AFTER LUNCH WITHOUT ALCOHOL: 0
HOW LIKELY ARE YOU TO NOD OFF OR FALL ASLEEP IN A CAR, WHILE STOPPED FOR A FEW MINUTES IN TRAFFIC: 0

## 2021-06-01 ASSESSMENT — ENCOUNTER SYMPTOMS: CHOKING: 0

## 2021-06-01 NOTE — PROGRESS NOTES
Gayla Kee         : 1950        PHONE: [phone] (home)   [] [address]     [] [address]      [] [facility]     [][facility]    [] Ival Opal  [x] [facility]     [] Other:    Diagnosis: [x] ANGELO (G47.33) [] CSA (G47.31) [] Apnea (G47.30)   Length of Need: [] 12 Months [x] 99 Months [] Other:    Machine (SAHRA!): [] Respironics Dream Station      Auto [] ResMed AirSense     Auto [] Other:     []  CPAP () [] Bilevel ()   Mode: [] Auto [] Spontaneous    Mode: [] Auto [] Spontaneous                                 Humidifier: [] Heated ()        [x] Water chamber replacement ()/ 1 per 6 months        Mask:   [] Nasal () /1 per 3 months [] Full Face () /1 per 3 months   [] Patient choice -Size and fit mask [] Patient Choice - Size and fit mask   [] Dispense: please send her nasal pillow for her dreamwear to try [] Dispense:    [] Headgear () / 1 per 3 months [] Headgear () / 1 per 3 months   [] Replacement Nasal Cushion ()/2 per month [] Interface Replacement ()/1 per month   [] Replacement Nasal Pillows ()/2 per month         Tubing: [x] Heated ()/1 per 3 months    [] Standard ()/1 per 3 months [] Other:           Filters: [x] Non-disposable ()/1 per 6 months     [x] Ultra-Fine, Disposable ()/2 per month        Miscellaneous: [] Chin Strap ()/ 1 per 6 months [] O2 bleed-in:       LPM   [] Oximetry on CPAP/Bilevel []  Other:          Start Order Date: 21    MEDICAL JUSTIFICATION:  I, the undersigned, certify that the above prescribed supplies are medically necessary for this patients wellbeing. In my opinion, the supplies are both reasonable and necessary in reference to accepted standards of medicalpractice in treatment of this patients condition.     Natalee Smith MD      NPI: [id number]       Order Signed Date: 21    Electronically signed by Natalee Smith MD on 2021 at 11:35 AM

## 2021-06-01 NOTE — PROGRESS NOTES
MD LEILA London Board Certified in Sleep Medicine  Certified in [address] [place] [address] Certified in Neurology [address]  [address]  [phone]   [address] [place] [address], [address] [place] [address] Elie Ne                      [address]  [address] [place] [id number] [phone]    Subjective:     Patient ID: Aniceto Murphy is a 73 y.o. female. Chief Complaint   Patient presents with    Follow-up     1st CPAP f/u       HPI:        Aniceto Murphy is a 73 y.o. female was seen today as a second follow formild obstructive sleep apnea with apnea hypopnea index of 5.3/hr with lowest O2 saturation of 89%, patient spent about 1.4 minutes below 90% while on O2. Patient is using the PAP machine about 93% of the time, more than 4 hours a nightabout  93 %, in total average of 5:29 hours a night in last 30 days. Currently on PAP at 14/10 cm (), the AHI is only 4.3 events per hour at this pressure. Patient improved regarding daytime sleepiness and fatigue, wakes up refreshed in the morning. The Patient scored Total score: 2 on [place] Sleepiness Scale ( more than 10 is indicative of daytime sleepiness)   Uses nasal pillow mask and has leakage, did not like the FFM during the titration. DOT/CDL - N/A        Previous Report(s)Reviewed: historical medical records         Social History     Socioeconomic History    Marital status:       Spouse name: Not on file    Number of children: 3    Years of education: 15    Highest education level: Not on file   Occupational History    Occupation: Retired  7/28/17   Tobacco Use    Smoking status: Never Smoker    Smokeless tobacco: Never Used   Vaping Use    Vaping Use: Never used   Substance and Sexual Activity    Alcohol use: Yes     Comment: OCCASIONALLY 1 or less/1-2 wk    Drug use: No     Comment: No Mj experimentation    Sexual activity: Never   Other Topics Concern    Not on file   Social History Narrative    No Exercise. 19    Information from [employer] EMR ending 2010    Problems    DEEP VENOUS THROMBOPHLEBITIS (ICD-453.40)    TRANSAMINASES, SERUM, ELEVATED (ICD-790.4)    HYPOMAGNESEMIA (ICD-275.2)    Hx of SEPSIS (ICD-995.91)    DIABETES MELLITUS, TYPE II (ICD-250.00)    HYPERTENSION (ICD-401. 9)    ALOPECIA (ICD-704.00)    ENCOUNTER FOR LONG-TERM USE OF ANTICOAGULANTS (ICD-V58.61)    DERMATITIS (ICD-692. 9)    CELLULITIS (ICD-682. 9)    FACTOR V DEFICIENCY (ICD-286.3)        Past Medical History:  Coronary Artery Disease, Hypertension, mixed hyperlipidemia/ low HDL/ metabolic synd. , Diabetes Type II, 2009 episode renal failure 2nd to sepsis, 2009 CELLULITIS/OPEN WOUND RIGHT ANKLE, BLOOD CLOTS (bilat. PASCALE hose)/ factor V leiden homozygote. Surgical History:  Appendectomy:, Cholecystectomy: , GYN Surgery: 3 , Tubal Ligation: , 2004 \"LHC\"      Family History: . Father - HTN, CAD/ MI    Son - 1 heterozygous factor V leiden. 2  HTN    Sister - Breast Ca, HTN, DVT        Exercise: doing hand weights 15-20 3x/wk. 18     Social Determinants of Health     Financial Resource Strain: Low Risk     Difficulty of Paying Living Expenses: Not hard at all   Food Insecurity: No Food Insecurity    Worried About Running Out of Food in the Last Year: Never true    Shyann of Food in the Last Year: Never true   Transportation Needs: No Transportation Needs    Lack of Transportation (Medical): No    Lack of Transportation (Non-Medical):  No   Physical Activity:     Days of Exercise per Week:     Minutes of Exercise per Session:    Stress:     Feeling of Stress :    Social Connections:     Frequency of Communication with Friends and Family:     Frequency of Social Gatherings with Friends and Family:     Attends Hinduism Services:     Active Member of Clubs or Organizations:     Attends Club or Organization Meetings:     Marital Status:    Intimate Partner Violence:     Fear of Current or Ex-Partner:     Emotionally Abused:     Physically Abused:     Sexually Abused:        Prior to Admission medications    Medication Sig Start Date End Date Taking?  Authorizing Provider   NOVOLOG 100 UNIT/ML injection vial ADMINISTER 10 UNITS UNDER THE SKIN THREE TIMES DAILY BEFORE MEALS 5/11/21  Yes JUAN MANUEL Wallace CNP   furosemide (LASIX) 40 MG tablet TAKE 1 TABLET BY MOUTH TWICE DAILY 4/30/21  Yes JUAN MANUEL Martino CNP   Insulin Syringe-Needle U-100 (INSULIN SYRINGE .3CC/31GX5/16\") 31G X 5/16\" 0.3 ML MISC USE AS DIRECTED UNDER THE SKIN THREE TIMES DAILY PER SLIDING SCALE AS DIRECTED 4/30/21  Yes JUAN MANUEL Martino CNP   metoprolol succinate (TOPROL XL) 50 MG extended release tablet TAKE 1 TABLET BY MOUTH DAILY 4/23/21  Yes JUAN MANUEL Wallace CNP   ACCU-CHEK APURVA PLUS strip CHECK SUGAR THREE TO FOUR TIMES DAILY 4/16/21  Yes JUAN MANUEL Fernando CNP   atorvastatin (LIPITOR) 40 MG tablet TAKE 1 TABLET BY MOUTH EVERY NIGHT 4/5/21  Yes JUAN MANUEL Fernando CNP   potassium chloride (KLOR-CON M) 20 MEQ extended release tablet Take 1 tablet by mouth daily 3/18/21  Yes JUAN MANUEL Wallace CNP   Accu-Chek Multiclix Lancets MISC TEST FOUR TIMES DAILY AS DIRECTED AS NEEDED FOR LOW OR HIGH BLOOD SUGAR 2/24/21  Yes JUAN MANUEL Fernando CNP   apixaban (ELIQUIS) 5 MG TABS tablet Take 1 tablet by mouth 2 times daily 12/9/20  Yes Yoel Smith MD   B-D UF III MINI PEN NEEDLES 31G X 5 MM MISC USE DAILY AS DIRECTED 4/13/20  Yes JUAN MANUEL Wallace CNP   folic acid (FOLVITE) 1 MG tablet TAKE 2 TABLETS BY MOUTH DAILY 1/10/21 4/27/21  JUAN MANUEL Wallace CNP       Allergies as of 06/01/2021 - Fully Reviewed 06/01/2021   Allergen Reaction Noted    Nickel Nausea Only and Rash 10/19/2017    Ibuprofen  05/10/2014       Patient Active Problem List   Diagnosis    [facility]    Essential hypertension    Hypercholesterolemia    Edema of both legs    Homozygous Factor V Leiden mutation ([facility]    Low serum HDL    Elevated LFTs    Hypomagnesemia    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HCC)    Vitamin D deficiency    CHF with unknown LVEF (HCC)    Generalized weakness    Pulmonary vascular congestion    Chronic venous stasis dermatitis of both lower extremities    Morbid obesity due to excess calories [facility]    Shortness of breath    Persistent atrial fibrillation (HCC)    RVF (right ventricular failure) ([facility]    Anemia    MDD (major depressive disorder), recurrent episode, moderate (HCC)    Acute on chronic congestive heart failure ([facility]    BMI 50.0-59.9, adult ([facility]    Obstructive sleep apnea    PLMD (periodic limb movement disorder)       Past Medical History:   Diagnosis Date    Diabetes mellitus type 2 in obese ([facility] 2010    Edema of both legs     Elevated LFTs     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[facility] 2018    19    Homozygous Factor V Leiden mutation ([facility] 1952    Hypercholesterolemia     Hypertension     Hypomagnesemia     Low serum HDL     Lower leg DVT (deep venous thromboembolism), chronic, right ([facility])     Osteoarthritis of cervical spine 2010    Facet arthropathy, spondylosis    Renal failure, acute ([facility])     Due to sepsis    Vitamin D deficiency 2018    13.2       Past Surgical History:   Procedure Laterality Date    APPENDECTOMY  1976     SECTION      3     CHOLECYSTECTOMY      gangrenous    COLONOSCOPY  2014    no polyp    TUBAL LIGATION Bilateral 1980       Family History   Problem Relation Age of Onset    Kidney Disease Mother         kidney stones - larger    Coronary Art Dis Father     Heart Surgery Father 48    Hypertension Father     Heart Attack Father

## 2021-06-10 DIAGNOSIS — I48.0 PAF (PAROXYSMAL ATRIAL FIBRILLATION) (HCC): ICD-10-CM

## 2021-06-10 DIAGNOSIS — R06.02 SHORTNESS OF BREATH: ICD-10-CM

## 2021-06-10 LAB
ANION GAP SERPL CALCULATED.3IONS-SCNC: 11 MMOL/L (ref 3–16)
BUN BLDV-MCNC: 9 MG/DL (ref 7–20)
CALCIUM SERPL-MCNC: 9.4 MG/DL (ref 8.3–10.6)
CHLORIDE BLD-SCNC: 102 MMOL/L (ref 99–110)
CO2: 28 MMOL/L (ref 21–32)
CREAT SERPL-MCNC: 0.7 MG/DL (ref 0.6–1.2)
GFR AFRICAN AMERICAN: >60
GFR NON-AFRICAN AMERICAN: >60
GLUCOSE BLD-MCNC: 149 MG/DL (ref 70–99)
HCT VFR BLD CALC: 37.3 % (ref 36–48)
HEMOGLOBIN: 12.2 G/DL (ref 12–16)
MCH RBC QN AUTO: 28.8 PG (ref 26–34)
MCHC RBC AUTO-ENTMCNC: 32.7 G/DL (ref 31–36)
MCV RBC AUTO: 88.3 FL (ref 80–100)
PDW BLD-RTO: 16.1 % (ref 12.4–15.4)
PLATELET # BLD: 161 K/UL (ref 135–450)
PMV BLD AUTO: 9.8 FL (ref 5–10.5)
POTASSIUM SERPL-SCNC: 3.7 MMOL/L (ref 3.5–5.1)
RBC # BLD: 4.22 M/UL (ref 4–5.2)
SODIUM BLD-SCNC: 141 MMOL/L (ref 136–145)
TSH REFLEX: 3.47 UIU/ML (ref 0.27–4.2)
WBC # BLD: 4 K/UL (ref 4–11)

## 2021-06-14 DIAGNOSIS — R60.0 EDEMA OF BOTH LEGS: ICD-10-CM

## 2021-06-15 ENCOUNTER — HOSPITAL ENCOUNTER (OUTPATIENT)
Dept: CARDIAC CATH/INVASIVE PROCEDURES | Age: 71
Discharge: HOME | End: 2021-06-15
Payer: MEDICARE

## 2021-06-15 VITALS
OXYGEN SATURATION: 98 % | HEIGHT: 66 IN | TEMPERATURE: 97.4 F | WEIGHT: 249 LBS | HEART RATE: 54 BPM | SYSTOLIC BLOOD PRESSURE: 124 MMHG | RESPIRATION RATE: 12 BRPM | DIASTOLIC BLOOD PRESSURE: 54 MMHG | BODY MASS INDEX: 40.02 KG/M2

## 2021-06-15 PROCEDURE — 2500000003 HC RX 250 WO HCPCS

## 2021-06-15 PROCEDURE — 93451 RIGHT HEART CATH: CPT | Performed by: INTERNAL MEDICINE

## 2021-06-15 PROCEDURE — C1751 CATH, INF, PER/CENT/MIDLINE: HCPCS

## 2021-06-15 PROCEDURE — C1894 INTRO/SHEATH, NON-LASER: HCPCS

## 2021-06-15 PROCEDURE — 93451 RIGHT HEART CATH: CPT

## 2021-06-15 PROCEDURE — 6360000002 HC RX W HCPCS

## 2021-06-15 PROCEDURE — 99153 MOD SED SAME PHYS/QHP EA: CPT

## 2021-06-15 PROCEDURE — 99152 MOD SED SAME PHYS/QHP 5/>YRS: CPT | Performed by: INTERNAL MEDICINE

## 2021-06-15 PROCEDURE — 99152 MOD SED SAME PHYS/QHP 5/>YRS: CPT

## 2021-06-15 RX ORDER — SODIUM CHLORIDE 9 MG/ML
INJECTION, SOLUTION INTRAVENOUS CONTINUOUS
Status: DISCONTINUED | OUTPATIENT
Start: 2021-06-15 | End: 2021-06-16 | Stop reason: HOSPADM

## 2021-06-15 RX ORDER — SODIUM CHLORIDE 0.9 % (FLUSH) 0.9 %
5-40 SYRINGE (ML) INJECTION EVERY 12 HOURS SCHEDULED
Status: DISCONTINUED | OUTPATIENT
Start: 2021-06-15 | End: 2021-06-16 | Stop reason: HOSPADM

## 2021-06-15 RX ORDER — FUROSEMIDE 40 MG/1
TABLET ORAL
Qty: 180 TABLET | Refills: 0 | Status: SHIPPED | OUTPATIENT
Start: 2021-06-15 | End: 2021-08-25 | Stop reason: SDUPTHER

## 2021-06-15 RX ORDER — SODIUM CHLORIDE 9 MG/ML
25 INJECTION, SOLUTION INTRAVENOUS PRN
Status: DISCONTINUED | OUTPATIENT
Start: 2021-06-15 | End: 2021-06-16 | Stop reason: HOSPADM

## 2021-06-15 RX ORDER — SODIUM CHLORIDE 0.9 % (FLUSH) 0.9 %
5-40 SYRINGE (ML) INJECTION PRN
Status: DISCONTINUED | OUTPATIENT
Start: 2021-06-15 | End: 2021-06-16 | Stop reason: HOSPADM

## 2021-06-15 NOTE — H&P
H&P     Subjective:   History of Present Illness:  Gabby Kulkarni is a 78 y.o. female with PMH significant for morbid obesity, atrial fib (chronic), DVT/PE, factor V, diabetes mellitus and marked mobility issues who was admitted with bilateral LE edema and increasing KENNEDY on 2020.     Today, she is here for hospital follow up. She is accompanied by her family and using a walker for ambulation. She has started weighing herself daily. She continues to be fatigued and shortness of breath with exertion. She is requesting to switch from Warfarin to another anticoagulation. Patient denies exertional chest pain/pressure, palpitations, lightheadedness, weight changes, changes in LE edema, and syncope. Prior cardiac testing:     [id number] Atrial fibrillation     Echo: 2020  Ejection fraction is visually estimated to be 55-60%. Grade I diastolic dysfunction with normal LV filling pressures. Severly dilated right ventricle. Right ventricular systolic function is mildly reduced. The right atrium is severely dilated.   Estimated pulmonary artery systolic pressure is normal at 37 mmHg assuming a right atrial pressure of 15 mmHg.     Stress Test:      Cath:         Past Medical History:   has a past medical history of Diabetes mellitus type 2 in obese ([address] Edema of both legs, Elevated LFTs, 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[address] Homozygous Factor V Leiden mutation ([facility] Hypercholesterolemia, Hypertension, Hypomagnesemia, Low serum HDL, Lower leg DVT (deep venous thromboembolism), chronic, right (HCC), Osteoarthritis of cervical spine, Renal failure, acute ([address] and Vitamin D deficiency.     Surgical History:   has a past surgical history that includes Cholecystectomy ();  section; Appendectomy (1976); Tubal ligation (Bilateral, 1980); and Colonoscopy (2014).    Social History:   reports that she has never smoked. She has never used smokeless tobacco. She reports current alcohol use. She reports that she does not use drugs.      Family History:  family history includes COPD in her sister; Clotting Disorder in her son; Coronary Art Dis in her father; Diabetes in her brother and sister; Heart Attack in her father; Heart Surgery (age of onset: 48) in her father; High Cholesterol in her son; Hypertension in her father and son; Kidney Disease in her mother; Other in her sister and son.     Home Medications:  Were reviewed and are listed in nursing record and/or below  Home Medications           Prior to Admission medications    Medication Sig Start Date End Date Taking?  Authorizing Provider   furosemide (LASIX) 40 MG tablet Take 1 tablet by mouth 2 times daily 12/3/20   Yes Dustin Denis MD   insulin aspart (NOVOLOG) 100 UNIT/ML injection vial Inject 10 Units into the skin 3 times daily (before meals) 12/3/20   Yes Dustin Denis MD   Insulin Degludec (TRESIBA FLEXTOUCH) 100 UNIT/ML SOPN Inject 40 Units into the skin daily 12/3/20   Yes Dustin Denis MD   metoprolol succinate (TOPROL XL) 50 MG extended release tablet TAKE 1 TABLET BY MOUTH DAILY  Patient taking differently: Take 50 mg by mouth nightly TAKE 1 TABLET BY MOUTH DAILY 10/20/20   Yes JUAN MANUEL Morales CNP   potassium chloride (KLOR-CON M) 10 MEQ extended release tablet TAKE 1 TABLET BY MOUTH DAILY  Patient taking differently: Take 10 mEq by mouth nightly  10/20/20   Yes JUAN MANUEL Morales CNP   warfarin (COUMADIN) 6 MG tablet TAKE 1 TABLET BY MOUTH DAILY OR AS DIRECTED  Patient taking differently: See Admin Instructions 6 mg daily except 3 mg on Tuesday and Saturday 7/9/20   Yes Shaaron Lesches, MD   folic acid (FOLVITE) 1 MG tablet Take 2 tablets by mouth daily  Patient taking differently: Take 2 mg by mouth nightly  6/26/20 12/9/20 Yes Summer Parnell, JUAN MANUEL - CNP   B-D UF III MINI PEN NEEDLES 31G X 5 MM MISC USE DAILY AS DIRECTED 4/13/20   Yes JUAN MANUEL Holguin - CNP   ACCU-CHEK APURVA PLUS strip CHECK SUGAR 3 TO 4 TIMES A DAY 4/3/20   Yes JUAN MANUEL Shaffer CNP   atorvastatin (LIPITOR) 40 MG tablet Take 1 tablet by mouth nightly 12/30/19   Yes JUAN MANUEL Shaffer CNP   ACCU-CHEK MULTICLIX LANCETS MISC TEST FOUR TIMES DAILY AS NEEDED FOR LOW OR HIGH BLOOD SUGARS 8/2/19   Yes JUAN MANUEL Avalos CNP               Allergies:  Nickel and Ibuprofen      Review of Systems:   · Constitutional: no unanticipated weight loss. There's been no change in energy level, sleep pattern, or activity level. No fevers, chills. · Eyes: No visual changes or diplopia. No scleral icterus. · ENT: No Headaches, hearing loss or vertigo. No mouth sores or sore throat. · Cardiovascular: as reviewed in HPI  · Respiratory: No cough or wheezing, no sputum production. No hemoptysis. · Gastrointestinal: No abdominal pain, appetite loss, blood in stools. No change in bowel or bladder habits. · Genitourinary: No dysuria, trouble voiding, or hematuria. · Musculoskeletal:  No gait disturbance, no joint complaints. · Integumentary: No rash or pruritis. · Neurological: No headache, diplopia, change in muscle strength, numbness or tingling. · Psychiatric: No anxiety or depression. · Endocrine: No temperature intolerance. No excessive thirst, fluid intake, or urination. No tremor. · Hematologic/Lymphatic: No abnormal bruising or bleeding, blood clots or swollen lymph nodes. · Allergic/Immunologic: No nasal congestion or hives.     Objective:   PHYSICAL EXAM:        Vitals:     12/09/20 1623   BP: 122/84   Pulse: 62   Temp: 97.1 °F (36.2 °C)   SpO2: 98%    Weight: (!) 348 lb 12.8 oz (158.2 kg)       General Appearance:  Alert, cooperative, no distress, appears stated age. Head:  Normocephalic, without obvious abnormality, atraumatic. Eyes:  Pupils equal and round. No scleral icterus. Mouth: Moist mucosa, no pharyngeal erythema. Nose: Nares normal. No drainage or sinus tenderness.    Neck: Supple, symmetrical, trachea midline. No adenopathy. No tenderness/mass/nodules. No carotid bruit or elevated JVD. Lungs:   Clear to auscultation bilaterally, respirations unlabored. No wheeze, rales, or rhonchi. Chest Wall:  No tenderness or deformity. Heart:  Regular rate. S1/S2 normal. No murmur, rub, or gallop. Abdomen:   Soft, non-tender, bowel sounds active. Musculoskeletal: No muscle wasting or digital clubbing. Extremities: Extremities normal, atraumatic. No cyanosis or edema. Pulses: 2+ radial and carotid pulses, symmetric. Skin: No rashes or lesions. Pysch: Normal mood and affect. Alert and oriented x 4.    Neurologic: Normal gross motor and sensory exam.       Labs      CBC:         Lab Results   Component Value Date     WBC 3.0 12/03/2020     RBC 3.85 12/03/2020     HGB 10.0 12/03/2020     HCT 31.2 12/03/2020     MCV 81.0 12/03/2020     RDW 20.8 12/03/2020      12/03/2020      CMP:        Lab Results   Component Value Date      12/03/2020     K 3.8 12/03/2020     CL 95 12/03/2020     CO2 36 12/03/2020     BUN 19 12/03/2020     CREATININE 0.7 12/03/2020     GFRAA >60 12/03/2020     GFRAA >60 03/12/2010     AGRATIO 1.3 06/16/2020     LABGLOM >60 12/03/2020     GLUCOSE 110 12/03/2020     PROT 6.7 06/16/2020     CALCIUM 9.4 12/03/2020     BILITOT 1.2 06/16/2020     ALKPHOS 120 06/16/2020     AST 18 06/16/2020     ALT 9 06/16/2020      PT/INR:  No results found for: PTINR  HgBA1c:        Lab Results   Component Value Date/Time     LABA1C 6.2 09/25/2020 09:00 AM      Troponin:         Lab Results   Component Value Date/Time     TROPONINI <0.01 11/22/2020 12:22 PM            CURRENT Medications:         Current Outpatient Medications on File Prior to Visit   Medication Sig Dispense Refill    furosemide (LASIX) 40 MG tablet Take 1 tablet by mouth 2 times daily 90 tablet 1    insulin aspart (NOVOLOG) 100 UNIT/ML injection vial Inject 10 Units into the skin 3 times daily (before meals) 20 mL 0    Insulin Degludec (TRESIBA FLEXTOUCH) 100 UNIT/ML SOPN Inject 40 Units into the skin daily 75 mL 2    metoprolol succinate (TOPROL XL) 50 MG extended release tablet TAKE 1 TABLET BY MOUTH DAILY (Patient taking differently: Take 50 mg by mouth nightly TAKE 1 TABLET BY MOUTH DAILY) 90 tablet 0    potassium chloride (KLOR-CON M) 10 MEQ extended release tablet TAKE 1 TABLET BY MOUTH DAILY (Patient taking differently: Take 10 mEq by mouth nightly ) 90 tablet 0    warfarin (COUMADIN) 6 MG tablet TAKE 1 TABLET BY MOUTH DAILY OR AS DIRECTED (Patient taking differently: See Admin Instructions 6 mg daily except 3 mg on Tuesday and Saturday) 90 tablet 1    folic acid (FOLVITE) 1 MG tablet Take 2 tablets by mouth daily (Patient taking differently: Take 2 mg by mouth nightly ) 180 tablet 1    B-D UF III MINI PEN NEEDLES 31G X 5 MM MISC USE DAILY AS DIRECTED 100 each 5    ACCU-CHEK APURVA PLUS strip CHECK SUGAR 3 TO 4 TIMES A  strip 5    atorvastatin (LIPITOR) 40 MG tablet Take 1 tablet by mouth nightly 90 tablet 3    ACCU-CHEK MULTICLIX LANCETS MISC TEST FOUR TIMES DAILY AS NEEDED FOR LOW OR HIGH BLOOD SUGARS 100 each 1      No current facility-administered medications on file prior to visit.          Assessment and Plan   1) Chronic RV systolic heart failure     I have reviewed the history and physical and examined the patient and find no relevant changes. I have reviewed with the patient and/or family the risks, benefits, and alternatives to the procedure. Pre-sedation Assessment    Patient:  Елена Sanches   :   1950  Intended Procedure: Right heart cath     Vitals:    06/15/21 1200   BP: (!) 124/54   Pulse: 54   Resp: 12   Temp: 97.4 °F (36.3 °C)   SpO2: 98%       Nursing notes reviewed and agreed. Medications reviewed  Allergies:    Allergies   Allergen Reactions    Nickel Nausea Only and Rash         Pre-Procedure Assessment/Plan:  ASA 3 - Patient with moderate systemic disease with functional limitations    Mallampati Airway Assessment:  Mallampati Class I - (soft palate, fauces, uvula & anterior/posterior tonsillar pillars are visible)    Level of Sedation Plan:Mild sedation    Post Procedure plan: Return to same level of care    Vilma Coombs MD [address] [facility], Interventional Cardiology, and Peripheral Vascular [address] [facility]   (C): [phone]  (O): [phone]

## 2021-06-15 NOTE — OP NOTE
[address]   Procedure Note    CLINICAL HISTORY:       Ashley Felton is a 73 y.o. female with a history of DVT/PE, with dyspnea, Echo suggestive of RV dysfunction and non-diagnostic TR gradient to determine pulmonary pressures. Patient Active Problem List   Diagnosis    DMII (diabetes mellitus, type 2) ([facility]    Essential hypertension    Hypercholesterolemia    Edema of both legs    Homozygous Factor V Leiden mutation ([facility]    Low serum HDL    Elevated LFTs    Hypomagnesemia    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[facility]    Vitamin D deficiency    CHF with unknown LVEF ([facility])    Generalized weakness    Pulmonary vascular congestion    Chronic venous stasis dermatitis of both lower extremities    Morbid obesity due to excess calories ([facility])    Shortness of breath    Persistent atrial fibrillation ([facility])    RVF (right ventricular failure) ([facility])    Anemia    MDD (major depressive disorder), recurrent episode, moderate ([facility])    Acute on chronic congestive heart failure ([facility]    BMI 50.0-59.9, adult ([facility])    Obstructive sleep apnea    PLMD (periodic limb movement disorder)       Prior to Admission medications    Medication Sig Start Date End Date Taking?  Authorizing Provider   furosemide (LASIX) 40 MG tablet TAKE 1 TABLET BY MOUTH TWICE DAILY 6/15/21  Yes JUAN MANUEL Chowdary CNP   NOVOLOG 100 UNIT/ML injection vial ADMINISTER 10 UNITS UNDER THE SKIN THREE TIMES DAILY BEFORE MEALS 5/11/21  Yes JUAN MANUEL Andrade CNP   metoprolol succinate (TOPROL XL) 50 MG extended release tablet TAKE 1 TABLET BY MOUTH DAILY 4/23/21  Yes JUAN MANUEL Davis CNP   atorvastatin (LIPITOR) 40 MG tablet TAKE 1 TABLET BY MOUTH EVERY NIGHT 4/5/21  Yes JUAN MANUEL Davis CNP   potassium chloride (KLOR-CON M) 20 MEQ extended release tablet Take 1 tablet by mouth daily 3/18/21  Yes JUAN MANUEL Suarez CNP   Insulin Syringe-Needle U-100 (INSULIN SYRINGE .3CC/31GX5/16\") 31G X 5/16\" 0.3 ML MISC USE AS DIRECTED UNDER THE SKIN THREE TIMES DAILY PER SLIDING SCALE AS DIRECTED 4/30/21   JUAN MANUEL Brennan CNP   ACCU-CHEK APURVA PLUS strip CHECK SUGAR THREE TO FOUR TIMES DAILY 4/16/21   JUAN MANUEL Castro CNP   Accu-Chek Multiclix Lancets MISC TEST FOUR TIMES DAILY AS DIRECTED AS NEEDED FOR LOW OR HIGH BLOOD SUGAR 2/24/21   JUAN MANUEL Castro - CNP   folic acid (FOLVITE) 1 MG tablet TAKE 2 TABLETS BY MOUTH DAILY 1/10/21 4/27/21  JUAN MANUEL Beltre - CNP   apixaban (ELIQUIS) 5 MG TABS tablet Take 1 tablet by mouth 2 times daily 12/9/20   Lennox Mura, MD   B-D UF III MINI PEN NEEDLES 31G X 5 MM MISC USE DAILY AS DIRECTED 4/13/20   JUAN MANUEL Beltre CNP        The risks, benefits, and details of the procedure were explained to the patient. The patient verbalized understanding and wanted to proceed. Informed written consent was obtained. INDICATION:  RV failure     PROCEDURES PERFORMED:   RHC     PROCEDURE TECHNIQUE:  The patient was approached from the right brachial vein using US technique with 4-5 slender sheath. CONTRAST:  Total of 0 cc. COMPLICATIONS:  None. EBL: 0 cc    Moderate Sedation:  Start time: 1152  Stop time: 1318  1 mg versed   25 mcg fentanyl   An independent trained observer pushed medications at my direction. We monitored the patient's level of consciousness and vital signs/physiologic status throughout the procedure duration (see start and start times above). HEMODYNAMICS:  Aortic pressure was 147/62    RA 4  RV 34/4  PA 41/14/26  PCWP 17   PA % 67  AO % 99  CO/CI 5.5 L/min 2.55 L/min/m2  SVR 1228 dynes . Sec/cm-5   dynes . Sec/cm-5  TPG 9    INTERVENTION  1.  None     SUMMARY:   Normal intracardiac and pulmonary pressures     RECOMMENDATION:      - continued current medical therapy     Marija Hubbard MD [address] [facility], Interventional Cardiology, and Peripheral Vascular Disease   [address]   (C):

## 2021-06-15 NOTE — TELEPHONE ENCOUNTER
Component Value Ref Range & Units Status Collected Lab   Sodium 141  136 - 145 mmol/L Final 06/10/2021 11:36 AM [facility]   Potassium 3.7  3.5 - 5.1 mmol/L Final 06/10/2021 11:36 AM [facility]   Chloride 102  99 - 110 mmol/L Final 06/10/2021 11:36 AM [facility]   CO2 28  21 - 32 mmol/L Final 06/10/2021 11:36 AM [facility]   Anion Gap 11  3 - 16 Final 06/10/2021 11:36 AM [facility]   Glucose 149High   70 - 99 mg/dL Final 06/10/2021 11:36 AM [facility]   BUN 9  7 - 20 mg/dL Final 06/10/2021 11:36 AM [facility]   CREATININE 0.7  0.6 - 1.2 mg/dL Final 06/10/2021 11:36 AM [facility]   GFR Non-African American >60  >60 Final 06/10/2021 11:36 AM [facility]   >60 mL/min/1.73m2 EGFR, calc. for ages 25 and older using the   MDRD formula (not corrected for weight), is valid for stable   renal function. GFR  >60  >60 Final 06/10/2021 11:36 AM [facility]   Chronic Kidney Disease: less than 60 ml/min/1.73 sq. m.         Kidney Failure: less than 15 ml/min/1.73 sq.m. Results valid for patients 18 years and older. Calcium 9.4  8.3 - 10.6 mg/dL Final 06/10/2021 11:36 AM [facility], RX SENT TO THE PHARMACY.   [address] [place] [address]

## 2021-06-15 NOTE — PRE SEDATION
Sedation Pre-Procedure Note    Patient Name: Ricky Rodarte   YOB: 1950  Room/Bed: Room/bed info not found  Medical Record Number: [id number]  Date: 6/15/2021   Time: 1:47 PM       Indication:  RV failure     Consent: I have discussed with the patient and/or the patient representative the indication, alternatives, and the possible risks and/or complications of the planned procedure and the anesthesia methods. The patient and/or patient representative appear to understand and agree to proceed. Vital Signs:   Vitals:    06/15/21 1200   BP: (!) 124/54   Pulse: 54   Resp: 12   Temp: 97.4 °F (36.3 °C)   SpO2: 98%       Past Medical History:   has a past medical history of Diabetes mellitus type 2 in obese ([facility] Edema of both legs, Elevated LFTs, 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[facility] Homozygous Factor V Leiden mutation ([facility] Hypercholesterolemia, Hypertension, Hypomagnesemia, Low serum HDL, Lower leg DVT (deep venous thromboembolism), chronic, right (HCC), Osteoarthritis of cervical spine, Renal failure, acute ([facility] and Vitamin D deficiency. Past Surgical History:   has a past surgical history that includes Cholecystectomy ();  section; Appendectomy (1976); Tubal ligation (Bilateral, 1980); and Colonoscopy (2014). Medications:   Scheduled Meds:    sodium chloride flush  5-40 mL Intravenous 2 times per day     Continuous Infusions:    sodium chloride      sodium chloride       PRN Meds: sodium chloride flush, sodium chloride  Home Meds:   Prior to Admission medications    Medication Sig Start Date End Date Taking?  Authorizing Provider   furosemide (LASIX) 40 MG tablet TAKE 1 TABLET BY MOUTH TWICE DAILY 6/15/21  Yes JUAN MANUEL Jung CNP   NOVOLOG 100 UNIT/ML injection vial ADMINISTER 10 UNITS UNDER THE SKIN THREE TIMES DAILY BEFORE MEALS 21  Yes JUAN MANUEL Sharp CNP   metoprolol succinate (TOPROL XL) 50 MG extended release tablet TAKE 1 TABLET BY MOUTH DAILY 4/23/21  Yes JUAN MANUEL Marroquin CNP   atorvastatin (LIPITOR) 40 MG tablet TAKE 1 TABLET BY MOUTH EVERY NIGHT 4/5/21  Yes JUAN MANUEL Marroquin CNP   potassium chloride (KLOR-CON M) 20 MEQ extended release tablet Take 1 tablet by mouth daily 3/18/21  Yes JUAN MANUEL Diaz CNP   Insulin Syringe-Needle U-100 (INSULIN SYRINGE .3CC/31GX5/16\") 31G X 5/16\" 0.3 ML MISC USE AS DIRECTED UNDER THE SKIN THREE TIMES DAILY PER SLIDING SCALE AS DIRECTED 4/30/21   JUAN MANUEL Rivera CNP   ACCU-CHEK APURVA PLUS strip CHECK SUGAR THREE TO FOUR TIMES DAILY 4/16/21   JUAN MANUEL Marroquin CNP   Accu-Chek Multiclix Lancets MISC TEST FOUR TIMES DAILY AS DIRECTED AS NEEDED FOR LOW OR HIGH BLOOD SUGAR 2/24/21   JUAN MANUEL Marroquin CNP   folic acid (FOLVITE) 1 MG tablet TAKE 2 TABLETS BY MOUTH DAILY 1/10/21 4/27/21  JUAN MANUEL Diaz CNP   apixaban (ELIQUIS) 5 MG TABS tablet Take 1 tablet by mouth 2 times daily 12/9/20   Thuy Ho MD   B-D UF III MINI PEN NEEDLES 31G X 5 MM MISC USE DAILY AS DIRECTED 4/13/20   JUAN MANUEL Diaz CNP     Coumadin Use Last 7 Days:  no  Antiplatelet drug therapy use last 7 days: no  Other anticoagulant use last 7 days: yes - [facility]  Additional Medication Information:  n/a      Pre-Sedation Documentation and Exam:   I have personally completed a history, physical exam & review of systems for this patient (see notes).     Mallampati Airway Assessment:  Mallampati Class I - (soft palate, fauces, uvula & anterior/posterior tonsillar pillars are visible)    Prior History of Anesthesia Complications:   none    ASA Classification:  Class 3 - A patient with severe systemic disease that limits activity but is not incapacitating    Sedation/ Anesthesia Plan:   intravenous sedation    Medications Planned:   midazolam (Versed) intravenously    Patient is an appropriate candidate for plan of sedation: yes    Electronically signed by Gale Pennington

## 2021-06-28 RX ORDER — APIXABAN 5 MG/1
TABLET, FILM COATED ORAL
Qty: 180 TABLET | Refills: 1 | Status: SHIPPED | OUTPATIENT
Start: 2021-06-28 | End: 2021-12-27

## 2021-06-30 ENCOUNTER — TELEPHONE (OUTPATIENT)
Dept: FAMILY MEDICINE CLINIC | Age: 71
End: 2021-06-30

## 2021-06-30 NOTE — TELEPHONE ENCOUNTER
----- Message from [facility] sent at 6/30/2021  2:49 PM EDT -----  Subject: Message to Provider    QUESTIONS  Information for Provider? Pt has stuffy head, runny nose, sneezing. Pt   would like to know what she can take for the symptoms over the counter. ---------------------------------------------------------------------------  --------------  Lesa FREIRE  What is the best way for the office to contact you? OK to leave message on   voicemail  Preferred Call Back Phone Number? [phone]  ---------------------------------------------------------------------------  --------------  SCRIPT ANSWERS  Relationship to Patient?  Self

## 2021-07-09 RX ORDER — FOLIC ACID 1 MG/1
2 TABLET ORAL DAILY
Qty: 180 TABLET | Refills: 3 | Status: SHIPPED | OUTPATIENT
Start: 2021-07-09 | End: 2021-08-25 | Stop reason: SDUPTHER

## 2021-07-09 NOTE — PROGRESS NOTES
Cardiology Consultation   Referring Physician:   Reason for Consultation: hospital follow up  Chief Complaint:   Chief Complaint   Patient presents with    Follow-Up from Hospital     no cc          Subjective:   History of Present Illness:  Arturo De La Cruz is a 73 y.o. female with PMH significant for morbid obesity, atrial fib (chronic), DVT/PE, factor V, diabetes mellitus and marked mobility issues who was admitted with bilateral LE edema and increasing KENNEDY on 2020. She underwent right heart catheterization on 6/15/21 showing normal pressures. She presents today for follow up. She reports losing a significant amount of weight since January. She reports feeling well overall. She is now on CPAP therapy and tolerating well. Patient denies exertional chest pain/pressure, dyspnea at rest, KENNEDY, PND, orthopnea, palpitations, lightheadedness, weight changes, changes in LE edema, and syncope. Prior cardiac testing:    EK21 Atrial fibrillation    Echo: 2020  Ejection fraction is visually estimated to be 55-60%. Grade I diastolic dysfunction with normal LV filling pressures. Severly dilated right ventricle. Right ventricular systolic function is mildly reduced. The right atrium is severely dilated. Estimated pulmonary artery systolic pressure is normal at 37 mmHg assuming a right atrial pressure of 15 mmHg. Echo 21  There is mildly increased left ventricular wall thickness with normal LV  size and systolic function. Ejection fraction is visually estimated to be 55-60%. Grade II diastolic dysfunction with elevated LV filling pressures. Moderately dilated right ventricle with mild reduced function. Compared to  prior study, RV appears less dilated. The left atrium moderately dilated. The right atrium is severely dilated. Moderate mitral regurgitation. At least moderate tricuspid regurgitation appreciated.  Full jet and velocity  not well appreciated due to RA size. Pulmonary artery systolic pressure is not well assessed in this study due to  incomplete envelope of TR jet. [address] 6/15/21  SUMMARY:   Normal intracardiac and pulmonary pressures        Past Medical History:   has a past medical history of Diabetes mellitus type 2 in obese ([address] Edema of both legs, Elevated LFTs, 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[address] Homozygous Factor V Leiden mutation ([address] Hypercholesterolemia, Hypertension, Hypomagnesemia, Low serum HDL, Lower leg DVT (deep venous thromboembolism), chronic, right ([address] Osteoarthritis of cervical spine, Renal failure, acute ([address] and Vitamin D deficiency. Surgical History:   has a past surgical history that includes Cholecystectomy ();  section; Appendectomy (1976); Tubal ligation (Bilateral, 1980); and Colonoscopy (2014). Social History:   reports that she has never smoked. She has never used smokeless tobacco. She reports current alcohol use. She reports that she does not use drugs. Family History:  family history includes COPD in her sister; Clotting Disorder in her son; Coronary Art Dis in her father; Diabetes in her brother and sister; Heart Attack in her father; Heart Surgery (age of onset: 48) in her father; High Cholesterol in her son; Hypertension in her father and son; Kidney Disease in her mother; Other in her sister and son. Allergies:  Nickel     Review of Systems:   · Constitutional: no unanticipated weight loss. There's been no change in energy level, sleep pattern, or activity level. No fevers, chills. · Eyes: No visual changes or diplopia. No scleral icterus. · ENT: No Headaches, hearing loss or vertigo. No mouth sores or sore throat. · Cardiovascular: as reviewed in HPI  · Respiratory: No cough or wheezing, no sputum production. No hemoptysis. · Gastrointestinal: No abdominal pain, appetite loss, blood in stools.  No change in bowel or bladder habits. · Genitourinary: No dysuria, trouble voiding, or hematuria. · Musculoskeletal:  No gait disturbance, no joint complaints. · Integumentary: No rash or pruritis. · Neurological: No headache, diplopia, change in muscle strength, numbness or tingling. · Psychiatric: No anxiety or depression. · Endocrine: No temperature intolerance. No excessive thirst, fluid intake, or urination. No tremor. · Hematologic/Lymphatic: No abnormal bruising or bleeding, blood clots or swollen lymph nodes. · Allergic/Immunologic: No nasal congestion or hives. Objective:   PHYSICAL EXAM:    Vitals:    07/12/21 1617   BP: 126/72   Pulse: 89   SpO2: 98%    Weight: 236 lb (107 kg)       General Appearance:  Alert, cooperative, no distress, appears stated age. Head:  Normocephalic, without obvious abnormality, atraumatic. Eyes:  Pupils equal and round. No scleral icterus. Mouth: Moist mucosa, no pharyngeal erythema. Nose: Nares normal. No drainage or sinus tenderness. Neck: Supple, symmetrical, trachea midline. No adenopathy. No tenderness/mass/nodules. No carotid bruit or elevated JVD. Lungs:   Clear to auscultation bilaterally, respirations unlabored. No wheeze, rales, or rhonchi. Chest Wall:  No tenderness or deformity. Heart:  Regular rate. S1/S2 normal. No murmur, rub, or gallop. Abdomen:   Soft, non-tender, bowel sounds active. Musculoskeletal: No muscle wasting or digital clubbing. Extremities: Extremities normal, atraumatic. No cyanosis or edema. Pulses: 2+ radial and carotid pulses, symmetric. Skin: No rashes or lesions. Pysch: Normal mood and affect. Alert and oriented x 4.    Neurologic: Normal gross motor and sensory exam.       Labs     CBC:   Lab Results   Component Value Date    WBC 4.0 06/10/2021    RBC 4.22 06/10/2021    HGB 12.2 06/10/2021    HCT 37.3 06/10/2021    MCV 88.3 06/10/2021    RDW 16.1 06/10/2021     06/10/2021     CMP:  Lab Results   Component Value Date     06/10/2021    K 3.7 06/10/2021    K 3.8 12/03/2020     06/10/2021    CO2 28 06/10/2021    BUN 9 06/10/2021    CREATININE 0.7 06/10/2021    GFRAA >60 06/10/2021    GFRAA >60 03/12/2010    AGRATIO 1.1 03/17/2021    LABGLOM >60 06/10/2021    GLUCOSE 149 06/10/2021    PROT 7.3 03/17/2021    CALCIUM 9.4 06/10/2021    BILITOT 3.3 03/17/2021    ALKPHOS 121 03/17/2021    AST 22 03/17/2021    ALT 9 03/17/2021     PT/INR:  No results found for: PTINR  HgBA1c:  Lab Results   Component Value Date/Time    LABA1C 6.7 02/24/2021 11:32 AM     Troponin:   Lab Results   Component Value Date/Time    TROPONINI <0.01 11/22/2020 12:22 PM         CURRENT Medications:  Current Outpatient Medications on File Prior to Visit   Medication Sig Dispense Refill    folic acid (FOLVITE) 1 MG tablet TAKE 2 TABLETS BY MOUTH DAILY 180 tablet 3    ELIQUIS 5 MG TABS tablet TAKE 1 TABLET BY MOUTH TWICE DAILY 180 tablet 1    furosemide (LASIX) 40 MG tablet TAKE 1 TABLET BY MOUTH TWICE DAILY 180 tablet 0    NOVOLOG 100 UNIT/ML injection vial ADMINISTER 10 UNITS UNDER THE SKIN THREE TIMES DAILY BEFORE MEALS 20 mL 2    Insulin Syringe-Needle U-100 (INSULIN SYRINGE .3CC/31GX5/16\") 31G X 5/16\" 0.3 ML MISC USE AS DIRECTED UNDER THE SKIN THREE TIMES DAILY PER SLIDING SCALE AS DIRECTED 100 each 5    metoprolol succinate (TOPROL XL) 50 MG extended release tablet TAKE 1 TABLET BY MOUTH DAILY 90 tablet 0    ACCU-CHEK APURVA PLUS strip CHECK SUGAR THREE TO FOUR TIMES DAILY 300 strip 5    atorvastatin (LIPITOR) 40 MG tablet TAKE 1 TABLET BY MOUTH EVERY NIGHT 90 tablet 0    potassium chloride (KLOR-CON M) 20 MEQ extended release tablet Take 1 tablet by mouth daily 90 tablet 1    Accu-Chek Multiclix Lancets MISC TEST FOUR TIMES DAILY AS DIRECTED AS NEEDED FOR LOW OR HIGH BLOOD SUGAR 306 each 0    B-D UF III MINI PEN NEEDLES 31G X 5 MM MISC USE DAILY AS DIRECTED 100 each 5     No current facility-administered medications on file prior to visit. Assessment and Plan   1) Chronic RV systolic heart failure  Appears compenstated  Normal pulmonary pressure by recent R[facility]     2) Atrial fibrillation  Chronic  Continue Eliquis 5 mg BID     3) H/O DVT/PE  Eliquis 5 mg BID     4) Morbid Obesity  Encouraged weight loss. 5) ANGELO  Encouraged CPAP compliance       Follow up in 6 months. Thank you for allowing us to participate in the care of Wai Gambino MD [address] [facility]   [facility]   (H): [phone]   (F): [phone]      This note was scribed in the presence of Gi Deng MD by [employer], RN. Physician Attestation:  The scribes documentation has been prepared under my direction and personally reviewed by me in its entirety. I confirm the note above accurately reflects all work, treatment, procedures, and medical decision making performed by me.     Electronically signed by Sagrario Newman MD on 7/25/2021 at 1:32 PM

## 2021-07-12 ENCOUNTER — OFFICE VISIT (OUTPATIENT)
Dept: CARDIOLOGY CLINIC | Age: 71
End: 2021-07-12
Payer: MEDICARE

## 2021-07-12 VITALS
OXYGEN SATURATION: 98 % | BODY MASS INDEX: 37.93 KG/M2 | HEIGHT: 66 IN | DIASTOLIC BLOOD PRESSURE: 72 MMHG | HEART RATE: 89 BPM | SYSTOLIC BLOOD PRESSURE: 126 MMHG | WEIGHT: 236 LBS

## 2021-07-12 DIAGNOSIS — I50.22 CHRONIC SYSTOLIC CHF (CONGESTIVE HEART FAILURE), NYHA CLASS 2 (HCC): ICD-10-CM

## 2021-07-12 DIAGNOSIS — I48.19 PERSISTENT ATRIAL FIBRILLATION (HCC): Primary | ICD-10-CM

## 2021-07-12 DIAGNOSIS — Z86.718 H/O DEEP VENOUS THROMBOSIS: ICD-10-CM

## 2021-07-12 PROCEDURE — 99213 OFFICE O/P EST LOW 20 MIN: CPT | Performed by: INTERNAL MEDICINE

## 2021-07-12 PROCEDURE — 93000 ELECTROCARDIOGRAM COMPLETE: CPT | Performed by: INTERNAL MEDICINE

## 2021-07-19 DIAGNOSIS — R74.8 LOW SERUM HDL: Chronic | ICD-10-CM

## 2021-07-19 DIAGNOSIS — E78.00 HYPERCHOLESTEROLEMIA: ICD-10-CM

## 2021-07-20 RX ORDER — ATORVASTATIN CALCIUM 40 MG/1
TABLET, FILM COATED ORAL
Qty: 90 TABLET | Refills: 0 | Status: SHIPPED | OUTPATIENT
Start: 2021-07-20 | End: 2021-08-25 | Stop reason: SDUPTHER

## 2021-08-03 DIAGNOSIS — I10 ESSENTIAL HYPERTENSION: ICD-10-CM

## 2021-08-03 RX ORDER — METOPROLOL SUCCINATE 50 MG/1
TABLET, EXTENDED RELEASE ORAL
Qty: 90 TABLET | Refills: 0 | Status: SHIPPED | OUTPATIENT
Start: 2021-08-03 | End: 2021-11-08

## 2021-08-25 ENCOUNTER — OFFICE VISIT (OUTPATIENT)
Dept: FAMILY MEDICINE CLINIC | Age: 71
End: 2021-08-25
Payer: MEDICARE

## 2021-08-25 VITALS
WEIGHT: 227 LBS | RESPIRATION RATE: 12 BRPM | HEART RATE: 77 BPM | OXYGEN SATURATION: 98 % | DIASTOLIC BLOOD PRESSURE: 64 MMHG | SYSTOLIC BLOOD PRESSURE: 120 MMHG | BODY MASS INDEX: 36.48 KG/M2 | HEIGHT: 66 IN | TEMPERATURE: 97.4 F

## 2021-08-25 DIAGNOSIS — R60.0 EDEMA OF BOTH LEGS: ICD-10-CM

## 2021-08-25 DIAGNOSIS — I50.9 CHF WITH UNKNOWN LVEF (HCC): ICD-10-CM

## 2021-08-25 DIAGNOSIS — E72.11 HOMOCYSTINEMIA (HCC): ICD-10-CM

## 2021-08-25 DIAGNOSIS — E11.69 DIABETES MELLITUS TYPE 2 IN OBESE (HCC): ICD-10-CM

## 2021-08-25 DIAGNOSIS — E78.00 HYPERCHOLESTEROLEMIA: ICD-10-CM

## 2021-08-25 DIAGNOSIS — M81.0 POST-MENOPAUSAL OSTEOPOROSIS: ICD-10-CM

## 2021-08-25 DIAGNOSIS — I10 ESSENTIAL HYPERTENSION: ICD-10-CM

## 2021-08-25 DIAGNOSIS — E66.9 DIABETES MELLITUS TYPE 2 IN OBESE (HCC): ICD-10-CM

## 2021-08-25 DIAGNOSIS — Z12.31 ENCOUNTER FOR SCREENING MAMMOGRAM FOR MALIGNANT NEOPLASM OF BREAST: ICD-10-CM

## 2021-08-25 DIAGNOSIS — Z23 NEED FOR SHINGLES VACCINE: ICD-10-CM

## 2021-08-25 DIAGNOSIS — Z00.00 ROUTINE GENERAL MEDICAL EXAMINATION AT A HEALTH CARE FACILITY: Primary | ICD-10-CM

## 2021-08-25 LAB
HBA1C MFR BLD: 6.6 %
HOMOCYSTEINE: 17 UMOL/L (ref 0–10)

## 2021-08-25 PROCEDURE — 36415 COLL VENOUS BLD VENIPUNCTURE: CPT | Performed by: NURSE PRACTITIONER

## 2021-08-25 PROCEDURE — G0438 PPPS, INITIAL VISIT: HCPCS | Performed by: NURSE PRACTITIONER

## 2021-08-25 PROCEDURE — 83036 HEMOGLOBIN GLYCOSYLATED A1C: CPT | Performed by: NURSE PRACTITIONER

## 2021-08-25 RX ORDER — FOLIC ACID 1 MG/1
5 TABLET ORAL DAILY
Qty: 150 TABLET | Refills: 3 | Status: SHIPPED | OUTPATIENT
Start: 2021-08-25 | End: 2022-01-20

## 2021-08-25 RX ORDER — LISINOPRIL 40 MG/1
40 TABLET ORAL DAILY
Qty: 90 TABLET | Refills: 1 | Status: SHIPPED | OUTPATIENT
Start: 2021-08-25 | End: 2022-02-18

## 2021-08-25 RX ORDER — POTASSIUM CHLORIDE 20 MEQ/1
20 TABLET, EXTENDED RELEASE ORAL DAILY
Qty: 90 TABLET | Refills: 1 | Status: SHIPPED | OUTPATIENT
Start: 2021-09-17 | End: 2022-03-14

## 2021-08-25 RX ORDER — CALCIUM CARB/VITAMIN D3/VIT K1 500-100-40
TABLET,CHEWABLE ORAL
Qty: 100 EACH | Refills: 11 | Status: SHIPPED | OUTPATIENT
Start: 2021-09-10 | End: 2021-11-29

## 2021-08-25 RX ORDER — FUROSEMIDE 40 MG/1
TABLET ORAL
Qty: 180 TABLET | Refills: 0 | Status: SHIPPED | OUTPATIENT
Start: 2021-09-14 | End: 2021-12-13

## 2021-08-25 RX ORDER — ATORVASTATIN CALCIUM 40 MG/1
TABLET, FILM COATED ORAL
Qty: 90 TABLET | Refills: 0 | Status: SHIPPED | OUTPATIENT
Start: 2021-10-19 | End: 2021-09-20

## 2021-08-25 RX ORDER — ZOSTER VACCINE RECOMBINANT, ADJUVANTED 50 MCG/0.5
0.5 KIT INTRAMUSCULAR SEE ADMIN INSTRUCTIONS
Qty: 0.5 ML | Refills: 0 | Status: SHIPPED | OUTPATIENT
Start: 2021-08-25 | End: 2022-02-21

## 2021-08-25 ASSESSMENT — PATIENT HEALTH QUESTIONNAIRE - PHQ9
SUM OF ALL RESPONSES TO PHQ QUESTIONS 1-9: 0
SUM OF ALL RESPONSES TO PHQ9 QUESTIONS 1 & 2: 0
SUM OF ALL RESPONSES TO PHQ QUESTIONS 1-9: 0
SUM OF ALL RESPONSES TO PHQ QUESTIONS 1-9: 0
2. FEELING DOWN, DEPRESSED OR HOPELESS: 0
SUM OF ALL RESPONSES TO PHQ QUESTIONS 1-9: 0
2. FEELING DOWN, DEPRESSED OR HOPELESS: 0
SUM OF ALL RESPONSES TO PHQ QUESTIONS 1-9: 0
1. LITTLE INTEREST OR PLEASURE IN DOING THINGS: 0
SUM OF ALL RESPONSES TO PHQ9 QUESTIONS 1 & 2: 0
SUM OF ALL RESPONSES TO PHQ QUESTIONS 1-9: 0
1. LITTLE INTEREST OR PLEASURE IN DOING THINGS: 0

## 2021-08-25 ASSESSMENT — LIFESTYLE VARIABLES
AUDIT-C TOTAL SCORE: 2
HOW OFTEN DO YOU HAVE A DRINK CONTAINING ALCOHOL: 2
HOW MANY STANDARD DRINKS CONTAINING ALCOHOL DO YOU HAVE ON A TYPICAL DAY: 0
HOW OFTEN DO YOU HAVE SIX OR MORE DRINKS ON ONE OCCASION: 0

## 2021-08-25 NOTE — PATIENT INSTRUCTIONS
Personalized Preventive Plan for Xiomara Garcia - 8/25/2021  Medicare offers a range of preventive health benefits. Some of the tests and screenings are paid in full while other may be subject to a deductible, co-insurance, and/or copay. Some of these benefits include a comprehensive review of your medical history including lifestyle, illnesses that may run in your family, and various assessments and screenings as appropriate. After reviewing your medical record and screening and assessments performed today your provider may have ordered immunizations, labs, imaging, and/or referrals for you. A list of these orders (if applicable) as well as your Preventive Care list are included within your After Visit Summary for your review. Other Preventive Recommendations:    · A preventive eye exam performed by an eye specialist is recommended every 1-2 years to screen for glaucoma; cataracts, macular degeneration, and other eye disorders. · A preventive dental visit is recommended every 6 months. · Try to get at least 150 minutes of exercise per week or 10,000 steps per day on a pedometer . · Order or download the FREE \"Exercise & Physical Activity: Your Everyday Guide\" from [employer]. Call 9-331.610.6512 or search [employer] online. · You need 2921-0994 mg of calcium and 6124-6009 IU of vitamin D per day. It is possible to meet your calcium requirement with diet alone, but a vitamin D supplement is usually necessary to meet this goal.  · When exposed to the sun, use a sunscreen that protects against both UVA and UVB radiation with an SPF of 30 or greater. Reapply every 2 to 3 hours or after sweating, drying off with a towel, or swimming. · Always wear a seat belt when traveling in a car. Always wear a helmet when riding a bicycle or motorcycle.

## 2021-08-25 NOTE — PROGRESS NOTES
Medicare Annual Wellness Visit  Name: Magdi Nicolas Date: 2021   MRN: [id number] Sex: Female   Age: 73 y.o. Ethnicity: Non- / Non    : 1950 Race: White (non-)      Tracy Dad is here for Diabetes (FOLLOW UP) and Medicare AWV    Screenings for behavioral, psychosocial and functional/safety risks, and cognitive dysfunction are all negative except as indicated below. These results, as well as other patient data from the [address] [employer] [address] [facility] form, are documented in Flowsheets linked to this Encounter. Hypertension: Patient here for follow-up of elevated blood pressure. She is  exercising and is adherent to low salt diet. Blood pressure is well controlled at home. Cardiac symptoms none. Patient denies none. Cardiovascular risk factors: advanced age (older than 54 for men, 72 for women), diabetes mellitus, hypertension and obesity (BMI >= 30 kg/m2). Use of agents associated with hypertension: none. History of target organ damage: heart failure and AFIB. DIABETES   ROUTINE FOLLOW UP  SHE DOES CHECK HER BLOOD SUGARS AT HOME  DEPENDING ON WHAT SHE EATS  NOVOLOG SLIDING SCALE   DENIES INCREASED THIRST - FREQUENT URINATION OR VISION CHANGES   Allergies   Allergen Reactions    Nickel Nausea Only and Rash       Prior to Visit Medications    Medication Sig Taking?  Authorizing Provider   metoprolol succinate (TOPROL XL) 50 MG extended release tablet TAKE 1 TABLET BY MOUTH DAILY Yes Summer Frank APRN - CNP   atorvastatin (LIPITOR) 40 MG tablet TAKE 1 TABLET BY MOUTH EVERY NIGHT Yes JUAN MANUEL Sanderson - CNP   folic acid (FOLVITE) 1 MG tablet TAKE 2 TABLETS BY MOUTH DAILY Yes Fanny Rosenbaum APRN - CNP   ELIQUIS 5 MG TABS tablet TAKE 1 TABLET BY MOUTH TWICE DAILY Yes Ángela Stone MD   furosemide (LASIX) 40 MG tablet TAKE 1 TABLET BY MOUTH TWICE DAILY Yes Dariel Menendez APRN - CNP   NOVOLOG 100 UNIT/ML injection vial ADMINISTER 10 UNITS UNDER THE SKIN THREE TIMES DAILY BEFORE MEALS Yes JUAN MANUEL Stone CNP   potassium chloride (KLOR-CON M) 20 MEQ extended release tablet Take 1 tablet by mouth daily Yes JUAN MANUEL Stone CNP   Insulin Syringe-Needle U-100 (INSULIN SYRINGE .3CC/31GX5/16\") 31G X 5/16\" 0.3 ML MISC USE AS DIRECTED UNDER THE SKIN THREE TIMES DAILY PER SLIDING SCALE AS DIRECTED  JUAN MANUEL Can CNP   ACCU-CHEK APURVA PLUS strip CHECK SUGAR THREE TO FOUR TIMES DAILY  JUAN MANUEL Stone CNP   Accu-Chek Multiclix Lancets MISC TEST FOUR TIMES DAILY AS DIRECTED AS NEEDED FOR LOW OR HIGH BLOOD SUGAR  JUAN MANUEL Stone CNP   B-D UF III MINI PEN NEEDLES 31G X 5 MM MISC USE DAILY AS DIRECTED  JUAN MANUEL Stone CNP       Past Medical History:   Diagnosis Date    Diabetes mellitus type 2 in obese ([facility] 2010    Edema of both legs     Elevated LFTs     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 2018    19    Homozygous Factor V Leiden mutation ([facility] 1952    Hypercholesterolemia     Hypertension     Hypomagnesemia     Low serum HDL     Lower leg DVT (deep venous thromboembolism), chronic, right (HCC)     Osteoarthritis of cervical spine 2010    Facet arthropathy, spondylosis    Renal failure, acute (HCC)     Due to sepsis    Vitamin D deficiency 2018    13.2       Past Surgical History:   Procedure Laterality Date    APPENDECTOMY  1976     SECTION      3     CHOLECYSTECTOMY  2002    gangrenous    COLONOSCOPY  2014    no polyp    TUBAL LIGATION Bilateral 1980       Family History   Problem Relation Age of Onset    Kidney Disease Mother         kidney stones - larger    Coronary Art Dis Father     Heart Surgery Father 48    Hypertension Father     Heart Attack Father     COPD Sister         smoker    Diabetes Sister     Diabetes Brother     Clotting Disorder Son         Factor V Leiden heterozygote   [address] [facility] [employer] Other Son GERD, gout, MRSA, homocystinemia    Hypertension Son     High Cholesterol Son     Other Sister         carotid stenosis       CareTeam (Including outside providers/suppliers regularly involved in providing care):   Patient Care Team:  JUAN MANUEL Mccarty CNP as PCP - General (Family Nurse Practitioner)  JUAN MANUEL Mccarty CNP as PCP - [facility]IL[facility]ATION [facility]  JUAN MANUEL Mccarty CNP as Nurse Practitioner (Family Nurse Practitioner)    Wt Readings from Last 3 Encounters:   08/25/21 227 lb (103 kg)   07/12/21 236 lb (107 kg)   06/15/21 249 lb (112.9 kg)     Vitals:    08/25/21 0735   BP: 120/64   Site: Left Upper Arm   Position: Sitting   Cuff Size: Thigh   Pulse: 77   Resp: 12   Temp: 97.4 °F (36.3 °C)   TempSrc: Infrared   SpO2: 98%   Weight: 227 lb (103 kg)   Height: 5' 6\" (1.676 m)     Body mass index is 36.64 kg/m². Based upon direct observation of the patient, evaluation of cognition reveals recent and remote memory intact. General Appearance: alert and oriented to person, place and time, well-developed and well-nourished, in no acute distress  Skin: warm and dry, no rash or erythema  Pulmonary/Chest: clear to auscultation bilaterally- no wheezes, rales or rhonchi, normal air movement, no respiratory distress  Cardiovascular: normal rate, normal S1 and S2 and no gallops  Musculoskeletal: normal range of motion, no joint swelling, deformity or tenderness  Neurologic: gait and coordination normal and speech normal    Patient's complete Health Risk Assessment and screening values have been reviewed and are found in Flowsheets. The following problems were reviewed today and where indicated follow up appointments were made and/or referrals ordered.     Positive Risk Factor Screenings with Interventions:          General Health and ACP:     Advance Directives     Power of  Living Will ACP-Advance Directive ACP-Power of     Not on File Not on File Not on File Not on File      General Health Risk Interventions:  · No Living Will: Advance Care Planning addressed with patient today and PAPERWORK GIVEN TO PT    Health Habits/Nutrition:     Body mass index: (!) 36.63  Health Habits/Nutrition Interventions:  · NO INTERVENTIONS REQUIRED-    · SHE HAS LOST ABOUT 100 POUNDS   · SHE HAS MADE SIGNIFICANT DIET CHANGES       Personalized Preventive Plan   Current Health Maintenance Status  Immunization History   Administered Date(s) Administered    COVID-19, Moderna, PF, 100mcg/0.5mL 03/03/2021, 03/31/2021    Influenza Vaccine, unspecified formulation 09/04/2009    Influenza Virus Vaccine 09/04/2009    Influenza, High Dose (Fluzone 65 yrs and older) 10/30/2017, 10/02/2018    Influenza, Triv, inactivated, subunit, adjuvanted, IM (Fluad 65 yrs and older) 09/23/2019, 09/18/2020    Pneumococcal Conjugate 13-valent ([id number]) 02/22/2018    Pneumococcal Polysaccharide ([id number]) 11/06/2007, 09/23/2019    Tdap (Boostrix, Adacel) 03/05/2018        Health Maintenance   Topic Date Due    Colon cancer screen colonoscopy  Never done    Shingles Vaccine (1 of 2) Never done    DEXA (modify frequency per FRAX score)  Never done   [employer] Visit (AWV)  Never done    Flu vaccine (1) 09/01/2021    Diabetic foot exam  11/23/2021    Breast cancer screen  11/25/2021    A1C test (Diabetic or Prediabetic)  02/24/2022    Diabetic microalbuminuria test  03/17/2022    Lipid screen  03/17/2022    Potassium monitoring  06/10/2022    Creatinine monitoring  06/10/2022    Diabetic retinal exam  04/08/2023    DTaP/Tdap/Td vaccine (2 - Td or Tdap) 03/05/2028    Pneumococcal 65+ years Vaccine  Completed    COVID-19 Vaccine  Completed    Hepatitis C screen  Completed    Hepatitis A vaccine  Aged Out    Hib vaccine  Aged Out    Meningococcal (ACWY) vaccine  Aged Out     Recommendations for boolino Due: see orders and patient instructions/AVS.  .   Recommended screening schedule for the next 5-10 years is provided to the patient in written form: see Patient Instructions/AVS.    Mary Paniagua was seen today for diabetes and medicare awv. Diagnoses and all orders for this visit:    Mary Paniagua was seen today for diabetes and medicare awv. Diagnoses and all orders for this visit:    Routine general medical examination at a health care facility  - Reviewed medical and social history together. Discussed wide range of preventive recommendations for Medicare population, including fall prevention, exercise, recommendations for healthy produce-based diet, vaccines and routine screening tests, addressing all care gaps. Essential hypertension  CONTINUE THE FOLLOWING MEDICATIONS  -     lisinopril (PRINIVIL;ZESTRIL) 40 MG tablet; Take 1 tablet by mouth daily  TOPROL 25 MG XL  Reduce sodium intake to less than 1500 mg daily  Include 5-7 servings of fruits and vegetables daily  Reduce weight to ideal if overweight  30 minutes of physical activity daily  Diabetes mellitus type 2 in obese (HCC)  -      DIABETES FOOT EXAM  -     DEXA BONE DENSITY AXIAL SKELETON; Future  -     POCT glycosylated hemoglobin (Hb A1C)    Encounter for screening mammogram for malignant neoplasm of breast  -     [facility] DIGITAL SCREEN W OR WO CAD BILATERAL; Future    Post-menopausal osteoporosis  -     DEXA BONE DENSITY AXIAL SKELETON; Future    Hypercholesterolemia  -     atorvastatin (LIPITOR) 40 MG tablet; TAKE 1 TABLET BY MOUTH EVERY NIGHT FILL 10/19    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[address]  -     HOMOCYSTEINE, SERUM; Future      Edema of both legs  CHF  furosemide (LASIX) 40 MG tablet; TAKE 1 TABLET BY MOUTH TWICE DAILY FILL 9/14  -     potassium chloride (KLOR-CON M) 20 MEQ extended release tablet; Take 1 tablet by mouth daily FILL 9/17  Need for shingles vaccine  -     zoster recombinant adjuvanted vaccine [facility]) 50 MCG/0.5ML SUSR injection;  Inject 0.5 mLs into the muscle See Admin Instructions 1 dose now and repeat in 2-6 months

## 2021-09-18 DIAGNOSIS — E78.00 HYPERCHOLESTEROLEMIA: ICD-10-CM

## 2021-09-18 DIAGNOSIS — E11.69 DIABETES MELLITUS TYPE 2 IN OBESE (HCC): ICD-10-CM

## 2021-09-18 DIAGNOSIS — E66.9 DIABETES MELLITUS TYPE 2 IN OBESE (HCC): ICD-10-CM

## 2021-09-20 RX ORDER — ATORVASTATIN CALCIUM 40 MG/1
TABLET, FILM COATED ORAL
Qty: 90 TABLET | Refills: 0 | Status: SHIPPED | OUTPATIENT
Start: 2021-09-20 | End: 2022-02-10

## 2021-09-20 RX ORDER — LANCETS
EACH MISCELLANEOUS
Qty: 300 EACH | Refills: 1 | Status: SHIPPED | OUTPATIENT
Start: 2021-09-20

## 2021-11-23 RX ORDER — GLUCOSAMINE HCL/CHONDROITIN SU 500-400 MG
CAPSULE ORAL
Qty: 90 STRIP | Refills: 1 | Status: SHIPPED | OUTPATIENT
Start: 2021-11-23 | End: 2022-01-19

## 2021-11-23 NOTE — TELEPHONE ENCOUNTER
----- Message from Abdifatah Tracey sent at 11/23/2021 11:16 AM EST -----  Subject: Message to Provider    QUESTIONS  Information for Provider? Patient needs to take her blood sugar test at noon. Patients meter is not working and she needs a new script sent to the pharmacy for the Traceystad guide and the test strips for that meter. Her pharmacy is [facility] [address], [address] [address] Agus Cardenas [phone]  ---------------------------------------------------------------------------  --------------  Donavan Freeman INFO  What is the best way for the office to contact you? OK to leave message on   voicemail  Preferred Call Back Phone Number? [phone]  ---------------------------------------------------------------------------  --------------  SCRIPT ANSWERS  Relationship to Patient?  Self

## 2021-11-29 RX ORDER — CALCIUM CARB/VITAMIN D3/VIT K1 500-100-40
TABLET,CHEWABLE ORAL
Qty: 100 EACH | Refills: 3 | Status: SHIPPED | OUTPATIENT
Start: 2021-11-29 | End: 2022-04-11

## 2021-12-13 DIAGNOSIS — R60.0 EDEMA OF BOTH LEGS: ICD-10-CM

## 2021-12-13 RX ORDER — FUROSEMIDE 40 MG/1
TABLET ORAL
Qty: 180 TABLET | Refills: 0 | Status: SHIPPED | OUTPATIENT
Start: 2021-12-13 | End: 2022-03-14

## 2021-12-27 RX ORDER — APIXABAN 5 MG/1
TABLET, FILM COATED ORAL
Qty: 180 TABLET | Refills: 1 | Status: SHIPPED | OUTPATIENT
Start: 2021-12-27 | End: 2022-04-27

## 2022-01-19 RX ORDER — BLOOD SUGAR DIAGNOSTIC
STRIP MISCELLANEOUS
Qty: 100 STRIP | Refills: 1 | Status: SHIPPED | OUTPATIENT
Start: 2022-01-19 | End: 2022-03-29

## 2022-01-20 RX ORDER — FOLIC ACID 1 MG/1
5 TABLET ORAL DAILY
Qty: 150 TABLET | Refills: 3 | Status: SHIPPED | OUTPATIENT
Start: 2022-01-20 | End: 2022-05-23

## 2022-02-10 DIAGNOSIS — E78.00 HYPERCHOLESTEROLEMIA: ICD-10-CM

## 2022-02-10 RX ORDER — ATORVASTATIN CALCIUM 40 MG/1
TABLET, FILM COATED ORAL
Qty: 90 TABLET | Refills: 0 | Status: SHIPPED | OUTPATIENT
Start: 2022-02-10 | End: 2022-05-10

## 2022-02-10 NOTE — TELEPHONE ENCOUNTER
LV 8/25/21 WITH CC FOR AWV NV 3/23/22    Component Ref Range & Units 3/17/21 0831 12/26/19 0859 2/13/19 1013 1/22/18 1041   Cholesterol, Fasting 0 - 199 mg/dL 137       Triglyceride, Fasting 0 - 150 mg/dL 115       HDL 40 - 60 mg/dL 44  51  54  53    LDL Calculated <100 mg/dL 70  67  89  89    VLDL Cholesterol Calculated Not Established mg/dL 23  14  20  22

## 2022-02-18 RX ORDER — LISINOPRIL 40 MG/1
40 TABLET ORAL DAILY
Qty: 90 TABLET | Refills: 0 | Status: SHIPPED | OUTPATIENT
Start: 2022-02-18 | End: 2022-05-19

## 2022-03-12 DIAGNOSIS — R60.0 EDEMA OF BOTH LEGS: ICD-10-CM

## 2022-03-14 RX ORDER — POTASSIUM CHLORIDE 20 MEQ/1
TABLET, EXTENDED RELEASE ORAL
Qty: 90 TABLET | Refills: 0 | Status: SHIPPED | OUTPATIENT
Start: 2022-03-14 | End: 2022-06-13

## 2022-03-14 RX ORDER — FUROSEMIDE 40 MG/1
TABLET ORAL
Qty: 180 TABLET | Refills: 0 | Status: SHIPPED | OUTPATIENT
Start: 2022-03-14 | End: 2022-06-13

## 2022-03-14 NOTE — TELEPHONE ENCOUNTER
LAST VISIT 08/25/2021 WITH SARAHI FOR AWV, NEXT VISIT 03/23/2022 WITH SARAHI. [facility]     6/10/2021  6:38 PM - Carlyn Dotson Incoming Lab Results From Soft (Epic Adt)    Component Value Flag Ref Range Units Status   Sodium 141   136 - 145 mmol/L Final   Potassium 3.7   3.5 - 5.1 mmol/L Final   Chloride 102   99 - 110 mmol/L Final   CO2 28   21 - 32 mmol/L Final   Anion Gap 11   3 - 16  Final   Glucose 149   High   70 - 99 mg/dL Final   BUN 9   7 - 20 mg/dL Final   CREATININE 0.7   0.6 - 1.2 mg/dL Final   GFR Non- >60   >60  Final   Comment:   >60 mL/min/1.73m2 EGFR, calc. for ages 25 and older using the   MDRD formula (not corrected for weight), is valid for stable   renal function. GFR  >60   >60  Final   Comment:   Chronic Kidney Disease: less than 60 ml/min/1.73 sq. m.         Kidney Failure: less than 15 ml/min/1.73 sq.m. Results valid for patients 18 years and older.     Calcium 9.4   8.3 - 10.6 mg/dL Final

## 2022-03-28 DIAGNOSIS — E66.9 DIABETES MELLITUS TYPE 2 IN OBESE (HCC): ICD-10-CM

## 2022-03-28 DIAGNOSIS — E11.69 DIABETES MELLITUS TYPE 2 IN OBESE (HCC): ICD-10-CM

## 2022-03-29 RX ORDER — BLOOD SUGAR DIAGNOSTIC
STRIP MISCELLANEOUS
Qty: 100 STRIP | Refills: 1 | Status: SHIPPED | OUTPATIENT
Start: 2022-03-29 | End: 2022-05-03

## 2022-03-29 NOTE — TELEPHONE ENCOUNTER
LV 8/25/21 WITH CC FOR AWV NV NONE   Return in 30 weeks (on 3/23/2022), or 0720 DIABETES, for Medicare Annual Wellness Visit in 1 year.

## 2022-04-11 RX ORDER — CALCIUM CARB/VITAMIN D3/VIT K1 500-100-40
TABLET,CHEWABLE ORAL
Qty: 100 EACH | Refills: 3 | Status: SHIPPED | OUTPATIENT
Start: 2022-04-11 | End: 2022-09-02

## 2022-04-27 NOTE — TELEPHONE ENCOUNTER
Last OV: 7/12/21  Next OV:   Last refill:12/27/21  Most recent Labs: 6/10/21  Last EKG (if needed):7/12/21    Needs apt, left vm to call office back

## 2022-04-29 RX ORDER — APIXABAN 5 MG/1
TABLET, FILM COATED ORAL
Qty: 60 TABLET | Refills: 0 | Status: SHIPPED | OUTPATIENT
Start: 2022-04-29 | End: 2022-07-21

## 2022-05-03 RX ORDER — BLOOD SUGAR DIAGNOSTIC
STRIP MISCELLANEOUS
Qty: 100 STRIP | Refills: 1 | Status: SHIPPED | OUTPATIENT
Start: 2022-05-03 | End: 2022-07-11

## 2022-05-10 DIAGNOSIS — E78.00 HYPERCHOLESTEROLEMIA: ICD-10-CM

## 2022-05-10 RX ORDER — ATORVASTATIN CALCIUM 40 MG/1
TABLET, FILM COATED ORAL
Qty: 90 TABLET | Refills: 0 | Status: SHIPPED | OUTPATIENT
Start: 2022-05-10 | End: 2022-05-17

## 2022-05-17 DIAGNOSIS — E78.00 HYPERCHOLESTEROLEMIA: ICD-10-CM

## 2022-05-17 RX ORDER — ATORVASTATIN CALCIUM 40 MG/1
TABLET, FILM COATED ORAL
Qty: 90 TABLET | Refills: 0 | Status: SHIPPED | OUTPATIENT
Start: 2022-05-17 | End: 2022-08-29

## 2022-05-19 DIAGNOSIS — I10 ESSENTIAL HYPERTENSION: ICD-10-CM

## 2022-05-19 RX ORDER — LISINOPRIL 40 MG/1
40 TABLET ORAL DAILY
Qty: 90 TABLET | Refills: 0 | Status: SHIPPED | OUTPATIENT
Start: 2022-05-19 | End: 2022-08-19

## 2022-05-19 RX ORDER — METOPROLOL SUCCINATE 50 MG/1
TABLET, EXTENDED RELEASE ORAL
Qty: 90 TABLET | Refills: 0 | Status: SHIPPED | OUTPATIENT
Start: 2022-05-19 | End: 2022-08-19

## 2022-05-23 RX ORDER — FOLIC ACID 1 MG/1
5 TABLET ORAL DAILY
Qty: 150 TABLET | Refills: 3 | Status: SHIPPED | OUTPATIENT
Start: 2022-05-23 | End: 2022-09-20

## 2022-06-11 DIAGNOSIS — R60.0 EDEMA OF BOTH LEGS: ICD-10-CM

## 2022-06-13 RX ORDER — FUROSEMIDE 40 MG/1
TABLET ORAL
Qty: 180 TABLET | Refills: 0 | Status: SHIPPED | OUTPATIENT
Start: 2022-06-13 | End: 2022-09-14

## 2022-06-13 RX ORDER — POTASSIUM CHLORIDE 20 MEQ/1
TABLET, EXTENDED RELEASE ORAL
Qty: 90 TABLET | Refills: 0 | Status: SHIPPED | OUTPATIENT
Start: 2022-06-13 | End: 2022-09-14

## 2022-07-11 RX ORDER — BLOOD SUGAR DIAGNOSTIC
STRIP MISCELLANEOUS
Qty: 100 STRIP | Refills: 1 | Status: SHIPPED | OUTPATIENT
Start: 2022-07-11 | End: 2022-10-03

## 2022-07-14 NOTE — TELEPHONE ENCOUNTER
Last OV: 7/12/21  Next OV: not scheduled. Requested to return in six months. Last refill: 4/29/22  Most recent Labs:   Last EKG (if needed): Attempted to call patient. Unable to leave message since voicemail has not been set up yet. Please attempt to schedule OV for patient. Thank you.

## 2022-07-17 DIAGNOSIS — E11.69 DIABETES MELLITUS TYPE 2 IN OBESE (HCC): ICD-10-CM

## 2022-07-17 DIAGNOSIS — E66.9 DIABETES MELLITUS TYPE 2 IN OBESE (HCC): ICD-10-CM

## 2022-07-18 RX ORDER — INSULIN ASPART 100 [IU]/ML
INJECTION, SOLUTION INTRAVENOUS; SUBCUTANEOUS
Qty: 20 ML | Refills: 2 | Status: SHIPPED | OUTPATIENT
Start: 2022-07-18

## 2022-07-21 RX ORDER — APIXABAN 5 MG/1
TABLET, FILM COATED ORAL
Qty: 180 TABLET | Refills: 3 | Status: SHIPPED | OUTPATIENT
Start: 2022-07-21 | End: 2022-08-22 | Stop reason: SDUPTHER

## 2022-08-19 DIAGNOSIS — I10 ESSENTIAL HYPERTENSION: ICD-10-CM

## 2022-08-19 RX ORDER — METOPROLOL SUCCINATE 50 MG/1
TABLET, EXTENDED RELEASE ORAL
Qty: 90 TABLET | Refills: 0 | Status: SHIPPED | OUTPATIENT
Start: 2022-08-19 | End: 2022-08-25

## 2022-08-19 RX ORDER — LISINOPRIL 40 MG/1
40 TABLET ORAL DAILY
Qty: 90 TABLET | Refills: 0 | Status: SHIPPED | OUTPATIENT
Start: 2022-08-19

## 2022-08-22 ENCOUNTER — OFFICE VISIT (OUTPATIENT)
Dept: CARDIOLOGY CLINIC | Age: 72
End: 2022-08-22
Payer: MEDICARE

## 2022-08-22 VITALS
DIASTOLIC BLOOD PRESSURE: 80 MMHG | HEIGHT: 66 IN | HEART RATE: 72 BPM | OXYGEN SATURATION: 97 % | SYSTOLIC BLOOD PRESSURE: 150 MMHG | BODY MASS INDEX: 38.8 KG/M2 | WEIGHT: 241.4 LBS

## 2022-08-22 DIAGNOSIS — I48.19 PERSISTENT ATRIAL FIBRILLATION (HCC): ICD-10-CM

## 2022-08-22 DIAGNOSIS — Z86.718 H/O DEEP VENOUS THROMBOSIS: ICD-10-CM

## 2022-08-22 DIAGNOSIS — E66.01 MORBID OBESITY (HCC): ICD-10-CM

## 2022-08-22 DIAGNOSIS — I50.22 CHRONIC SYSTOLIC CHF (CONGESTIVE HEART FAILURE), NYHA CLASS 2 (HCC): Primary | ICD-10-CM

## 2022-08-22 PROCEDURE — 99214 OFFICE O/P EST MOD 30 MIN: CPT | Performed by: INTERNAL MEDICINE

## 2022-08-22 PROCEDURE — 1123F ACP DISCUSS/DSCN MKR DOCD: CPT | Performed by: INTERNAL MEDICINE

## 2022-08-22 PROCEDURE — 93000 ELECTROCARDIOGRAM COMPLETE: CPT | Performed by: INTERNAL MEDICINE

## 2022-08-22 NOTE — PROGRESS NOTES
Cardiology Consultation   Referring Physician:   Reason for Consultation: hospital follow up  Chief Complaint:   Chief Complaint   Patient presents with    1 Year Follow Up     Yearly f/u            Subjective:   History of Present Illness:  Miguel Cushing is a 81 y.o. female with PMH significant for morbid obesity, atrial fib (chronic), DVT/PE, factor V, diabetes mellitus and marked mobility issues who was admitted with bilateral LE edema and increasing KENNEDY on 2020. She underwent right heart catheterization on 6/15/21 showing normal pressures. She presents today for follow up. She has not been monitoring her blood pressure at home but will occasionally monitor this at pharmacies. She reports stable readings. She denies chest pain with rest or exertion. Her breathing has been comfortable without shortness of breath. Patient denies exertional chest pain/pressure, dyspnea at rest, KENNEDY, PND, orthopnea, palpitations, lightheadedness, weight changes, changes in LE edema, and syncope. She reports medication compliance and is tolerating. She denies abnormal bruising or bleeding. Prior cardiac testing:    EK22 Atrial fibrillation    Echo: 2020  Ejection fraction is visually estimated to be 55-60%. Grade I diastolic dysfunction with normal LV filling pressures. Severly dilated right ventricle. Right ventricular systolic function is mildly reduced. The right atrium is severely dilated. Estimated pulmonary artery systolic pressure is normal at 37 mmHg assuming a right atrial pressure of 15 mmHg. Echo 21  There is mildly increased left ventricular wall thickness with normal LV  size and systolic function. Ejection fraction is visually estimated to be 55-60%. Grade II diastolic dysfunction with elevated LV filling pressures. Moderately dilated right ventricle with mild reduced function.  Compared to  prior study, RV appears less dilated. The left atrium moderately dilated. The right atrium is severely dilated. Moderate mitral regurgitation. At least moderate tricuspid regurgitation appreciated. Full jet and velocity  not well appreciated due to RA size. Pulmonary artery systolic pressure is not well assessed in this study due to  incomplete envelope of TR jet. [address] 6/15/21  SUMMARY:   Normal intracardiac and pulmonary pressures        Past Medical History:   has a past medical history of Diabetes mellitus type 2 in obese ([address] Edema of both legs, Elevated LFTs, 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[address] Homozygous Factor V Leiden mutation ([address] Hypercholesterolemia, Hypertension, Hypomagnesemia, Low serum HDL, Lower leg DVT (deep venous thromboembolism), chronic, right ([address] Osteoarthritis of cervical spine, Renal failure, acute ([address] and Vitamin D deficiency. Surgical History:   has a past surgical history that includes Cholecystectomy ();  section; Appendectomy (1976); Tubal ligation (Bilateral, 1980); and Colonoscopy (2014). Social History:   reports that she has never smoked. She has never used smokeless tobacco. She reports current alcohol use. She reports that she does not use drugs. Family History:  family history includes COPD in her sister; Clotting Disorder in her son; Coronary Art Dis in her father; Diabetes in her brother and sister; Heart Attack in her father; Heart Surgery (age of onset: 48) in her father; High Cholesterol in her son; Hypertension in her father and son; Kidney Disease in her mother; Other in her sister and son. Allergies:  Nickel     Review of Systems:   Constitutional: no unanticipated weight loss. There's been no change in energy level, sleep pattern, or activity level. No fevers, chills. Eyes: No visual changes or diplopia. No scleral icterus. ENT: No Headaches, hearing loss or vertigo. No mouth sores or sore throat.   Cardiovascular: as reviewed in HPI  Respiratory: No cough or wheezing, no sputum production. No hemoptysis. Gastrointestinal: No abdominal pain, appetite loss, blood in stools. No change in bowel or bladder habits. Genitourinary: No dysuria, trouble voiding, or hematuria. Musculoskeletal:  No gait disturbance, no joint complaints. Integumentary: No rash or pruritis. Neurological: No headache, diplopia, change in muscle strength, numbness or tingling. Psychiatric: No anxiety or depression. Endocrine: No temperature intolerance. No excessive thirst, fluid intake, or urination. No tremor. Hematologic/Lymphatic: No abnormal bruising or bleeding, blood clots or swollen lymph nodes. Allergic/Immunologic: No nasal congestion or hives. Objective:   PHYSICAL EXAM:    Vitals:    08/22/22 1102   BP: (!) 150/80   Pulse:    SpO2:       Weight: 241 lb 6.4 oz (109.5 kg)       General Appearance:  Alert, cooperative, no distress, appears stated age. Head:  Normocephalic, without obvious abnormality, atraumatic. Eyes:  Pupils equal and round. No scleral icterus. Mouth: Moist mucosa, no pharyngeal erythema. Nose: Nares normal. No drainage or sinus tenderness. Neck: Supple, symmetrical, trachea midline. No adenopathy. No tenderness/mass/nodules. No carotid bruit or elevated JVD. Lungs:   Clear to auscultation bilaterally, respirations unlabored. No wheeze, rales, or rhonchi. Chest Wall:  No tenderness or deformity. Heart:  Regular rate. S1/S2 normal. No murmur, rub, or gallop. Abdomen:   Soft, non-tender, bowel sounds active. Musculoskeletal: No muscle wasting or digital clubbing. Extremities: Extremities normal, atraumatic. No cyanosis or edema. Pulses: 2+ radial and carotid pulses, symmetric. Skin: No rashes or lesions. Pysch: Normal mood and affect. Alert and oriented x 4.    Neurologic: Normal gross motor and sensory exam.       Labs     CBC:   Lab Results   Component Value Date/Time    WBC 4.0 06/10/2021 11:36 AM    RBC 4.22 06/10/2021 11:36 AM    HGB 12.2 06/10/2021 11:36 AM    HCT 37.3 06/10/2021 11:36 AM    MCV 88.3 06/10/2021 11:36 AM    RDW 16.1 06/10/2021 11:36 AM     06/10/2021 11:36 AM     CMP:  Lab Results   Component Value Date/Time     06/10/2021 11:36 AM    K 3.7 06/10/2021 11:36 AM    K 3.8 12/03/2020 06:05 AM     06/10/2021 11:36 AM    CO2 28 06/10/2021 11:36 AM    BUN 9 06/10/2021 11:36 AM    CREATININE 0.7 06/10/2021 11:36 AM    GFRAA >60 06/10/2021 11:36 AM    GFRAA >60 03/12/2010 10:00 AM    AGRATIO 1.1 03/17/2021 08:31 AM    LABGLOM >60 06/10/2021 11:36 AM    GLUCOSE 149 06/10/2021 11:36 AM    PROT 7.3 03/17/2021 08:31 AM    CALCIUM 9.4 06/10/2021 11:36 AM    BILITOT 3.3 03/17/2021 08:31 AM    ALKPHOS 121 03/17/2021 08:31 AM    AST 22 03/17/2021 08:31 AM    ALT 9 03/17/2021 08:31 AM     PT/INR:  No results found for: PTINR  HgBA1c:  Lab Results   Component Value Date/Time    LABA1C 6.6 08/25/2021 08:41 AM     Troponin:   Lab Results   Component Value Date/Time    TROPONINI <0.01 11/22/2020 12:22 PM         CURRENT Medications:  Current Outpatient Medications on File Prior to Visit   Medication Sig Dispense Refill    lisinopril (PRINIVIL;ZESTRIL) 40 MG tablet TAKE 1 TABLET BY MOUTH DAILY 90 tablet 0    metoprolol succinate (TOPROL XL) 50 MG extended release tablet TAKE 1 TABLET BY MOUTH DAILY 90 tablet 0    ELIQUIS 5 MG TABS tablet TAKE 1 TABLET BY MOUTH TWICE DAILY 180 tablet 3    insulin aspart (NOVOLOG) 100 UNIT/ML injection vial ADMINISTER 10 UNITS UNDER THE SKIN THREE TIMES DAILY BEFORE MEALS 20 mL 2    ACCU-CHEK GUIDE strip USE TO CHECK BLOOD SUGAR THREE TIMES DAILY 100 strip 1    potassium chloride (KLOR-CON M) 20 MEQ extended release tablet TAKE 1 TABLET BY MOUTH EVERY DAY 90 tablet 0    furosemide (LASIX) 40 MG tablet TAKE 1 TABLET BY MOUTH TWICE DAILY 311 tablet 0    folic acid (FOLVITE) 1 MG tablet TAKE 5 TABLETS BY MOUTH DAILY 150 tablet 3    atorvastatin (LIPITOR) 40 MG tablet TAKE 1 TABLET BY MOUTH EVERY NIGHT. SCHEDULE VISIT FOR AWV BEFORE ANY FURTHER REFILLS. 90 tablet 0    Insulin Syringe-Needle U-100 (INSULIN SYRINGE .3CC/31GX5/16\") 31G X 5/16\" 0.3 ML MISC USE THREE TIMES DAILY PER SLIDING SCALE AS DIRECTED 100 each 3    blood glucose monitor kit and supplies Dispense sufficient amount for indicated testing frequency plus additional to accommodate PRN testing needs. Dispense all needed supplies to include: monitor, strips, lancing device, lancets, control solutions, alcohol swabs. 1 kit 0    Accu-Chek Softclix Lancets MISC TEST FOUR TIMES DAILY AS DIRECTED AS NEEDED FOR LOW OR HIGH BLOOD SUGAR 300 each 1    ACCU-CHEK APURVA PLUS strip CHECK SUGAR THREE TO FOUR TIMES DAILY 300 strip 5    B-D UF III MINI PEN NEEDLES 31G X 5 MM MISC USE DAILY AS DIRECTED 100 each 5     No current facility-administered medications on file prior to visit. Assessment and Plan   1) Chronic RV systolic heart failure  Appears compenstated  Normal pulmonary pressure by recent RHC     2) Atrial fibrillation, Chronic  Rate controlled today by EKG   Continue Eliquis 5 mg BID     3) H/O DVT/PE  Eliquis 5 mg BID     4) Morbid Obesity  Encouraged weight loss. 5) ANGELO  Encouraged CPAP compliance     6) Essential hypertension   Elevated today in the office   Script given for home blood pressure monitor   Encouraged to monitor for the next several weeks and call with consistently elevated readings    Follow up in 1 year     Thank you for allowing us to participate in the care of Mayra Phillips MD [address] [place] [address] and Interventional Cardiology   [facility] [address]   (O): [phone]   (F): [phone]        This note was scribed in the presence of Cece Medina MD by Debroah Mcardle, RN. Physician Attestation:  The scribes documentation has been prepared under my direction and personally reviewed by me in its entirety.      I confirm the note above accurately reflects all work, treatment, procedures, and medical decision making performed by me.     Electronically signed by Jayden Chang MD on 9/18/2022 at 10:34 PM

## 2022-08-25 DIAGNOSIS — I10 ESSENTIAL HYPERTENSION: ICD-10-CM

## 2022-08-25 RX ORDER — METOPROLOL SUCCINATE 50 MG/1
TABLET, EXTENDED RELEASE ORAL
Qty: 90 TABLET | Refills: 0 | Status: SHIPPED | OUTPATIENT
Start: 2022-08-25

## 2022-08-28 DIAGNOSIS — E78.00 HYPERCHOLESTEROLEMIA: ICD-10-CM

## 2022-08-29 RX ORDER — ATORVASTATIN CALCIUM 40 MG/1
TABLET, FILM COATED ORAL
Qty: 90 TABLET | Refills: 0 | Status: SHIPPED | OUTPATIENT
Start: 2022-08-29 | End: 2022-09-25 | Stop reason: SDUPTHER

## 2022-09-02 RX ORDER — CALCIUM CARB/VITAMIN D3/VIT K1 500-100-40
TABLET,CHEWABLE ORAL
Qty: 100 EACH | Refills: 3 | Status: SHIPPED | OUTPATIENT
Start: 2022-09-02

## 2022-09-02 NOTE — TELEPHONE ENCOUNTER
LAST VISIT 08/25/2021 WITH SARAHI FOR AWV, NEXT VISIT 09/22/2022.  [facility]       8/25/2021  8:41 AM - Jose Barillas MA    Component Value Flag Ref Range Units Status   Hemoglobin A1C 6.6    % Final

## 2022-09-14 DIAGNOSIS — R60.0 EDEMA OF BOTH LEGS: ICD-10-CM

## 2022-09-14 RX ORDER — FUROSEMIDE 40 MG/1
TABLET ORAL
Qty: 180 TABLET | Refills: 0 | Status: SHIPPED | OUTPATIENT
Start: 2022-09-14

## 2022-09-14 RX ORDER — POTASSIUM CHLORIDE 20 MEQ/1
TABLET, EXTENDED RELEASE ORAL
Qty: 90 TABLET | Refills: 0 | Status: SHIPPED | OUTPATIENT
Start: 2022-09-14

## 2022-09-20 RX ORDER — FOLIC ACID 1 MG/1
5 TABLET ORAL DAILY
Qty: 450 TABLET | Refills: 0 | Status: SHIPPED | OUTPATIENT
Start: 2022-09-20

## 2022-09-20 RX ORDER — FOLIC ACID 1 MG/1
5 TABLET ORAL DAILY
Qty: 150 TABLET | Refills: 0 | Status: SHIPPED | OUTPATIENT
Start: 2022-09-20 | End: 2022-09-20

## 2022-09-22 ENCOUNTER — OFFICE VISIT (OUTPATIENT)
Dept: FAMILY MEDICINE CLINIC | Age: 72
End: 2022-09-22
Payer: MEDICARE

## 2022-09-22 VITALS
WEIGHT: 240 LBS | HEART RATE: 64 BPM | SYSTOLIC BLOOD PRESSURE: 132 MMHG | BODY MASS INDEX: 38.57 KG/M2 | HEIGHT: 66 IN | DIASTOLIC BLOOD PRESSURE: 72 MMHG

## 2022-09-22 DIAGNOSIS — Z23 NEED FOR IMMUNIZATION AGAINST INFLUENZA: ICD-10-CM

## 2022-09-22 DIAGNOSIS — Z00.00 MEDICARE ANNUAL WELLNESS VISIT, SUBSEQUENT: Primary | ICD-10-CM

## 2022-09-22 DIAGNOSIS — Z12.31 ENCOUNTER FOR SCREENING MAMMOGRAM FOR MALIGNANT NEOPLASM OF BREAST: ICD-10-CM

## 2022-09-22 DIAGNOSIS — E72.11 HOMOCYSTINEMIA (HCC): ICD-10-CM

## 2022-09-22 DIAGNOSIS — E11.9 TYPE 2 DIABETES MELLITUS WITHOUT COMPLICATION, WITH LONG-TERM CURRENT USE OF INSULIN (HCC): ICD-10-CM

## 2022-09-22 DIAGNOSIS — E78.00 HYPERCHOLESTEROLEMIA: ICD-10-CM

## 2022-09-22 DIAGNOSIS — Z12.11 COLON CANCER SCREENING: ICD-10-CM

## 2022-09-22 DIAGNOSIS — M81.0 OSTEOPOROSIS, POST-MENOPAUSAL: ICD-10-CM

## 2022-09-22 DIAGNOSIS — Z79.4 TYPE 2 DIABETES MELLITUS WITHOUT COMPLICATION, WITH LONG-TERM CURRENT USE OF INSULIN (HCC): ICD-10-CM

## 2022-09-22 LAB
A/G RATIO: 1.3 (ref 1.1–2.2)
ALBUMIN SERPL-MCNC: 3.9 G/DL (ref 3.4–5)
ALP BLD-CCNC: 112 U/L (ref 40–129)
ALT SERPL-CCNC: 11 U/L (ref 10–40)
ANION GAP SERPL CALCULATED.3IONS-SCNC: 13 MMOL/L (ref 3–16)
AST SERPL-CCNC: 22 U/L (ref 15–37)
BILIRUB SERPL-MCNC: 1.6 MG/DL (ref 0–1)
BUN BLDV-MCNC: 29 MG/DL (ref 7–20)
CALCIUM SERPL-MCNC: 9.5 MG/DL (ref 8.3–10.6)
CHLORIDE BLD-SCNC: 104 MMOL/L (ref 99–110)
CHOLESTEROL, FASTING: 156 MG/DL (ref 0–199)
CO2: 26 MMOL/L (ref 21–32)
CREAT SERPL-MCNC: 1.1 MG/DL (ref 0.6–1.2)
CREATININE URINE POCT: NORMAL
ESTIMATED AVERAGE GLUCOSE: 159.9 MG/DL
GFR AFRICAN AMERICAN: 59
GFR NON-AFRICAN AMERICAN: 49
GLUCOSE BLD-MCNC: 111 MG/DL (ref 70–99)
HBA1C MFR BLD: 7.2 %
HDLC SERPL-MCNC: 65 MG/DL (ref 40–60)
HOMOCYSTEINE: 24 UMOL/L (ref 0–10)
LDL CHOLESTEROL CALCULATED: 76 MG/DL
MICROALBUMIN/CREAT 24H UR: NORMAL MG/G{CREAT}
MICROALBUMIN/CREAT UR-RTO: NORMAL
POTASSIUM SERPL-SCNC: 4 MMOL/L (ref 3.5–5.1)
SODIUM BLD-SCNC: 143 MMOL/L (ref 136–145)
TOTAL PROTEIN: 6.8 G/DL (ref 6.4–8.2)
TRIGLYCERIDE, FASTING: 76 MG/DL (ref 0–150)
VLDLC SERPL CALC-MCNC: 15 MG/DL

## 2022-09-22 PROCEDURE — 90694 VACC AIIV4 NO PRSRV 0.5ML IM: CPT | Performed by: NURSE PRACTITIONER

## 2022-09-22 PROCEDURE — G0439 PPPS, SUBSEQ VISIT: HCPCS | Performed by: NURSE PRACTITIONER

## 2022-09-22 PROCEDURE — 99213 OFFICE O/P EST LOW 20 MIN: CPT | Performed by: NURSE PRACTITIONER

## 2022-09-22 PROCEDURE — 36415 COLL VENOUS BLD VENIPUNCTURE: CPT | Performed by: NURSE PRACTITIONER

## 2022-09-22 PROCEDURE — G0008 ADMIN INFLUENZA VIRUS VAC: HCPCS | Performed by: NURSE PRACTITIONER

## 2022-09-22 PROCEDURE — 1123F ACP DISCUSS/DSCN MKR DOCD: CPT | Performed by: NURSE PRACTITIONER

## 2022-09-22 PROCEDURE — 82044 UR ALBUMIN SEMIQUANTITATIVE: CPT | Performed by: NURSE PRACTITIONER

## 2022-09-22 SDOH — ECONOMIC STABILITY: FOOD INSECURITY: WITHIN THE PAST 12 MONTHS, THE FOOD YOU BOUGHT JUST DIDN'T LAST AND YOU DIDN'T HAVE MONEY TO GET MORE.: NEVER TRUE

## 2022-09-22 SDOH — ECONOMIC STABILITY: TRANSPORTATION INSECURITY
IN THE PAST 12 MONTHS, HAS LACK OF TRANSPORTATION KEPT YOU FROM MEETINGS, WORK, OR FROM GETTING THINGS NEEDED FOR DAILY LIVING?: NO

## 2022-09-22 SDOH — ECONOMIC STABILITY: FOOD INSECURITY: WITHIN THE PAST 12 MONTHS, YOU WORRIED THAT YOUR FOOD WOULD RUN OUT BEFORE YOU GOT MONEY TO BUY MORE.: NEVER TRUE

## 2022-09-22 ASSESSMENT — PATIENT HEALTH QUESTIONNAIRE - PHQ9
1. LITTLE INTEREST OR PLEASURE IN DOING THINGS: 0
6. FEELING BAD ABOUT YOURSELF - OR THAT YOU ARE A FAILURE OR HAVE LET YOURSELF OR YOUR FAMILY DOWN: 0
4. FEELING TIRED OR HAVING LITTLE ENERGY: 0
5. POOR APPETITE OR OVEREATING: 0
SUM OF ALL RESPONSES TO PHQ QUESTIONS 1-9: 0
SUM OF ALL RESPONSES TO PHQ QUESTIONS 1-9: 0
2. FEELING DOWN, DEPRESSED OR HOPELESS: 0
8. MOVING OR SPEAKING SO SLOWLY THAT OTHER PEOPLE COULD HAVE NOTICED. OR THE OPPOSITE, BEING SO FIGETY OR RESTLESS THAT YOU HAVE BEEN MOVING AROUND A LOT MORE THAN USUAL: 0
SUM OF ALL RESPONSES TO PHQ QUESTIONS 1-9: 0
7. TROUBLE CONCENTRATING ON THINGS, SUCH AS READING THE NEWSPAPER OR WATCHING TELEVISION: 0
3. TROUBLE FALLING OR STAYING ASLEEP: 0
9. THOUGHTS THAT YOU WOULD BE BETTER OFF DEAD, OR OF HURTING YOURSELF: 0
SUM OF ALL RESPONSES TO PHQ9 QUESTIONS 1 & 2: 0
SUM OF ALL RESPONSES TO PHQ QUESTIONS 1-9: 0
10. IF YOU CHECKED OFF ANY PROBLEMS, HOW DIFFICULT HAVE THESE PROBLEMS MADE IT FOR YOU TO DO YOUR WORK, TAKE CARE OF THINGS AT HOME, OR GET ALONG WITH OTHER PEOPLE: 0

## 2022-09-22 ASSESSMENT — SOCIAL DETERMINANTS OF HEALTH (SDOH): HOW HARD IS IT FOR YOU TO PAY FOR THE VERY BASICS LIKE FOOD, HOUSING, MEDICAL CARE, AND HEATING?: NOT HARD AT ALL

## 2022-09-22 NOTE — PROGRESS NOTES
EXERCISING   SAW CARDIOLOGIST 8/22    Positive Risk Factor Screenings with Interventions:             General Health and ACP:  General  In general, how would you say your health is?: Good  In the past 7 days, have you experienced any of the following: New or Increased Pain, New or Increased Fatigue, Loneliness, Social Isolation, Stress or Anger?: No  Do you get the social and emotional support that you need?: Yes  Do you have a Living Will?: (!) No    Advance Directives       Power of  Living Will ACP-Advance Directive ACP-Power of     Not on File Not on File Not on File Not on File          General Health Risk Interventions:  No Living Will: Advance Care Planning addressed with patient today and PAPERWORK GIVEN TO PT    Health Habits/Nutrition:  Physical Activity: Insufficiently Active    Days of Exercise per Week: 2 days    Minutes of Exercise per Session: 20 min     Have you lost any weight without trying in the past 3 months?: No  Body mass index: (!) 38.73  Have you seen the dentist within the past year?: N/A - wear dentures  Health Habits/Nutrition Interventions:  Inadequate physical activity:  patient agrees to exercise for at least 150 minutes/week     Safety:  Do you have working smoke detectors?: Yes  Do you have any tripping hazards - loose or unsecured carpets or rugs?: (!) Yes (AFEW RUGS)  Do you have any tripping hazards - clutter in doorways, halls, or stairs?: No  Do you have either shower bars, grab bars, non-slip mats or non-slip surfaces in your shower or bathtub?: Yes  Do all of your stairways have a railing or banister?: Not Applicable  Do you always fasten your seatbelt when you are in a car?: Yes  Safety Interventions:  Home safety tips provided           Objective   Vitals:    09/22/22 0822   BP: 132/72   Site: Left Upper Arm   Position: Sitting   Cuff Size: Medium Adult   Pulse: 64   Weight: 240 lb (108.9 kg)   Height: 5' 6\" (1.676 m)      Body mass index is 38.74 kg/m². General Appearance: alert and oriented to person, place and time, well-developed and well-nourished, in no acute distress  Skin: warm and dry, no rash or erythema  Visual inspection:  Deformity/amputation: absent  Skin lesions/pre-ulcerative calluses: absent  Edema: right- negative, left- negative    Sensory exam:  Monofilament sensation: normal  (minimum of 5 random plantar locations tested, avoiding callused areas - > 1 area with absence of sensation is + for neuropathy)  Plus at least one of the following:  Pulses: normal,   Neck: neck supple and non tender without mass, no thyromegaly or thyroid nodules, no cervical lymphadenopathy   Pulmonary/Chest: clear to auscultation bilaterally- no wheezes, rales or rhonchi, normal air movement, no respiratory distress  Cardiovascular: normal rate, normal S1 and S2, no murmurs, and no carotid bruits  Musculoskeletal: normal range of motion, no joint swelling, deformity or tenderness       Allergies   Allergen Reactions    Nickel Nausea Only and Rash     Prior to Visit Medications    Medication Sig Taking? Authorizing Provider   folic acid (FOLVITE) 1 MG tablet TAKE 5 TABLETS BY MOUTH DAILY Yes JUAN MANUEL Kapoor CNP   furosemide (LASIX) 40 MG tablet TAKE 1 TABLET BY MOUTH TWICE DAILY Yes JUAN MANUEL Messina CNP   potassium chloride (KLOR-CON M) 20 MEQ extended release tablet TAKE 1 TABLET BY MOUTH EVERY DAY Yes JUAN MANUEL Fletcher CNP   Insulin Syringe-Needle U-100 (INSULIN SYRINGE .3CC/31GX5/16\") 31G X 5/16\" 0.3 ML MISC USE THREE TIMES DAILY PER SLIDING SCALE AS DIRECTED Yes JUAN MANUEL Siddiqi CNP   Insulin Syringe-Needle U-100 (INSULIN SYRINGE .3CC/31GX5/16\") 31G X 5/16\" 0.3 ML MISC USE AS DIRECTED THREE TIMES DAILY Yes Luke A Glischinski, APRN - CNP   atorvastatin (LIPITOR) 40 MG tablet TAKE 1 TABLET BY MOUTH EVERY NIGHT.  SCHEDULE VISIT FOR AWV BEFORE ANY FURTHER REFILLS Yes JUAN MANUEL Walsh CNP   metoprolol succinate (TOPROL XL) 50 MG extended release tablet TAKE 1 TABLET BY MOUTH DAILY Yes JUAN MANUEL Cuevas CNP   Blood Pressure Monitoring [address] [place] [address] blood pressure monitor Yes Sangita Livingston MD   apixaban (ELIQUIS) 5 MG TABS tablet TAKE 1 TABLET BY MOUTH TWICE DAILY Yes Sangita Livingston MD   lisinopril (PRINIVIL;ZESTRIL) 40 MG tablet TAKE 1 TABLET BY MOUTH DAILY Yes JUAN MANUEL Saldivar CNP   insulin aspart (NOVOLOG) 100 UNIT/ML injection vial ADMINISTER 10 UNITS UNDER THE SKIN THREE TIMES DAILY BEFORE MEALS Yes JUAN MANUEL Cuevas CNP   ACCU-CHEK GUIDE strip USE TO CHECK BLOOD SUGAR THREE TIMES DAILY Yes JUAN MANUEL Trujillo CNP   blood glucose monitor kit and supplies Dispense sufficient amount for indicated testing frequency plus additional to accommodate PRN testing needs. Dispense all needed supplies to include: monitor, strips, lancing device, lancets, control solutions, alcohol swabs.  Yes JUAN MANUEL Cuevas CNP   Accu-Chek Softclix Lancets MISC TEST FOUR TIMES DAILY AS DIRECTED AS NEEDED FOR LOW OR HIGH BLOOD SUGAR Yes JUAN MANUEL Cuevas CNP   ACCU-CHEK APURVA PLUS strip CHECK SUGAR THREE TO FOUR TIMES DAILY Yes JUAN MANUEL Cuevas CNP   B-D UF III MINI PEN NEEDLES 31G X 5 MM MISC USE DAILY AS DIRECTED Yes JUAN MANUEL Thomas CNP       CareTeam (Including outside providers/suppliers regularly involved in providing care):   Patient Care Team:  JUAN MANUEL Thomas CNP as PCP - General (Family Nurse Practitioner)  JUAN MANUEL Thomas CNP as PCP - [facility]  JUAN MANUEL Thomas CNP as Nurse Practitioner (Family Nurse Practitioner)     Reviewed and updated this visit:  Tobacco  Allergies  Meds  Med Hx  Surg Hx  Soc Hx  Fam Hx

## 2022-09-22 NOTE — PATIENT INSTRUCTIONS
Personalized Preventive Plan for [facility] - 9/22/2022  Medicare offers a range of preventive health benefits. Some of the tests and screenings are paid in full while other may be subject to a deductible, co-insurance, and/or copay. Some of these benefits include a comprehensive review of your medical history including lifestyle, illnesses that may run in your family, and various assessments and screenings as appropriate. After reviewing your medical record and screening and assessments performed today your provider may have ordered immunizations, labs, imaging, and/or referrals for you. A list of these orders (if applicable) as well as your Preventive Care list are included within your After Visit Summary for your review. Other Preventive Recommendations:    A preventive eye exam performed by an eye specialist is recommended every 1-2 years to screen for glaucoma; cataracts, macular degeneration, and other eye disorders. A preventive dental visit is recommended every 6 months. Try to get at least 150 minutes of exercise per week or 10,000 steps per day on a pedometer . Order or download the FREE \"Exercise & Physical Activity: Your Everyday Guide\" from [employer]. Call 9-840.344.1772 or search [employer] online. You need 9817-0414 mg of calcium and 6198-3247 IU of vitamin D per day. It is possible to meet your calcium requirement with diet alone, but a vitamin D supplement is usually necessary to meet this goal.  When exposed to the sun, use a sunscreen that protects against both UVA and UVB radiation with an SPF of 30 or greater. Reapply every 2 to 3 hours or after sweating, drying off with a towel, or swimming. Always wear a seat belt when traveling in a car. Always wear a helmet when riding a bicycle or motorcycle.

## 2022-09-22 NOTE — PROGRESS NOTES
Immunizations Administered       Name Date Dose Route    Influenza, FLUAD, (age 72 y+), Adjuvanted, 0.5mL 9/22/2022 0.5 mL Intramuscular    Site: Deltoid- Left    Lot: [id number]    NDC: 54872-569-57

## 2022-09-25 DIAGNOSIS — E78.00 HYPERCHOLESTEROLEMIA: ICD-10-CM

## 2022-09-25 RX ORDER — ATORVASTATIN CALCIUM 40 MG/1
TABLET, FILM COATED ORAL
Qty: 90 TABLET | Refills: 3 | Status: SHIPPED | OUTPATIENT
Start: 2022-09-25 | End: 2022-12-24

## 2022-10-03 RX ORDER — BLOOD SUGAR DIAGNOSTIC
STRIP MISCELLANEOUS
Qty: 100 STRIP | Refills: 1 | Status: SHIPPED | OUTPATIENT
Start: 2022-10-03

## 2022-11-15 RX ORDER — LANCETS
EACH MISCELLANEOUS
Qty: 300 EACH | Refills: 1 | OUTPATIENT
Start: 2022-11-15

## 2022-11-15 RX ORDER — BLOOD SUGAR DIAGNOSTIC
STRIP MISCELLANEOUS
Qty: 100 STRIP | Refills: 1 | Status: SHIPPED | OUTPATIENT
Start: 2022-11-15

## 2022-11-15 RX ORDER — LANCETS
EACH MISCELLANEOUS
Qty: 300 EACH | Refills: 1 | Status: SHIPPED | OUTPATIENT
Start: 2022-11-15

## 2022-11-21 RX ORDER — LISINOPRIL 40 MG/1
40 TABLET ORAL DAILY
Qty: 90 TABLET | Refills: 0 | Status: SHIPPED | OUTPATIENT
Start: 2022-11-21

## 2022-11-24 DIAGNOSIS — I10 ESSENTIAL HYPERTENSION: ICD-10-CM

## 2022-11-28 ENCOUNTER — TELEPHONE (OUTPATIENT)
Dept: FAMILY MEDICINE CLINIC | Age: 72
End: 2022-11-28

## 2022-11-28 RX ORDER — BENZONATATE 100 MG/1
100-200 CAPSULE ORAL 3 TIMES DAILY PRN
Qty: 60 CAPSULE | Refills: 0 | Status: CANCELLED | OUTPATIENT
Start: 2022-11-28 | End: 2022-12-05

## 2022-11-28 RX ORDER — METOPROLOL SUCCINATE 50 MG/1
TABLET, EXTENDED RELEASE ORAL
Qty: 90 TABLET | Refills: 0 | Status: SHIPPED | OUTPATIENT
Start: 2022-11-28

## 2022-11-28 NOTE — TELEPHONE ENCOUNTER
PT C/O NASAL DRAINAGE: yes    WHAT COLOR: yellow/green, COUGH: yes   BRINGING UP PHLEGM:  yes  IF YES, WHAT COLOR:  yellow/green   SORE THROAT:  no   PND:  no   HEADACHE:  no   EAR PAIN:  no   LEFT, RIGHT OR BOTH:  na   S.O.B.:  no  WITH OR WITHOUT EXERTION:  na   WHEEZING:  yes   HEAD CONGESTION:  no   CHEST CONGESTION:  yes   BODY ACHES:  no   VOMITTING no   DIARRHEA:  no   TEMP:  no  IF SO, HIGHEST TEMP:  na   SYMPTOMS FOR HOW MANY DAYS:  x3 days    WHAT MEDS HAS PT TRIED:  teraflu, mucinex, tylenol   MED ALLERGIES:  na  APPT OFFERED:  yes  VERIFY PHARMACY INFORMATION. [facility] [address] [facility],[address], [address] [address] [address] - F [phone]      Patient said morgan knows she gets bronchitis every year and this is it.   She just needs some type of cough syrup to help get the cough to go away

## 2022-11-29 RX ORDER — BENZONATATE 100 MG/1
100-200 CAPSULE ORAL 3 TIMES DAILY PRN
Qty: 60 CAPSULE | Refills: 0 | Status: SHIPPED | OUTPATIENT
Start: 2022-11-29 | End: 2022-12-06

## 2022-12-16 RX ORDER — POTASSIUM CHLORIDE 20 MEQ/1
TABLET, EXTENDED RELEASE ORAL
Qty: 90 TABLET | Refills: 0 | Status: SHIPPED | OUTPATIENT
Start: 2022-12-16

## 2022-12-17 DIAGNOSIS — R60.0 EDEMA OF BOTH LEGS: ICD-10-CM

## 2022-12-19 RX ORDER — FOLIC ACID 1 MG/1
5 TABLET ORAL DAILY
Qty: 450 TABLET | Refills: 0 | Status: SHIPPED | OUTPATIENT
Start: 2022-12-19

## 2022-12-19 RX ORDER — FUROSEMIDE 40 MG/1
TABLET ORAL
Qty: 180 TABLET | Refills: 0 | Status: SHIPPED | OUTPATIENT
Start: 2022-12-19

## 2023-01-23 RX ORDER — BLOOD SUGAR DIAGNOSTIC
STRIP MISCELLANEOUS
Qty: 100 STRIP | Refills: 1 | Status: SHIPPED | OUTPATIENT
Start: 2023-01-23

## 2023-02-10 ENCOUNTER — TELEPHONE (OUTPATIENT)
Dept: FAMILY MEDICINE CLINIC | Age: 73
End: 2023-02-10

## 2023-02-10 DIAGNOSIS — E66.9 DIABETES MELLITUS TYPE 2 IN OBESE (HCC): ICD-10-CM

## 2023-02-10 DIAGNOSIS — E11.69 DIABETES MELLITUS TYPE 2 IN OBESE (HCC): ICD-10-CM

## 2023-02-10 RX ORDER — INSULIN ASPART 100 [IU]/ML
INJECTION, SOLUTION INTRAVENOUS; SUBCUTANEOUS
Qty: 30 ML | Refills: 2 | Status: SHIPPED | OUTPATIENT
Start: 2023-02-10

## 2023-02-10 NOTE — TELEPHONE ENCOUNTER
Spoke with Mrs Sharif Hidalgo - script sent to pharmacy - with sliding scale instructions- can close

## 2023-02-10 NOTE — TELEPHONE ENCOUNTER
Pt calling re: Novalog insulin -  she is on a sliding scale - she has been taking it at night - sugars have been spiking - so she is short now.  She needs a refill  - pharmacy says it's to soon to refill -  can you prescribe a larger vial.    She is almost out -     Pt @  [phone] -let her know, please     Madhu Trimble [address], [address] [place] [address] -  [phone]

## 2023-02-20 RX ORDER — LISINOPRIL 40 MG/1
40 TABLET ORAL DAILY
Qty: 90 TABLET | Refills: 0 | Status: SHIPPED | OUTPATIENT
Start: 2023-02-20

## 2023-03-17 RX ORDER — POTASSIUM CHLORIDE 20 MEQ/1
TABLET, EXTENDED RELEASE ORAL
Qty: 90 TABLET | Refills: 0 | Status: SHIPPED | OUTPATIENT
Start: 2023-03-17

## 2023-03-18 DIAGNOSIS — R60.0 EDEMA OF BOTH LEGS: ICD-10-CM

## 2023-03-20 RX ORDER — FUROSEMIDE 40 MG/1
TABLET ORAL
Qty: 180 TABLET | Refills: 0 | Status: SHIPPED | OUTPATIENT
Start: 2023-03-20

## 2023-03-20 RX ORDER — FOLIC ACID 1 MG/1
5 TABLET ORAL DAILY
Qty: 450 TABLET | Refills: 0 | Status: SHIPPED | OUTPATIENT
Start: 2023-03-20

## 2023-03-28 RX ORDER — BLOOD SUGAR DIAGNOSTIC
STRIP MISCELLANEOUS
Qty: 100 STRIP | Refills: 1 | Status: SHIPPED | OUTPATIENT
Start: 2023-03-28

## 2023-04-21 RX ORDER — LANCETS
EACH MISCELLANEOUS
Qty: 300 EACH | Refills: 1 | Status: SHIPPED | OUTPATIENT
Start: 2023-04-21

## 2023-04-25 NOTE — PATIENT INSTRUCTIONS
You may receive a survey regarding the care you received during your visit. Your input is valuable to us. We encourage you to complete and return your survey. We hope you will choose us in the future for your healthcare needs. GENERAL OFFICE POLICIES      Telephone Calls: Messages will be answered within 1-2 business days, unless the provider is out of the office. If it is urgent a covering provider will answer. (this does not include Medication refills). MyChart: We recommend all patients sign up for [employer]. Through this portal you can see your lab results, request refills, schedule appointments, pay your bill and send messages to the office. [employer] messages will be answered within 1-2 business days unless the provider is out of the office. For urgent matters, please call the office. Appointments:  All appointments must be scheduled. We ask all patients to schedule their next follow up appointment before they leave the office to make sure you will be able to be seen before you run out of medications. 24 hours notice is required to cancel or reschedule an appointment to avoid being marked as a no show. You may be dismissed from the practice after 3 no shows. LATE for Appointment: If you are 15 or more minutes late for your appointment, you may be asked to reschedule. MA/LAB APPTS: Must be scheduled, cannot accept walk in lab visits. We only draw labs for patients established in our office. We only do injections for medications ordered by our office. Acute Sick Visits:  Nothing other than acute complaint will be addressed at this visit. TRADITIONAL MEDICARE  DOES NOT COVER PHYSICALS  MEDICARE WELLNESS VISITS: These are NOT physicals but the free annual visit offered by Medicare to discuss wellness issues. Medication refills, checkups, etc. will not be addressed during this visit.   Medication Refills: Refills are handled electronically so please contact your pharmacy for medication refills

## 2023-04-26 ENCOUNTER — OFFICE VISIT (OUTPATIENT)
Dept: FAMILY MEDICINE CLINIC | Age: 73
End: 2023-04-26
Payer: MEDICARE

## 2023-04-26 VITALS
SYSTOLIC BLOOD PRESSURE: 124 MMHG | HEART RATE: 56 BPM | BODY MASS INDEX: 40.34 KG/M2 | OXYGEN SATURATION: 93 % | WEIGHT: 251 LBS | DIASTOLIC BLOOD PRESSURE: 86 MMHG | HEIGHT: 66 IN

## 2023-04-26 DIAGNOSIS — E78.2 MIXED HYPERLIPIDEMIA: ICD-10-CM

## 2023-04-26 DIAGNOSIS — E11.9 TYPE 2 DIABETES MELLITUS WITHOUT COMPLICATION, WITH LONG-TERM CURRENT USE OF INSULIN (HCC): Primary | ICD-10-CM

## 2023-04-26 DIAGNOSIS — E72.11 HOMOCYSTINEMIA (HCC): ICD-10-CM

## 2023-04-26 DIAGNOSIS — E72.11 HOMOCYSTINEMIA (HCC): Primary | ICD-10-CM

## 2023-04-26 DIAGNOSIS — Z79.4 TYPE 2 DIABETES MELLITUS WITHOUT COMPLICATION, WITH LONG-TERM CURRENT USE OF INSULIN (HCC): Primary | ICD-10-CM

## 2023-04-26 DIAGNOSIS — I10 ESSENTIAL HYPERTENSION: ICD-10-CM

## 2023-04-26 LAB
HBA1C MFR BLD: 7 %
HCYS SERPL-SCNC: 22 UMOL/L (ref 0–10)

## 2023-04-26 PROCEDURE — 36415 COLL VENOUS BLD VENIPUNCTURE: CPT | Performed by: NURSE PRACTITIONER

## 2023-04-26 PROCEDURE — 3074F SYST BP LT 130 MM HG: CPT | Performed by: NURSE PRACTITIONER

## 2023-04-26 PROCEDURE — 99214 OFFICE O/P EST MOD 30 MIN: CPT | Performed by: NURSE PRACTITIONER

## 2023-04-26 PROCEDURE — 3079F DIAST BP 80-89 MM HG: CPT | Performed by: NURSE PRACTITIONER

## 2023-04-26 PROCEDURE — 3051F HG A1C>EQUAL 7.0%<8.0%: CPT | Performed by: NURSE PRACTITIONER

## 2023-04-26 PROCEDURE — 1123F ACP DISCUSS/DSCN MKR DOCD: CPT | Performed by: NURSE PRACTITIONER

## 2023-04-26 PROCEDURE — 83036 HEMOGLOBIN GLYCOSYLATED A1C: CPT | Performed by: NURSE PRACTITIONER

## 2023-04-26 RX ORDER — FOLIC ACID-PYRIDOXINE-CYANOCOBALAMIN TAB 2.5-25-2 MG 2.5-25-2 MG
1 TAB ORAL DAILY
Qty: 90 TABLET | Refills: 1 | Status: SHIPPED | OUTPATIENT
Start: 2023-04-26 | End: 2023-07-25

## 2023-04-26 SDOH — ECONOMIC STABILITY: HOUSING INSECURITY
IN THE LAST 12 MONTHS, WAS THERE A TIME WHEN YOU DID NOT HAVE A STEADY PLACE TO SLEEP OR SLEPT IN A SHELTER (INCLUDING NOW)?: NO

## 2023-04-26 SDOH — ECONOMIC STABILITY: FOOD INSECURITY: WITHIN THE PAST 12 MONTHS, THE FOOD YOU BOUGHT JUST DIDN'T LAST AND YOU DIDN'T HAVE MONEY TO GET MORE.: NEVER TRUE

## 2023-04-26 SDOH — ECONOMIC STABILITY: INCOME INSECURITY: HOW HARD IS IT FOR YOU TO PAY FOR THE VERY BASICS LIKE FOOD, HOUSING, MEDICAL CARE, AND HEATING?: NOT HARD AT ALL

## 2023-04-26 SDOH — ECONOMIC STABILITY: FOOD INSECURITY: WITHIN THE PAST 12 MONTHS, YOU WORRIED THAT YOUR FOOD WOULD RUN OUT BEFORE YOU GOT MONEY TO BUY MORE.: NEVER TRUE

## 2023-04-26 ASSESSMENT — PATIENT HEALTH QUESTIONNAIRE - PHQ9
2. FEELING DOWN, DEPRESSED OR HOPELESS: 0
1. LITTLE INTEREST OR PLEASURE IN DOING THINGS: 0
SUM OF ALL RESPONSES TO PHQ QUESTIONS 1-9: 0
SUM OF ALL RESPONSES TO PHQ QUESTIONS 1-9: 0
3. TROUBLE FALLING OR STAYING ASLEEP: 0
SUM OF ALL RESPONSES TO PHQ9 QUESTIONS 1 & 2: 0
6. FEELING BAD ABOUT YOURSELF - OR THAT YOU ARE A FAILURE OR HAVE LET YOURSELF OR YOUR FAMILY DOWN: 0
5. POOR APPETITE OR OVEREATING: 0
8. MOVING OR SPEAKING SO SLOWLY THAT OTHER PEOPLE COULD HAVE NOTICED. OR THE OPPOSITE, BEING SO FIGETY OR RESTLESS THAT YOU HAVE BEEN MOVING AROUND A LOT MORE THAN USUAL: 0
SUM OF ALL RESPONSES TO PHQ QUESTIONS 1-9: 0
7. TROUBLE CONCENTRATING ON THINGS, SUCH AS READING THE NEWSPAPER OR WATCHING TELEVISION: 0
4. FEELING TIRED OR HAVING LITTLE ENERGY: 0
10. IF YOU CHECKED OFF ANY PROBLEMS, HOW DIFFICULT HAVE THESE PROBLEMS MADE IT FOR YOU TO DO YOUR WORK, TAKE CARE OF THINGS AT HOME, OR GET ALONG WITH OTHER PEOPLE: 0
9. THOUGHTS THAT YOU WOULD BE BETTER OFF DEAD, OR OF HURTING YOURSELF: 0
SUM OF ALL RESPONSES TO PHQ QUESTIONS 1-9: 0

## 2023-04-26 NOTE — PROGRESS NOTES
Attention and perception normal.         Mood and Affect: Mood and affect normal.         Speech: Speech normal.         Behavior: Behavior normal. Behavior is cooperative. Thought Content: Thought content normal.         Cognition and Memory: Cognition and memory normal.         Judgment: Judgment normal.           Edith Balderas was seen today for diabetes. Diagnoses and all orders for this visit:    Type 2 diabetes mellitus without complication, with long-term current use of insulin (HCC)  -     POCT glycosylated hemoglobin (Hb A1C) 7.0  -     Cancel: POCT microalbumin    Essential hypertension    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HCC)  -     Homocysteine; Future    Mixed hyperlipidemia  -     Homocysteine; Future           An electronic signature was used to authenticate this note.     --JUAN MANUEL Fried - CNP

## 2023-05-17 DIAGNOSIS — I10 ESSENTIAL HYPERTENSION: ICD-10-CM

## 2023-05-18 RX ORDER — METOPROLOL SUCCINATE 50 MG/1
TABLET, EXTENDED RELEASE ORAL
Qty: 90 TABLET | Refills: 0 | Status: SHIPPED | OUTPATIENT
Start: 2023-05-18

## 2023-05-18 RX ORDER — LISINOPRIL 40 MG/1
40 TABLET ORAL DAILY
Qty: 90 TABLET | Refills: 0 | Status: SHIPPED | OUTPATIENT
Start: 2023-05-18

## 2023-05-23 RX ORDER — CALCIUM CARB/VITAMIN D3/VIT K1 500-100-40
TABLET,CHEWABLE ORAL
Qty: 100 EACH | Refills: 3 | Status: SHIPPED | OUTPATIENT
Start: 2023-05-23

## 2023-06-24 RX ORDER — BLOOD SUGAR DIAGNOSTIC
STRIP MISCELLANEOUS
Qty: 100 STRIP | Refills: 1 | Status: SHIPPED | OUTPATIENT
Start: 2023-06-24 | End: 2023-07-25 | Stop reason: SDUPTHER

## 2023-07-25 RX ORDER — BLOOD SUGAR DIAGNOSTIC
STRIP MISCELLANEOUS
Qty: 100 STRIP | Refills: 1 | Status: SHIPPED | OUTPATIENT
Start: 2023-07-25

## 2023-07-25 NOTE — TELEPHONE ENCOUNTER
SCRIPT NEEDS TO SAY A SLIDING SCALE OR 4 TIMES DAILY - PHARMACY TOLD HER SHE NEEDS A NEW SCRIPT    L/V 06/15/23  N/V 08/22/23    PT WILL BE OUT TODAY

## 2023-08-17 DIAGNOSIS — I10 ESSENTIAL HYPERTENSION: ICD-10-CM

## 2023-08-17 RX ORDER — LISINOPRIL 40 MG/1
40 TABLET ORAL DAILY
Qty: 90 TABLET | Refills: 0 | Status: SHIPPED | OUTPATIENT
Start: 2023-08-17

## 2023-08-17 RX ORDER — METOPROLOL SUCCINATE 50 MG/1
TABLET, EXTENDED RELEASE ORAL
Qty: 90 TABLET | Refills: 0 | Status: SHIPPED | OUTPATIENT
Start: 2023-08-17

## 2023-08-22 ENCOUNTER — OFFICE VISIT (OUTPATIENT)
Dept: CARDIOLOGY CLINIC | Age: 73
End: 2023-08-22
Payer: MEDICARE

## 2023-08-22 VITALS
OXYGEN SATURATION: 98 % | HEART RATE: 56 BPM | WEIGHT: 258 LBS | DIASTOLIC BLOOD PRESSURE: 74 MMHG | SYSTOLIC BLOOD PRESSURE: 138 MMHG | BODY MASS INDEX: 41.46 KG/M2 | HEIGHT: 66 IN

## 2023-08-22 DIAGNOSIS — Z86.718 H/O DEEP VENOUS THROMBOSIS: ICD-10-CM

## 2023-08-22 DIAGNOSIS — G47.33 OSA (OBSTRUCTIVE SLEEP APNEA): ICD-10-CM

## 2023-08-22 DIAGNOSIS — E66.01 MORBID OBESITY (HCC): ICD-10-CM

## 2023-08-22 DIAGNOSIS — I48.19 PERSISTENT ATRIAL FIBRILLATION (HCC): ICD-10-CM

## 2023-08-22 DIAGNOSIS — I50.22 CHRONIC SYSTOLIC CHF (CONGESTIVE HEART FAILURE), NYHA CLASS 2 (HCC): Primary | ICD-10-CM

## 2023-08-22 PROCEDURE — 99213 OFFICE O/P EST LOW 20 MIN: CPT | Performed by: INTERNAL MEDICINE

## 2023-08-22 PROCEDURE — 93000 ELECTROCARDIOGRAM COMPLETE: CPT | Performed by: INTERNAL MEDICINE

## 2023-08-22 PROCEDURE — 3078F DIAST BP <80 MM HG: CPT | Performed by: INTERNAL MEDICINE

## 2023-08-22 PROCEDURE — 3074F SYST BP LT 130 MM HG: CPT | Performed by: INTERNAL MEDICINE

## 2023-08-22 PROCEDURE — 1123F ACP DISCUSS/DSCN MKR DOCD: CPT | Performed by: INTERNAL MEDICINE

## 2023-08-22 NOTE — PROGRESS NOTES
Cardiology Consultation   Referring Physician:   Reason for Consultation: hospital follow up  Chief Complaint:   No chief complaint on file. Subjective:   History of Present Illness:  Layla Cabot is a 72 y.o. female with PMH significant for morbid obesity, atrial fib (chronic), DVT/PE, factor V, diabetes mellitus and marked mobility issues who was admitted with bilateral LE edema and increasing KENNEDY on 2020. She underwent right heart catheterization on 6/15/21 showing normal pressures. She presents today for follow up. She has not been monitoring her blood pressure at home but will occasionally monitor this at pharmacies. She reports stable readings. She denies chest pain with rest or exertion. Her breathing has been comfortable without shortness of breath. Patient denies exertional chest pain/pressure, dyspnea at rest, KENNEDY, PND, orthopnea, palpitations, lightheadedness, weight changes, changes in LE edema, and syncope. She reports medication compliance and is tolerating. She denies abnormal bruising or bleeding. Interval history 23  ***    Prior cardiac testing:    EK22 Atrial fibrillation    Echo: 2020  Ejection fraction is visually estimated to be 55-60%. Grade I diastolic dysfunction with normal LV filling pressures. Severly dilated right ventricle. Right ventricular systolic function is mildly reduced. The right atrium is severely dilated. Estimated pulmonary artery systolic pressure is normal at 37 mmHg assuming a right atrial pressure of 15 mmHg. Echo 21  There is mildly increased left ventricular wall thickness with normal LV  size and systolic function. Ejection fraction is visually estimated to be 55-60%. Grade II diastolic dysfunction with elevated LV filling pressures. Moderately dilated right ventricle with mild reduced function. Compared to  prior study, RV appears less dilated.   The left
06/10/2021 11:36 AM    MCV 88.3 06/10/2021 11:36 AM    RDW 16.1 06/10/2021 11:36 AM     06/10/2021 11:36 AM     CMP:  Lab Results   Component Value Date/Time     09/22/2022 09:16 AM    K 4.0 09/22/2022 09:16 AM    K 3.8 12/03/2020 06:05 AM     09/22/2022 09:16 AM    CO2 26 09/22/2022 09:16 AM    BUN 29 09/22/2022 09:16 AM    CREATININE 1.1 09/22/2022 09:16 AM    GFRAA 59 09/22/2022 09:16 AM    GFRAA >60 03/12/2010 10:00 AM    AGRATIO 1.3 09/22/2022 09:16 AM    LABGLOM 49 09/22/2022 09:16 AM    GLUCOSE 111 09/22/2022 09:16 AM    PROT 6.8 09/22/2022 09:16 AM    CALCIUM 9.5 09/22/2022 09:16 AM    BILITOT 1.6 09/22/2022 09:16 AM    ALKPHOS 112 09/22/2022 09:16 AM    AST 22 09/22/2022 09:16 AM    ALT 11 09/22/2022 09:16 AM     PT/INR:  No results found for: PTINR  HgBA1c:  Lab Results   Component Value Date/Time    LABA1C 7.0 04/26/2023 09:36 AM     Troponin:   Lab Results   Component Value Date/Time    TROPONINI <0.01 11/22/2020 12:22 PM         CURRENT Medications:  Current Outpatient Medications on File Prior to Visit   Medication Sig Dispense Refill    apixaban (ELIQUIS) 5 MG TABS tablet TAKE 1 TABLET BY MOUTH TWICE DAILY 180 tablet 3    lisinopril (PRINIVIL;ZESTRIL) 40 MG tablet TAKE 1 TABLET BY MOUTH DAILY 90 tablet 0    metoprolol succinate (TOPROL XL) 50 MG extended release tablet TAKE 1 TABLET BY MOUTH DAILY 90 tablet 0    blood glucose test strips (ACCU-CHEK GUIDE) strip USE TO TEST BLOOD SUGAR  4 TIMES DAILY AS DIRECTED 100 strip 1    furosemide (LASIX) 40 MG tablet TAKE 1 TABLET BY MOUTH TWICE DAILY 180 tablet 0    Cetirizine HCl 10 MG CAPS Take 1 capsule by mouth      predniSONE (DELTASONE) 10 MG tablet 3 tabs x 2 d 2 tabs x 2 d 1  tab x 2 d in am with food  avoid NSAIDs while on this medication 12 tablet 0    potassium chloride (KLOR-CON M) 20 MEQ extended release tablet TAKE 1 TABLET BY MOUTH DAILY 90 tablet 1    Insulin Syringe-Needle U-100 (INSULIN SYRINGE .3CC/31GX5/16\") 31G X 5/16\"

## 2023-08-28 DIAGNOSIS — E66.9 DIABETES MELLITUS TYPE 2 IN OBESE (HCC): ICD-10-CM

## 2023-08-28 DIAGNOSIS — E11.69 DIABETES MELLITUS TYPE 2 IN OBESE (HCC): ICD-10-CM

## 2023-08-28 RX ORDER — BLOOD SUGAR DIAGNOSTIC
STRIP MISCELLANEOUS
Qty: 100 STRIP | Refills: 1 | Status: SHIPPED | OUTPATIENT
Start: 2023-08-28

## 2023-08-28 RX ORDER — INSULIN ASPART 100 [IU]/ML
INJECTION, SOLUTION INTRAVENOUS; SUBCUTANEOUS
Qty: 30 ML | Refills: 2 | Status: SHIPPED | OUTPATIENT
Start: 2023-08-28

## 2023-09-10 DIAGNOSIS — R60.0 EDEMA OF BOTH LEGS: ICD-10-CM

## 2023-09-11 RX ORDER — FUROSEMIDE 40 MG/1
TABLET ORAL
Qty: 180 TABLET | Refills: 0 | Status: SHIPPED | OUTPATIENT
Start: 2023-09-11

## 2023-09-17 DIAGNOSIS — E78.00 HYPERCHOLESTEROLEMIA: ICD-10-CM

## 2023-09-18 RX ORDER — POTASSIUM CHLORIDE 20 MEQ/1
TABLET, EXTENDED RELEASE ORAL
Qty: 90 TABLET | Refills: 0 | Status: SHIPPED | OUTPATIENT
Start: 2023-09-18

## 2023-09-18 RX ORDER — ATORVASTATIN CALCIUM 40 MG/1
TABLET, FILM COATED ORAL
Qty: 90 TABLET | Refills: 0 | Status: SHIPPED | OUTPATIENT
Start: 2023-09-18

## 2023-09-20 ENCOUNTER — TELEPHONE (OUTPATIENT)
Dept: FAMILY MEDICINE CLINIC | Age: 73
End: 2023-09-20

## 2023-09-20 RX ORDER — BENZONATATE 100 MG/1
100-200 CAPSULE ORAL 3 TIMES DAILY PRN
Qty: 60 CAPSULE | Refills: 0 | Status: SHIPPED | OUTPATIENT
Start: 2023-09-20 | End: 2023-09-27

## 2023-09-20 NOTE — TELEPHONE ENCOUNTER
Patient is not on my chart, and she is not feeling well. Symptoms - cough-clear and a little bit yellow, fever, she gets bronchitis every year, she has had this now for 3 days. She is having trouble sleeping with this cough. Is there something OTC she can take, she is taking now  tylenol, cough drop, and Nyquil  and it is not working. She did test for covid and it was negative. [facility]. - phone no. [phone]    Cough is now going more into her chest.     Please give her a call back.

## 2023-09-20 NOTE — TELEPHONE ENCOUNTER
ibuprofene rcommend     Instructions for Respiratory Infections (SAVE THIS SHEET)   For the first 7-14 days of symptoms follow instructions below, even before being seen in the office or even during treatment with antibiotics, until symptom free. 1. Water: Drink 1 ounce of water for every 2 pounds of body weight for adults need 64 Ounces of water per day. This will loosen mucus in the head and chest & improve the weak feeling of dehydration, allow the body to get germ fighting resources to the infection. Half can be juice or sugar free Crystal Light. Don't count drinks with caffeine or carbonation. Infants can have Pedialyte liquid or freezer pops. Avoid salt if you have high Blood Pressure, swelling in the feet or ankles or have heart problems. 2. Humidity: Humidify the air to 35-50% ( or until the windows fog over slightly). Can use a humidifier, vaporizer, boil water on the stove or put a coffee can full of water on the heater vents. This will loosen mucus from infections and allergies. 3. Sleep: Get 8-10 hours a night and rest during the evening after work or school. If you have trouble sleeping, adults can take Melatonin 3mg up to 3 tabs at bedtime ( not for children or pregnant women). If Mono is suspected then sleep during 9PM to 9AM time span (if possible.)   4. Cough: Take cough medicines with Guaifenesin ( to loosen chest or head congestion) and Dextromethorphan ( to decrease excess cough). Robitussin D.M. Syrup every 4-6 hrs or Mucinex D.M. Pills twice a day. Use the pediatric formulations for children over 6 months making sure they are alcohol & sugar free for children, pregnant women, and diabetics. 5. Pain And Fevers: Take Acetaminophen ( Tylenol) for fevers, aches, and headaches. 2-500 mg every 8 hours for adults. Appropriate doses at bedtime for children may help them sleep better. If pregnant take 1 -500 mg. (Tylenol) every 8 hours as needed.  Ibuprofen may be used if not pregnant, but should

## 2023-10-02 RX ORDER — NAPROXEN SODIUM 220 MG
TABLET ORAL
Qty: 100 EACH | Refills: 0 | Status: SHIPPED | OUTPATIENT
Start: 2023-10-02

## 2023-10-12 ENCOUNTER — IMMUNIZATION (OUTPATIENT)
Dept: FAMILY MEDICINE CLINIC | Age: 73
End: 2023-10-12
Payer: MEDICARE

## 2023-10-12 DIAGNOSIS — Z23 NEED FOR IMMUNIZATION AGAINST INFLUENZA: Primary | ICD-10-CM

## 2023-10-12 PROCEDURE — 90694 VACC AIIV4 NO PRSRV 0.5ML IM: CPT | Performed by: NURSE PRACTITIONER

## 2023-10-12 PROCEDURE — G0008 ADMIN INFLUENZA VIRUS VAC: HCPCS | Performed by: NURSE PRACTITIONER

## 2023-10-12 NOTE — PROGRESS NOTES
Immunizations Administered       Name Date Dose Route    Influenza, FLUAD, (age 72 y+), Adjuvanted, 0.5mL 10/12/2023 0.5 mL Intramuscular    Site: Deltoid- Left    Lot: [id number]    [address]: 00814-422-08

## 2023-10-19 DIAGNOSIS — R79.83 HOMOCYSTINEMIA: ICD-10-CM

## 2023-10-19 RX ORDER — CYANOCOBALAMIN/FOLIC AC/VIT B6 2-2.5-25MG
1 TABLET ORAL DAILY
Qty: 90 TABLET | Refills: 0 | Status: SHIPPED | OUTPATIENT
Start: 2023-10-19

## 2023-11-06 RX ORDER — NAPROXEN SODIUM 220 MG
TABLET ORAL
Qty: 100 EACH | Refills: 0 | Status: SHIPPED | OUTPATIENT
Start: 2023-11-06

## 2023-11-15 DIAGNOSIS — I10 ESSENTIAL HYPERTENSION: ICD-10-CM

## 2023-11-16 RX ORDER — METOPROLOL SUCCINATE 50 MG/1
50 TABLET, EXTENDED RELEASE ORAL DAILY
Qty: 90 TABLET | Refills: 0 | Status: SHIPPED | OUTPATIENT
Start: 2023-11-16

## 2023-11-16 RX ORDER — LISINOPRIL 40 MG/1
40 TABLET ORAL DAILY
Qty: 90 TABLET | Refills: 0 | OUTPATIENT
Start: 2023-11-16

## 2023-11-16 RX ORDER — METOPROLOL SUCCINATE 50 MG/1
TABLET, EXTENDED RELEASE ORAL
Qty: 90 TABLET | Refills: 0 | OUTPATIENT
Start: 2023-11-16

## 2023-11-16 RX ORDER — LISINOPRIL 40 MG/1
40 TABLET ORAL DAILY
Qty: 90 TABLET | Refills: 0 | Status: SHIPPED | OUTPATIENT
Start: 2023-11-16

## 2023-11-16 NOTE — TELEPHONE ENCOUNTER
Pt made an appt for Montrell 10, 2023. With Summer. It is for her AWV/DM    We actually canceled her appt because pt had [employer]   She had appt for 9.25. and 10.26. Will we call in her medications for her?

## 2023-11-26 RX ORDER — BLOOD SUGAR DIAGNOSTIC
STRIP MISCELLANEOUS
Qty: 100 STRIP | Refills: 1 | Status: SHIPPED | OUTPATIENT
Start: 2023-11-26

## 2023-12-05 RX ORDER — NAPROXEN SODIUM 220 MG
TABLET ORAL
Qty: 100 EACH | Refills: 2 | Status: SHIPPED | OUTPATIENT
Start: 2023-12-05

## 2023-12-12 DIAGNOSIS — E11.69 DIABETES MELLITUS TYPE 2 IN OBESE (HCC): ICD-10-CM

## 2023-12-12 DIAGNOSIS — R60.0 EDEMA OF BOTH LEGS: ICD-10-CM

## 2023-12-12 DIAGNOSIS — E66.9 DIABETES MELLITUS TYPE 2 IN OBESE (HCC): ICD-10-CM

## 2023-12-12 RX ORDER — FUROSEMIDE 40 MG/1
TABLET ORAL
Qty: 180 TABLET | Refills: 0 | Status: SHIPPED | OUTPATIENT
Start: 2023-12-12

## 2023-12-12 RX ORDER — INSULIN ASPART 100 [IU]/ML
INJECTION, SOLUTION INTRAVENOUS; SUBCUTANEOUS
Qty: 30 ML | Refills: 2 | Status: SHIPPED | OUTPATIENT
Start: 2023-12-12

## 2023-12-27 ENCOUNTER — TELEPHONE (OUTPATIENT)
Dept: FAMILY MEDICINE CLINIC | Age: 73
End: 2023-12-27

## 2023-12-27 NOTE — TELEPHONE ENCOUNTER
Patient was calling because she received a letter stating that the insurance was no longer going to cover the Medication- Novolog in 2024  That there were two other drugs that she could try. There would be exceptions when talking to providers.    She says she really needs the Novolog and she is just unsure what to do     Can we please give her a call

## 2023-12-28 NOTE — TELEPHONE ENCOUNTER
Px advised. Patient asked several questions about insurance and advised to redirect those questions to her insurance. Was informed active nigredient was the same in alternative med listed. Admelog. Patient also encouraged to call office if there are any issues while taking the new medication in 2024.   AM

## 2024-01-08 ENCOUNTER — TELEPHONE (OUTPATIENT)
Dept: FAMILY MEDICINE CLINIC | Age: 74
End: 2024-01-08

## 2024-01-08 ENCOUNTER — OFFICE VISIT (OUTPATIENT)
Age: 74
End: 2024-01-08

## 2024-01-08 VITALS
WEIGHT: 251 LBS | BODY MASS INDEX: 40.51 KG/M2 | SYSTOLIC BLOOD PRESSURE: 147 MMHG | HEART RATE: 66 BPM | DIASTOLIC BLOOD PRESSURE: 65 MMHG | OXYGEN SATURATION: 95 % | TEMPERATURE: 98 F

## 2024-01-08 DIAGNOSIS — M01.X0 ACUTE BACTERIAL ARTHRITIS (HCC): Primary | ICD-10-CM

## 2024-01-08 DIAGNOSIS — A49.9 ACUTE BACTERIAL ARTHRITIS (HCC): Primary | ICD-10-CM

## 2024-01-08 RX ORDER — PREDNISONE 10 MG/1
TABLET ORAL
Qty: 24 TABLET | Refills: 0 | Status: SHIPPED | OUTPATIENT
Start: 2024-01-08 | End: 2024-01-14

## 2024-01-08 RX ORDER — CEPHALEXIN 500 MG/1
500 CAPSULE ORAL 2 TIMES DAILY
Qty: 10 CAPSULE | Refills: 0 | Status: SHIPPED | OUTPATIENT
Start: 2024-01-08 | End: 2024-01-13

## 2024-01-08 ASSESSMENT — ENCOUNTER SYMPTOMS: COLOR CHANGE: 1

## 2024-01-08 NOTE — TELEPHONE ENCOUNTER
Pt called reporting that she has a pain in left thumb, had in right thumb and was told it was arthritis less than a year ago.  Could someone call pt to advise

## 2024-01-08 NOTE — PROGRESS NOTES
Sheila Esparza (:  1950) is a 73 y.o. female,New patient, here for evaluation of the following chief complaint(s):  Joint Pain (Left thumb pain and swelling, left ankle swelling and pain. X 1 week)      ASSESSMENT/PLAN:    ICD-10-CM    1. Acute bacterial arthritis (HCC)  A49.9 predniSONE (DELTASONE) 10 MG tablet    M01.X0 cephALEXin (KEFLEX) 500 MG capsule        Patient is experiencing bacterial arthritis at this time. She has had this in the past in a different joint and states that it feels the same. She was started on Keflex and a short-course of prednisone at this time. Encouraged her to take these as prescribed, use cool compresses, and follow up with PCP on Wednesday as planned. Educated her on signs that would warrant an ER visit. She is understanding and agreeable to this plan.    Dx Disposition: cellulitis, joint infection  Education and handout provided on diagnosis and management of symptoms.   AVS reviewed with patient. Follow up as needed in UC or with PCP for new or worsening symptoms.   Return if symptoms worsen or fail to improve.    SUBJECTIVE/OBJECTIVE:  Patient presents to the clinic with complaints of left thumb pain, swelling, and redness and left ankle area. This started last week. Denies any fever or body aches. She has tried tylenol, vicks rub, heat, and cool water with some relief. History of arthritis and states that she was prescribed something for it last year, but she cannot remember what it was. PCP appt on Wednesday.         History provided by:  Patient   used: No    Joint Pain   Pertinent negatives include no fever.       Vitals:    24 1150   BP: (!) 147/65   Site: Left Lower Arm   Position: Sitting   Cuff Size: Medium Adult   Pulse: 66   Temp: 98 °F (36.7 °C)   TempSrc: Oral   SpO2: 95%   Weight: 113.9 kg (251 lb)       Review of Systems   Constitutional:  Negative for chills, fatigue and fever.   Musculoskeletal:  Positive for arthralgias

## 2024-01-08 NOTE — TELEPHONE ENCOUNTER
Without being seen  there is not much I can do- if it is arthritis the treatment would be antiinflammatories

## 2024-01-09 SDOH — HEALTH STABILITY: PHYSICAL HEALTH: ON AVERAGE, HOW MANY DAYS PER WEEK DO YOU ENGAGE IN MODERATE TO STRENUOUS EXERCISE (LIKE A BRISK WALK)?: 3 DAYS

## 2024-01-09 SDOH — HEALTH STABILITY: PHYSICAL HEALTH: ON AVERAGE, HOW MANY MINUTES DO YOU ENGAGE IN EXERCISE AT THIS LEVEL?: 20 MIN

## 2024-01-09 ASSESSMENT — LIFESTYLE VARIABLES
HOW OFTEN DO YOU HAVE SIX OR MORE DRINKS ON ONE OCCASION: 98
HOW OFTEN DO YOU HAVE A DRINK CONTAINING ALCOHOL: 98
HOW MANY STANDARD DRINKS CONTAINING ALCOHOL DO YOU HAVE ON A TYPICAL DAY: 98
HOW MANY STANDARD DRINKS CONTAINING ALCOHOL DO YOU HAVE ON A TYPICAL DAY: PATIENT DECLINED
HOW OFTEN DO YOU HAVE A DRINK CONTAINING ALCOHOL: PATIENT DECLINED

## 2024-01-09 ASSESSMENT — PATIENT HEALTH QUESTIONNAIRE - PHQ9
SUM OF ALL RESPONSES TO PHQ QUESTIONS 1-9: 0
SUM OF ALL RESPONSES TO PHQ QUESTIONS 1-9: 0
SUM OF ALL RESPONSES TO PHQ9 QUESTIONS 1 & 2: 0
1. LITTLE INTEREST OR PLEASURE IN DOING THINGS: 0
SUM OF ALL RESPONSES TO PHQ QUESTIONS 1-9: 0
SUM OF ALL RESPONSES TO PHQ QUESTIONS 1-9: 0
2. FEELING DOWN, DEPRESSED OR HOPELESS: 0

## 2024-01-10 ENCOUNTER — OFFICE VISIT (OUTPATIENT)
Dept: FAMILY MEDICINE CLINIC | Age: 74
End: 2024-01-10
Payer: MEDICARE

## 2024-01-10 VITALS
WEIGHT: 253 LBS | DIASTOLIC BLOOD PRESSURE: 84 MMHG | HEIGHT: 66 IN | BODY MASS INDEX: 40.66 KG/M2 | HEART RATE: 76 BPM | SYSTOLIC BLOOD PRESSURE: 136 MMHG

## 2024-01-10 DIAGNOSIS — Z12.31 ENCOUNTER FOR SCREENING MAMMOGRAM FOR MALIGNANT NEOPLASM OF BREAST: ICD-10-CM

## 2024-01-10 DIAGNOSIS — Z12.11 COLON CANCER SCREENING: ICD-10-CM

## 2024-01-10 DIAGNOSIS — G89.29 CHRONIC PAIN OF RIGHT THUMB: ICD-10-CM

## 2024-01-10 DIAGNOSIS — R79.83 HOMOCYSTINEMIA: Chronic | ICD-10-CM

## 2024-01-10 DIAGNOSIS — E78.2 MIXED HYPERLIPIDEMIA: ICD-10-CM

## 2024-01-10 DIAGNOSIS — Z23 NEED FOR SHINGLES VACCINE: ICD-10-CM

## 2024-01-10 DIAGNOSIS — M79.644 CHRONIC PAIN OF RIGHT THUMB: ICD-10-CM

## 2024-01-10 DIAGNOSIS — E66.9 DIABETES MELLITUS TYPE 2 IN OBESE (HCC): ICD-10-CM

## 2024-01-10 DIAGNOSIS — E11.69 DIABETES MELLITUS TYPE 2 IN OBESE (HCC): ICD-10-CM

## 2024-01-10 DIAGNOSIS — Z00.00 MEDICARE ANNUAL WELLNESS VISIT, SUBSEQUENT: Primary | ICD-10-CM

## 2024-01-10 DIAGNOSIS — I10 ESSENTIAL (PRIMARY) HYPERTENSION: ICD-10-CM

## 2024-01-10 DIAGNOSIS — I10 ESSENTIAL HYPERTENSION: ICD-10-CM

## 2024-01-10 DIAGNOSIS — M1A.0420 CHRONIC GOUT OF LEFT HAND, UNSPECIFIED CAUSE: Primary | ICD-10-CM

## 2024-01-10 LAB
ALBUMIN SERPL-MCNC: 4.3 G/DL (ref 3.4–5)
ALBUMIN/GLOB SERPL: 1.9 {RATIO} (ref 1.1–2.2)
ALP SERPL-CCNC: 110 U/L (ref 40–129)
ALT SERPL-CCNC: 16 U/L (ref 10–40)
ANION GAP SERPL CALCULATED.3IONS-SCNC: 12 MMOL/L (ref 3–16)
AST SERPL-CCNC: 26 U/L (ref 15–37)
BILIRUB SERPL-MCNC: 1.3 MG/DL (ref 0–1)
BUN SERPL-MCNC: 30 MG/DL (ref 7–20)
CALCIUM SERPL-MCNC: 10 MG/DL (ref 8.3–10.6)
CHLORIDE SERPL-SCNC: 101 MMOL/L (ref 99–110)
CHOLEST SERPL-MCNC: 172 MG/DL (ref 0–199)
CO2 SERPL-SCNC: 26 MMOL/L (ref 21–32)
CREAT SERPL-MCNC: 1 MG/DL (ref 0.6–1.2)
CREATININE URINE POCT: NORMAL
GFR SERPLBLD CREATININE-BSD FMLA CKD-EPI: 60 ML/MIN/{1.73_M2}
GLUCOSE SERPL-MCNC: 219 MG/DL (ref 70–99)
HBA1C MFR BLD: 7.9 %
HCYS SERPL-SCNC: 13 UMOL/L (ref 0–10)
HDLC SERPL-MCNC: 72 MG/DL (ref 40–60)
LDLC SERPL CALC-MCNC: 85 MG/DL
MICROALBUMIN/CREAT 24H UR: NORMAL MG/G{CREAT}
MICROALBUMIN/CREAT UR-RTO: NORMAL
POTASSIUM SERPL-SCNC: 4.3 MMOL/L (ref 3.5–5.1)
PROT SERPL-MCNC: 6.6 G/DL (ref 6.4–8.2)
SODIUM SERPL-SCNC: 139 MMOL/L (ref 136–145)
TRIGL SERPL-MCNC: 73 MG/DL (ref 0–150)
URATE SERPL-MCNC: 8.5 MG/DL (ref 2.6–6)
VLDLC SERPL CALC-MCNC: 15 MG/DL

## 2024-01-10 PROCEDURE — 1123F ACP DISCUSS/DSCN MKR DOCD: CPT | Performed by: NURSE PRACTITIONER

## 2024-01-10 PROCEDURE — 3079F DIAST BP 80-89 MM HG: CPT | Performed by: NURSE PRACTITIONER

## 2024-01-10 PROCEDURE — 99213 OFFICE O/P EST LOW 20 MIN: CPT | Performed by: NURSE PRACTITIONER

## 2024-01-10 PROCEDURE — 3075F SYST BP GE 130 - 139MM HG: CPT | Performed by: NURSE PRACTITIONER

## 2024-01-10 PROCEDURE — 83036 HEMOGLOBIN GLYCOSYLATED A1C: CPT | Performed by: NURSE PRACTITIONER

## 2024-01-10 PROCEDURE — G0439 PPPS, SUBSEQ VISIT: HCPCS | Performed by: NURSE PRACTITIONER

## 2024-01-10 PROCEDURE — 3051F HG A1C>EQUAL 7.0%<8.0%: CPT | Performed by: NURSE PRACTITIONER

## 2024-01-10 PROCEDURE — 82044 UR ALBUMIN SEMIQUANTITATIVE: CPT | Performed by: NURSE PRACTITIONER

## 2024-01-10 RX ORDER — CYANOCOBALAMIN/FOLIC AC/VIT B6 2-2.5-25MG
1 TABLET ORAL DAILY
Qty: 90 TABLET | Refills: 3 | Status: SHIPPED | OUTPATIENT
Start: 2024-01-10

## 2024-01-10 RX ORDER — INSULIN LISPRO 100 [IU]/ML
INJECTION, SOLUTION INTRAVENOUS; SUBCUTANEOUS
Qty: 10 ML | Refills: 3 | Status: SHIPPED | OUTPATIENT
Start: 2024-01-10

## 2024-01-10 RX ORDER — ZOSTER VACCINE RECOMBINANT, ADJUVANTED 50 MCG/0.5
0.5 KIT INTRAMUSCULAR SEE ADMIN INSTRUCTIONS
Qty: 0.5 ML | Refills: 0 | Status: SHIPPED | OUTPATIENT
Start: 2024-01-10 | End: 2024-07-08

## 2024-01-10 RX ORDER — METOPROLOL SUCCINATE 50 MG/1
50 TABLET, EXTENDED RELEASE ORAL DAILY
Qty: 90 TABLET | Refills: 0 | Status: SHIPPED | OUTPATIENT
Start: 2024-01-10

## 2024-01-10 RX ORDER — ALLOPURINOL 100 MG/1
100 TABLET ORAL DAILY
Qty: 90 TABLET | Refills: 1 | Status: SHIPPED | OUTPATIENT
Start: 2024-01-10

## 2024-01-10 RX ORDER — LISINOPRIL 40 MG/1
40 TABLET ORAL DAILY
Qty: 90 TABLET | Refills: 1 | Status: SHIPPED | OUTPATIENT
Start: 2024-01-10

## 2024-01-10 ASSESSMENT — PATIENT HEALTH QUESTIONNAIRE - PHQ9
6. FEELING BAD ABOUT YOURSELF - OR THAT YOU ARE A FAILURE OR HAVE LET YOURSELF OR YOUR FAMILY DOWN: 0
SUM OF ALL RESPONSES TO PHQ QUESTIONS 1-9: 0
SUM OF ALL RESPONSES TO PHQ9 QUESTIONS 1 & 2: 0
7. TROUBLE CONCENTRATING ON THINGS, SUCH AS READING THE NEWSPAPER OR WATCHING TELEVISION: 0
9. THOUGHTS THAT YOU WOULD BE BETTER OFF DEAD, OR OF HURTING YOURSELF: 0
SUM OF ALL RESPONSES TO PHQ QUESTIONS 1-9: 0
8. MOVING OR SPEAKING SO SLOWLY THAT OTHER PEOPLE COULD HAVE NOTICED. OR THE OPPOSITE, BEING SO FIGETY OR RESTLESS THAT YOU HAVE BEEN MOVING AROUND A LOT MORE THAN USUAL: 0
SUM OF ALL RESPONSES TO PHQ QUESTIONS 1-9: 0
3. TROUBLE FALLING OR STAYING ASLEEP: 0
2. FEELING DOWN, DEPRESSED OR HOPELESS: 0
10. IF YOU CHECKED OFF ANY PROBLEMS, HOW DIFFICULT HAVE THESE PROBLEMS MADE IT FOR YOU TO DO YOUR WORK, TAKE CARE OF THINGS AT HOME, OR GET ALONG WITH OTHER PEOPLE: 0
5. POOR APPETITE OR OVEREATING: 0
SUM OF ALL RESPONSES TO PHQ QUESTIONS 1-9: 0
1. LITTLE INTEREST OR PLEASURE IN DOING THINGS: 0
4. FEELING TIRED OR HAVING LITTLE ENERGY: 0

## 2024-01-10 NOTE — PATIENT INSTRUCTIONS
even if current refills have been exhausted. If you are on a controlled medication you will be referred to a specialist (pain specialist, psychiatry, etc).   Forms: There is a $35 fee to fill out FMLA/Disability paperwork, payable at time of . Instead of the fee, you can choose to have the paperwork filled out during a separate office visit that is for filling out the paperwork only.  Medication Samples:  This office does not carry medication samples.  If you need assistance in getting your medications, then please let the medical assistant know so they can help you sign up for a drug assistance program that can help get medications at a reduced cost or even free (if you qualify).  Workman's Comp Claims: We do not handle workman's comp cases or claims. You will need to go to an urgent care to be seen or to whomever your employer uses.  General - Any abusive/rude behavior toward staff/providers may be cause for dismissal.        Advance Directives: Care Instructions  Overview  An advance directive is a legal way to state your wishes at the end of your life. It tells your family and your doctor what to do if you can't say what you want.  There are two main types of advance directives. You can change them any time your wishes change.  Living will.  This form tells your family and your doctor your wishes about life support and other treatment. The form is also called a declaration.  Medical power of .  This form lets you name a person to make treatment decisions for you when you can't speak for yourself. This person is called a health care agent (health care proxy, health care surrogate). The form is also called a durable power of  for health care.  If you do not have an advance directive, decisions about your medical care may be made by a family member, or by a doctor or a  who doesn't know you.  It may help to think of an advance directive as a gift to the people who care for you. If you

## 2024-01-10 NOTE — PROGRESS NOTES
Medicare Annual Wellness Visit    Sheila Esparza is here for Medicare AWV (awv)    Assessment & Plan   Diabetes mellitus type 2 in obese (HCC)  -     POCT glycosylated hemoglobin (Hb A1C)  Medicare annual wellness visit, subsequent    Recommendations for Preventive Services Due: see orders and patient instructions/AVS.  Recommended screening schedule for the next 5-10 years is provided to the patient in written form: see Patient Instructions/AVS.     Sheila was seen today for medicare awv.    Diagnoses and all orders for this visit:    Medicare annual wellness visit, subsequent  Recommendations for Preventive Services Due: see orders and patient instructions/AVS.  Recommended screening schedule for the next 5-10 years is provided to the patient in written form: see Patient Instructions/AVS.  Diabetes mellitus type 2 in obese (HCC)  -     POCT glycosylated hemoglobin (Hb A1C) 7.9  SHE WILL TIGHTEN UP ON HER DIET - AS WELL AS INCREASE PHYSICAL ACTIVITY  NO MEDICATION CHANGES AT THIS TIME   NOVOLOG DISCONTINUED DUE TO INSURANCE PREFERENCE  ADMELOG PRESCRIBED CONTINUE SLIDING SCALE  -     Comprehensive Metabolic Panel; Future  -      DIABETES FOOT EXAM  -     POCT microalbumin  -     Comprehensive Metabolic Panel    Essential hypertension  STABLE ON CURRENT MEDICATION   CONTINUE metoprolol succinate (TOPROL XL) 50 MG extended release tablet; Take 1 tablet by mouth daily  -     lisinopril (PRINIVIL;ZESTRIL) 40 MG tablet; Take 1 tablet by mouth daily  MONITOR B/P GOAL 130/80 OR LESS DUE TO DM  FOLLOW UP WITH CARDIOLOGIST    Homocystinemia  -     Homocysteine; Future  -     folic acid-pyridoxine-cyancobalamin 2.5-25-2 mg tablet (WESTAB MAX) 2.5-25-2 MG TABS; Take 1 tablet by mouth daily  -     Homocysteine    Chronic pain of right thumb  -     Uric Acid; Future  -     Uric Acid    Mixed hyperlipidemia  -     Lipid Panel; Future  -     Lipid Panel    Encounter for screening mammogram for malignant neoplasm of

## 2024-01-15 RX ORDER — BLOOD SUGAR DIAGNOSTIC
STRIP MISCELLANEOUS
Qty: 100 STRIP | Refills: 1 | Status: SHIPPED | OUTPATIENT
Start: 2024-01-15

## 2024-03-05 ENCOUNTER — TELEPHONE (OUTPATIENT)
Dept: ADMINISTRATIVE | Age: 74
End: 2024-03-05

## 2024-03-05 RX ORDER — INSULIN LISPRO 100 [IU]/ML
INJECTION, SOLUTION INTRAVENOUS; SUBCUTANEOUS
Qty: 30 ML | Refills: 1 | Status: SHIPPED | OUTPATIENT
Start: 2024-03-05

## 2024-03-05 RX ORDER — NAPROXEN SODIUM 220 MG
TABLET ORAL
Qty: 272 EACH | Refills: 1 | Status: SHIPPED | OUTPATIENT
Start: 2024-03-05

## 2024-03-05 NOTE — TELEPHONE ENCOUNTER
Submitted PA for Insulin Lispro 100UNIT/ML solution   Via CM (Key: [id number])  STATUS: PENDING.    Follow up done daily; if no decision with in three days we will refax.  If another three days goes by with no decision will call the insurance for status.

## 2024-03-12 RX ORDER — INSULIN LISPRO 100 [IU]/ML
INJECTION, SOLUTION INTRAVENOUS; SUBCUTANEOUS
Qty: 30 ML | Refills: 1 | Status: SHIPPED | OUTPATIENT
Start: 2024-03-12

## 2024-03-14 DIAGNOSIS — R60.0 EDEMA OF BOTH LEGS: ICD-10-CM

## 2024-03-14 RX ORDER — FUROSEMIDE 40 MG/1
TABLET ORAL
Qty: 180 TABLET | Refills: 1 | Status: SHIPPED | OUTPATIENT
Start: 2024-03-14

## 2024-03-18 RX ORDER — POTASSIUM CHLORIDE 20 MEQ/1
TABLET, EXTENDED RELEASE ORAL
Qty: 90 TABLET | Refills: 1 | Status: SHIPPED | OUTPATIENT
Start: 2024-03-18

## 2024-03-24 RX ORDER — BLOOD SUGAR DIAGNOSTIC
STRIP MISCELLANEOUS
Qty: 100 STRIP | Refills: 1 | Status: SHIPPED | OUTPATIENT
Start: 2024-03-24

## 2024-04-07 DIAGNOSIS — E78.00 HYPERCHOLESTEROLEMIA: ICD-10-CM

## 2024-04-07 RX ORDER — ATORVASTATIN CALCIUM 40 MG/1
TABLET, FILM COATED ORAL
Qty: 90 TABLET | Refills: 0 | Status: SHIPPED | OUTPATIENT
Start: 2024-04-07

## 2024-04-18 DIAGNOSIS — M1A.0420 CHRONIC GOUT OF LEFT HAND, UNSPECIFIED CAUSE: ICD-10-CM

## 2024-04-18 RX ORDER — ALLOPURINOL 100 MG/1
100 TABLET ORAL DAILY
Qty: 90 TABLET | Refills: 1 | Status: SHIPPED | OUTPATIENT
Start: 2024-04-18

## 2024-04-19 RX ORDER — BLOOD SUGAR DIAGNOSTIC
STRIP MISCELLANEOUS
Qty: 100 STRIP | Refills: 1 | Status: SHIPPED | OUTPATIENT
Start: 2024-04-19

## 2024-05-09 ENCOUNTER — TELEPHONE (OUTPATIENT)
Dept: FAMILY MEDICINE CLINIC | Age: 74
End: 2024-05-09

## 2024-05-09 DIAGNOSIS — E11.9 TYPE 2 DIABETES MELLITUS WITHOUT COMPLICATION, WITH LONG-TERM CURRENT USE OF INSULIN (HCC): Primary | ICD-10-CM

## 2024-05-09 DIAGNOSIS — Z79.4 TYPE 2 DIABETES MELLITUS WITHOUT COMPLICATION, WITH LONG-TERM CURRENT USE OF INSULIN (HCC): Primary | ICD-10-CM

## 2024-05-09 NOTE — TELEPHONE ENCOUNTER
Pt Glucose machine broke,    She needs a new RX sent to  [facility] [phone]    It is the Accu Chek Guide machine.  She only needs the kit she said she has the lancets and test strips.  She test 4 times a day.

## 2024-05-17 DIAGNOSIS — I10 ESSENTIAL HYPERTENSION: ICD-10-CM

## 2024-05-20 RX ORDER — METOPROLOL SUCCINATE 50 MG/1
50 TABLET, EXTENDED RELEASE ORAL DAILY
Qty: 90 TABLET | Refills: 1 | Status: SHIPPED | OUTPATIENT
Start: 2024-05-20

## 2024-06-17 RX ORDER — BLOOD SUGAR DIAGNOSTIC
STRIP MISCELLANEOUS
Qty: 100 STRIP | Refills: 1 | Status: SHIPPED | OUTPATIENT
Start: 2024-06-17

## 2024-06-20 ENCOUNTER — COMMUNITY OUTREACH (OUTPATIENT)
Dept: FAMILY MEDICINE CLINIC | Age: 74
End: 2024-06-20

## 2024-06-20 NOTE — PROGRESS NOTES
Patient's HM shows they are overdue for Mammogram.   [facility]zo and  files searched without success.

## 2024-06-26 RX ORDER — INSULIN LISPRO 100 [IU]/ML
INJECTION, SOLUTION INTRAVENOUS; SUBCUTANEOUS
Qty: 30 ML | Refills: 1 | Status: SHIPPED | OUTPATIENT
Start: 2024-06-26 | End: 2024-09-26

## 2024-07-01 DIAGNOSIS — E78.00 HYPERCHOLESTEROLEMIA: ICD-10-CM

## 2024-07-01 RX ORDER — POTASSIUM CHLORIDE 20 MEQ/1
TABLET, EXTENDED RELEASE ORAL
Qty: 90 TABLET | Refills: 1 | Status: SHIPPED | OUTPATIENT
Start: 2024-07-01

## 2024-07-02 RX ORDER — ATORVASTATIN CALCIUM 40 MG/1
40 TABLET, FILM COATED ORAL NIGHTLY
Qty: 90 TABLET | Refills: 3 | Status: SHIPPED | OUTPATIENT
Start: 2024-07-02

## 2024-07-03 NOTE — TELEPHONE ENCOUNTER
Spoke to [facility]. Verified that claudy has received the new prescription for atorvastatin refills on 7/2/24.    Per [facility], they have tried to process the prescription with patient's [place] insurance and it gets kicked to another insurance (same is happening for patient's lisinopril and furosemide).  Patient's other insurance is listed as Medicare Part D and the patient is paying $27 per month for the atorvastatin (should be $0 copay with [place]).  Pharmacy is unable to process under [place] as every time they try to use [place], it gets kicked to the other insurance.    Per pharmacy, the ID # on other insurance is [id number].  Per chart review, this is [employer] PDP Medicare prescription drug coverage (part of [employer] Medicare).      Malka Mckay, PharmD, [facility]  Population Health Pharmacist  [facility]  Department, toll free: [phone], option 1    
  Component Value Date    CREATININE 1.0 01/10/2024      Lab Results   Component Value Date    K 4.3 01/10/2024           STATIN ADHERENCE  IInsurance Records claims through 6/24/24  YTD PDC = no data for 2024:     Per Reconciled Dispense Report:  0 refill(s) remaining.  ATORVASTATIN 40MG TABLETS 04/02/2024 90 90 each Summer Roche, [facility] #...   ATORVASTATIN 40MG TABLETS 12/13/2023 90 90 each Kaley, Summer Nakia, APRN [facility] #...     Per [place] Portal:  Atorvastatin 40 mg approved claim 4/2/24 x 90 days; 0 refill(s) remaining.  [id number]    Lab Results   Component Value Date    CHOL 172 01/10/2024    TRIG 73 01/10/2024    HDL 72 (H) 01/10/2024    LDL 85 01/10/2024    VLDL 15 01/10/2024     ALT   Date Value Ref Range Status   01/10/2024 16 10 - 40 U/L Final     AST   Date Value Ref Range Status   01/10/2024 26 15 - 37 U/L Final     The 10-year ASCVD risk score (Shanon SEVILLA, et al., 2019) is: 29.3%    Values used to calculate the score:      Age: 74 years      Sex: Female      Is Non- : No      Diabetic: Yes      Tobacco smoker: No      Systolic Blood Pressure: 136 mmHg      Is BP treated: Yes      HDL Cholesterol: 72 mg/dL      Total Cholesterol: 172 mg/dL           STATIN GAP IDENTIFIED  Per chart review, statin therapy is recommended as per the 2024 ADA Guidelines:  For primary prevention:     Patients with diabetes (age 40-75) at higher cardiovascular risk (including patients with one or more ASCVD risk factors:  duration of diabetes, obesity/overweight, family history of premature ASCVD, hypertension, dyslipidemia, smoking, or CKD/albuminuria): high-intensity statin is recommended.  It is recommended to reduce LDL >/=50% from baseline and to a target LDL goal <70.      Per chart review, a statin is prescribed for the patient; however, the patient does not have a paid claim in the [employer] portal for a statin in

## 2024-07-09 RX ORDER — NAPROXEN SODIUM 220 MG
TABLET ORAL
Qty: 270 EACH | Refills: 0 | Status: SHIPPED | OUTPATIENT
Start: 2024-07-09

## 2024-08-09 RX ORDER — BLOOD SUGAR DIAGNOSTIC
STRIP MISCELLANEOUS
Qty: 100 STRIP | Refills: 1 | Status: SHIPPED | OUTPATIENT
Start: 2024-08-09

## 2024-08-16 NOTE — PROGRESS NOTES
Cardiology Follow Up  Referring Physician:   Reason for Consultation: hospital follow up  Chief Complaint: \"I feel good\"  Chief Complaint   Patient presents with    Follow-up     NO CC       Subjective:   History of Present Illness:  Sheila Esparza is a 74 y.o. female with PMH significant for morbid obesity, atrial fib (chronic), DVT/PE, factor V, diabetes mellitus and marked mobility issues who was admitted with bilateral LE edema and increasing KENNEDY on 2020. She underwent right heart catheterization on 6/15/21 showing normal pressures. Patient here today for follow up. Reports overall she is feeling good. Staying active. 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 Patient currently denies any weight gain, edema, palpitations, chest pain, shortness of breath, dizziness, and syncope. States she did put some weight on but is working on losing again. Looking into going to the gym.            Past Medical History:   has a past medical history of Diabetes mellitus type 2 in obese, Edema of both legs, Elevated LFTs, Homocystinemia, Homozygous Factor V Leiden mutation (HCC), Hypercholesterolemia, Hypertension, Hypomagnesemia, Low serum HDL, Lower leg DVT (deep venous thromboembolism), chronic, right (HCC), Osteoarthritis of cervical spine, Renal failure, acute (HCC), and Vitamin D deficiency.    Surgical History:   has a past surgical history that includes Cholecystectomy ();  section; Appendectomy (1976); Tubal ligation (Bilateral, 1980); Colonoscopy (2014); and eye surgery (3 years).     Social History:   reports that she has never smoked. She has never used smokeless tobacco. She reports current alcohol use. She reports that she does not use drugs.     Family History:  family history includes COPD in her sister; Clotting Disorder in her son; Coronary Art Dis in her father; Diabetes in her brother and sister; Heart

## 2024-08-19 RX ORDER — INSULIN LISPRO 100 [IU]/ML
INJECTION, SOLUTION INTRAVENOUS; SUBCUTANEOUS
Qty: 30 ML | Refills: 1 | Status: SHIPPED | OUTPATIENT
Start: 2024-08-19

## 2024-08-20 ENCOUNTER — OFFICE VISIT (OUTPATIENT)
Dept: CARDIOLOGY CLINIC | Age: 74
End: 2024-08-20
Payer: MEDICARE

## 2024-08-20 VITALS
BODY MASS INDEX: 40.47 KG/M2 | SYSTOLIC BLOOD PRESSURE: 134 MMHG | OXYGEN SATURATION: 97 % | HEIGHT: 66 IN | WEIGHT: 251.8 LBS | HEART RATE: 75 BPM | DIASTOLIC BLOOD PRESSURE: 80 MMHG

## 2024-08-20 DIAGNOSIS — I48.19 PERSISTENT ATRIAL FIBRILLATION (HCC): Primary | ICD-10-CM

## 2024-08-20 DIAGNOSIS — I10 ESSENTIAL HYPERTENSION: ICD-10-CM

## 2024-08-20 DIAGNOSIS — Z86.718 H/O DEEP VENOUS THROMBOSIS: ICD-10-CM

## 2024-08-20 DIAGNOSIS — G47.33 OSA (OBSTRUCTIVE SLEEP APNEA): ICD-10-CM

## 2024-08-20 PROCEDURE — 93000 ELECTROCARDIOGRAM COMPLETE: CPT | Performed by: INTERNAL MEDICINE

## 2024-08-20 PROCEDURE — 1123F ACP DISCUSS/DSCN MKR DOCD: CPT | Performed by: INTERNAL MEDICINE

## 2024-08-20 PROCEDURE — 3079F DIAST BP 80-89 MM HG: CPT | Performed by: INTERNAL MEDICINE

## 2024-08-20 PROCEDURE — 3075F SYST BP GE 130 - 139MM HG: CPT | Performed by: INTERNAL MEDICINE

## 2024-08-20 PROCEDURE — 99214 OFFICE O/P EST MOD 30 MIN: CPT | Performed by: INTERNAL MEDICINE

## 2024-08-20 RX ORDER — AMLODIPINE BESYLATE 5 MG/1
1 TABLET ORAL DAILY
COMMUNITY

## 2024-08-21 RX ORDER — LISINOPRIL 40 MG/1
40 TABLET ORAL DAILY
Qty: 90 TABLET | Refills: 1 | Status: SHIPPED | OUTPATIENT
Start: 2024-08-21

## 2024-09-03 ENCOUNTER — TELEPHONE (OUTPATIENT)
Dept: FAMILY MEDICINE CLINIC | Age: 74
End: 2024-09-03

## 2024-09-04 ENCOUNTER — HOSPITAL ENCOUNTER (OUTPATIENT)
Dept: WOMENS IMAGING | Age: 74
Discharge: HOME | End: 2024-09-04
Payer: MEDICARE

## 2024-09-04 ENCOUNTER — TELEPHONE (OUTPATIENT)
Dept: FAMILY MEDICINE CLINIC | Age: 74
End: 2024-09-04

## 2024-09-04 VITALS — BODY MASS INDEX: 41.82 KG/M2 | WEIGHT: 251 LBS | HEIGHT: 65 IN

## 2024-09-04 DIAGNOSIS — R92.8 ABNORMAL MAMMOGRAM OF BOTH BREASTS: Primary | ICD-10-CM

## 2024-09-04 DIAGNOSIS — Z12.31 ENCOUNTER FOR SCREENING MAMMOGRAM FOR BREAST CANCER: ICD-10-CM

## 2024-09-04 DIAGNOSIS — Z12.31 ENCOUNTER FOR SCREENING MAMMOGRAM FOR MALIGNANT NEOPLASM OF BREAST: ICD-10-CM

## 2024-09-04 PROCEDURE — 77067 SCR MAMMO BI INCL CAD: CPT

## 2024-09-04 NOTE — TELEPHONE ENCOUNTER
Patient was calling because she was upset with her mammogram results and was wondering if she could talk to Summer.    Can we please give her a call

## 2024-09-09 RX ORDER — BLOOD SUGAR DIAGNOSTIC
STRIP MISCELLANEOUS
Qty: 100 STRIP | Refills: 1 | Status: SHIPPED | OUTPATIENT
Start: 2024-09-09

## 2024-09-12 ENCOUNTER — HOSPITAL ENCOUNTER (OUTPATIENT)
Dept: MAMMOGRAPHY | Age: 74
Discharge: HOME | End: 2024-09-12
Payer: MEDICARE

## 2024-09-12 ENCOUNTER — HOSPITAL ENCOUNTER (OUTPATIENT)
Dept: ULTRASOUND IMAGING | Age: 74
Discharge: HOME | End: 2024-09-12
Payer: MEDICARE

## 2024-09-12 ENCOUNTER — TELEPHONE (OUTPATIENT)
Dept: CARDIOLOGY CLINIC | Age: 74
End: 2024-09-12

## 2024-09-12 DIAGNOSIS — R92.8 ABNORMAL MAMMOGRAM: ICD-10-CM

## 2024-09-12 DIAGNOSIS — R92.8 ABNORMAL FINDINGS ON DIAGNOSTIC IMAGING OF BREAST: ICD-10-CM

## 2024-09-12 DIAGNOSIS — R92.8 ABNORMAL MAMMOGRAM OF BOTH BREASTS: ICD-10-CM

## 2024-09-12 DIAGNOSIS — N63.23 MASS OF LOWER OUTER QUADRANT OF LEFT BREAST: Primary | ICD-10-CM

## 2024-09-12 PROCEDURE — G0279 TOMOSYNTHESIS, MAMMO: HCPCS

## 2024-09-12 PROCEDURE — 76642 ULTRASOUND BREAST LIMITED: CPT

## 2024-09-20 ENCOUNTER — HOSPITAL ENCOUNTER (OUTPATIENT)
Dept: ULTRASOUND IMAGING | Age: 74
Discharge: HOME | End: 2024-09-20
Payer: MEDICARE

## 2024-09-20 ENCOUNTER — HOSPITAL ENCOUNTER (OUTPATIENT)
Dept: MAMMOGRAPHY | Age: 74
Discharge: HOME | End: 2024-09-20
Payer: MEDICARE

## 2024-09-20 DIAGNOSIS — N63.23 MASS OF LOWER OUTER QUADRANT OF LEFT BREAST: ICD-10-CM

## 2024-09-20 DIAGNOSIS — R92.8 ABNORMAL FINDINGS ON DIAGNOSTIC IMAGING OF BREAST: ICD-10-CM

## 2024-09-20 PROCEDURE — 2709999900 US BREAST BIOPSY W LOC DEVICE 1ST LESION LEFT

## 2024-09-20 PROCEDURE — 77065 DX MAMMO INCL CAD UNI: CPT

## 2024-09-23 DIAGNOSIS — R60.0 EDEMA OF BOTH LEGS: ICD-10-CM

## 2024-09-23 DIAGNOSIS — R92.8 ABNORMAL MAMMOGRAM: Primary | ICD-10-CM

## 2024-09-23 RX ORDER — FUROSEMIDE 40 MG
TABLET ORAL
Qty: 180 TABLET | Refills: 1 | Status: SHIPPED | OUTPATIENT
Start: 2024-09-23

## 2024-10-11 DIAGNOSIS — M1A.0420 CHRONIC GOUT OF LEFT HAND, UNSPECIFIED CAUSE: ICD-10-CM

## 2024-10-11 RX ORDER — NAPROXEN SODIUM 220 MG
TABLET ORAL
Qty: 270 EACH | Refills: 0 | OUTPATIENT
Start: 2024-10-11

## 2024-10-11 RX ORDER — NAPROXEN SODIUM 220 MG
TABLET ORAL
Qty: 270 EACH | Refills: 0 | Status: SHIPPED | OUTPATIENT
Start: 2024-10-11

## 2024-10-14 RX ORDER — ALLOPURINOL 100 MG/1
100 TABLET ORAL DAILY
Qty: 90 TABLET | Refills: 1 | Status: SHIPPED | OUTPATIENT
Start: 2024-10-14

## 2024-11-05 ENCOUNTER — TELEPHONE (OUTPATIENT)
Dept: FAMILY MEDICINE CLINIC | Age: 74
End: 2024-11-05

## 2024-11-05 NOTE — TELEPHONE ENCOUNTER
Patient called stating her insurance won't cover WESTAB MAX 2.5-25-2 mg tabs-one per day- 90 day refills, due to it being considered a vitamin. She wants to know if she could be prescribed anything else. She stated she has not had an issue until now and doesn't know why they suddenly decided it is not covered.    [facility] [address]  Phone no. [phone]    Please give her a call back.

## 2024-11-08 ENCOUNTER — LAB (OUTPATIENT)
Dept: FAMILY MEDICINE CLINIC | Age: 74
End: 2024-11-08
Payer: MEDICARE

## 2024-11-08 DIAGNOSIS — E11.9 TYPE 2 DIABETES MELLITUS WITHOUT COMPLICATION, WITH LONG-TERM CURRENT USE OF INSULIN (HCC): ICD-10-CM

## 2024-11-08 DIAGNOSIS — Z79.4 TYPE 2 DIABETES MELLITUS WITHOUT COMPLICATION, WITH LONG-TERM CURRENT USE OF INSULIN (HCC): ICD-10-CM

## 2024-11-08 DIAGNOSIS — Z23 NEED FOR INFLUENZA VACCINATION: Primary | ICD-10-CM

## 2024-11-08 LAB — HBA1C MFR BLD: 8.6 %

## 2024-11-08 PROCEDURE — 83036 HEMOGLOBIN GLYCOSYLATED A1C: CPT | Performed by: NURSE PRACTITIONER

## 2024-11-08 PROCEDURE — G0008 ADMIN INFLUENZA VIRUS VAC: HCPCS | Performed by: NURSE PRACTITIONER

## 2024-11-08 PROCEDURE — 90653 IIV ADJUVANT VACCINE IM: CPT | Performed by: NURSE PRACTITIONER

## 2024-11-08 NOTE — PROGRESS NOTES
Immunizations Administered       Name Date Dose Route    Influenza, FLUAD, (age 65 y+), IM, Trivalent PF, 0.5mL 11/8/2024 0.5 mL Intramuscular    Site: Deltoid- Left    Lot: [id number]    NDC: 71778-090-41          Risks and benefits explained.  Current VIS given.  Consent signed.

## 2024-11-14 DIAGNOSIS — I10 ESSENTIAL HYPERTENSION: ICD-10-CM

## 2024-11-14 RX ORDER — METOPROLOL SUCCINATE 50 MG/1
50 TABLET, EXTENDED RELEASE ORAL DAILY
Qty: 90 TABLET | Refills: 0 | Status: SHIPPED | OUTPATIENT
Start: 2024-11-14

## 2024-11-14 RX ORDER — METOPROLOL SUCCINATE 50 MG/1
50 TABLET, EXTENDED RELEASE ORAL DAILY
Qty: 90 TABLET | Refills: 1 | OUTPATIENT
Start: 2024-11-14

## 2024-11-25 RX ORDER — LISINOPRIL 40 MG/1
40 TABLET ORAL DAILY
Qty: 90 TABLET | Refills: 0 | Status: SHIPPED | OUTPATIENT
Start: 2024-11-25

## 2024-12-11 RX ORDER — BLOOD SUGAR DIAGNOSTIC
STRIP MISCELLANEOUS
Qty: 100 STRIP | Refills: 1 | Status: SHIPPED | OUTPATIENT
Start: 2024-12-11

## 2025-01-10 RX ORDER — POTASSIUM CHLORIDE 1500 MG/1
TABLET, EXTENDED RELEASE ORAL
Qty: 90 TABLET | Refills: 0 | Status: SHIPPED | OUTPATIENT
Start: 2025-01-10

## 2025-02-05 ENCOUNTER — OFFICE VISIT (OUTPATIENT)
Dept: FAMILY MEDICINE CLINIC | Age: 75
End: 2025-02-05

## 2025-02-05 VITALS
SYSTOLIC BLOOD PRESSURE: 130 MMHG | HEART RATE: 62 BPM | HEIGHT: 65 IN | OXYGEN SATURATION: 100 % | WEIGHT: 252.8 LBS | DIASTOLIC BLOOD PRESSURE: 74 MMHG | BODY MASS INDEX: 42.12 KG/M2

## 2025-02-05 DIAGNOSIS — I50.22 CHRONIC SYSTOLIC CHF (CONGESTIVE HEART FAILURE), NYHA CLASS 2 (HCC): ICD-10-CM

## 2025-02-05 DIAGNOSIS — E78.00 HYPERCHOLESTEROLEMIA: Chronic | ICD-10-CM

## 2025-02-05 DIAGNOSIS — E11.65 UNCONTROLLED TYPE 2 DIABETES MELLITUS WITH HYPERGLYCEMIA (HCC): ICD-10-CM

## 2025-02-05 DIAGNOSIS — Z00.00 MEDICARE ANNUAL WELLNESS VISIT, SUBSEQUENT: Primary | ICD-10-CM

## 2025-02-05 DIAGNOSIS — I48.19 PERSISTENT ATRIAL FIBRILLATION (HCC): ICD-10-CM

## 2025-02-05 PROBLEM — F33.1 MDD (MAJOR DEPRESSIVE DISORDER), RECURRENT EPISODE, MODERATE (HCC): Status: RESOLVED | Noted: 2020-12-02 | Resolved: 2025-02-05

## 2025-02-05 LAB
ALBUMIN SERPL-MCNC: 4.2 G/DL (ref 3.4–5)
ALBUMIN/GLOB SERPL: 1.6 {RATIO} (ref 1.1–2.2)
ALP SERPL-CCNC: 117 U/L (ref 40–129)
ALT SERPL-CCNC: 15 U/L (ref 10–40)
ANION GAP SERPL CALCULATED.3IONS-SCNC: 10 MMOL/L (ref 3–16)
AST SERPL-CCNC: 27 U/L (ref 15–37)
BILIRUB SERPL-MCNC: 1.4 MG/DL (ref 0–1)
BUN SERPL-MCNC: 28 MG/DL (ref 7–20)
CALCIUM SERPL-MCNC: 10.2 MG/DL (ref 8.3–10.6)
CHLORIDE SERPL-SCNC: 104 MMOL/L (ref 99–110)
CHOLEST SERPL-MCNC: 163 MG/DL (ref 0–199)
CO2 SERPL-SCNC: 29 MMOL/L (ref 21–32)
CREAT SERPL-MCNC: 1.1 MG/DL (ref 0.6–1.2)
CREAT UR-MCNC: 35.4 MG/DL (ref 28–259)
GFR SERPLBLD CREATININE-BSD FMLA CKD-EPI: 53 ML/MIN/{1.73_M2}
GLUCOSE SERPL-MCNC: 104 MG/DL (ref 70–99)
HBA1C MFR BLD: 9 %
HDLC SERPL-MCNC: 66 MG/DL (ref 40–60)
LDL CHOLESTEROL: 81 MG/DL
MICROALBUMIN UR DL<=1MG/L-MCNC: <1.2 MG/DL
MICROALBUMIN/CREAT UR: NORMAL MG/G (ref 0–30)
POTASSIUM SERPL-SCNC: 4.5 MMOL/L (ref 3.5–5.1)
PROT SERPL-MCNC: 6.9 G/DL (ref 6.4–8.2)
SODIUM SERPL-SCNC: 143 MMOL/L (ref 136–145)
TRIGL SERPL-MCNC: 78 MG/DL (ref 0–150)
VLDLC SERPL CALC-MCNC: 16 MG/DL

## 2025-02-05 SDOH — ECONOMIC STABILITY: FOOD INSECURITY: WITHIN THE PAST 12 MONTHS, THE FOOD YOU BOUGHT JUST DIDN'T LAST AND YOU DIDN'T HAVE MONEY TO GET MORE.: NEVER TRUE

## 2025-02-05 SDOH — ECONOMIC STABILITY: FOOD INSECURITY: WITHIN THE PAST 12 MONTHS, YOU WORRIED THAT YOUR FOOD WOULD RUN OUT BEFORE YOU GOT MONEY TO BUY MORE.: NEVER TRUE

## 2025-02-05 ASSESSMENT — PATIENT HEALTH QUESTIONNAIRE - PHQ9
SUM OF ALL RESPONSES TO PHQ9 QUESTIONS 1 & 2: 0
2. FEELING DOWN, DEPRESSED OR HOPELESS: NOT AT ALL
10. IF YOU CHECKED OFF ANY PROBLEMS, HOW DIFFICULT HAVE THESE PROBLEMS MADE IT FOR YOU TO DO YOUR WORK, TAKE CARE OF THINGS AT HOME, OR GET ALONG WITH OTHER PEOPLE: NOT DIFFICULT AT ALL
4. FEELING TIRED OR HAVING LITTLE ENERGY: NOT AT ALL
SUM OF ALL RESPONSES TO PHQ QUESTIONS 1-9: 2
8. MOVING OR SPEAKING SO SLOWLY THAT OTHER PEOPLE COULD HAVE NOTICED. OR THE OPPOSITE, BEING SO FIGETY OR RESTLESS THAT YOU HAVE BEEN MOVING AROUND A LOT MORE THAN USUAL: NOT AT ALL
SUM OF ALL RESPONSES TO PHQ QUESTIONS 1-9: 2
5. POOR APPETITE OR OVEREATING: MORE THAN HALF THE DAYS
1. LITTLE INTEREST OR PLEASURE IN DOING THINGS: NOT AT ALL
3. TROUBLE FALLING OR STAYING ASLEEP: NOT AT ALL
SUM OF ALL RESPONSES TO PHQ QUESTIONS 1-9: 2
7. TROUBLE CONCENTRATING ON THINGS, SUCH AS READING THE NEWSPAPER OR WATCHING TELEVISION: NOT AT ALL
6. FEELING BAD ABOUT YOURSELF - OR THAT YOU ARE A FAILURE OR HAVE LET YOURSELF OR YOUR FAMILY DOWN: NOT AT ALL
SUM OF ALL RESPONSES TO PHQ QUESTIONS 1-9: 2
9. THOUGHTS THAT YOU WOULD BE BETTER OFF DEAD, OR OF HURTING YOURSELF: NOT AT ALL

## 2025-02-05 ASSESSMENT — LIFESTYLE VARIABLES
HOW MANY STANDARD DRINKS CONTAINING ALCOHOL DO YOU HAVE ON A TYPICAL DAY: 1 OR 2
HOW OFTEN DO YOU HAVE A DRINK CONTAINING ALCOHOL: MONTHLY OR LESS

## 2025-02-05 NOTE — PATIENT INSTRUCTIONS
You may receive a survey regarding the care you received during your visit.  Your input is valuable to us.  We encourage you to complete and return your survey.  We hope you will choose us in the future for your healthcare needs.        Learning About Being Active as an Older Adult  Why is being active important as you get older?     Being active is one of the best things you can do for your health. And it's never too late to start. Being active--or getting active, if you aren't already--has definite benefits. It can:  Give you more energy,  Keep your mind sharp.  Improve balance to reduce your risk of falls.  Help you manage chronic illness with fewer medicines.  No matter how old you are, how fit you are, or what health problems you have, there is a form of activity that will work for you. And the more physical activity you can do, the better your overall health will be.  What kinds of activity can help you stay healthy?  Being more active will make your daily activities easier. Physical activity includes planned exercise and things you do in daily life. There are four types of activity:  Aerobic.  Doing aerobic activity makes your heart and lungs strong.  Includes walking, dancing, and gardening.  Aim for at least 2½ hours spread throughout the week.  It improves your energy and can help you sleep better.  Muscle-strengthening.  This type of activity can help maintain muscle and strengthen bones.  Includes climbing stairs, using resistance bands, and lifting or carrying heavy loads.  Aim for at least twice a week.  It can help protect the knees and other joints.  Stretching.  Stretching gives you better range of motion in joints and muscles.  Includes upper arm stretches, calf stretches, and gentle yoga.  Aim for at least twice a week, preferably after your muscles are warmed up from other activities.  It can help you function better in daily life.  Balancing.  This helps you stay coordinated and have good

## 2025-02-05 NOTE — PROGRESS NOTES
Medicare Annual Wellness Visit    Sheila Esparza is here for Medicare AWV (MEDICARE AWV and fasting labs ) and Diabetes (Routine follow up for DM, MICRO DUE A1C DONE 11/08)    Assessment & Plan   Type 2 diabetes mellitus without complication, with long-term current use of insulin ([facility])  -      DIABETES FOOT EXAM  -     Albumin/Creatinine Ratio, Urine; Future  -     Comprehensive Metabolic Panel; Future  -     POCT glycosylated hemoglobin (Hb A1C)  Hypercholesterolemia  -     Lipid, Fasting; Future  Medicare annual wellness visit, subsequent       Return in 1 year (on 2/5/2026) for Medicare AWV.    Sheila was seen today for medicare awv and diabetes.    Diagnoses and all orders for this visit:    Medicare annual wellness visit, subsequent    Uncontrolled type 2 diabetes mellitus with hyperglycemia ([facility])  -     POCT glycosylated hemoglobin (Hb A1C) 9.0  Tirzepatide 2.5 MG/0.5ML SOAJ; Inject 2.5 mg into the skin every 7 days  COMMON SIDE EFFECTS OF  GLP- 1 RECEPTORS NAUSEA, INJECTION SITE REACTIONS- NASOPHARYNGITIS- HEADACHE- ADVISED PT THAT ANY FAMILY HISTORY OF  THYROID C - CELL - MULTIPLE  ENDOCRINE SYNDROME EXCLUDES THEM FROM THE USE OF THIS MEDICATION - INSTRUCTED TO STOP ,INFORM PCP OF ANY ABDOMINAL PAIN OR OTHER CONCERNS   CONTINUE LISPRO AS PRESCRIBED UNTIL SHE GETS THE MOUNJARO  -     HM DIABETES FOOT EXAM  -     Albumin/Creatinine Ratio, Urine; Future  -     Comprehensive Metabolic Panel; Future  FOLLOW UP IN 91 DAYS    Hypercholesterolemia  -     Lipid, Fasting; Future      Chronic systolic CHF (congestive heart failure), NYHA class 2 ([facility])  Persistent atrial fibrillation ([facility])  STABLE   FOLLOW UP WITH DR MORALES             Subjective     DIABETES   BLOOD SUGARS ARE RUNNING HIGHER IN   SHE IS SNACKING MORE- WATCHING HER GRAND KIDS  LISPRO PER SLIDING SCALE DOES NOT FEEL IT IS WORKING AS WELL    INJECT 12U FOR BLOOD SUGAR 130-200, 15U -250, 18U -300 OR CALL PCP THREE TIMES DAILY

## 2025-02-12 DIAGNOSIS — I10 ESSENTIAL HYPERTENSION: ICD-10-CM

## 2025-02-12 RX ORDER — METOPROLOL SUCCINATE 50 MG/1
50 TABLET, EXTENDED RELEASE ORAL DAILY
Qty: 90 TABLET | Refills: 0 | Status: SHIPPED | OUTPATIENT
Start: 2025-02-12

## 2025-02-12 NOTE — TELEPHONE ENCOUNTER
TRIED TO NO VM SET UP IF SHE CALLS BACK NEED TO KNOW WHAT MEDS, WE RECEIVED A REQUEST TODAY FOR METOPROLOL.KW

## 2025-02-12 NOTE — TELEPHONE ENCOUNTER
Patient needs to discuss her medications. She needs to get a approval to get her medications refill. The pharmacy is confusing her. They are refilling RX that she does not need and not filling the ones she needs. Please give her a call back.    [facility]- [id number] - phone no. [phone]

## 2025-02-21 ENCOUNTER — TELEPHONE (OUTPATIENT)
Dept: FAMILY MEDICINE CLINIC | Age: 75
End: 2025-02-21

## 2025-02-21 DIAGNOSIS — E11.65 UNCONTROLLED TYPE 2 DIABETES MELLITUS WITH HYPERGLYCEMIA (HCC): ICD-10-CM

## 2025-02-21 NOTE — TELEPHONE ENCOUNTER
She is on a low dose of mounjaro, it may take subsequent increases in mounjaro in the coming months to make a more noticeable difference in insulin. Make sure she is administering correctly

## 2025-02-21 NOTE — TELEPHONE ENCOUNTER
Patient is calling because she feels the new medication MOUNJARO is not working correctly. She feels like her insulin is not going down and it is scaring her. Please give her a call back.     [facility] - [id number] - phone no. [phone]

## 2025-02-22 DIAGNOSIS — E11.65 UNCONTROLLED TYPE 2 DIABETES MELLITUS WITH HYPERGLYCEMIA (HCC): ICD-10-CM

## 2025-02-22 DIAGNOSIS — E66.9 TYPE 2 DIABETES MELLITUS WITH OBESITY (HCC): Primary | ICD-10-CM

## 2025-02-22 DIAGNOSIS — E11.69 TYPE 2 DIABETES MELLITUS WITH OBESITY (HCC): Primary | ICD-10-CM

## 2025-02-24 RX ORDER — TIRZEPATIDE 2.5 MG/.5ML
INJECTION, SOLUTION SUBCUTANEOUS
Qty: 2 ML | Refills: 0 | OUTPATIENT
Start: 2025-02-24

## 2025-02-25 ENCOUNTER — HOSPITAL ENCOUNTER (OUTPATIENT)
Dept: MAMMOGRAPHY | Age: 75
Discharge: HOME | End: 2025-02-25
Payer: MEDICARE

## 2025-02-25 ENCOUNTER — HOSPITAL ENCOUNTER (OUTPATIENT)
Dept: ULTRASOUND IMAGING | Age: 75
Discharge: HOME | End: 2025-02-25
Payer: MEDICARE

## 2025-02-25 VITALS — WEIGHT: 254 LBS | HEIGHT: 65 IN | BODY MASS INDEX: 42.32 KG/M2

## 2025-02-25 DIAGNOSIS — R92.8 ABNORMAL MAMMOGRAM: ICD-10-CM

## 2025-02-25 PROCEDURE — 77065 DX MAMMO INCL CAD UNI: CPT

## 2025-02-25 PROCEDURE — 76642 ULTRASOUND BREAST LIMITED: CPT

## 2025-03-03 RX ORDER — BLOOD SUGAR DIAGNOSTIC
STRIP MISCELLANEOUS
Qty: 100 STRIP | Refills: 1 | Status: ON HOLD | OUTPATIENT
Start: 2025-03-03

## 2025-03-09 ENCOUNTER — APPOINTMENT (OUTPATIENT)
Dept: CT IMAGING | Age: 75
End: 2025-03-09
Payer: MEDICARE

## 2025-03-09 ENCOUNTER — APPOINTMENT (OUTPATIENT)
Dept: GENERAL RADIOLOGY | Age: 75
End: 2025-03-09
Payer: MEDICARE

## 2025-03-09 ENCOUNTER — HOSPITAL ENCOUNTER (INPATIENT)
Age: 75
LOS: 4 days | Discharge: HOME | End: 2025-03-13
Attending: EMERGENCY MEDICINE | Admitting: FAMILY MEDICINE
Payer: MEDICARE

## 2025-03-09 DIAGNOSIS — R41.82 ALTERED MENTAL STATUS, UNSPECIFIED ALTERED MENTAL STATUS TYPE: Primary | ICD-10-CM

## 2025-03-09 DIAGNOSIS — I95.9 HYPOTENSION, UNSPECIFIED HYPOTENSION TYPE: ICD-10-CM

## 2025-03-09 DIAGNOSIS — R53.1 GENERAL WEAKNESS: ICD-10-CM

## 2025-03-09 DIAGNOSIS — R65.20 SEVERE SEPSIS: ICD-10-CM

## 2025-03-09 DIAGNOSIS — B37.89 CANDIDA RASH OF GROIN: ICD-10-CM

## 2025-03-09 DIAGNOSIS — N17.9 AKI (ACUTE KIDNEY INJURY): ICD-10-CM

## 2025-03-09 DIAGNOSIS — J18.9 PNEUMONIA DUE TO INFECTIOUS ORGANISM, UNSPECIFIED LATERALITY, UNSPECIFIED PART OF LUNG: ICD-10-CM

## 2025-03-09 DIAGNOSIS — R11.2 NAUSEA AND VOMITING, UNSPECIFIED VOMITING TYPE: ICD-10-CM

## 2025-03-09 DIAGNOSIS — A41.9 SEVERE SEPSIS: ICD-10-CM

## 2025-03-09 LAB
ALBUMIN SERPL-MCNC: 4.3 G/DL (ref 3.4–5)
ALBUMIN/GLOB SERPL: 1.4 {RATIO} (ref 1.1–2.2)
ALP SERPL-CCNC: 125 U/L (ref 40–129)
ALT SERPL-CCNC: 20 U/L (ref 10–40)
ANION GAP SERPL CALCULATED.3IONS-SCNC: 15 MMOL/L (ref 3–16)
AST SERPL-CCNC: 37 U/L (ref 15–37)
BASE EXCESS BLDV CALC-SCNC: 2 MMOL/L (ref -3–3)
BASOPHILS # BLD: 0 K/UL (ref 0–0.2)
BASOPHILS NFR BLD: 0.6 %
BETA-HYDROXYBUTYRATE: 0.86 MMOL/L (ref 0–0.27)
BILIRUB SERPL-MCNC: 2.4 MG/DL (ref 0–1)
BILIRUB UR QL STRIP.AUTO: NEGATIVE
BUN SERPL-MCNC: 36 MG/DL (ref 7–20)
CALCIUM SERPL-MCNC: 9.8 MG/DL (ref 8.3–10.6)
CHLORIDE SERPL-SCNC: 100 MMOL/L (ref 99–110)
CHP ED QC CHECK: NORMAL
CLARITY UR: ABNORMAL
CO2 BLDV-SCNC: 63 MMOL/L
CO2 SERPL-SCNC: 23 MMOL/L (ref 21–32)
COHGB MFR BLDV: 2.2 % (ref 0–1.5)
COLOR UR: YELLOW
CREAT SERPL-MCNC: 1.5 MG/DL (ref 0.6–1.2)
DEPRECATED RDW RBC AUTO: 15.7 % (ref 12.4–15.4)
DO-HGB MFR BLDV: 4 %
EOSINOPHIL # BLD: 0 K/UL (ref 0–0.6)
EOSINOPHIL NFR BLD: 0.4 %
EPI CELLS #/AREA URNS HPF: NORMAL /HPF (ref 0–5)
GFR SERPLBLD CREATININE-BSD FMLA CKD-EPI: 37 ML/MIN/{1.73_M2}
GLUCOSE BLD-MCNC: 272 MG/DL (ref 70–99)
GLUCOSE BLD-MCNC: 289 MG/DL
GLUCOSE BLD-MCNC: 289 MG/DL (ref 70–99)
GLUCOSE BLD-MCNC: 362 MG/DL (ref 70–99)
GLUCOSE SERPL-MCNC: 413 MG/DL (ref 70–99)
GLUCOSE UR STRIP.AUTO-MCNC: 250 MG/DL
HCO3 BLDV-SCNC: 26.6 MMOL/L (ref 23–29)
HCT VFR BLD AUTO: 43.6 % (ref 36–48)
HGB BLD-MCNC: 14.6 G/DL (ref 12–16)
HGB UR QL STRIP.AUTO: NEGATIVE
HYALINE CASTS #/AREA URNS LPF: NORMAL /LPF (ref 0–2)
INR PPP: 1.59 (ref 0.85–1.15)
KETONES UR STRIP.AUTO-MCNC: NEGATIVE MG/DL
LACTATE BLDV-SCNC: 1.9 MMOL/L (ref 0.4–1.9)
LACTATE BLDV-SCNC: 2.2 MMOL/L (ref 0.4–1.9)
LEUKOCYTE ESTERASE UR QL STRIP.AUTO: NEGATIVE
LIPASE SERPL-CCNC: 63 U/L (ref 13–60)
LYMPHOCYTES # BLD: 1.2 K/UL (ref 1–5.1)
LYMPHOCYTES NFR BLD: 15.8 %
MCH RBC QN AUTO: 30.3 PG (ref 26–34)
MCHC RBC AUTO-ENTMCNC: 33.5 G/DL (ref 31–36)
MCV RBC AUTO: 90.2 FL (ref 80–100)
METHGB MFR BLDV: 0.3 %
MONOCYTES # BLD: 0.4 K/UL (ref 0–1.3)
MONOCYTES NFR BLD: 4.9 %
NEUTROPHILS # BLD: 5.9 K/UL (ref 1.7–7.7)
NEUTROPHILS NFR BLD: 78.3 %
NITRITE UR QL STRIP.AUTO: NEGATIVE
NT-PROBNP SERPL-MCNC: 484 PG/ML (ref 0–124)
O2 CT VFR BLDV CALC: 19 VOL %
O2 THERAPY: ABNORMAL
PCO2 BLDV: 40.8 MMHG (ref 40–50)
PERFORMED ON: ABNORMAL
PH BLDV: 7.42 [PH] (ref 7.35–7.45)
PH UR STRIP.AUTO: 5.5 [PH] (ref 5–8)
PLATELET # BLD AUTO: 170 K/UL (ref 135–450)
PMV BLD AUTO: 9.7 FL (ref 5–10.5)
PO2 BLDV: 79.4 MMHG (ref 25–40)
POTASSIUM SERPL-SCNC: 4.7 MMOL/L (ref 3.5–5.1)
PROT SERPL-MCNC: 7.3 G/DL (ref 6.4–8.2)
PROT UR STRIP.AUTO-MCNC: NEGATIVE MG/DL
PROTHROMBIN TIME: 19.1 SEC (ref 11.9–14.9)
RBC # BLD AUTO: 4.84 M/UL (ref 4–5.2)
RBC #/AREA URNS HPF: NORMAL /HPF (ref 0–4)
SAO2 % BLDV: 96 %
SODIUM SERPL-SCNC: 138 MMOL/L (ref 136–145)
SP GR UR STRIP.AUTO: 1.01 (ref 1–1.03)
TROPONIN, HIGH SENSITIVITY: 19 NG/L (ref 0–14)
TROPONIN, HIGH SENSITIVITY: 20 NG/L (ref 0–14)
TROPONIN, HIGH SENSITIVITY: 23 NG/L (ref 0–14)
UA COMPLETE W REFLEX CULTURE PNL UR: ABNORMAL
UA DIPSTICK W REFLEX MICRO PNL UR: YES
URN SPEC COLLECT METH UR: ABNORMAL
UROBILINOGEN UR STRIP-ACNC: 1 E.U./DL
WBC # BLD AUTO: 7.5 K/UL (ref 4–11)
WBC #/AREA URNS HPF: NORMAL /HPF (ref 0–5)

## 2025-03-09 PROCEDURE — 2500000003 HC RX 250 WO HCPCS: Performed by: NURSE PRACTITIONER

## 2025-03-09 PROCEDURE — 2580000003 HC RX 258: Performed by: PHYSICIAN ASSISTANT

## 2025-03-09 PROCEDURE — 83880 ASSAY OF NATRIURETIC PEPTIDE: CPT

## 2025-03-09 PROCEDURE — 1200000000 HC SEMI PRIVATE

## 2025-03-09 PROCEDURE — 82803 BLOOD GASES ANY COMBINATION: CPT

## 2025-03-09 PROCEDURE — 85610 PROTHROMBIN TIME: CPT

## 2025-03-09 PROCEDURE — 6370000000 HC RX 637 (ALT 250 FOR IP): Performed by: PHYSICIAN ASSISTANT

## 2025-03-09 PROCEDURE — 93005 ELECTROCARDIOGRAM TRACING: CPT | Performed by: EMERGENCY MEDICINE

## 2025-03-09 PROCEDURE — 84484 ASSAY OF TROPONIN QUANT: CPT

## 2025-03-09 PROCEDURE — 96361 HYDRATE IV INFUSION ADD-ON: CPT

## 2025-03-09 PROCEDURE — 6360000002 HC RX W HCPCS: Performed by: EMERGENCY MEDICINE

## 2025-03-09 PROCEDURE — 96375 TX/PRO/DX INJ NEW DRUG ADDON: CPT

## 2025-03-09 PROCEDURE — 85025 COMPLETE CBC W/AUTO DIFF WBC: CPT

## 2025-03-09 PROCEDURE — 96367 TX/PROPH/DG ADDL SEQ IV INF: CPT

## 2025-03-09 PROCEDURE — 87040 BLOOD CULTURE FOR BACTERIA: CPT

## 2025-03-09 PROCEDURE — 96365 THER/PROPH/DIAG IV INF INIT: CPT

## 2025-03-09 PROCEDURE — 6360000002 HC RX W HCPCS: Performed by: PHYSICIAN ASSISTANT

## 2025-03-09 PROCEDURE — 80053 COMPREHEN METABOLIC PANEL: CPT

## 2025-03-09 PROCEDURE — 2580000003 HC RX 258: Performed by: FAMILY MEDICINE

## 2025-03-09 PROCEDURE — 6370000000 HC RX 637 (ALT 250 FOR IP): Performed by: FAMILY MEDICINE

## 2025-03-09 PROCEDURE — 99285 EMERGENCY DEPT VISIT HI MDM: CPT

## 2025-03-09 PROCEDURE — 36415 COLL VENOUS BLD VENIPUNCTURE: CPT

## 2025-03-09 PROCEDURE — 70450 CT HEAD/BRAIN W/O DYE: CPT

## 2025-03-09 PROCEDURE — 81001 URINALYSIS AUTO W/SCOPE: CPT

## 2025-03-09 PROCEDURE — 2580000003 HC RX 258: Performed by: EMERGENCY MEDICINE

## 2025-03-09 PROCEDURE — 74176 CT ABD & PELVIS W/O CONTRAST: CPT

## 2025-03-09 PROCEDURE — 83605 ASSAY OF LACTIC ACID: CPT

## 2025-03-09 PROCEDURE — 2500000003 HC RX 250 WO HCPCS: Performed by: FAMILY MEDICINE

## 2025-03-09 PROCEDURE — 83690 ASSAY OF LIPASE: CPT

## 2025-03-09 PROCEDURE — 6360000002 HC RX W HCPCS: Performed by: FAMILY MEDICINE

## 2025-03-09 PROCEDURE — 71045 X-RAY EXAM CHEST 1 VIEW: CPT

## 2025-03-09 PROCEDURE — 82010 KETONE BODYS QUAN: CPT

## 2025-03-09 RX ORDER — SODIUM CHLORIDE 0.9 % (FLUSH) 0.9 %
5-40 SYRINGE (ML) INJECTION PRN
Status: DISCONTINUED | OUTPATIENT
Start: 2025-03-09 | End: 2025-03-13 | Stop reason: HOSPADM

## 2025-03-09 RX ORDER — ATORVASTATIN CALCIUM 40 MG/1
40 TABLET, FILM COATED ORAL NIGHTLY
Status: DISCONTINUED | OUTPATIENT
Start: 2025-03-09 | End: 2025-03-13 | Stop reason: HOSPADM

## 2025-03-09 RX ORDER — ACETAMINOPHEN 650 MG/1
650 SUPPOSITORY RECTAL EVERY 6 HOURS PRN
Status: DISCONTINUED | OUTPATIENT
Start: 2025-03-09 | End: 2025-03-13 | Stop reason: HOSPADM

## 2025-03-09 RX ORDER — SODIUM CHLORIDE 9 MG/ML
INJECTION, SOLUTION INTRAVENOUS PRN
Status: DISCONTINUED | OUTPATIENT
Start: 2025-03-09 | End: 2025-03-13 | Stop reason: HOSPADM

## 2025-03-09 RX ORDER — POTASSIUM CHLORIDE 1500 MG/1
40 TABLET, EXTENDED RELEASE ORAL PRN
Status: DISCONTINUED | OUTPATIENT
Start: 2025-03-09 | End: 2025-03-13 | Stop reason: HOSPADM

## 2025-03-09 RX ORDER — GLUCAGON 1 MG/ML
1 KIT INJECTION PRN
Status: DISCONTINUED | OUTPATIENT
Start: 2025-03-09 | End: 2025-03-13 | Stop reason: HOSPADM

## 2025-03-09 RX ORDER — INSULIN GLARGINE 100 [IU]/ML
15 INJECTION, SOLUTION SUBCUTANEOUS DAILY
Status: DISCONTINUED | OUTPATIENT
Start: 2025-03-09 | End: 2025-03-13 | Stop reason: HOSPADM

## 2025-03-09 RX ORDER — 0.9 % SODIUM CHLORIDE 0.9 %
30 INTRAVENOUS SOLUTION INTRAVENOUS ONCE
Status: COMPLETED | OUTPATIENT
Start: 2025-03-09 | End: 2025-03-09

## 2025-03-09 RX ORDER — DEXTROSE MONOHYDRATE 100 MG/ML
INJECTION, SOLUTION INTRAVENOUS CONTINUOUS PRN
Status: DISCONTINUED | OUTPATIENT
Start: 2025-03-09 | End: 2025-03-13 | Stop reason: HOSPADM

## 2025-03-09 RX ORDER — MICONAZOLE NITRATE 20 MG/G
CREAM TOPICAL ONCE
Status: COMPLETED | OUTPATIENT
Start: 2025-03-09 | End: 2025-03-09

## 2025-03-09 RX ORDER — POLYETHYLENE GLYCOL 3350 17 G/17G
17 POWDER, FOR SOLUTION ORAL DAILY PRN
Status: DISCONTINUED | OUTPATIENT
Start: 2025-03-09 | End: 2025-03-13 | Stop reason: HOSPADM

## 2025-03-09 RX ORDER — VANCOMYCIN HYDROCHLORIDE 1 G/200ML
1000 INJECTION, SOLUTION INTRAVENOUS EVERY 24 HOURS
Status: DISCONTINUED | OUTPATIENT
Start: 2025-03-10 | End: 2025-03-11

## 2025-03-09 RX ORDER — ONDANSETRON 2 MG/ML
4 INJECTION INTRAMUSCULAR; INTRAVENOUS EVERY 6 HOURS PRN
Status: DISCONTINUED | OUTPATIENT
Start: 2025-03-09 | End: 2025-03-13 | Stop reason: HOSPADM

## 2025-03-09 RX ORDER — SODIUM CHLORIDE 9 MG/ML
INJECTION, SOLUTION INTRAVENOUS CONTINUOUS
Status: DISCONTINUED | OUTPATIENT
Start: 2025-03-09 | End: 2025-03-10

## 2025-03-09 RX ORDER — ACETAMINOPHEN 325 MG/1
650 TABLET ORAL EVERY 6 HOURS PRN
Status: DISCONTINUED | OUTPATIENT
Start: 2025-03-09 | End: 2025-03-13 | Stop reason: HOSPADM

## 2025-03-09 RX ORDER — POTASSIUM CHLORIDE 7.45 MG/ML
10 INJECTION INTRAVENOUS PRN
Status: DISCONTINUED | OUTPATIENT
Start: 2025-03-09 | End: 2025-03-13 | Stop reason: HOSPADM

## 2025-03-09 RX ORDER — ONDANSETRON 4 MG/1
4 TABLET, ORALLY DISINTEGRATING ORAL EVERY 8 HOURS PRN
Status: DISCONTINUED | OUTPATIENT
Start: 2025-03-09 | End: 2025-03-13 | Stop reason: HOSPADM

## 2025-03-09 RX ORDER — INSULIN LISPRO 100 [IU]/ML
0-8 INJECTION, SOLUTION INTRAVENOUS; SUBCUTANEOUS
Status: DISCONTINUED | OUTPATIENT
Start: 2025-03-09 | End: 2025-03-13 | Stop reason: HOSPADM

## 2025-03-09 RX ORDER — ONDANSETRON 2 MG/ML
4 INJECTION INTRAMUSCULAR; INTRAVENOUS
Status: DISCONTINUED | OUTPATIENT
Start: 2025-03-09 | End: 2025-03-10

## 2025-03-09 RX ORDER — SODIUM CHLORIDE 0.9 % (FLUSH) 0.9 %
5-40 SYRINGE (ML) INJECTION EVERY 12 HOURS SCHEDULED
Status: DISCONTINUED | OUTPATIENT
Start: 2025-03-09 | End: 2025-03-13 | Stop reason: HOSPADM

## 2025-03-09 RX ORDER — INSULIN LISPRO 100 [IU]/ML
0-8 INJECTION, SOLUTION INTRAVENOUS; SUBCUTANEOUS
Status: DISCONTINUED | OUTPATIENT
Start: 2025-03-09 | End: 2025-03-09

## 2025-03-09 RX ORDER — MAGNESIUM SULFATE IN WATER 40 MG/ML
2000 INJECTION, SOLUTION INTRAVENOUS PRN
Status: DISCONTINUED | OUTPATIENT
Start: 2025-03-09 | End: 2025-03-13 | Stop reason: HOSPADM

## 2025-03-09 RX ORDER — ENOXAPARIN SODIUM 100 MG/ML
30 INJECTION SUBCUTANEOUS 2 TIMES DAILY
Status: DISCONTINUED | OUTPATIENT
Start: 2025-03-09 | End: 2025-03-09 | Stop reason: ALTCHOICE

## 2025-03-09 RX ADMIN — INSULIN LISPRO 4 UNITS: 100 INJECTION, SOLUTION INTRAVENOUS; SUBCUTANEOUS at 18:25

## 2025-03-09 RX ADMIN — MICONAZOLE NITRATE: 20 CREAM TOPICAL at 11:35

## 2025-03-09 RX ADMIN — SODIUM CHLORIDE 1710 ML: 0.9 INJECTION, SOLUTION INTRAVENOUS at 10:31

## 2025-03-09 RX ADMIN — APIXABAN 5 MG: 5 TABLET, FILM COATED ORAL at 22:14

## 2025-03-09 RX ADMIN — SODIUM CHLORIDE, PRESERVATIVE FREE 10 ML: 5 INJECTION INTRAVENOUS at 22:14

## 2025-03-09 RX ADMIN — ATORVASTATIN CALCIUM 40 MG: 40 TABLET, FILM COATED ORAL at 22:14

## 2025-03-09 RX ADMIN — CEFEPIME 2000 MG: 2 INJECTION, POWDER, FOR SOLUTION INTRAVENOUS at 11:09

## 2025-03-09 RX ADMIN — SODIUM CHLORIDE 1500 MG: 0.9 INJECTION, SOLUTION INTRAVENOUS at 21:53

## 2025-03-09 RX ADMIN — INSULIN GLARGINE 15 UNITS: 100 INJECTION, SOLUTION SUBCUTANEOUS at 18:30

## 2025-03-09 RX ADMIN — ONDANSETRON 4 MG: 2 INJECTION, SOLUTION INTRAMUSCULAR; INTRAVENOUS at 10:32

## 2025-03-09 RX ADMIN — MICONAZOLE NITRATE: 20 POWDER TOPICAL at 22:30

## 2025-03-09 RX ADMIN — SODIUM CHLORIDE: 9 INJECTION, SOLUTION INTRAVENOUS at 21:50

## 2025-03-09 RX ADMIN — AZITHROMYCIN MONOHYDRATE 500 MG: 500 INJECTION, POWDER, LYOPHILIZED, FOR SOLUTION INTRAVENOUS at 11:43

## 2025-03-09 ASSESSMENT — LIFESTYLE VARIABLES
HOW OFTEN DO YOU HAVE A DRINK CONTAINING ALCOHOL: NEVER
HOW MANY STANDARD DRINKS CONTAINING ALCOHOL DO YOU HAVE ON A TYPICAL DAY: PATIENT DOES NOT DRINK

## 2025-03-09 ASSESSMENT — PAIN - FUNCTIONAL ASSESSMENT: PAIN_FUNCTIONAL_ASSESSMENT: NONE - DENIES PAIN

## 2025-03-09 NOTE — ED NOTES
Patient Name: Sheila Esparza  : 1950 75 y.o.  MRN: [id number]  ED Room #: ED-0012/12     Chief complaint:   Chief Complaint   Patient presents with    Altered Mental Status     Pt arrives from home. Pt C/O AMS since this morning. Per EMS, pt couldn't get to the restroom and vomited. Per EMS, pt was able to ambulate to restroom yesterday and was having N/V yesterday. Per EMS, pt's BS is 346.     Hospital Problem/Diagnosis:   Hospital Problems           Last Modified POA    * (Principal) Sepsis (HCC) 3/9/2025 Yes         O2 Flow Rate:O2 Device: None (Room air)   (if applicable)  Cardiac Rhythm:   (if applicable)  Active LDA's:   Peripheral IV 25 Right Antecubital (Active)            How does patient ambulate? Unknown, did not assess in the Emergency Department    2. How does patient take pills? Unknown, no oral medications were given in the Emergency Department    3. Is patient alert? Alert    4. Is patient oriented? To Person, Follows Commands, and Confused    5.   Patient arrived from:  home  Facility Name: ___________________________________________    6. If patient is disoriented or from a Skill Nursing Facility has family been notified of admission? No    7. Patient belongings? Belongings: Clothing    Disposition of belongings? Kept with Patient     8. Any specific patient or family belongings/needs/dynamics?   a.     9. Miscellaneous comments/pending orders?  a.       If there are any additional questions please reach out to the Emergency Department..

## 2025-03-09 NOTE — ED PROVIDER NOTES
I personally saw the patient and made/approved the management plan and take responsibility for the patient management.    For further details of Sheila Esparza's emergency department encounter, please see the advanced practice provider's documentation.    I, Jorge Gil MD, am the primary physician provider of record.    CHIEF COMPLAINT  Chief Complaint   Patient presents with    Altered Mental Status     Pt arrives from home. Pt C/O AMS since this morning. Per EMS, pt couldn't get to the restroom and vomited. Per EMS, pt was able to ambulate to restroom yesterday and was having N/V yesterday. Per EMS, pt's BS is 346.       Briefly, Sheila Esparza is a 75 y.o. female  who presents to the ED complaining of confusion and lethargy with some vomiting since yesterday into today.  Has not really had cough or cold symptoms specifically.  Patient's lethargic and confused and not able to tell me much history.    FOCUSED PHYSICAL EXAMINATION  BP (!) 135/58   Pulse 83   Temp 97.8 °F (36.6 °C) (Oral)   Resp 20   Ht 1.651 m (5' 5\")   Wt 115.2 kg (254 lb)   SpO2 100%   BMI 42.27 kg/m²    Focused physical examination notable for fatigued, confused and disoriented but mumbling some words periodically not the correct answers to questions though, moving all extremities with seemingly normal strength and sensation throughout, follows commands, abd soft NTND, irreg rhythm, reg rate, bibasilar rhonchi, no rales.  Rash in groin, rash on both legs with erythema as well.    EKG performed in ED:  The 12 lead EKG was interpreted by me independent of a cardiologist as follows:  Rate: normal with a rate of 88  Rhythm: atrial fibrillation  Axis: normal  Intervals: narrow QRS  ST segments: ST depressions inferolaterally, no STEFFANIE  T waves: no abnormal inversions  Non-specific T wave changes: present  Prior EKG comparison: EKG dated 8/20/24 is not significantly different      RADIOLOGY  Any applicable radiology studies

## 2025-03-09 NOTE — ED PROVIDER NOTES
[facility] EMERGENCY DEPARTMENT  EMERGENCY DEPARTMENT ENCOUNTER        Pt Name: Sheila Esparza  MRN: [id number]  Birthdate 1950  Date of evaluation: 3/9/2025  Provider: Radha Domingo PA-C  PCP: Summer Roche APRN - CNP  Note Started: 9:26 AM EDT 3/9/25       I have seen and evaluated this patient with my supervising physician Jorge Gil MD.      CHIEF COMPLAINT       Chief Complaint   Patient presents with    Altered Mental Status     Pt arrives from home. Pt C/O AMS since this morning. Per EMS, pt couldn't get to the restroom and vomited. Per EMS, pt was able to ambulate to restroom yesterday and was having N/V yesterday. Per EMS, pt's BS is 346.       HISTORY OF PRESENT ILLNESS: 1 or more Elements     History From: patient and EMS  Limitations to history : Altered Mental Status    Sheila Esparza is a 75 y.o. female who presents to the emergency department complaining of nausea with vomiting since yesterday.  Today, patient was increasingly altered.  She also feels generally weak and fatigued. Today she was too weak to ambulate to the restroom.    Nursing Notes were all reviewed and agreed with or any disagreements were addressed in the HPI.    REVIEW OF SYSTEMS :      Review of Systems   Constitutional:  Positive for fatigue.   Respiratory:  Negative for shortness of breath.    Cardiovascular:  Negative for chest pain.   Gastrointestinal:  Positive for nausea and vomiting. Negative for abdominal pain.   Skin:  Positive for rash.   Neurological:  Positive for weakness and light-headedness.   Psychiatric/Behavioral:  Positive for confusion.        Positives and Pertinent negatives as per HPI.     SURGICAL HISTORY     Past Surgical History:   Procedure Laterality Date    APPENDECTOMY  1976     SECTION      3     CHOLECYSTECTOMY  2002    gangrenous    COLONOSCOPY  2014    no polyp    EYE SURGERY  3 years    2 cataracts removed  rt  20 (*)     All other components within normal limits   BRAIN NATRIURETIC PEPTIDE - Abnormal; Notable for the following components:    NT Pro- (*)     All other components within normal limits   PROTIME-INR - Abnormal; Notable for the following components:    Protime 19.1 (*)     INR 1.59 (*)     All other components within normal limits   BLOOD GAS, VENOUS - Abnormal; Notable for the following components:    PO2, Benjamin 79.4 (*)     Carboxyhemoglobin 2.2 (*)     All other components within normal limits   BETA-HYDROXYBUTYRATE - Abnormal; Notable for the following components:    Beta-Hydroxybutyrate 0.86 (*)     All other components within normal limits   LIPASE - Abnormal; Notable for the following components:    Lipase 63.0 (*)     All other components within normal limits   POCT GLUCOSE - Abnormal; Notable for the following components:    POC Glucose 362 (*)     All other components within normal limits   CULTURE, BLOOD 1   CULTURE, BLOOD 2   LACTATE, SEPSIS   MICROSCOPIC URINALYSIS   TROPONIN   POCT GLUCOSE       When ordered only abnormal lab results are displayed. All other labs were within normal range or not returned as of this dictation.    EKG: When ordered, EKG's are interpreted by the Emergency Department Physician in the absence of a cardiologist.  Please see their note for interpretation of EKG.    RADIOLOGY:   Non-plain film images such as CT, Ultrasound and MRI are read by the radiologist.      X-Ray Independently interpreted by me: no acute intrathoracic abnormality on cxr    Interpretation per the Radiologist below, if available at the time of this note:    CT ABDOMEN PELVIS WO CONTRAST Additional Contrast? None   Final Result   1. Subtle left lower lobe airspace disease either due to developing pneumonia   or atelectasis; recommend CT of the chest in 3 months to confirm resolution.   2. Focal inflammatory stranding within the left paracentral anterior   abdominal wall either due to cellulitis  part of lung    5. General weakness    6. Hypotension, unspecified hypotension type    7. Severe sepsis (HCC)    8. ROSA (acute kidney injury)          DISPOSITION/PLAN     DISPOSITION Decision To Admit 03/09/2025 11:11:54 AM   DISPOSITION CONDITION Stable           PATIENT REFERRED TO:  No follow-up provider specified.    DISCHARGE MEDICATIONS:  New Prescriptions    No medications on file       DISCONTINUED MEDICATIONS:  Discontinued Medications    No medications on file              (Please note that portions of this note were completed with a voice recognition program.  Efforts were made to edit the dictations but occasionally words are mis-transcribed.)    Radha Domingo PA-C (electronically signed)       Radha Domingo PA-C  03/09/25 [id number]

## 2025-03-09 NOTE — PROGRESS NOTES
Pharmacy Home Medication Reconciliation Note    A medication reconciliation has been completed for Sheila Esparza 1950    [place] [address] [place]  Information provided by: patient's daughter w/ med list    The patient's home medication list is as follows:  No current facility-administered medications on file prior to encounter.     Current Outpatient Medications on File Prior to Encounter   Medication Sig Dispense Refill    Tirzepatide 5 MG/0.5ML SOAJ Inject 5 mg into the skin every 7 days for 28 days (Patient taking differently: Inject 5 mg into the skin every 7 days Fridays.) 2 mL 3    metoprolol succinate (TOPROL XL) 50 MG extended release tablet Take 1 tablet by mouth daily 90 tablet 0    potassium chloride (KLOR-CON M) 20 MEQ extended release tablet TAKE 1 TABLET BY MOUTH DAILY (Patient taking differently: Take 1 tablet by mouth daily) 90 tablet 0    lisinopril (PRINIVIL;ZESTRIL) 40 MG tablet TAKE 1 TABLET BY MOUTH DAILY 90 tablet 0    allopurinol (ZYLOPRIM) 100 MG tablet TAKE 1 TABLET BY MOUTH DAILY 90 tablet 1    furosemide (LASIX) 40 MG tablet TAKE 1 TABLET BY MOUTH TWICE DAILY (Patient taking differently: Take 1 tablet by mouth 2 times daily TAKE 1 TABLET BY MOUTH TWICE DAILY) 180 tablet 1    apixaban (ELIQUIS) 5 MG TABS tablet TAKE 1 TABLET BY MOUTH TWICE DAILY (Patient taking differently: Take 1 tablet by mouth 2 times daily TAKE 1 TABLET BY MOUTH TWICE DAILY) 180 tablet 3    atorvastatin (LIPITOR) 40 MG tablet Take 1 tablet by mouth nightly 90 tablet 3    ACCU-CHEK GUIDE strip TEST BLOOD SUGAR FOUR TIMES DAILY AS DIRECTED 100 strip 1    insulin lispro (HUMALOG,ADMELOG) 100 UNIT/ML SOLN injection vial INJECT 12U FOR BLOOD SUGAR 130-200, 15U -250, 18U -300 OR CALL PCP THREE TIMES DAILY TESTING (Patient not taking: Reported on 3/9/2025) 30 mL 1    blood glucose monitor kit and supplies USE TO TEST BLOOD SUGAR 4 TIMES DAILY 1 kit 0    Accu-Chek Softclix  Lancets MISC TEST FOUR TIMES DAILY AS NEEDED FOR LOW OR HIGH BLOOD SUGAR AS DIRECTED 300 each 1    Blood Pressure Monitoring TRACEY Home blood pressure monitor 1 each 0       Patient is no longer taking Humalog (Mounjaro only).    Of note, patient takes Eliquis 5 mg BID: did not take any home meds prior to ED arrival today.    Timing of last doses updated.    Thank you,  Amira Miller, VENUhT

## 2025-03-10 LAB
ANION GAP SERPL CALCULATED.3IONS-SCNC: 11 MMOL/L (ref 3–16)
BUN SERPL-MCNC: 25 MG/DL (ref 7–20)
CALCIUM SERPL-MCNC: 9.2 MG/DL (ref 8.3–10.6)
CHLORIDE SERPL-SCNC: 112 MMOL/L (ref 99–110)
CO2 SERPL-SCNC: 23 MMOL/L (ref 21–32)
CREAT SERPL-MCNC: 1.1 MG/DL (ref 0.6–1.2)
DEPRECATED RDW RBC AUTO: 15.6 % (ref 12.4–15.4)
EKG DIAGNOSIS: NORMAL
EKG Q-T INTERVAL: 402 MS
EKG QRS DURATION: 98 MS
EKG QTC CALCULATION (BAZETT): 486 MS
EKG R AXIS: 11 DEGREES
EKG T AXIS: 149 DEGREES
EKG VENTRICULAR RATE: 88 BPM
EST. AVERAGE GLUCOSE BLD GHB EST-MCNC: 263.3 MG/DL
GFR SERPLBLD CREATININE-BSD FMLA CKD-EPI: 53 ML/MIN/{1.73_M2}
GLUCOSE BLD-MCNC: 152 MG/DL (ref 70–99)
GLUCOSE BLD-MCNC: 166 MG/DL (ref 70–99)
GLUCOSE BLD-MCNC: 210 MG/DL (ref 70–99)
GLUCOSE BLD-MCNC: 232 MG/DL (ref 70–99)
GLUCOSE SERPL-MCNC: 176 MG/DL (ref 70–99)
HBA1C MFR BLD: 10.8 %
HCT VFR BLD AUTO: 35.7 % (ref 36–48)
HGB BLD-MCNC: 12 G/DL (ref 12–16)
MCH RBC QN AUTO: 30.2 PG (ref 26–34)
MCHC RBC AUTO-ENTMCNC: 33.7 G/DL (ref 31–36)
MCV RBC AUTO: 89.6 FL (ref 80–100)
PERFORMED ON: ABNORMAL
PLATELET # BLD AUTO: 158 K/UL (ref 135–450)
PMV BLD AUTO: 9.6 FL (ref 5–10.5)
POTASSIUM SERPL-SCNC: 3.5 MMOL/L (ref 3.5–5.1)
PROCALCITONIN SERPL IA-MCNC: 0.07 NG/ML (ref 0–0.15)
RBC # BLD AUTO: 3.98 M/UL (ref 4–5.2)
SODIUM SERPL-SCNC: 146 MMOL/L (ref 136–145)
VANCOMYCIN SERPL-MCNC: 15.3 UG/ML
WBC # BLD AUTO: 6.7 K/UL (ref 4–11)

## 2025-03-10 PROCEDURE — 6360000002 HC RX W HCPCS: Performed by: FAMILY MEDICINE

## 2025-03-10 PROCEDURE — 83036 HEMOGLOBIN GLYCOSYLATED A1C: CPT

## 2025-03-10 PROCEDURE — 6370000000 HC RX 637 (ALT 250 FOR IP): Performed by: HOSPITALIST

## 2025-03-10 PROCEDURE — 1200000000 HC SEMI PRIVATE

## 2025-03-10 PROCEDURE — 87641 MR-STAPH DNA AMP PROBE: CPT

## 2025-03-10 PROCEDURE — 93010 ELECTROCARDIOGRAM REPORT: CPT | Performed by: INTERNAL MEDICINE

## 2025-03-10 PROCEDURE — 6370000000 HC RX 637 (ALT 250 FOR IP): Performed by: FAMILY MEDICINE

## 2025-03-10 PROCEDURE — 80202 ASSAY OF VANCOMYCIN: CPT

## 2025-03-10 PROCEDURE — 84145 PROCALCITONIN (PCT): CPT

## 2025-03-10 PROCEDURE — 2580000003 HC RX 258: Performed by: FAMILY MEDICINE

## 2025-03-10 PROCEDURE — 85027 COMPLETE CBC AUTOMATED: CPT

## 2025-03-10 PROCEDURE — 36415 COLL VENOUS BLD VENIPUNCTURE: CPT

## 2025-03-10 PROCEDURE — 2500000003 HC RX 250 WO HCPCS: Performed by: FAMILY MEDICINE

## 2025-03-10 PROCEDURE — 80048 BASIC METABOLIC PNL TOTAL CA: CPT

## 2025-03-10 RX ORDER — METOPROLOL SUCCINATE 50 MG/1
50 TABLET, EXTENDED RELEASE ORAL DAILY
Status: DISCONTINUED | OUTPATIENT
Start: 2025-03-10 | End: 2025-03-13 | Stop reason: HOSPADM

## 2025-03-10 RX ORDER — ALLOPURINOL 100 MG/1
100 TABLET ORAL DAILY
Status: DISCONTINUED | OUTPATIENT
Start: 2025-03-10 | End: 2025-03-13 | Stop reason: HOSPADM

## 2025-03-10 RX ADMIN — VANCOMYCIN HYDROCHLORIDE 1000 MG: 1 INJECTION, SOLUTION INTRAVENOUS at 22:07

## 2025-03-10 RX ADMIN — MICONAZOLE NITRATE: 20 POWDER TOPICAL at 20:17

## 2025-03-10 RX ADMIN — ALLOPURINOL 100 MG: 100 TABLET ORAL at 17:03

## 2025-03-10 RX ADMIN — ATORVASTATIN CALCIUM 40 MG: 40 TABLET, FILM COATED ORAL at 20:15

## 2025-03-10 RX ADMIN — CEFEPIME 2000 MG: 2 INJECTION, POWDER, FOR SOLUTION INTRAVENOUS at 00:09

## 2025-03-10 RX ADMIN — INSULIN LISPRO 2 UNITS: 100 INJECTION, SOLUTION INTRAVENOUS; SUBCUTANEOUS at 20:22

## 2025-03-10 RX ADMIN — MICONAZOLE NITRATE: 20 POWDER TOPICAL at 09:11

## 2025-03-10 RX ADMIN — CEFEPIME 2000 MG: 2 INJECTION, POWDER, FOR SOLUTION INTRAVENOUS at 12:01

## 2025-03-10 RX ADMIN — SODIUM CHLORIDE, PRESERVATIVE FREE 10 ML: 5 INJECTION INTRAVENOUS at 20:17

## 2025-03-10 RX ADMIN — METOPROLOL SUCCINATE 50 MG: 50 TABLET, FILM COATED, EXTENDED RELEASE ORAL at 17:03

## 2025-03-10 RX ADMIN — APIXABAN 5 MG: 5 TABLET, FILM COATED ORAL at 20:15

## 2025-03-10 RX ADMIN — APIXABAN 5 MG: 5 TABLET, FILM COATED ORAL at 09:11

## 2025-03-10 RX ADMIN — SODIUM CHLORIDE: 0.9 INJECTION, SOLUTION INTRAVENOUS at 22:06

## 2025-03-10 RX ADMIN — SODIUM CHLORIDE: 9 INJECTION, SOLUTION INTRAVENOUS at 09:14

## 2025-03-10 RX ADMIN — INSULIN LISPRO 2 UNITS: 100 INJECTION, SOLUTION INTRAVENOUS; SUBCUTANEOUS at 11:58

## 2025-03-10 RX ADMIN — INSULIN GLARGINE 15 UNITS: 100 INJECTION, SOLUTION SUBCUTANEOUS at 09:11

## 2025-03-10 ASSESSMENT — PAIN SCALES - GENERAL: PAINLEVEL_OUTOF10: 0

## 2025-03-10 NOTE — PROGRESS NOTES
Shift assessment completed. Routine vitals stable. Scheduled medications given. Patient is awake, alert and oriented x4. Respirations are easy and unlabored. Patient does not appear to be in distress, resting comfortably at this time. Call light within reach. Denies needs at this time. Pain 0/10 per patient. POC discussed with patient, agreeable.    Pop Smith RN, BSN

## 2025-03-10 NOTE — PROGRESS NOTES
PM assessment complete and documented. Meds given per MAR. New IV placed without difficulty. Pt tolerated well. Daughter in room. Call light in reach. Pt denies needs or concerns at present.

## 2025-03-10 NOTE — PROGRESS NOTES
Clinical Pharmacy Note: Pharmacy to Dose Vancomycin    Sheila Esparza is a 75 y.o. female started on Vancomycin for Sepsis and soft tissue infection; consult received from Dr. Butcher to manage therapy. Also receiving the following antibiotics: Cefepime.    Goal AUC: 400-600 mg/L*hr    Assessment/Plan:  A 1500 mg loading dose was given on 03/09 at 2153  Initiate vancomycin 1000 mg IV every 24 hours. Bayesian modeling predicts an AUC of 532 mg/L*hr and a trough of 16.9 mcg/mL at steady state concentration.  A vancomycin random level has been ordered for 03/10 at 0600  Changes in regimen will be determined based on culture results, renal function, and clinical response.  Pharmacy will continue to monitor and adjust regimen as necessary.    Allergies:  Nickel     Recent Labs     03/09/25  1021   CREATININE 1.5*       Recent Labs     03/09/25  1021   WBC 7.5       Ht Readings from Last 1 Encounters:   03/09/25 1.651 m (5' 5\")        Wt Readings from Last 1 Encounters:   03/09/25 115.2 kg (254 lb)         Estimated Creatinine Clearance: 42 mL/min (A) (based on SCr of 1.5 mg/dL (H)).      Thank you for the consult,    Ck Sandoval, EsterD

## 2025-03-10 NOTE — CARE COORDINATION
Patient with rash to abdominal folds and groin.  Antifungal ordered. Patient also has redness to bilateral lower legs, no blistering or weeping. No dressings at this time. GUSTABO SOLANON, RN, CWOCN  Inpatient  Wound/Ostomy Care  [phone]

## 2025-03-10 NOTE — H&P
V2.0  History and Physical      Name:  Sheila Esparza /Age/Sex: 1950  (75 y.o. female)   MRN & CSN:  [id number] & [id number] Encounter Date/Time: 3/9/2025 8:31 PM EDT   Location:  [facility]3319/3319-01 PCP: Summer Roche, JUAN MANEUL - CNP       Hospital Day: 1    Assessment and Plan:   Sheila Esparza is a 75 y.o. female with a pmh of  who presents with Sepsis (HCC)    Hospital Problems           Last Modified POA    * (Principal) Sepsis (HCC) 3/9/2025 Yes    Altered mental state 3/9/2025 Yes       Plan:  Sepsis d/t pneumonia   With hypotension, lactic acidosis  COVID, flu neg  On Cefepime and Vancomycin  BP responsive to Fluid resuscitation  Cont on maintenance fluids  Antihypertensives on hold    Nausea and vomiting  Likely d/t above  CT abdomen and pelvis is neg for acute pathology  Placed on clears for now  Advance diet as tolerated     Type 2 DM uncontrolled with hyperglycemia  On Lantus , sliding scale insulin and fluids    Altered mental state  CT head is neg for stroke or hemorrhage  UA is neg for UTI        Diet ADULT DIET; Clear Liquid; 4 carb choices (60 gm/meal)   DVT Prophylaxis [] Lovenox, []  Heparin, [] SCDs, [] Ambulation,  [] Eliquis, [] Xarelto, [] Coumadin   Code Status Full Code   Surrogate Decision Maker/ POA      Personally reviewed Lab Studies and Imaging         History of Present Illness:     Chief Complaint:   Sheila Esparza is a 75 y.o. female with pmh of CHF, HTN , DM who presents with c/o altered mental state, nausea and vomiting, weakness. Blood glucose is elevated > 340  ED work up showed  CT chest neg fpr PE   Showed pneumonia  CT head is neg   CT abdomen and pelvis is neg for acute findings  Initial BP was low  The pt mentation and BP  improved with fluid resuscitation     Review of Systems:        Pertinent positives and negatives discussed in HPI     Objective:   No intake or output data in the 24 hours ending 25   Vitals:   Vitals:    25 1653  - Will resume Vyvanse (home med) per Epilepsy  - Attempting to keep patient on home regimen where she is seizing while monitored by vEEG   based adjustment of the mA/kV was utilized to reduce the radiation dose to as low as reasonably achievable. COMPARISON: None. HISTORY: ORDERING SYSTEM PROVIDED HISTORY: vomiting, altered, no abd tenderness but ?not able to tell us if something hurts TECHNOLOGIST PROVIDED HISTORY: Reason for exam:->vomiting, altered, no abd tenderness but ?not able to tell us if something hurts Additional Contrast?->None Decision Support Exception - unselect if not a suspected or confirmed emergency medical condition->Emergency Medical Condition (MA) Reason for Exam: vomiting, altered, no abd tenderness but ?not able to tell us if something hurts FINDINGS: Lower Chest: Motion artifact degrades image quality.  There is bibasilar scarring with subtle left lower lobe airspace disease.  The heart is enlarged.  Coronary artery calcifications are a marker of atherosclerosis. Organs: The unenhanced liver, spleen, pancreas, adrenal glands and kidneys are unremarkable.  Status post cholecystectomy.  There is no hydronephrosis or obstructive uropathy. GI/Bowel: There is no bowel obstruction.  Small to moderate duodenal diverticula are noted.  Status post appendectomy. Pelvis: There is a fibroid uterus. Peritoneum/Retroperitoneum: There is no evidence of free fluid or adenopathy. Scattered retroperitoneal lymph nodes are not pathologic by CT criteria.  A small fat containing umbilical hernia is noted.  Moderate to severe atherosclerosis involves the abdominal aorta and major branch vessels. Bones/Soft Tissues: Degenerative changes involve the thoracolumbar spine, bilateral hips and sacroiliac joints. Focal subcutaneous stranding is present in left paracentral anterior abdominal wall without a discrete fluid collection.  Partially calcified soft tissue mass within the medial left gluteal oscar favors a chronic hematoma.     1. Subtle left lower lobe airspace disease either due to developing pneumonia or atelectasis; recommend CT of the chest

## 2025-03-10 NOTE — PLAN OF CARE
Problem: Chronic Conditions and Co-morbidities  Goal: Patient's chronic conditions and co-morbidity symptoms are monitored and maintained or improved  3/10/2025 1019 by Pop Smith RN  Outcome: Progressing  3/10/2025 0129 by Emeli Groves RN  Outcome: Progressing     Problem: Discharge Planning  Goal: Discharge to home or other facility with appropriate resources  3/10/2025 1019 by Pop Smith RN  Outcome: Progressing  3/10/2025 0129 by Emeli Groves RN  Outcome: Progressing     Problem: Skin/Tissue Integrity  Goal: Skin integrity remains intact  Description: 1.  Monitor for areas of redness and/or skin breakdown  2.  Assess vascular access sites hourly  3.  Every 4-6 hours minimum:  Change oxygen saturation probe site  4.  Every 4-6 hours:  If on nasal continuous positive airway pressure, respiratory therapy assess nares and determine need for appliance change or resting period  3/10/2025 1019 by Pop Smith RN  Outcome: Progressing  Flowsheets (Taken 3/10/2025 0909)  Skin Integrity Remains Intact: Monitor for areas of redness and/or skin breakdown  3/10/2025 0129 by Emeli Groves RN  Outcome: Progressing  Flowsheets (Taken 3/9/2025 2342)  Skin Integrity Remains Intact: Monitor for areas of redness and/or skin breakdown     Problem: Safety - Adult  Goal: Free from fall injury  3/10/2025 1019 by Pop Smith RN  Outcome: Progressing  3/10/2025 0129 by Emeli Groves RN  Outcome: Progressing     Problem: Neurosensory - Adult  Goal: Achieves stable or improved neurological status  Outcome: Progressing  Goal: Achieves maximal functionality and self care  Outcome: Progressing     Problem: Cardiovascular - Adult  Goal: Maintains optimal cardiac output and hemodynamic stability  Outcome: Progressing     Problem: Skin/Tissue Integrity - Adult  Goal: Skin integrity remains intact  Description: 1.  Monitor for areas of redness and/or skin breakdown  2.  Assess vascular access sites

## 2025-03-10 NOTE — PROGRESS NOTES
Clinical Pharmacy Note: Pharmacy to Dose Vancomycin    Vancomycin Day: 2  Indication: PNA  Current Dose: 1000 mg q24h  Dosing Method: Bayesian Modeling    Random: 15.3 mg/L    Recent Labs     03/09/25  1021 03/10/25  0605   BUN 36* 25*       Recent Labs     03/09/25  1021 03/10/25  0605   CREATININE 1.5* 1.1       Recent Labs     03/09/25  1021 03/10/25  0605   WBC 7.5 6.7         Intake/Output Summary (Last 24 hours) at 3/10/2025 0725  Last data filed at 3/10/2025 0601  Gross per 24 hour   Intake --   Output 1400 ml   Net -1400 ml         Ht Readings from Last 1 Encounters:   03/09/25 1.651 m (5' 5\")        Wt Readings from Last 1 Encounters:   03/10/25 112.9 kg (248 lb 14.4 oz)         Body mass index is 41.42 kg/m².    Estimated Creatinine Clearance: 57 mL/min (based on SCr of 1.1 mg/dL).      Assessment/Plan:  Vancomycin level is therapeutic.  Continue current vancomycin regimen of 1000 mg every 24 hours.   Bayesian Modeling predicts an AUC of 441 mg/L*hr and trough of 12.5 mg/L.  Will repeat vancomycin level if clinically indicated.   Changes in regimen will be determined based on culture results, renal function, and clinical response.  Pharmacy will continue to monitor and adjust regimen as necessary.    Thank you for the consult,    Jyoti Up, PharmD, BCPS  [id number]  3/10/2025 7:25 AM

## 2025-03-10 NOTE — PROGRESS NOTES
[facility]    Hospitalist Progress Note:      Name:  Sheila Esparza /Age/Sex: 1950  (75 y.o. female)   MRN & CSN:  [id number] & [id number] Encounter Date: 3/10/2025    Location:  [facility]3319/3319-01 PCP: Summer Roche, APRN - CNP     Attending:Brian Simons MD  Admission Date: 3/9/2025      Hospital Day: 2    Chief Complain/Reason for Admission:  Chief Complaint   Patient presents with    Altered Mental Status     Pt arrives from home. Pt C/O AMS since this morning. Per EMS, pt couldn't get to the restroom and vomited. Per EMS, pt was able to ambulate to restroom yesterday and was having N/V yesterday. Per EMS, pt's BS is 346.       Assessment:  Sheila Esparza is a 75 y.o. female with pmh of CHF, HTN , DM who presents with c/o altered mental state, nausea and vomiting, weakness.   Altered mental status/confusion likely delirium, multifactorial including pna  Left lower lobe airspace disease suspected pneumonia  Elevated troponin likely due to pneumonia: No chest pain  Acute kidney injury: Serum creatinine 1.5 discharge 1.1  Chronic atrial fibrillation: on eliquis  DM2  Hypertension  CHF    Plan:   Continue empirical IV antibiotics, follow-up cultures  Delirium precautions, frequent reorientation, avoid polypharmacy, avoid sleep deprivation, constipation and dehydration. Avoid benzodiazepams/anticholinergics. Restrains/Sitter for safety as needed. Consider IV haldol prn for agitation  DC IV fluids, volume status closely.  Monitor renal/electrolytes closely  On hold IV Lasix and lisinopril, resume metoprolol.  If renal function stable will resume lisinopril tomorrow.  Current meds reviewed, adjusted.   See orders  Diet ADULT DIET; Regular; 4 carb choices (60 gm/meal); Low Fat/Low Chol/High Fiber/2 gm Na   DVT Prophylaxis [] Lovenox, []  Heparin, [] SCDs, [] Ambulation,  [] Eliquis, [] Xarelto  [] Coumadin   Code Status Full Code   Disposition Expected Disposition: Home vs

## 2025-03-11 LAB
ANION GAP SERPL CALCULATED.3IONS-SCNC: 8 MMOL/L (ref 3–16)
BUN SERPL-MCNC: 17 MG/DL (ref 7–20)
CALCIUM SERPL-MCNC: 8.9 MG/DL (ref 8.3–10.6)
CHLORIDE SERPL-SCNC: 105 MMOL/L (ref 99–110)
CO2 SERPL-SCNC: 23 MMOL/L (ref 21–32)
CREAT SERPL-MCNC: 1 MG/DL (ref 0.6–1.2)
DEPRECATED RDW RBC AUTO: 15.7 % (ref 12.4–15.4)
GFR SERPLBLD CREATININE-BSD FMLA CKD-EPI: 59 ML/MIN/{1.73_M2}
GLUCOSE BLD-MCNC: 168 MG/DL (ref 70–99)
GLUCOSE BLD-MCNC: 172 MG/DL (ref 70–99)
GLUCOSE BLD-MCNC: 203 MG/DL (ref 70–99)
GLUCOSE BLD-MCNC: 213 MG/DL (ref 70–99)
GLUCOSE SERPL-MCNC: 191 MG/DL (ref 70–99)
HCT VFR BLD AUTO: 35.2 % (ref 36–48)
HGB BLD-MCNC: 11.6 G/DL (ref 12–16)
MCH RBC QN AUTO: 30 PG (ref 26–34)
MCHC RBC AUTO-ENTMCNC: 32.8 G/DL (ref 31–36)
MCV RBC AUTO: 91.3 FL (ref 80–100)
MRSA DNA SPEC QL NAA+PROBE: NORMAL
PERFORMED ON: ABNORMAL
PLATELET # BLD AUTO: 141 K/UL (ref 135–450)
PMV BLD AUTO: 9.2 FL (ref 5–10.5)
POTASSIUM SERPL-SCNC: 3.7 MMOL/L (ref 3.5–5.1)
RBC # BLD AUTO: 3.86 M/UL (ref 4–5.2)
SODIUM SERPL-SCNC: 136 MMOL/L (ref 136–145)
WBC # BLD AUTO: 5.9 K/UL (ref 4–11)

## 2025-03-11 PROCEDURE — 1200000000 HC SEMI PRIVATE

## 2025-03-11 PROCEDURE — 6360000002 HC RX W HCPCS: Performed by: FAMILY MEDICINE

## 2025-03-11 PROCEDURE — 97530 THERAPEUTIC ACTIVITIES: CPT

## 2025-03-11 PROCEDURE — 36415 COLL VENOUS BLD VENIPUNCTURE: CPT

## 2025-03-11 PROCEDURE — 6370000000 HC RX 637 (ALT 250 FOR IP): Performed by: HOSPITALIST

## 2025-03-11 PROCEDURE — 2500000003 HC RX 250 WO HCPCS: Performed by: FAMILY MEDICINE

## 2025-03-11 PROCEDURE — 2500000003 HC RX 250 WO HCPCS: Performed by: HOSPITALIST

## 2025-03-11 PROCEDURE — 97535 SELF CARE MNGMENT TRAINING: CPT

## 2025-03-11 PROCEDURE — 6360000002 HC RX W HCPCS: Performed by: HOSPITALIST

## 2025-03-11 PROCEDURE — 6370000000 HC RX 637 (ALT 250 FOR IP): Performed by: FAMILY MEDICINE

## 2025-03-11 PROCEDURE — 97165 OT EVAL LOW COMPLEX 30 MIN: CPT

## 2025-03-11 PROCEDURE — 97116 GAIT TRAINING THERAPY: CPT

## 2025-03-11 PROCEDURE — 80048 BASIC METABOLIC PNL TOTAL CA: CPT

## 2025-03-11 PROCEDURE — 85027 COMPLETE CBC AUTOMATED: CPT

## 2025-03-11 PROCEDURE — 97161 PT EVAL LOW COMPLEX 20 MIN: CPT

## 2025-03-11 PROCEDURE — 2580000003 HC RX 258: Performed by: FAMILY MEDICINE

## 2025-03-11 RX ORDER — AZITHROMYCIN 250 MG/1
500 TABLET, FILM COATED ORAL DAILY
Status: COMPLETED | OUTPATIENT
Start: 2025-03-11 | End: 2025-03-13

## 2025-03-11 RX ORDER — LISINOPRIL 5 MG/1
5 TABLET ORAL DAILY
Status: DISCONTINUED | OUTPATIENT
Start: 2025-03-11 | End: 2025-03-13 | Stop reason: HOSPADM

## 2025-03-11 RX ADMIN — METOPROLOL SUCCINATE 50 MG: 50 TABLET, FILM COATED, EXTENDED RELEASE ORAL at 09:09

## 2025-03-11 RX ADMIN — SODIUM CHLORIDE: 0.9 INJECTION, SOLUTION INTRAVENOUS at 04:34

## 2025-03-11 RX ADMIN — CEFEPIME 2000 MG: 2 INJECTION, POWDER, FOR SOLUTION INTRAVENOUS at 06:30

## 2025-03-11 RX ADMIN — MICONAZOLE NITRATE: 20 POWDER TOPICAL at 21:22

## 2025-03-11 RX ADMIN — INSULIN LISPRO 2 UNITS: 100 INJECTION, SOLUTION INTRAVENOUS; SUBCUTANEOUS at 12:37

## 2025-03-11 RX ADMIN — ALLOPURINOL 100 MG: 100 TABLET ORAL at 09:09

## 2025-03-11 RX ADMIN — ATORVASTATIN CALCIUM 40 MG: 40 TABLET, FILM COATED ORAL at 21:22

## 2025-03-11 RX ADMIN — WATER 1000 MG: 1 INJECTION INTRAMUSCULAR; INTRAVENOUS; SUBCUTANEOUS at 12:37

## 2025-03-11 RX ADMIN — APIXABAN 5 MG: 5 TABLET, FILM COATED ORAL at 09:09

## 2025-03-11 RX ADMIN — SODIUM CHLORIDE, PRESERVATIVE FREE 10 ML: 5 INJECTION INTRAVENOUS at 21:22

## 2025-03-11 RX ADMIN — LISINOPRIL 5 MG: 5 TABLET ORAL at 12:49

## 2025-03-11 RX ADMIN — VANCOMYCIN HYDROCHLORIDE 1000 MG: 1 INJECTION, SOLUTION INTRAVENOUS at 04:25

## 2025-03-11 RX ADMIN — MICONAZOLE NITRATE: 20 POWDER TOPICAL at 09:12

## 2025-03-11 RX ADMIN — APIXABAN 5 MG: 5 TABLET, FILM COATED ORAL at 21:22

## 2025-03-11 RX ADMIN — INSULIN GLARGINE 15 UNITS: 100 INJECTION, SOLUTION SUBCUTANEOUS at 09:09

## 2025-03-11 RX ADMIN — AZITHROMYCIN DIHYDRATE 500 MG: 250 TABLET ORAL at 12:37

## 2025-03-11 RX ADMIN — SODIUM CHLORIDE, PRESERVATIVE FREE 10 ML: 5 INJECTION INTRAVENOUS at 09:12

## 2025-03-11 RX ADMIN — INSULIN LISPRO 2 UNITS: 100 INJECTION, SOLUTION INTRAVENOUS; SUBCUTANEOUS at 21:21

## 2025-03-11 NOTE — PROGRESS NOTES
[facility] - Inpatient Rehabilitation Department   Phone: [phone]    Occupational Therapy    [x] Initial Evaluation            [] Daily Treatment Note         [] Discharge Summary      Patient: Sheila Esparza   : 1950   MRN: [id number]   Date of Service:  3/11/2025    Admitting Diagnosis:  Sepsis (HCC)  Current Admission Summary: 75 y.o. female who presents to the ED complaining of altered mental status. Pt was was nauseous and vomiting the day of admission and unable to ambulate to the restroom due to weakness and confusion.  Past Medical History:  has a past medical history of Diabetes mellitus type 2 in obese, Edema of both legs, Elevated LFTs, Homocystinemia, Homozygous Factor V Leiden mutation, Hypercholesterolemia, Hypertension, Hypomagnesemia, Low serum HDL, Lower leg DVT (deep venous thromboembolism), chronic, right (HCC), Osteoarthritis of cervical spine, Renal failure, acute, and Vitamin D deficiency.  Past Surgical History:  has a past surgical history that includes Cholecystectomy ();  section; Appendectomy (1976); Tubal ligation (Bilateral, 1980); Colonoscopy (2014); eye surgery (3 years); and US BREAST BIOPSY W LOC DEVICE 1ST LESION LEFT (Left, 2024).    Discharge Recommendations: Sheila Esparza scored a 20/24 on the AM-PAC ADL Inpatient form.  At this time, no further OT is recommended upon discharge due to pt similar to baseline status.  Recommend patient returns to prior setting with prior services.       DME Required For Discharge: patient has all required DME for discharge    Precautions/Restrictions: low fall risk  Weight Bearing Restrictions: no restrictions  [] Right Upper Extremity  [] Left Upper Extremity [] Right Lower Extremity  [] Left Lower Extremity     Required Braces/Orthotics: no braces required   [] Right  [] Left  Positional Restrictions:no positional restrictions    Pre-Admission Information   Lives With:

## 2025-03-11 NOTE — PLAN OF CARE
Problem: Discharge Planning  Goal: Discharge to home or other facility with appropriate resources  3/10/2025 2307 by Kath Hicks, RN  Outcome: Progressing  Problem: Skin/Tissue Integrity  Goal: Skin integrity remains intact  Description: 1.  Monitor for areas of redness and/or skin breakdown  2.  Assess vascular access sites hourly  3.  Every 4-6 hours minimum:  Change oxygen saturation probe site  4.  Every 4-6 hours:  If on nasal continuous positive airway pressure, respiratory therapy assess nares and determine need for appliance change or resting period  3/10/2025 2307 by Kath Hicks, RN  Outcome: Progressing  Skin Integrity Remains Intact: Monitor for areas of redness and/or skin breakdown     Problem: Safety - Adult  Goal: Free from fall injury  3/10/2025 2307 by Kath Hicks, RN  Outcome: Progressing  Problem: Chronic Conditions and Co-morbidities  Goal: Patient's chronic conditions and co-morbidity symptoms are monitored and maintained or improved  3/10/2025 2307 by Kath Hicks, RN  Outcome: Not Progressing

## 2025-03-11 NOTE — PROGRESS NOTES
[facility] - Inpatient Rehabilitation Department   Phone: [phone]    Physical Therapy    [x] Initial Evaluation            [] Daily Treatment Note         [] Discharge Summary      Patient: Sheila Esparza   : 1950   MRN: [id number]   Date of Service:  3/11/2025  Admitting Diagnosis: Sepsis (HCC)  Current Admission Summary: 76 yo female admitted to [facility] for AMS, nausea/vomiting, and weakness. BG >340.    - Chest CT negative for PE   - COVID and Flu negative   - Sepsis 2/2 PNA   - Hypotensive, AMS and BP improved with fluids  Past Medical History:  has a past medical history of Diabetes mellitus type 2 in obese, Edema of both legs, Elevated LFTs, Homocystinemia, Homozygous Factor V Leiden mutation, Hypercholesterolemia, Hypertension, Hypomagnesemia, Low serum HDL, Lower leg DVT (deep venous thromboembolism), chronic, right (HCC), Osteoarthritis of cervical spine, Renal failure, acute, and Vitamin D deficiency.  Past Surgical History:  has a past surgical history that includes Cholecystectomy ();  section; Appendectomy (1976); Tubal ligation (Bilateral, 1980); Colonoscopy (2014); eye surgery (3 years); and US BREAST BIOPSY W LOC DEVICE 1ST LESION LEFT (Left, 2024).  Discharge Recommendations: Sheila Esparza scored a 18/24 on the AM-PAC short mobility form.  At this time, no further PT is recommended upon discharge due to pt functioning near her baseline and anticipated to progress well while in the acute setting.  Recommend patient returns to prior setting with prior services.    DME Required For Discharge: no DME required at discharge  Precautions/Restrictions: high fall risk  Weight Bearing Restrictions: no restrictions  [] Right Upper Extremity  [] Left Upper Extremity [] Right Lower Extremity  [] Left Lower Extremity     Required Braces/Orthotics: no braces required   [] Right  [] Left  Positional Restrictions:no positional  move. Reports she sleeps in a recliner at home and is unable to sleep in the bed here.   Pain: 0/10  Pain Interventions: not applicable       Functional Mobility  Bed Mobility:  Bed mobility not completed on this date.  Comments: Pt prefers to sit/sleep in the recliner.   Transfers:  Sit to stand transfer: stand by assistance  Stand to sit transfer: stand by assistance  Comments: Cues for hand placement with use of RW from recliner, without AD for 2nd trial. Pulls on sink for sit<>stand from chair at sink.   Ambulation:  Surface:level surface  Assistive Device: rolling walker  Assistance: stand by assistance  Distance: 150 ft   Gait Mechanics: Slow sebastian, even step length, good balance  Comments:    Ambulation Trial 2:  Surface:level surface  Assistive Device: no device  Assistance: contact guard assistance  Distance: 220 ft + 15 ft + 15 ft  Gait Mechanics: Increased lateral sway, decreased step length, decreased trunk rotation, decreased balance with onset of fatigue  Comments:  Pt noted to use phan rail or holding onto furniture as she fatigues.   Stair Mobility:  Stair mobility not completed on this date.  Comments:  Wheelchair Mobility:  No w/c mobility completed on this date.  Comments:  Balance:  Static Sitting Balance: good: independent with functional balance in unsupported position  Dynamic Sitting Balance: fair (+): maintains balance at SBA/supervision without use of UE support  Static Standing Balance: fair (+): maintains balance at SBA/supervision without use of UE support  Dynamic Standing Balance: fair: maintains balance at CGA without use of UE support  Comments: Pt's static balance standing at sink with no sway or concern for LOB, but pt benefits from use of RW for community distance ambulation for both energy conservation and balance.     Other Therapeutic Interventions  Pt assisted with set up for ADLs at sink. Primarily seated for energy conservation but no LOB for standing components.  Time Frame: Before discharge  Patient will complete transfers at Independent   Patient will ambulate 300 ft with use of no device at Independent  Patient to maintain standing at Independent for 5 minutes.    Above goals reviewed on 3/11/2025.  All goals are ongoing at this time unless indicated above.      Therapy Session Time      Individual Group Co-treatment   Time In     1118   Time Out     1218   Minutes     60     Timed Code Treatment Minutes:  45 Minutes  Total Treatment Minutes:  60 Minutes       Electronically Signed By: Pamela Bennett, PT      Pamela Bennett PT, DPT, CNS #[id number]

## 2025-03-11 NOTE — CARE COORDINATION
Discharge Planning:     (CM) reviewed the patient's chart to assess needs. Patient's Readmission Risk Score is 11%. Patient's medical insurance is [facility] Medicare. Patient's PCP is SARAHI CARLSON .     PT/OT pending.    No needs anticipated, at this time. CM team to follow. Staff to inform CM if additional discharge needs arise.     Electronically signed by Marco Watson on 3/11/25 at 9:26 AM EDT

## 2025-03-11 NOTE — PROGRESS NOTES
Physician Progress Note      PATIENT:               SHAHZAD MELENDREZ  [facility] #:                  [id number]  :                       1950  ADMIT DATE:       3/9/2025 9:02 AM  DISCH DATE:  RESPONDING  PROVIDER #:        Brian JIANG MD          QUERY TEXT:    Patient admitted with altered mental status. Noted documentation of sepsis in   H&P and WBC 7.5 Tmax 98.3 pulse 69 - 83.  In order to support the diagnosis of sepsis, please include additional   clinical indicators in your documentation.  Or please document if the   diagnosis of sepsis has been ruled out after further study    The medical record reflects the following:    Risk Factors: CHF, HTN , DM    Clinical Indicators: WBC 7.5 Tmax 98.3 pulse 69 - 83 Blood cultures: No   growth.  H&P note: \"Sepsis d/t pneumonia With hypotension, lactic acidosis  COVID, flu neg  On Cefepime and Vancomycin  BP responsive to Fluid   resuscitation  Cont on maintenance fluids  Antihypertensives on hold\"    Treatment: Serial labs, BC, CXR, CT Head, CT Abd pelvis, IV NS 1710 bolus, IV   cefepime, IV Zithromax, IV NS @ 100ml/hr  Options provided:  -- Sepsis present as evidenced by, Please document evidence.  -- Sepsis was ruled out after study  -- Other - I will add my own diagnosis  -- Disagree - Not applicable / Not valid  -- Disagree - Clinically unable to determine / Unknown  -- Refer to Clinical Documentation Reviewer    PROVIDER RESPONSE TEXT:    Sepsis was ruled out after study.    Query created by: Margo Faith on 3/11/2025 12:32 PM      Electronically signed by:  Brian JIANG MD 3/11/2025 4:04 PM

## 2025-03-11 NOTE — PROGRESS NOTES
[facility]    Hospitalist Progress Note:      Name:  Sheila Esparza /Age/Sex: 1950  (75 y.o. female)   MRN & CSN:  [id number] & [id number] Encounter Date: 3/11/2025    Location:  [id number]/3319-01 PCP: Summer Roche, APRN - CNP     Attending:Brian Simons MD  Admission Date: 3/9/2025      Hospital Day: 3    Chief Complain/Reason for Admission:  Chief Complaint   Patient presents with    Altered Mental Status     Pt arrives from home. Pt C/O AMS since this morning. Per EMS, pt couldn't get to the restroom and vomited. Per EMS, pt was able to ambulate to restroom yesterday and was having N/V yesterday. Per EMS, pt's BS is 346.       Assessment:  Sheila Esparza is a 75 y.o. female with pmh of CHF, HTN , DM who presents with c/o altered mental state, nausea and vomiting, weakness.   Altered mental status/confusion likely delirium, multifactorial including pna  Left lower lobe airspace disease suspected pneumonia  Elevated troponin likely due to pneumonia: No chest pain  Acute kidney injury: Serum creatinine 1.5 --> 1.1--> 1.0  Chronic atrial fibrillation: on eliquis  DM2  Hypertension  CHF    Plan:   Change IV antibiotics to ceftriaxone and azithromycin, follow-up cultures  Delirium precautions, frequent reorientation, avoid polypharmacy, avoid sleep deprivation, constipation and dehydration. Avoid benzodiazepams/anticholinergics. Restrains/Sitter for safety as needed. Consider IV haldol prn for agitation  Resume lisinopril. Cont hold Lasix.  If renal function stable will resume home lasix tomorrow.  Current meds reviewed, adjusted.   See orders  Diet ADULT DIET; Regular; 4 carb choices (60 gm/meal); Low Fat/Low Chol/High Fiber/2 gm Na   DVT Prophylaxis [] Lovenox, []  Heparin, [] SCDs, [] Ambulation,  [x] Eliquis, [] Xarelto  [] Coumadin   Code Status Full Code   Disposition Expected Disposition: Home vs ECF vs Hm with [facility]  Estimated Date of Discharge:  1-2 days  Reason     GLUCOSEU 250 03/09/2025 09:42 AM    KETUA Negative 03/09/2025 09:42 AM       Imaging Studies:  Reports reviewed.  CT ABDOMEN PELVIS WO CONTRAST Additional Contrast? None  Result Date: 3/9/2025  EXAMINATION: CT OF THE ABDOMEN AND PELVIS WITHOUT CONTRAST 3/9/2025 9:39 am TECHNIQUE: CT of the abdomen and pelvis was performed without the administration of intravenous contrast. Multiplanar reformatted images are provided for review. Automated exposure control, iterative reconstruction, and/or weight based adjustment of the mA/kV was utilized to reduce the radiation dose to as low as reasonably achievable. COMPARISON: None. HISTORY: ORDERING SYSTEM PROVIDED HISTORY: vomiting, altered, no abd tenderness but ?not able to tell us if something hurts TECHNOLOGIST PROVIDED HISTORY: Reason for exam:->vomiting, altered, no abd tenderness but ?not able to tell us if something hurts Additional Contrast?->None Decision Support Exception - unselect if not a suspected or confirmed emergency medical condition->Emergency Medical Condition (MA) Reason for Exam: vomiting, altered, no abd tenderness but ?not able to tell us if something hurts FINDINGS: Lower Chest: Motion artifact degrades image quality.  There is bibasilar scarring with subtle left lower lobe airspace disease.  The heart is enlarged.  Coronary artery calcifications are a marker of atherosclerosis. Organs: The unenhanced liver, spleen, pancreas, adrenal glands and kidneys are unremarkable.  Status post cholecystectomy.  There is no hydronephrosis or obstructive uropathy. GI/Bowel: There is no bowel obstruction.  Small to moderate duodenal diverticula are noted.  Status post appendectomy. Pelvis: There is a fibroid uterus. Peritoneum/Retroperitoneum: There is no evidence of free fluid or adenopathy. Scattered retroperitoneal lymph nodes are not pathologic by CT criteria.  A small fat containing umbilical hernia is noted.  Moderate to severe atherosclerosis involves the

## 2025-03-12 LAB
ANION GAP SERPL CALCULATED.3IONS-SCNC: 10 MMOL/L (ref 3–16)
BUN SERPL-MCNC: 15 MG/DL (ref 7–20)
CALCIUM SERPL-MCNC: 9.3 MG/DL (ref 8.3–10.6)
CHLORIDE SERPL-SCNC: 103 MMOL/L (ref 99–110)
CO2 SERPL-SCNC: 23 MMOL/L (ref 21–32)
CREAT SERPL-MCNC: 0.8 MG/DL (ref 0.6–1.2)
DEPRECATED RDW RBC AUTO: 15.1 % (ref 12.4–15.4)
EKG ATRIAL RATE: 340 BPM
EKG DIAGNOSIS: NORMAL
EKG Q-T INTERVAL: 522 MS
EKG QRS DURATION: 104 MS
EKG QTC CALCULATION (BAZETT): 446 MS
EKG R AXIS: 13 DEGREES
EKG T AXIS: 80 DEGREES
EKG VENTRICULAR RATE: 44 BPM
GFR SERPLBLD CREATININE-BSD FMLA CKD-EPI: 78 ML/MIN/{1.73_M2}
GLUCOSE BLD-MCNC: 102 MG/DL (ref 70–99)
GLUCOSE BLD-MCNC: 150 MG/DL (ref 70–99)
GLUCOSE BLD-MCNC: 164 MG/DL (ref 70–99)
GLUCOSE BLD-MCNC: 212 MG/DL (ref 70–99)
GLUCOSE SERPL-MCNC: 114 MG/DL (ref 70–99)
HCT VFR BLD AUTO: 36.3 % (ref 36–48)
HGB BLD-MCNC: 12 G/DL (ref 12–16)
MCH RBC QN AUTO: 30.2 PG (ref 26–34)
MCHC RBC AUTO-ENTMCNC: 33 G/DL (ref 31–36)
MCV RBC AUTO: 91.5 FL (ref 80–100)
PERFORMED ON: ABNORMAL
PLATELET # BLD AUTO: 142 K/UL (ref 135–450)
PMV BLD AUTO: 9.5 FL (ref 5–10.5)
POTASSIUM SERPL-SCNC: 3.8 MMOL/L (ref 3.5–5.1)
RBC # BLD AUTO: 3.97 M/UL (ref 4–5.2)
SODIUM SERPL-SCNC: 136 MMOL/L (ref 136–145)
WBC # BLD AUTO: 5.3 K/UL (ref 4–11)

## 2025-03-12 PROCEDURE — 2500000003 HC RX 250 WO HCPCS: Performed by: FAMILY MEDICINE

## 2025-03-12 PROCEDURE — 36415 COLL VENOUS BLD VENIPUNCTURE: CPT

## 2025-03-12 PROCEDURE — 6360000002 HC RX W HCPCS: Performed by: HOSPITALIST

## 2025-03-12 PROCEDURE — 6370000000 HC RX 637 (ALT 250 FOR IP): Performed by: HOSPITALIST

## 2025-03-12 PROCEDURE — 85027 COMPLETE CBC AUTOMATED: CPT

## 2025-03-12 PROCEDURE — 2500000003 HC RX 250 WO HCPCS: Performed by: HOSPITALIST

## 2025-03-12 PROCEDURE — 6370000000 HC RX 637 (ALT 250 FOR IP): Performed by: FAMILY MEDICINE

## 2025-03-12 PROCEDURE — 1200000000 HC SEMI PRIVATE

## 2025-03-12 PROCEDURE — 93005 ELECTROCARDIOGRAM TRACING: CPT | Performed by: FAMILY MEDICINE

## 2025-03-12 PROCEDURE — 80048 BASIC METABOLIC PNL TOTAL CA: CPT

## 2025-03-12 PROCEDURE — 93010 ELECTROCARDIOGRAM REPORT: CPT | Performed by: INTERNAL MEDICINE

## 2025-03-12 RX ORDER — OXYMETAZOLINE HYDROCHLORIDE 0.05 G/100ML
2 SPRAY NASAL 2 TIMES DAILY PRN
Status: DISCONTINUED | OUTPATIENT
Start: 2025-03-12 | End: 2025-03-13 | Stop reason: HOSPADM

## 2025-03-12 RX ADMIN — METOPROLOL SUCCINATE 50 MG: 50 TABLET, FILM COATED, EXTENDED RELEASE ORAL at 09:16

## 2025-03-12 RX ADMIN — AZITHROMYCIN DIHYDRATE 500 MG: 250 TABLET ORAL at 09:16

## 2025-03-12 RX ADMIN — MICONAZOLE NITRATE: 20 POWDER TOPICAL at 09:17

## 2025-03-12 RX ADMIN — ALLOPURINOL 100 MG: 100 TABLET ORAL at 09:17

## 2025-03-12 RX ADMIN — SODIUM CHLORIDE, PRESERVATIVE FREE 10 ML: 5 INJECTION INTRAVENOUS at 09:17

## 2025-03-12 RX ADMIN — INSULIN GLARGINE 15 UNITS: 100 INJECTION, SOLUTION SUBCUTANEOUS at 09:17

## 2025-03-12 RX ADMIN — APIXABAN 5 MG: 5 TABLET, FILM COATED ORAL at 09:17

## 2025-03-12 RX ADMIN — SODIUM CHLORIDE, PRESERVATIVE FREE 10 ML: 5 INJECTION INTRAVENOUS at 23:17

## 2025-03-12 RX ADMIN — INSULIN LISPRO 2 UNITS: 100 INJECTION, SOLUTION INTRAVENOUS; SUBCUTANEOUS at 18:39

## 2025-03-12 RX ADMIN — APIXABAN 5 MG: 5 TABLET, FILM COATED ORAL at 23:17

## 2025-03-12 RX ADMIN — WATER 1000 MG: 1 INJECTION INTRAMUSCULAR; INTRAVENOUS; SUBCUTANEOUS at 12:50

## 2025-03-12 RX ADMIN — OXYMETAZOLINE HYDROCHLORIDE 2 SPRAY: 0.5 SPRAY NASAL at 11:40

## 2025-03-12 RX ADMIN — MICONAZOLE NITRATE: 20 POWDER TOPICAL at 23:18

## 2025-03-12 RX ADMIN — ATORVASTATIN CALCIUM 40 MG: 40 TABLET, FILM COATED ORAL at 23:17

## 2025-03-12 RX ADMIN — LISINOPRIL 5 MG: 5 TABLET ORAL at 09:16

## 2025-03-12 ASSESSMENT — PAIN SCALES - GENERAL: PAINLEVEL_OUTOF10: 0

## 2025-03-12 NOTE — PLAN OF CARE
Problem: Chronic Conditions and Co-morbidities  Goal: Patient's chronic conditions and co-morbidity symptoms are monitored and maintained or improved  Outcome: Progressing     Problem: Discharge Planning  Goal: Discharge to home or other facility with appropriate resources  Outcome: Progressing     Problem: Skin/Tissue Integrity  Goal: Skin integrity remains intact  Description: 1.  Monitor for areas of redness and/or skin breakdown  2.  Assess vascular access sites hourly  3.  Every 4-6 hours minimum:  Change oxygen saturation probe site  4.  Every 4-6 hours:  If on nasal continuous positive airway pressure, respiratory therapy assess nares and determine need for appliance change or resting period  Outcome: Progressing     Problem: Safety - Adult  Goal: Free from fall injury  Outcome: Progressing     Problem: Neurosensory - Adult  Goal: Achieves stable or improved neurological status  Outcome: Progressing  Goal: Achieves maximal functionality and self care  Outcome: Progressing     Problem: Cardiovascular - Adult  Goal: Maintains optimal cardiac output and hemodynamic stability  Outcome: Progressing     Problem: Skin/Tissue Integrity - Adult  Goal: Skin integrity remains intact  Description: 1.  Monitor for areas of redness and/or skin breakdown  2.  Assess vascular access sites hourly  3.  Every 4-6 hours minimum:  Change oxygen saturation probe site  4.  Every 4-6 hours:  If on nasal continuous positive airway pressure, respiratory therapy assess nares and determine need for appliance change or resting period  Outcome: Progressing  Goal: Incisions, wounds, or drain sites healing without S/S of infection  Outcome: Progressing  Goal: Oral mucous membranes remain intact  Outcome: Progressing     Problem: Musculoskeletal - Adult  Goal: Return mobility to safest level of function  Outcome: Progressing  Goal: Return ADL status to a safe level of function  Outcome: Progressing     Problem: Gastrointestinal -

## 2025-03-12 NOTE — PROGRESS NOTES
Patient alert and pleasant, denies any SOB or pain at this time. Patient ambulated around unit with nurse. Patient maintained balance and posture with steady gait. Continent of bowl and bladder at this time.

## 2025-03-12 NOTE — PROGRESS NOTES
Patient and their family members asked if the patient could be seen by a cardiologist while she is here this nurse messaged the attending physician about this request the message was read with no response

## 2025-03-12 NOTE — PROGRESS NOTES
[facility]    Hospitalist Progress Note:      Name:  Sheila Esparza /Age/Sex: 1950  (75 y.o. female)   MRN & CSN:  [id number] & [id number] Encounter Date: 3/12/2025    Location:  [id number]/3319-01 PCP: Summer Roche, APRN - CNP     Attending:Brian Simons MD  Admission Date: 3/9/2025      Hospital Day: 4    Chief Complain/Reason for Admission:  Chief Complaint   Patient presents with    Altered Mental Status     Pt arrives from home. Pt C/O AMS since this morning. Per EMS, pt couldn't get to the restroom and vomited. Per EMS, pt was able to ambulate to restroom yesterday and was having N/V yesterday. Per EMS, pt's BS is 346.       Assessment:  Sheila Esparza is a 75 y.o. female with pmh of CHF, HTN , DM who presents with c/o altered mental state, nausea and vomiting, weakness.   Altered mental status/confusion likely delirium, multifactorial including pna. Resolved.  Left lower lobe airspace disease suspected pneumonia  Epistaxis, both nostrils  Elevated troponin likely due to pneumonia: No chest pain  Acute kidney injury: Serum creatinine 1.5 --> 1.1--> 1.0  Chronic atrial fibrillation: on eliquis  DM2  Hypertension  CHF    Plan:   Afrin nasal spray, Eucerin cream for local application, monitor H&H  Cont IV antibiotics to ceftriaxone and azithromycin.   Delirium precautions, frequent reorientation, avoid polypharmacy, avoid sleep deprivation, constipation and dehydration. Avoid benzodiazepams/anticholinergics. Restrains/Sitter for safety as needed. Consider IV haldol prn for agitation  Resume lisinopril. Resume home lasix tomorrow.  Current meds reviewed, adjusted.   See orders  Diet ADULT DIET; Regular; 4 carb choices (60 gm/meal); Low Fat/Low Chol/High Fiber/2 gm Na   DVT Prophylaxis [x] Lovenox, []  Heparin, [] SCDs, [] Ambulation,  [x] Eliquis, [] Xarelto  [] Coumadin   Code Status Full Code   Disposition Expected Disposition: Home vs ECF vs  with HHC  Estimated  Date of Discharge:  1-2 days  Reason for continued admission: Pneumonia     Subjective:  Pt. seen and examined at bedside.    Overall symptoms are stable.  Alert, awake, oriented x 3   Reports bleeding from both nostril earlier today , two episodes. Currently stopped  Denies any SOB/CP/Diarrhea.   No HA/Dizziness or any tingling/numbness.     Objective:  Vital Signs:  /83   Pulse 62   Temp 98 °F (36.7 °C) (Oral)   Resp 18   Ht 1.651 m (5' 5\")   Wt 114.4 kg (252 lb 3.2 oz)   SpO2 97%   BMI 41.97 kg/m²     Diet: ADULT DIET; Regular; 4 carb choices (60 gm/meal); Low Fat/Low Chol/High Fiber/2 gm Na  No intake or output data in the 24 hours ending 03/12/25 1147    No intake/output data recorded.  Wt Readings from Last 3 Encounters:   03/12/25 114.4 kg (252 lb 3.2 oz)   02/25/25 115.2 kg (254 lb)   02/05/25 114.7 kg (252 lb 12.8 oz)       Physical Examination:   General: Alert, Awake, oriented x 3, cooperative. No distress.  Obese body habitus  HEENT: Oral mucosa moist. No active bleeding from nostrils currently  Respiratory: Breath sounds decreased bilaterally. No rales/wheezes.  Cardiovascular: S1 S2, RRR  Gastrointestinal: BS+. Soft, No distention, No tenderness  Musculoskeletal/ Extermities: No edema legs bilaterally.   Neurologic: Face symmetrical, Speech clear  Psych: no psychomotor agitation.  Appropriate attention span and concentration    Current medications:  Medications:    cefTRIAXone (ROCEPHIN) 1,000 mg in sterile water 10 mL IV syringe  1,000 mg IntraVENous Q24H    azithromycin  500 mg Oral Daily    lisinopril  5 mg Oral Daily    allopurinol  100 mg Oral Daily    metoprolol succinate  50 mg Oral Daily    apixaban  5 mg Oral BID    atorvastatin  40 mg Oral Nightly    sodium chloride flush  5-40 mL IntraVENous 2 times per day    insulin glargine  15 Units SubCUTAneous Daily    insulin lispro  0-8 Units SubCUTAneous 4x Daily AC & HS    miconazole   Topical BID      Infusions:    dextrose

## 2025-03-13 ENCOUNTER — TELEPHONE (OUTPATIENT)
Dept: FAMILY MEDICINE CLINIC | Age: 75
End: 2025-03-13

## 2025-03-13 VITALS
RESPIRATION RATE: 18 BRPM | SYSTOLIC BLOOD PRESSURE: 143 MMHG | HEART RATE: 61 BPM | BODY MASS INDEX: 42.02 KG/M2 | HEIGHT: 65 IN | TEMPERATURE: 98 F | WEIGHT: 252.2 LBS | DIASTOLIC BLOOD PRESSURE: 77 MMHG | OXYGEN SATURATION: 97 %

## 2025-03-13 LAB
BACTERIA BLD CULT ORG #2: NORMAL
BACTERIA BLD CULT: NORMAL
GLUCOSE BLD-MCNC: 116 MG/DL (ref 70–99)
GLUCOSE BLD-MCNC: 169 MG/DL (ref 70–99)
GLUCOSE BLD-MCNC: 213 MG/DL (ref 70–99)
PERFORMED ON: ABNORMAL

## 2025-03-13 PROCEDURE — 6370000000 HC RX 637 (ALT 250 FOR IP): Performed by: HOSPITALIST

## 2025-03-13 PROCEDURE — 2500000003 HC RX 250 WO HCPCS: Performed by: HOSPITALIST

## 2025-03-13 PROCEDURE — 6360000002 HC RX W HCPCS: Performed by: HOSPITALIST

## 2025-03-13 PROCEDURE — 6370000000 HC RX 637 (ALT 250 FOR IP): Performed by: FAMILY MEDICINE

## 2025-03-13 PROCEDURE — 2500000003 HC RX 250 WO HCPCS: Performed by: FAMILY MEDICINE

## 2025-03-13 RX ORDER — INSULIN LISPRO 100 [IU]/ML
INJECTION, SOLUTION INTRAVENOUS; SUBCUTANEOUS
Qty: 30 ML | Refills: 1 | Status: SHIPPED | OUTPATIENT
Start: 2025-03-13

## 2025-03-13 RX ADMIN — METOPROLOL SUCCINATE 50 MG: 50 TABLET, FILM COATED, EXTENDED RELEASE ORAL at 13:23

## 2025-03-13 RX ADMIN — ALLOPURINOL 100 MG: 100 TABLET ORAL at 13:23

## 2025-03-13 RX ADMIN — AZITHROMYCIN DIHYDRATE 500 MG: 250 TABLET ORAL at 13:23

## 2025-03-13 RX ADMIN — WATER 1000 MG: 1 INJECTION INTRAMUSCULAR; INTRAVENOUS; SUBCUTANEOUS at 13:14

## 2025-03-13 RX ADMIN — LISINOPRIL 5 MG: 5 TABLET ORAL at 13:23

## 2025-03-13 RX ADMIN — SODIUM CHLORIDE, PRESERVATIVE FREE 10 ML: 5 INJECTION INTRAVENOUS at 13:28

## 2025-03-13 RX ADMIN — APIXABAN 5 MG: 5 TABLET, FILM COATED ORAL at 13:23

## 2025-03-13 RX ADMIN — INSULIN GLARGINE 15 UNITS: 100 INJECTION, SOLUTION SUBCUTANEOUS at 13:23

## 2025-03-13 RX ADMIN — MICONAZOLE NITRATE: 20 POWDER TOPICAL at 13:28

## 2025-03-13 NOTE — PROGRESS NOTES
Read result of EKG on 3/12/25 and read from attending's note after test to patient \"Chronic atrial fibrillation: on eliquis\"

## 2025-03-13 NOTE — TELEPHONE ENCOUNTER
START taking these medications       amoxicillin-clavulanate 875-125 MG per tablet  Commonly known as: AUGMENTIN  Take 1 tablet by mouth 2 times daily for 5 days      insulin lispro 100 UNIT/ML Soln injection vial  Commonly known as: HUMALOG,ADMELOG  INJECT 12U FOR BLOOD SUGAR 130-200, 15U -250, 18U -300 OR CALL PCP THREE TIMES DAILY TESTING                CHANGE how you take these medications       apixaban 5 MG Tabs tablet  Commonly known as: Eliquis  TAKE 1 TABLET BY MOUTH TWICE DAILY  What changed:   how much to take  how to take this  when to take this      furosemide 40 MG tablet  Commonly known as: LASIX  TAKE 1 TABLET BY MOUTH TWICE DAILY  What changed: additional instructions      Tirzepatide 5 MG/0.5ML Soaj  Inject 5 mg into the skin every 7 days for 28 days  What changed: additional instructions                CONTINUE taking these medications       Accu-Chek Guide strip  Generic drug: blood glucose test strips  TEST BLOOD SUGAR FOUR TIMES DAILY AS DIRECTED      Accu-Chek Softclix Lancets Misc  TEST FOUR TIMES DAILY AS NEEDED FOR LOW OR HIGH BLOOD SUGAR AS DIRECTED      allopurinol 100 MG tablet  Commonly known as: ZYLOPRIM  TAKE 1 TABLET BY MOUTH DAILY      atorvastatin 40 MG tablet  Commonly known as: LIPITOR  Take 1 tablet by mouth nightly      blood glucose monitor kit and supplies  USE TO TEST BLOOD SUGAR 4 TIMES DAILY      Blood Pressure Monitoring Maria  Home blood pressure monitor      lisinopril 40 MG tablet  Commonly known as: PRINIVIL;ZESTRIL  TAKE 1 TABLET BY MOUTH DAILY      metoprolol succinate 50 MG extended release tablet  Commonly known as: TOPROL XL  Take 1 tablet by mouth daily      potassium chloride 20 MEQ extended release tablet  Commonly known as: KLOR-CON M  TAKE 1 TABLET BY MOUTH DAILY       Please call pt - this is the discharge medication instructions

## 2025-03-13 NOTE — TELEPHONE ENCOUNTER
CALLED PATIENT AT THE HOSPITAL AND SHE SAID THAT THE HOSPITAL IST WANTS HER TO BE ON INSULIN, PT WANTED TO CHECK WITH SARAHI TO SEE ABOUT THIS SINCE SHE IS ON MOUNJARO. PER CC, MONITOR BLOOD SUGAR AND PER SLIDING SCALE ADMINISTER INSULIN ACCORDINGLY.  ONLY TO TAKE THE INSULIN IF NEEDED. PATIENT AGREED TO THIS AND ALREADY HAS HER APPT SCHEDULED NEXT TUESDAY AT 2PM. SC

## 2025-03-13 NOTE — TELEPHONE ENCOUNTER
ATTEMPTED TO CONTACT PATIENT, NO VOICEMAIL, UNABLE TO LEAVE MESSAGE, IT SHOWS PATIENT STILL ADMITTED. SC

## 2025-03-13 NOTE — CARE COORDINATION
Discharge Planning Note:    Chart reviewed and it appears that patient has minimal needs for discharge at this time. Risk Score 9 %     Primary Care Physician is Summer Roche APRN - CNP    Primary insurance is [employer].    Please notify case management if any discharge needs are identified.      Case management will continue to follow progress and update discharge plan as needed.  Electronically signed by RUDI Dietrich on 3/13/25 at 11:02 AM EDT

## 2025-03-13 NOTE — PROGRESS NOTES
CLINICAL PHARMACY NOTE: MEDS TO BEDS    Total # of Prescriptions Filled: 1   The following medications were delivered to the patient:  AMOXICILLIN - POTCLAVUL 674 - 325 TABS    Additional Documentation: Bhavik MOURA approved to deliver medication to patient room=signed  [facility]

## 2025-03-13 NOTE — CARE COORDINATION
03/13/25 1416   IMM Letter   IMM Letter given to Patient/Family/Significant other/Guardian/POA/by: IMM Given   IMM Letter date given: 03/13/25   IMM Letter time given: 1416

## 2025-03-13 NOTE — TELEPHONE ENCOUNTER
Patient called in stating she is in the hospital currently, about to be discharged. She states the doctors there are confusing her about her medications that morgan has prescribed for her. She has several questions and would like a call back.  The phone number provided is for her hospital room but she is getting discharged today, her mobile phone is at home.

## 2025-03-14 ENCOUNTER — CARE COORDINATION (OUTPATIENT)
Dept: CASE MANAGEMENT | Age: 75
End: 2025-03-14

## 2025-03-14 DIAGNOSIS — R41.82 ALTERED MENTAL STATUS, UNSPECIFIED ALTERED MENTAL STATUS TYPE: Primary | ICD-10-CM

## 2025-03-14 PROCEDURE — 1111F DSCHRG MED/CURRENT MED MERGE: CPT | Performed by: NURSE PRACTITIONER

## 2025-03-14 NOTE — PROGRESS NOTES
Patient discharged home with family member, IV taken out, some discharge medication delivered to bedside, patient refused to be transported out in a wheelchair so they walked out with all belongings

## 2025-03-14 NOTE — CARE COORDINATION
Care Transitions Note    Initial Call - Call within 2 business days of discharge: Yes    Patient Current Location:  Home: [address] [place] [address]  [address]  [facility] [address]    Care Transition Nurse contacted the patient by telephone to perform post hospital discharge assessment, verified name and  as identifiers. Provided introduction to self, and explanation of the Care Transition Nurse role.     Patient: Sheila Esparza    Patient : 1950   MRN: [id number]    Reason for Admission:   Discharge Date: 3/13/25  RURS: Readmission Risk Score: 8.9      Last Discharge Facility       Date Complaint Diagnosis Description Type Department Provider    3/9/25 Altered Mental Status Altered mental status, unspecified altered mental status type ... ED to Hosp-Admission (Discharged) (ADMITTED) [facility] Brian Simons MD; Jorge Gil...            Was this an external facility discharge? No    Additional needs identified to be addressed with provider   No needs identified             Method of communication with provider: none.    Patients top risk factors for readmission: medical condition-AMS,PNA    Interventions to address risk factors:   Review of patient management of conditions/medications: as above    Care Summary Note: Pt states doing well, no issues or concerns. Denies SOB, CP, cough, fever.F/U appt with PCP listed below. Agreed to more CTC f/u calls.      Care Transition Nurse reviewed discharge instructions with patient. The patient was given an opportunity to ask questions; all questions answered at this time.. The patient verbalized understanding.   Were discharge instructions available to patient? Yes.   Reviewed appropriate site of care based on symptoms and resources available to patient including: PCP  When to call 911. The patient agrees to contact the primary care provider and/or specialist office for questions related to their healthcare.      Advance Care Planning:   Does patient have an Advance

## 2025-03-18 ENCOUNTER — OFFICE VISIT (OUTPATIENT)
Dept: FAMILY MEDICINE CLINIC | Age: 75
End: 2025-03-18

## 2025-03-18 VITALS
HEART RATE: 67 BPM | DIASTOLIC BLOOD PRESSURE: 82 MMHG | BODY MASS INDEX: 41.48 KG/M2 | WEIGHT: 249 LBS | SYSTOLIC BLOOD PRESSURE: 120 MMHG | OXYGEN SATURATION: 97 % | HEIGHT: 65 IN

## 2025-03-18 DIAGNOSIS — Z09 HOSPITAL DISCHARGE FOLLOW-UP: ICD-10-CM

## 2025-03-18 DIAGNOSIS — E66.9 TYPE 2 DIABETES MELLITUS WITH OBESITY (HCC): ICD-10-CM

## 2025-03-18 DIAGNOSIS — E11.69 TYPE 2 DIABETES MELLITUS WITH OBESITY (HCC): ICD-10-CM

## 2025-03-18 DIAGNOSIS — J18.9 PNEUMONIA DUE TO INFECTIOUS ORGANISM, UNSPECIFIED LATERALITY, UNSPECIFIED PART OF LUNG: Primary | ICD-10-CM

## 2025-03-18 DIAGNOSIS — B37.2 YEAST DERMATITIS: ICD-10-CM

## 2025-03-18 RX ORDER — NYSTATIN 100000 [USP'U]/G
POWDER TOPICAL
Qty: 60 G | Refills: 0 | Status: SHIPPED | OUTPATIENT
Start: 2025-03-18

## 2025-03-18 RX ORDER — INSULIN ASPART INJECTION 100 [IU]/ML
INJECTION, SOLUTION SUBCUTANEOUS
COMMUNITY
Start: 2025-03-14

## 2025-03-18 RX ORDER — SYRINGE-NEEDLE,INSULIN,0.5 ML 27GX1/2"
1 SYRINGE, EMPTY DISPOSABLE MISCELLANEOUS 3 TIMES DAILY
Qty: 100 EACH | Refills: 3 | Status: SHIPPED | OUTPATIENT
Start: 2025-03-18 | End: 2025-04-17

## 2025-03-18 NOTE — PROGRESS NOTES
confusion and vomiting prior to this she felt fine  cleaned her home the day before - her sugars were getting higher 280-290  while on mounjaro which is not normal for her- she was diagnosed with pneumonia -  just about finished with her antibiotics -she is checking her blood sugars at home   Using a new sliding scale ( lispro) 2 units 130 200- 4 units 201 -250 8 units 251- 300 call pcp if 300 or more- blood sugars ranging 123- 218 before meals  Interval history/Current status:     Patient Active Problem List   Diagnosis    Type 2 diabetes mellitus with obesity ([facility])    Essential hypertension    Hypercholesterolemia    Edema of both legs    Homozygous Factor V Leiden mutation    Low serum HDL    Elevated LFTs    Hypomagnesemia    Homocystinemia    Vitamin D deficiency    CHF with unknown LVEF ([facility])    Generalized weakness    Pulmonary vascular congestion    Chronic venous stasis dermatitis of both lower extremities    Morbid obesity due to excess calories    Shortness of breath    Persistent atrial fibrillation ([facility])    RVF (right ventricular failure) ([facility])    Anemia    Acute on chronic congestive heart failure ([facility])    BMI 50.0-59.9, adult    Obstructive sleep apnea    PLMD (periodic limb movement disorder)    Sepsis ([facility])    Altered mental state       Medications listed as ordered at the time of discharge from hospital     Medication List            Accurate as of March 18, 2025  2:10 PM. If you have any questions, ask your nurse or doctor.                CONTINUE taking these medications      Accu-Chek Guide strip  Generic drug: blood glucose test strips  TEST BLOOD SUGAR FOUR TIMES DAILY AS DIRECTED     Accu-Chek Softclix Lancets Misc  TEST FOUR TIMES DAILY AS NEEDED FOR LOW OR HIGH BLOOD SUGAR AS DIRECTED     allopurinol 100 MG tablet  Commonly known as: ZYLOPRIM  TAKE 1 TABLET BY MOUTH DAILY     amoxicillin-clavulanate 875-125 MG per tablet  Commonly known as: AUGMENTIN  Take 1 tablet by mouth 2 times daily

## 2025-03-21 ENCOUNTER — CARE COORDINATION (OUTPATIENT)
Dept: CASE MANAGEMENT | Age: 75
End: 2025-03-21

## 2025-03-21 NOTE — CARE COORDINATION
Care Transitions Note    Follow Up Call     Patient Current Location:  Home: [address] [place] [address]  [address]  [facility] [address]    Care Transition Nurse contacted the patient by telephone. Verified name and  as identifiers.    Additional needs identified to be addressed with provider   No needs identified                 Method of communication with provider: none.    Care Summary Note: Pt states doing well, no issues or concerns. Good visit with PCP. Agreed to more CTC f/u calls.    Advance Care Planning:   Does patient have an Advance Directive: reviewed during previous call, see note. .    Medication Review:  No changes since last call.     Remote Patient Monitoring:  Offered patient enrollment in the Remote Patient Monitoring (RPM) program for in-home monitoring: future call.    Assessments:  Care Transitions Subsequent and Final Call    Subsequent and Final Calls  Do you have any ongoing symptoms?: No  Have your medications changed?: No  Do you have any questions related to your medications?: No  Do you currently have any active services?: No  Do you have any needs or concerns that I can assist you with?: No  Identified Barriers: None  Care Transitions Interventions  No Identified Needs  Other Interventions:              Follow Up Appointment:     Future Appointments         Provider Specialty Dept Phone    2025 9:20 AM Summer Roche APRN - CNP Family Medicine [phone]    2025 8:45 AM Ayan Roth MD Cardiology [phone]    2025 11:00 AM (Arrive by 10:45 AM) MAMMOGRAPHY MOB ROOM 2 Radiology [phone]            Care Transition Nurse provided contact information.  Plan for follow-up call in 6-10 days based on severity of symptoms and risk factors.  Plan for next call: self management-       Tomás Rosario RN

## 2025-03-27 ENCOUNTER — CARE COORDINATION (OUTPATIENT)
Dept: CASE MANAGEMENT | Age: 75
End: 2025-03-27

## 2025-03-27 DIAGNOSIS — R60.0 EDEMA OF BOTH LEGS: ICD-10-CM

## 2025-03-27 RX ORDER — FUROSEMIDE 40 MG/1
40 TABLET ORAL 2 TIMES DAILY
Qty: 180 TABLET | Refills: 0 | Status: SHIPPED | OUTPATIENT
Start: 2025-03-27

## 2025-03-27 NOTE — CARE COORDINATION
Care Transitions Note    Follow Up Call     Patient Current Location:  Home: [address] [place] [address]  [address]  [facility] [address]    Care Transition Nurse contacted the patient by telephone. Verified name and  as identifiers.    Additional needs identified to be addressed with provider   No needs identified                 Method of communication with provider: none.    Care Summary Note: Pt states doing well, no issues or concerns. Agreed to more CTC f/u calls.      Advance Care Planning:   Does patient have an Advance Directive: reviewed during previous call, see note. .    Medication Review:  No changes since last call.     Remote Patient Monitoring:  Offered patient enrollment in the Remote Patient Monitoring (RPM) program for in-home monitoring: future call.    Assessments:  Care Transitions Subsequent and Final Call    Subsequent and Final Calls  Do you have any ongoing symptoms?: No  Have your medications changed?: No  Do you have any questions related to your medications?: No  Do you currently have any active services?: No  Do you have any needs or concerns that I can assist you with?: No  Identified Barriers: None  Care Transitions Interventions  No Identified Needs  Other Interventions:              Follow Up Appointment:   Reviewed upcoming appointment(s).  Future Appointments         Provider Specialty Dept Phone    2025 9:20 AM Summer Roche APRN - CNP Family Medicine [phone]    2025 8:45 AM Ayan Roth MD Cardiology [phone]    2025 11:00 AM (Arrive by 10:45 AM) MAMMOGRAPHY MOB ROOM 2 Radiology [phone]            Care Transition Nurse provided contact information.  Plan for follow-up call in 6-10 days based on severity of symptoms and risk factors.  Plan for next call: self management-       Tomás Rosario RN

## 2025-03-28 NOTE — PROGRESS NOTES
Physician Progress Note      PATIENT:               SHAHZAD MELENDREZ  [facility] #:                  [id number]  :                       1950  ADMIT DATE:       3/9/2025 9:02 AM  DISCH DATE:        3/13/2025 5:50 PM  RESPONDING  PROVIDER #:        Brian JIANG MD          QUERY TEXT:    Pt admitted with altered mental status.  Pt noted to have pneumonia documented   and treated with Cefepime, Rocephin and Vancomycin. If possible, please   document in the progress notes and discharge summary if you are evaluating   and/or treating any of the following:    Note: CAP and HCAP indicate where the pneumonia was acquired, not a specific   type.    The medical record reflects the following:    Risk Factors: DM2, HTN, CHF    Clinical Indicators: H&P note: \"pneumonia  With hypotension, lactic   acidosis...On Cefepime and Vancomycin\"  Discharge summary: \"Left lower lobe   airspace disease suspected pneumonia: improving. Con po abx for 5 days.\"    Treatment: Serial labs, VBG,  BC x 2, CXR, IV NS 1710 bolus, IV NS @ 100ml/hr,   IV Cefepime, IV Vancomycin, IV Rocephin, IV Azithromycin x1, po Azithromycin  Options provided:  -- This patient was treated for Gram negative pneumonia  -- This patient was treated for Gram positive pneumonia  -- Other - I will add my own diagnosis  -- Disagree - Not applicable / Not valid  -- Disagree - Clinically unable to determine / Unknown  -- Refer to Clinical Documentation Reviewer    PROVIDER RESPONSE TEXT:    LLL pneumonia, unclear etio suspected gram positive pneumoinia    Query created by: Margo Faith on 3/20/2025 4:00 PM      QUERY TEXT:    Pt admitted with altered mental status . Pt noted to have troponins of 19, 20,   23. If possible, please document in the progress notes and discharge summary   if you are evaluating and/or treating any of the following:    The medical record reflects the following:    Risk Factors: DM2, HTN, CHF, PNA    Clinical Indicators: Troponin values of 19, 20,

## 2025-04-01 ENCOUNTER — OFFICE VISIT (OUTPATIENT)
Dept: FAMILY MEDICINE CLINIC | Age: 75
End: 2025-04-01

## 2025-04-01 VITALS
HEIGHT: 65 IN | SYSTOLIC BLOOD PRESSURE: 130 MMHG | WEIGHT: 241.6 LBS | DIASTOLIC BLOOD PRESSURE: 82 MMHG | HEART RATE: 65 BPM | BODY MASS INDEX: 40.25 KG/M2 | OXYGEN SATURATION: 97 %

## 2025-04-01 DIAGNOSIS — R53.83 FATIGUE, UNSPECIFIED TYPE: ICD-10-CM

## 2025-04-01 DIAGNOSIS — R40.4 TRANSIENT ALTERATION OF AWARENESS: Primary | ICD-10-CM

## 2025-04-01 DIAGNOSIS — I87.2 VENOUS INSUFFICIENCY (CHRONIC) (PERIPHERAL): ICD-10-CM

## 2025-04-01 LAB
BILIRUBIN, POC: ABNORMAL
BLOOD URINE, POC: ABNORMAL
CLARITY, POC: CLEAR
COLOR, POC: YELLOW
DEPRECATED RDW RBC AUTO: 14.9 % (ref 12.4–15.4)
GLUCOSE URINE, POC: NEGATIVE MG/DL
HCT VFR BLD AUTO: 38.1 % (ref 36–48)
HGB BLD-MCNC: 12.8 G/DL (ref 12–16)
KETONES, POC: ABNORMAL
LEUKOCYTE EST, POC: ABNORMAL
MCH RBC QN AUTO: 30.7 PG (ref 26–34)
MCHC RBC AUTO-ENTMCNC: 33.6 G/DL (ref 31–36)
MCV RBC AUTO: 91.4 FL (ref 80–100)
NITRITE, POC: NEGATIVE
PH, POC: ABNORMAL
PLATELET # BLD AUTO: 183 K/UL (ref 135–450)
PMV BLD AUTO: 9.9 FL (ref 5–10.5)
PROTEIN, POC: ABNORMAL MG/DL
RBC # BLD AUTO: 4.17 M/UL (ref 4–5.2)
SPECIFIC GRAVITY, POC: ABNORMAL
TSH SERPL DL<=0.005 MIU/L-ACNC: 3.38 UIU/ML (ref 0.27–4.2)
UROBILINOGEN, POC: ABNORMAL
WBC # BLD AUTO: 5.2 K/UL (ref 4–11)

## 2025-04-01 NOTE — ASSESSMENT & PLAN NOTE
Acute condition, new, Obtain labs per orders.    Orders:    POCT Urinalysis no Micro    Culture, Urine

## 2025-04-01 NOTE — PROGRESS NOTES
Sheila Esparza (:  1950) is a 75 y.o. female,Established patient, here for evaluation of the following chief complaint(s):  Altered Mental Status (PT HERE WITH DAUGHTER WHO VOICED CONCERN ABOUT PATIENT ALTERED MENTAL STATUS, STATES PATIENT WAS VERY FORGETFUL, TIRED MORE THAN USUAL, AND HAD NO REAL APPETITE AT ALL, )      Assessment & Plan  Transient alteration of awareness   Acute condition, new, Obtain labs per orders.    Orders:    POCT Urinalysis no Micro    Culture, Urine    Fatigue, unspecified type   Acute condition, new, Obtain labs per orders.    Orders:    TSH reflex to FT4; Future    CBC; Future    Venous insufficiency (chronic) (peripheral)     Chronic at goal  See plan below         Assessment & Plan  1. Altered mental status.  - Symptoms do not align with a stroke diagnosis.  - Neurological examination conducted today showed improvement in shaking and mental clarity.  - Urine test will be conducted to rule out urinary tract infection; blood work ordered to assess thyroid function (TSH), complete blood count (CBC), and inflammatory markers.  - Carotid artery screening, EKG, and abdominal aorta screening will be scheduled; advised to maintain adequate hydration by consuming one cup of water every hour while awake.  Instructed to go to ER if she has any further symptoms    2. Venous insufficiency.  - Continued wearing of compression stockings and elevation of legs when at home recommended.  - Physical examination revealed improvement in leg condition; no signs of open wounds or ulcers.  - Prescription for compression stockings will be provided; advised to monitor salt intake and wear stockings daily to prevent further complications.    Results  Labs   - Blood Sugar: 167 mg/dL    No follow-ups on file.    Subjective       HPI  History of Present Illness  The patient is a 75-year-old female who presents today to discuss concerns of altered mental status, confusion, forgetfulness, and fatigue.

## 2025-04-02 ENCOUNTER — RESULTS FOLLOW-UP (OUTPATIENT)
Dept: FAMILY MEDICINE CLINIC | Age: 75
End: 2025-04-02

## 2025-04-02 LAB — BACTERIA UR CULT: NORMAL

## 2025-04-03 ENCOUNTER — CARE COORDINATION (OUTPATIENT)
Dept: CASE MANAGEMENT | Age: 75
End: 2025-04-03

## 2025-04-03 RX ORDER — BLOOD SUGAR DIAGNOSTIC
STRIP MISCELLANEOUS
Qty: 100 STRIP | Refills: 1 | Status: SHIPPED | OUTPATIENT
Start: 2025-04-03

## 2025-04-03 NOTE — CARE COORDINATION
Care Transitions Note    Follow Up Call     Patient Current Location:  Home: [address] [place] [address]  [address]  [facility] [address]    Care Transition Nurse contacted the patient by telephone. Verified name and  as identifiers.    Additional needs identified to be addressed with provider   No needs identified                 Method of communication with provider: none.    Care Summary Note: Pt states doing well, no issues or concerns. BSs are good. Had tremors in hand, went and saw PCP.No more since that event. Agreed to more CTC f/u calls.      Advance Care Planning:   Does patient have an Advance Directive: reviewed during previous call, see note. .    Medication Review:  No changes since last call.     Remote Patient Monitoring:  Offered patient enrollment in the Remote Patient Monitoring (RPM) program for in-home monitoring: .    Assessments:  Care Transitions Subsequent and Final Call    Subsequent and Final Calls  Do you have any ongoing symptoms?: No  Have your medications changed?: No  Do you have any questions related to your medications?: No  Do you currently have any active services?: No  Do you have any needs or concerns that I can assist you with?: No  Identified Barriers: None  Care Transitions Interventions  No Identified Needs  Other Interventions:              Follow Up Appointment:   Reviewed upcoming appointment(s).  Future Appointments         Provider Specialty Dept Phone    2025 9:20 AM Summer Roche APRN - CNP Family Medicine [phone]    2025 8:45 AM Ayan Roth MD Cardiology [phone]    2025 11:00 AM (Arrive by 10:45 AM) MAMMOGRAPHY [facility] ROOM 2 Radiology [phone]            Care Transition Nurse provided contact information.  Plan for follow-up call in 6-10 days based on severity of symptoms and risk factors.  Plan for next call: self management-       Tomás Rosario RN

## 2025-04-08 DIAGNOSIS — M1A.0420 CHRONIC GOUT OF LEFT HAND, UNSPECIFIED CAUSE: ICD-10-CM

## 2025-04-08 RX ORDER — ALLOPURINOL 100 MG/1
100 TABLET ORAL DAILY
Qty: 90 TABLET | Refills: 0 | Status: SHIPPED | OUTPATIENT
Start: 2025-04-08

## 2025-04-10 ENCOUNTER — CARE COORDINATION (OUTPATIENT)
Dept: CASE MANAGEMENT | Age: 75
End: 2025-04-10

## 2025-04-10 NOTE — CARE COORDINATION
Care Transitions Note    Final Call     Patient Current Location:  Home: [address] [place] [address]  [address]  [facility] [address]    Care Transition Nurse contacted the patient by telephone. Verified name and  as identifiers.    Patient graduated from the Care Transitions program on 4/10/25.  Patient/family verbalizes confidence in the ability to self-manage at this time..      Advance Care Planning:   Does patient have an Advance Directive: reviewed during previous call, see note. .    Handoff:   Patient was not referred to the ACM team due to no additional needs identified.       Care Summary Note: Pt states doing very well, no issues or concerns. Declined RPM program,chronic conditions are well controlled. No need for further f/u CTC calls.        Assessments:  Care Transitions Subsequent and Final Call    Subsequent and Final Calls  Do you have any ongoing symptoms?: No  Have your medications changed?: No  Do you have any questions related to your medications?: No  Do you currently have any active services?: No  Do you have any needs or concerns that I can assist you with?: No  Identified Barriers: None  Care Transitions Interventions  No Identified Needs  Other Interventions:              Upcoming Appointments:    Future Appointments         Provider Specialty Dept Phone    2025 9:20 AM Summer Roche APRN - CNP Family Medicine [phone]    2025 8:45 AM Ayan Roth MD Cardiology [phone]    2025 11:00 AM (Arrive by 10:45 AM) MAMMOGRAPHY MOB ROOM 2 Radiology [phone]            Patient has agreed to contact primary care provider and/or specialist for any further questions, concerns, or needs.    Tomás Rosario RN

## 2025-04-21 RX ORDER — POTASSIUM CHLORIDE 1500 MG/1
20 TABLET, EXTENDED RELEASE ORAL DAILY
Qty: 90 TABLET | Refills: 0 | Status: SHIPPED | OUTPATIENT
Start: 2025-04-21

## 2025-04-29 ENCOUNTER — OFFICE VISIT (OUTPATIENT)
Dept: FAMILY MEDICINE CLINIC | Age: 75
End: 2025-04-29

## 2025-04-29 VITALS
BODY MASS INDEX: 40.32 KG/M2 | DIASTOLIC BLOOD PRESSURE: 74 MMHG | HEART RATE: 71 BPM | WEIGHT: 242 LBS | OXYGEN SATURATION: 94 % | HEIGHT: 65 IN | SYSTOLIC BLOOD PRESSURE: 126 MMHG

## 2025-04-29 DIAGNOSIS — L30.9 DERMATITIS: Primary | ICD-10-CM

## 2025-04-29 RX ORDER — TIRZEPATIDE 5 MG/.5ML
INJECTION, SOLUTION SUBCUTANEOUS
COMMUNITY
Start: 2025-04-22

## 2025-04-29 RX ORDER — METHYLPREDNISOLONE ACETATE 80 MG/ML
80 INJECTION, SUSPENSION INTRA-ARTICULAR; INTRALESIONAL; INTRAMUSCULAR; SOFT TISSUE ONCE
Status: COMPLETED | OUTPATIENT
Start: 2025-04-29 | End: 2025-04-29

## 2025-04-29 RX ADMIN — METHYLPREDNISOLONE ACETATE 80 MG: 80 INJECTION, SUSPENSION INTRA-ARTICULAR; INTRALESIONAL; INTRAMUSCULAR; SOFT TISSUE at 11:50

## 2025-04-29 NOTE — PROGRESS NOTES
Sheila Esparza (:  1950) is a 75 y.o. female,Established patient, here for evaluation of the following chief complaint(s):  Rash (RASH 2 DAYS SPREADING, ITCHES )      Assessment & Plan  Dermatitis   Acute condition, new, SEE PLAN AS NOTED BELOW    Orders:    MEASLES ANTIBODIES (IGG, IGM); Future      Assessment & Plan  1. Rash.  - The rash does not appear to be indicative of measles or shingles.  - A photograph of the rash will be taken for documentation purposes.  - An IgM test will be conducted to rule out the possibility of measles.  - A Depo-Medrol injection will be administered in the right arm to alleviate the symptoms.  CONTINUE BENADRYL AS NEEDED FOR ITCHING     PROCEDURE  Procedure: Depo-Medrol injection in the right arm    All questions were answered and agreement to proceed was given after the following Pre-Procedure details were reviewed:  - Risks and Benefits: Discussed potential benefits of reducing inflammation and alleviating symptoms. Risks include potential side effects such as local irritation, allergic reaction, and systemic effects of steroids.  - Alternative Options: Oral steroid medication was discussed as an alternative option.  - Side effects: Potential side effects such as local irritation, allergic reaction, and systemic effects of steroids were discussed.  - Consent: Verbal consent was obtained from the patient.    Intra-Procedure:  - Time-Out: Confirmed patient's identity, procedure, and site.  - Site Preparation: The injection site on the right arm was cleaned with an antiseptic solution.  - Medication: Depo-Medrol was administered.  - Dressing: A small adhesive bandage was applied to the injection site.    Post-Procedure:  - Tolerance Level: Patient tolerated the procedure well.  - Home Care Instructions: Patient was advised to monitor the injection site for any signs of infection or adverse reaction and to follow up if symptoms persist or worsen.    Results      No

## 2025-04-29 NOTE — PROGRESS NOTES
Administrations This Visit       methylPREDNISolone acetate (DEPO-MEDROL) injection 80 mg       Admin Date  04/29/2025  11:50 Action  Given Dose  80 mg Route  IntraMUSCular Site  Deltoid Right Documented By  Pamela Morrison CMA    NDC: [address]-3    Lot#: [id number]    : Distractify    Patient Supplied?: No    Comments: SITE:  RIGHT DELTOID  NDC#  [address]-8  LOT#  [id number]  EXP DATE:  12/31/26  VERIFIED BY: CARL

## 2025-05-02 ENCOUNTER — RESULTS FOLLOW-UP (OUTPATIENT)
Dept: FAMILY MEDICINE CLINIC | Age: 75
End: 2025-05-02

## 2025-05-02 LAB
MEV IGG SER IA-ACNC: 181 AU/ML
MEV IGM SER IA-ACNC: 0.2 AU (ref 0–0.79)

## 2025-05-10 DIAGNOSIS — I10 ESSENTIAL HYPERTENSION: ICD-10-CM

## 2025-05-12 RX ORDER — METOPROLOL SUCCINATE 50 MG/1
50 TABLET, EXTENDED RELEASE ORAL DAILY
Qty: 90 TABLET | Refills: 0 | Status: SHIPPED | OUTPATIENT
Start: 2025-05-12

## 2025-05-13 ENCOUNTER — TELEPHONE (OUTPATIENT)
Dept: FAMILY MEDICINE CLINIC | Age: 75
End: 2025-05-13

## 2025-05-13 RX ORDER — LANCETS
EACH MISCELLANEOUS
Qty: 300 EACH | Refills: 1 | Status: SHIPPED | OUTPATIENT
Start: 2025-05-13

## 2025-05-13 NOTE — TELEPHONE ENCOUNTER
Pt needs a refill on her     Accu-Chek Softclix  Lancets   Pt tests 4 times a day     Morris Redmond  [phone]

## 2025-05-14 DIAGNOSIS — I10 ESSENTIAL HYPERTENSION: ICD-10-CM

## 2025-05-14 RX ORDER — METOPROLOL SUCCINATE 50 MG/1
50 TABLET, EXTENDED RELEASE ORAL DAILY
Qty: 90 TABLET | Refills: 0 | OUTPATIENT
Start: 2025-05-14

## 2025-05-19 RX ORDER — LISINOPRIL 40 MG/1
40 TABLET ORAL DAILY
Qty: 90 TABLET | Refills: 0 | Status: SHIPPED | OUTPATIENT
Start: 2025-05-19

## 2025-05-22 RX ORDER — LISINOPRIL 40 MG/1
40 TABLET ORAL DAILY
Qty: 90 TABLET | Refills: 0 | OUTPATIENT
Start: 2025-05-22

## 2025-06-09 ENCOUNTER — TELEPHONE (OUTPATIENT)
Dept: FAMILY MEDICINE CLINIC | Age: 75
End: 2025-06-09

## 2025-06-09 NOTE — TELEPHONE ENCOUNTER
Patient needs a new monitor kit because her last one is broken. She would a new kit.     Please give her a call back.     [facility] - [address] Phone no.[phone]    She needs this ASAP.

## 2025-06-09 NOTE — TELEPHONE ENCOUNTER
Pt called back she needs a new    Blood Glucose monitoring machine    Morris Swain  [phone]

## 2025-06-11 DIAGNOSIS — B37.2 YEAST DERMATITIS: ICD-10-CM

## 2025-06-11 RX ORDER — NYSTATIN TOPICAL POWDER 100000 U/G
POWDER TOPICAL
Qty: 60 G | Refills: 0 | Status: SHIPPED | OUTPATIENT
Start: 2025-06-11

## 2025-06-11 RX ORDER — NYSTATIN TOPICAL POWDER 100000 U/G
POWDER TOPICAL
Qty: 60 G | Refills: 0 | OUTPATIENT
Start: 2025-06-11

## 2025-06-18 ENCOUNTER — OFFICE VISIT (OUTPATIENT)
Dept: FAMILY MEDICINE CLINIC | Age: 75
End: 2025-06-18

## 2025-06-18 VITALS
WEIGHT: 234.6 LBS | BODY MASS INDEX: 39.09 KG/M2 | OXYGEN SATURATION: 98 % | HEIGHT: 65 IN | DIASTOLIC BLOOD PRESSURE: 86 MMHG | HEART RATE: 82 BPM | SYSTOLIC BLOOD PRESSURE: 132 MMHG

## 2025-06-18 DIAGNOSIS — E66.9 TYPE 2 DIABETES MELLITUS WITH OBESITY (HCC): Primary | ICD-10-CM

## 2025-06-18 DIAGNOSIS — E11.69 TYPE 2 DIABETES MELLITUS WITH OBESITY (HCC): Primary | ICD-10-CM

## 2025-06-18 DIAGNOSIS — M1A.0420 CHRONIC GOUT OF LEFT HAND, UNSPECIFIED CAUSE: ICD-10-CM

## 2025-06-18 DIAGNOSIS — Z78.0 POST-MENOPAUSAL: ICD-10-CM

## 2025-06-18 DIAGNOSIS — E78.00 HYPERCHOLESTEROLEMIA: ICD-10-CM

## 2025-06-18 DIAGNOSIS — Z12.11 COLON CANCER SCREENING: ICD-10-CM

## 2025-06-18 DIAGNOSIS — I10 ESSENTIAL HYPERTENSION: ICD-10-CM

## 2025-06-18 LAB
ANION GAP SERPL CALCULATED.3IONS-SCNC: 11 MMOL/L (ref 3–16)
BUN SERPL-MCNC: 20 MG/DL (ref 7–20)
CALCIUM SERPL-MCNC: 9.6 MG/DL (ref 8.3–10.6)
CHLORIDE SERPL-SCNC: 106 MMOL/L (ref 99–110)
CO2 SERPL-SCNC: 25 MMOL/L (ref 21–32)
CREAT SERPL-MCNC: 1.1 MG/DL (ref 0.6–1.2)
GFR SERPLBLD CREATININE-BSD FMLA CKD-EPI: 53 ML/MIN/{1.73_M2}
GLUCOSE SERPL-MCNC: 90 MG/DL (ref 70–99)
HBA1C MFR BLD: 7 %
POTASSIUM SERPL-SCNC: 3.8 MMOL/L (ref 3.5–5.1)
SODIUM SERPL-SCNC: 142 MMOL/L (ref 136–145)

## 2025-06-18 RX ORDER — TIRZEPATIDE 5 MG/.5ML
5 INJECTION, SOLUTION SUBCUTANEOUS WEEKLY
Qty: 6 ML | Refills: 0 | Status: SHIPPED | OUTPATIENT
Start: 2025-06-18 | End: 2025-09-10

## 2025-06-18 RX ORDER — ALLOPURINOL 100 MG/1
100 TABLET ORAL DAILY
Qty: 90 TABLET | Refills: 3 | Status: SHIPPED | OUTPATIENT
Start: 2025-06-18

## 2025-06-18 RX ORDER — LISINOPRIL 40 MG/1
40 TABLET ORAL DAILY
Qty: 90 TABLET | Refills: 0 | Status: SHIPPED | OUTPATIENT
Start: 2025-06-18

## 2025-06-18 RX ORDER — ATORVASTATIN CALCIUM 40 MG/1
40 TABLET, FILM COATED ORAL NIGHTLY
Qty: 90 TABLET | Refills: 3 | Status: SHIPPED | OUTPATIENT
Start: 2025-06-18

## 2025-06-18 NOTE — ASSESSMENT & PLAN NOTE
Chronic, at goal (stable), continue current treatment plan    Orders:    atorvastatin (LIPITOR) 40 MG tablet; Take 1 tablet by mouth nightly

## 2025-06-18 NOTE — PROGRESS NOTES
Sheila Esparza (:  1950) is a 75 y.o. 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)   Chronic, at goal (stable), changes made today: REGULAR INSULIN DECREASED TO BID DOSING  CONTINUE MOUNJARO 5 MG  Orders:    POCT glycosylated hemoglobin (Hb A1C) 7.0      Essential hypertension   Chronic, at goal (stable), continue current treatment plan  FOLLOW UP WITH DR ROTH       Chronic gout of left hand, unspecified cause   Chronic, at goal (stable), continue current treatment plan    Orders:    allopurinol (ZYLOPRIM) 100 MG tablet; Take 1 tablet by mouth daily    Hypercholesterolemia   Chronic, at goal (stable), continue current treatment plan    Orders:    atorvastatin (LIPITOR) 40 MG tablet; Take 1 tablet by mouth nightly    Post-menopausal   DEXA SCAN DECLINED         Colon cancer screening   DECLINED BY PATIENT           Assessment & Plan  1. Diabetes Mellitus.  - Her A1c has improved from 10.8 to 7.0 with the use of Mounjaro 5 mg once a week.  - She has also lost 15 pounds.  - She will reduce her regular insulin injections to twice daily, in the morning and at bedtime, and only if her fasting blood sugar exceeds 140.  - A BMP will be conducted today to monitor kidney function. A 90-day supply of Mounjaro 5 mg has been sent to her pharmacy.    2. Hypertension.  - Her blood pressure is well-controlled at 132/86 mmHg.  - She will continue her current medications, including metoprolol and lisinopril.  - She is advised to monitor her blood pressure at home.  - She has an appointment with Dr. Roth in September.    3. Hyperlipidemia.  - Her lipid panel from 2025 was normal.  - She will continue taking Lipitor.  - No additional lipid panel needed at this time.  - Monitoring will continue as per routine follow-up.    4. Gout.  - She will continue

## 2025-06-18 NOTE — ASSESSMENT & PLAN NOTE
Chronic, at goal (stable), changes made today: REGULAR INSULIN DECREASED TO BID DOSING  CONTINUE MOUNJARO 5 MG  Orders:    POCT glycosylated hemoglobin (Hb A1C) 7.0

## 2025-06-20 ENCOUNTER — RESULTS FOLLOW-UP (OUTPATIENT)
Dept: FAMILY MEDICINE CLINIC | Age: 75
End: 2025-06-20

## 2025-06-27 RX ORDER — BLOOD SUGAR DIAGNOSTIC
STRIP MISCELLANEOUS
Qty: 100 STRIP | Refills: 1 | Status: SHIPPED | OUTPATIENT
Start: 2025-06-27

## 2025-06-28 DIAGNOSIS — R60.0 EDEMA OF BOTH LEGS: ICD-10-CM

## 2025-06-30 RX ORDER — FUROSEMIDE 40 MG/1
40 TABLET ORAL 2 TIMES DAILY
Qty: 180 TABLET | Refills: 0 | Status: SHIPPED | OUTPATIENT
Start: 2025-06-30

## 2025-07-14 ENCOUNTER — TELEPHONE (OUTPATIENT)
Dept: FAMILY MEDICINE CLINIC | Age: 75
End: 2025-07-14

## 2025-07-14 DIAGNOSIS — E11.69 TYPE 2 DIABETES MELLITUS WITH OBESITY (HCC): ICD-10-CM

## 2025-07-14 DIAGNOSIS — E66.9 TYPE 2 DIABETES MELLITUS WITH OBESITY (HCC): ICD-10-CM

## 2025-07-14 RX ORDER — NAPROXEN SODIUM 220 MG
TABLET ORAL
Qty: 270 EACH | Refills: 0 | Status: SHIPPED | OUTPATIENT
Start: 2025-07-14

## 2025-07-14 NOTE — TELEPHONE ENCOUNTER
Patient needs a refill on her needles:    [facility] short  ns 30 g 0.3 ml uf     [facility] - [address] - phone no.[phone]    Please give her a call back.

## 2025-07-29 ENCOUNTER — TELEPHONE (OUTPATIENT)
Dept: FAMILY MEDICINE CLINIC | Age: 75
End: 2025-07-29

## 2025-07-29 DIAGNOSIS — E11.69 TYPE 2 DIABETES MELLITUS WITH OBESITY (HCC): Primary | ICD-10-CM

## 2025-07-29 DIAGNOSIS — E66.9 TYPE 2 DIABETES MELLITUS WITH OBESITY (HCC): Primary | ICD-10-CM

## 2025-07-29 RX ORDER — INSULIN ASPART INJECTION 100 [IU]/ML
INJECTION, SOLUTION SUBCUTANEOUS
Qty: 3 ML | Refills: 1 | Status: SHIPPED | OUTPATIENT
Start: 2025-07-29

## 2025-07-29 NOTE — TELEPHONE ENCOUNTER
Patient called in stating she needs a refill on her FIASP 100 UNIT/ML SOLN- 100 ML INJ 10ML- INJECT 2 UNITS FOR BLOOD SUGAR 4 UNITS -250, 8 UNITS -300, TAKE 3-4 TIMES DAILY.    This was prescribed by someone else, she states Summer knows about it.    [id number] [place], [place] - [address] - [place] [phone] - F [phone]     Please give her a call back.

## 2025-08-07 RX ORDER — BLOOD SUGAR DIAGNOSTIC
STRIP MISCELLANEOUS
Qty: 100 STRIP | Refills: 2 | Status: SHIPPED | OUTPATIENT
Start: 2025-08-07

## 2025-08-29 ENCOUNTER — TELEPHONE (OUTPATIENT)
Dept: FAMILY MEDICINE CLINIC | Age: 75
End: 2025-08-29

## 2025-08-29 DIAGNOSIS — B37.2 YEAST DERMATITIS: ICD-10-CM

## 2025-08-29 DIAGNOSIS — I10 ESSENTIAL HYPERTENSION: ICD-10-CM

## 2025-08-29 RX ORDER — NYSTATIN 100000 [USP'U]/G
POWDER TOPICAL
Qty: 60 G | Refills: 0 | Status: SHIPPED | OUTPATIENT
Start: 2025-08-29

## 2025-08-29 RX ORDER — METOPROLOL SUCCINATE 50 MG/1
50 TABLET, EXTENDED RELEASE ORAL DAILY
Qty: 90 TABLET | Refills: 0 | Status: SHIPPED | OUTPATIENT
Start: 2025-08-29